# Patient Record
Sex: MALE | Race: WHITE | NOT HISPANIC OR LATINO | Employment: UNEMPLOYED | URBAN - METROPOLITAN AREA
[De-identification: names, ages, dates, MRNs, and addresses within clinical notes are randomized per-mention and may not be internally consistent; named-entity substitution may affect disease eponyms.]

---

## 2017-02-23 ENCOUNTER — ALLSCRIPTS OFFICE VISIT (OUTPATIENT)
Dept: OTHER | Facility: OTHER | Age: 27
End: 2017-02-23

## 2017-03-23 ENCOUNTER — ALLSCRIPTS OFFICE VISIT (OUTPATIENT)
Dept: OTHER | Facility: OTHER | Age: 27
End: 2017-03-23

## 2017-04-03 ENCOUNTER — ALLSCRIPTS OFFICE VISIT (OUTPATIENT)
Dept: OTHER | Facility: OTHER | Age: 27
End: 2017-04-03

## 2017-04-03 DIAGNOSIS — Q66.51 CONGENITAL PES PLANUS OF RIGHT FOOT: ICD-10-CM

## 2017-04-12 ENCOUNTER — APPOINTMENT (OUTPATIENT)
Dept: PHYSICAL THERAPY | Facility: REHABILITATION | Age: 27
End: 2017-04-12
Payer: COMMERCIAL

## 2017-04-12 PROCEDURE — 97162 PT EVAL MOD COMPLEX 30 MIN: CPT

## 2017-04-17 ENCOUNTER — GENERIC CONVERSION - ENCOUNTER (OUTPATIENT)
Dept: OTHER | Facility: OTHER | Age: 27
End: 2017-04-17

## 2017-04-20 ENCOUNTER — APPOINTMENT (OUTPATIENT)
Dept: PHYSICAL THERAPY | Facility: REHABILITATION | Age: 27
End: 2017-04-20
Payer: COMMERCIAL

## 2017-04-20 PROCEDURE — 97010 HOT OR COLD PACKS THERAPY: CPT

## 2017-04-20 PROCEDURE — 97140 MANUAL THERAPY 1/> REGIONS: CPT

## 2017-04-20 PROCEDURE — 97110 THERAPEUTIC EXERCISES: CPT

## 2017-04-28 ENCOUNTER — APPOINTMENT (OUTPATIENT)
Dept: PHYSICAL THERAPY | Facility: REHABILITATION | Age: 27
End: 2017-04-28
Payer: COMMERCIAL

## 2017-04-28 PROCEDURE — 97140 MANUAL THERAPY 1/> REGIONS: CPT

## 2017-04-28 PROCEDURE — 97110 THERAPEUTIC EXERCISES: CPT

## 2017-04-28 PROCEDURE — 97010 HOT OR COLD PACKS THERAPY: CPT

## 2017-05-03 ENCOUNTER — APPOINTMENT (OUTPATIENT)
Dept: PHYSICAL THERAPY | Facility: REHABILITATION | Age: 27
End: 2017-05-03
Payer: COMMERCIAL

## 2017-05-03 PROCEDURE — 97140 MANUAL THERAPY 1/> REGIONS: CPT

## 2017-05-03 PROCEDURE — 97010 HOT OR COLD PACKS THERAPY: CPT

## 2017-05-03 PROCEDURE — 97110 THERAPEUTIC EXERCISES: CPT

## 2017-05-10 ENCOUNTER — APPOINTMENT (OUTPATIENT)
Dept: PHYSICAL THERAPY | Facility: REHABILITATION | Age: 27
End: 2017-05-10
Payer: COMMERCIAL

## 2017-05-10 PROCEDURE — 97010 HOT OR COLD PACKS THERAPY: CPT

## 2017-05-10 PROCEDURE — 97110 THERAPEUTIC EXERCISES: CPT

## 2017-05-10 PROCEDURE — 97140 MANUAL THERAPY 1/> REGIONS: CPT

## 2017-05-12 ENCOUNTER — APPOINTMENT (OUTPATIENT)
Dept: PHYSICAL THERAPY | Facility: REHABILITATION | Age: 27
End: 2017-05-12
Payer: COMMERCIAL

## 2017-05-17 ENCOUNTER — APPOINTMENT (OUTPATIENT)
Dept: PHYSICAL THERAPY | Facility: REHABILITATION | Age: 27
End: 2017-05-17
Payer: COMMERCIAL

## 2017-05-17 PROCEDURE — 97140 MANUAL THERAPY 1/> REGIONS: CPT

## 2017-05-17 PROCEDURE — 97110 THERAPEUTIC EXERCISES: CPT

## 2017-05-17 PROCEDURE — 97010 HOT OR COLD PACKS THERAPY: CPT

## 2017-05-19 ENCOUNTER — ALLSCRIPTS OFFICE VISIT (OUTPATIENT)
Dept: OTHER | Facility: OTHER | Age: 27
End: 2017-05-19

## 2017-05-24 ENCOUNTER — APPOINTMENT (OUTPATIENT)
Dept: PHYSICAL THERAPY | Facility: REHABILITATION | Age: 27
End: 2017-05-24
Payer: COMMERCIAL

## 2017-05-24 PROCEDURE — 97110 THERAPEUTIC EXERCISES: CPT

## 2017-05-24 PROCEDURE — 97140 MANUAL THERAPY 1/> REGIONS: CPT

## 2017-05-25 ENCOUNTER — TRANSCRIBE ORDERS (OUTPATIENT)
Dept: ADMINISTRATIVE | Facility: HOSPITAL | Age: 27
End: 2017-05-25

## 2017-05-25 ENCOUNTER — ALLSCRIPTS OFFICE VISIT (OUTPATIENT)
Dept: OTHER | Facility: OTHER | Age: 27
End: 2017-05-25

## 2017-05-25 DIAGNOSIS — M54.16 LUMBAR RADICULOPATHY: Primary | ICD-10-CM

## 2017-05-30 ENCOUNTER — APPOINTMENT (OUTPATIENT)
Dept: PHYSICAL THERAPY | Facility: REHABILITATION | Age: 27
End: 2017-05-30
Payer: COMMERCIAL

## 2017-06-01 ENCOUNTER — APPOINTMENT (OUTPATIENT)
Dept: PHYSICAL THERAPY | Facility: REHABILITATION | Age: 27
End: 2017-06-01
Payer: COMMERCIAL

## 2017-06-07 ENCOUNTER — APPOINTMENT (OUTPATIENT)
Dept: PHYSICAL THERAPY | Facility: REHABILITATION | Age: 27
End: 2017-06-07
Payer: COMMERCIAL

## 2017-06-08 ENCOUNTER — GENERIC CONVERSION - ENCOUNTER (OUTPATIENT)
Dept: OTHER | Facility: OTHER | Age: 27
End: 2017-06-08

## 2017-06-09 ENCOUNTER — APPOINTMENT (OUTPATIENT)
Dept: PHYSICAL THERAPY | Facility: REHABILITATION | Age: 27
End: 2017-06-09
Payer: COMMERCIAL

## 2017-06-09 ENCOUNTER — HOSPITAL ENCOUNTER (OUTPATIENT)
Dept: NEUROLOGY | Facility: HOSPITAL | Age: 27
Discharge: HOME | End: 2017-06-09
Attending: PODIATRIST
Payer: COMMERCIAL

## 2017-06-09 DIAGNOSIS — R20.2 PARESTHESIA OF LEFT FOOT: ICD-10-CM

## 2017-06-09 DIAGNOSIS — M54.16 LUMBAR RADICULOPATHY: ICD-10-CM

## 2017-06-09 PROCEDURE — 95910 NRV CNDJ TEST 7-8 STUDIES: CPT

## 2017-06-09 PROCEDURE — 95885 MUSC TST DONE W/NERV TST LIM: CPT

## 2017-06-12 ENCOUNTER — ALLSCRIPTS OFFICE VISIT (OUTPATIENT)
Dept: OTHER | Facility: OTHER | Age: 27
End: 2017-06-12

## 2017-06-15 ENCOUNTER — ALLSCRIPTS OFFICE VISIT (OUTPATIENT)
Dept: OTHER | Facility: OTHER | Age: 27
End: 2017-06-15

## 2017-06-15 DIAGNOSIS — Z13.6 ENCOUNTER FOR SCREENING FOR CARDIOVASCULAR DISORDERS: ICD-10-CM

## 2017-06-21 ENCOUNTER — APPOINTMENT (OUTPATIENT)
Dept: PHYSICAL THERAPY | Facility: REHABILITATION | Age: 27
End: 2017-06-21
Payer: COMMERCIAL

## 2017-06-21 ENCOUNTER — GENERIC CONVERSION - ENCOUNTER (OUTPATIENT)
Dept: OTHER | Facility: OTHER | Age: 27
End: 2017-06-21

## 2017-06-21 PROCEDURE — 97010 HOT OR COLD PACKS THERAPY: CPT

## 2017-06-21 PROCEDURE — 97110 THERAPEUTIC EXERCISES: CPT

## 2017-06-21 PROCEDURE — 97140 MANUAL THERAPY 1/> REGIONS: CPT

## 2017-06-22 ENCOUNTER — GENERIC CONVERSION - ENCOUNTER (OUTPATIENT)
Dept: OTHER | Facility: OTHER | Age: 27
End: 2017-06-22

## 2017-06-22 LAB
A/G RATIO (HISTORICAL): 1.8 (ref 1.2–2.2)
ALBUMIN SERPL BCP-MCNC: 4.4 G/DL (ref 3.5–5.5)
ALP SERPL-CCNC: 71 IU/L (ref 39–117)
ALT SERPL W P-5'-P-CCNC: 37 IU/L (ref 0–44)
AST SERPL W P-5'-P-CCNC: 24 IU/L (ref 0–40)
BILIRUB SERPL-MCNC: 0.4 MG/DL (ref 0–1.2)
BUN SERPL-MCNC: 15 MG/DL (ref 6–20)
BUN/CREA RATIO (HISTORICAL): 17 (ref 9–20)
CALCIUM SERPL-MCNC: 9.8 MG/DL (ref 8.7–10.2)
CHLORIDE SERPL-SCNC: 99 MMOL/L (ref 96–106)
CHOLEST SERPL-MCNC: 198 MG/DL (ref 100–199)
CO2 SERPL-SCNC: 23 MMOL/L (ref 18–29)
CREAT SERPL-MCNC: 0.86 MG/DL (ref 0.76–1.27)
EGFR AFRICAN AMERICAN (HISTORICAL): 139 ML/MIN/1.73
EGFR-AMERICAN CALC (HISTORICAL): 121 ML/MIN/1.73
GLUCOSE SERPL-MCNC: 91 MG/DL (ref 65–99)
HDLC SERPL-MCNC: 38 MG/DL
INTERPRETATION (HISTORICAL): NORMAL
LDLC SERPL CALC-MCNC: 133 MG/DL (ref 0–99)
POTASSIUM SERPL-SCNC: 4.4 MMOL/L (ref 3.5–5.2)
SODIUM SERPL-SCNC: 139 MMOL/L (ref 134–144)
TOT. GLOBULIN, SERUM (HISTORICAL): 2.4 G/DL (ref 1.5–4.5)
TOTAL PROTEIN (HISTORICAL): 6.8 G/DL (ref 6–8.5)
TRIGL SERPL-MCNC: 134 MG/DL (ref 0–149)

## 2017-06-26 ENCOUNTER — APPOINTMENT (OUTPATIENT)
Dept: PHYSICAL THERAPY | Facility: REHABILITATION | Age: 27
End: 2017-06-26
Payer: COMMERCIAL

## 2017-06-26 PROCEDURE — 97110 THERAPEUTIC EXERCISES: CPT

## 2017-06-26 PROCEDURE — 97140 MANUAL THERAPY 1/> REGIONS: CPT

## 2017-06-30 ENCOUNTER — APPOINTMENT (OUTPATIENT)
Dept: PHYSICAL THERAPY | Facility: REHABILITATION | Age: 27
End: 2017-06-30
Payer: COMMERCIAL

## 2017-06-30 PROCEDURE — 97010 HOT OR COLD PACKS THERAPY: CPT

## 2017-06-30 PROCEDURE — 97140 MANUAL THERAPY 1/> REGIONS: CPT

## 2017-06-30 PROCEDURE — 97110 THERAPEUTIC EXERCISES: CPT

## 2017-07-03 ENCOUNTER — APPOINTMENT (OUTPATIENT)
Dept: PHYSICAL THERAPY | Facility: REHABILITATION | Age: 27
End: 2017-07-03
Payer: COMMERCIAL

## 2017-07-03 PROCEDURE — 97010 HOT OR COLD PACKS THERAPY: CPT

## 2017-07-03 PROCEDURE — 97140 MANUAL THERAPY 1/> REGIONS: CPT

## 2017-07-03 PROCEDURE — 97110 THERAPEUTIC EXERCISES: CPT

## 2017-07-06 ENCOUNTER — APPOINTMENT (OUTPATIENT)
Dept: PHYSICAL THERAPY | Facility: REHABILITATION | Age: 27
End: 2017-07-06
Payer: COMMERCIAL

## 2017-07-06 PROCEDURE — 97110 THERAPEUTIC EXERCISES: CPT

## 2017-07-06 PROCEDURE — 97010 HOT OR COLD PACKS THERAPY: CPT

## 2017-07-06 PROCEDURE — 97140 MANUAL THERAPY 1/> REGIONS: CPT

## 2017-07-11 ENCOUNTER — APPOINTMENT (OUTPATIENT)
Dept: PHYSICAL THERAPY | Facility: REHABILITATION | Age: 27
End: 2017-07-11
Payer: COMMERCIAL

## 2017-07-13 ENCOUNTER — APPOINTMENT (OUTPATIENT)
Dept: PHYSICAL THERAPY | Facility: REHABILITATION | Age: 27
End: 2017-07-13
Payer: COMMERCIAL

## 2017-07-13 PROCEDURE — 97110 THERAPEUTIC EXERCISES: CPT

## 2017-07-13 PROCEDURE — 97140 MANUAL THERAPY 1/> REGIONS: CPT

## 2017-07-13 PROCEDURE — 97010 HOT OR COLD PACKS THERAPY: CPT

## 2017-07-18 ENCOUNTER — APPOINTMENT (OUTPATIENT)
Dept: PHYSICAL THERAPY | Facility: REHABILITATION | Age: 27
End: 2017-07-18
Payer: COMMERCIAL

## 2017-07-18 PROCEDURE — 97010 HOT OR COLD PACKS THERAPY: CPT

## 2017-07-18 PROCEDURE — 97140 MANUAL THERAPY 1/> REGIONS: CPT

## 2017-07-18 PROCEDURE — 97110 THERAPEUTIC EXERCISES: CPT

## 2017-07-20 ENCOUNTER — APPOINTMENT (OUTPATIENT)
Dept: PHYSICAL THERAPY | Facility: REHABILITATION | Age: 27
End: 2017-07-20
Payer: COMMERCIAL

## 2017-07-20 PROCEDURE — 97140 MANUAL THERAPY 1/> REGIONS: CPT

## 2017-07-20 PROCEDURE — 97010 HOT OR COLD PACKS THERAPY: CPT

## 2017-07-20 PROCEDURE — 97110 THERAPEUTIC EXERCISES: CPT

## 2017-07-25 ENCOUNTER — APPOINTMENT (OUTPATIENT)
Dept: PHYSICAL THERAPY | Facility: REHABILITATION | Age: 27
End: 2017-07-25
Payer: COMMERCIAL

## 2017-07-25 PROCEDURE — 97140 MANUAL THERAPY 1/> REGIONS: CPT

## 2017-07-25 PROCEDURE — 97010 HOT OR COLD PACKS THERAPY: CPT

## 2017-07-25 PROCEDURE — 97110 THERAPEUTIC EXERCISES: CPT

## 2017-07-27 ENCOUNTER — ALLSCRIPTS OFFICE VISIT (OUTPATIENT)
Dept: OTHER | Facility: OTHER | Age: 27
End: 2017-07-27

## 2017-07-27 ENCOUNTER — APPOINTMENT (OUTPATIENT)
Dept: PHYSICAL THERAPY | Facility: REHABILITATION | Age: 27
End: 2017-07-27
Payer: COMMERCIAL

## 2017-07-27 PROCEDURE — 97110 THERAPEUTIC EXERCISES: CPT

## 2017-07-27 PROCEDURE — 97010 HOT OR COLD PACKS THERAPY: CPT

## 2017-07-27 PROCEDURE — 97140 MANUAL THERAPY 1/> REGIONS: CPT

## 2017-07-31 ENCOUNTER — APPOINTMENT (OUTPATIENT)
Dept: PHYSICAL THERAPY | Facility: REHABILITATION | Age: 27
End: 2017-07-31
Payer: COMMERCIAL

## 2017-08-02 ENCOUNTER — GENERIC CONVERSION - ENCOUNTER (OUTPATIENT)
Dept: OTHER | Facility: OTHER | Age: 27
End: 2017-08-02

## 2017-08-16 ENCOUNTER — ALLSCRIPTS OFFICE VISIT (OUTPATIENT)
Dept: OTHER | Facility: OTHER | Age: 27
End: 2017-08-16

## 2017-08-21 ENCOUNTER — ALLSCRIPTS OFFICE VISIT (OUTPATIENT)
Dept: OTHER | Facility: OTHER | Age: 27
End: 2017-08-21

## 2017-09-08 ENCOUNTER — GENERIC CONVERSION - ENCOUNTER (OUTPATIENT)
Dept: OTHER | Facility: OTHER | Age: 27
End: 2017-09-08

## 2017-09-12 ENCOUNTER — GENERIC CONVERSION - ENCOUNTER (OUTPATIENT)
Dept: OTHER | Facility: OTHER | Age: 27
End: 2017-09-12

## 2017-09-14 ENCOUNTER — GENERIC CONVERSION - ENCOUNTER (OUTPATIENT)
Dept: OTHER | Facility: OTHER | Age: 27
End: 2017-09-14

## 2017-10-13 ENCOUNTER — GENERIC CONVERSION - ENCOUNTER (OUTPATIENT)
Dept: OTHER | Facility: OTHER | Age: 27
End: 2017-10-13

## 2017-10-13 ENCOUNTER — TRANSCRIBE ORDERS (OUTPATIENT)
Dept: ADMINISTRATIVE | Facility: HOSPITAL | Age: 27
End: 2017-10-13

## 2017-10-13 DIAGNOSIS — M72.2 PLANTAR FASCIAL FIBROMATOSIS: Primary | ICD-10-CM

## 2017-10-24 ENCOUNTER — HOSPITAL ENCOUNTER (OUTPATIENT)
Dept: RADIOLOGY | Facility: HOSPITAL | Age: 27
Discharge: HOME | End: 2017-10-24
Attending: PODIATRIST
Payer: COMMERCIAL

## 2017-10-24 ENCOUNTER — GENERIC CONVERSION - ENCOUNTER (OUTPATIENT)
Dept: OTHER | Facility: OTHER | Age: 27
End: 2017-10-24

## 2017-10-24 DIAGNOSIS — M72.2 PLANTAR FASCIAL FIBROMATOSIS: ICD-10-CM

## 2017-10-24 PROCEDURE — 73721 MRI JNT OF LWR EXTRE W/O DYE: CPT

## 2017-10-27 ENCOUNTER — ALLSCRIPTS OFFICE VISIT (OUTPATIENT)
Dept: OTHER | Facility: OTHER | Age: 27
End: 2017-10-27

## 2017-10-27 ENCOUNTER — GENERIC CONVERSION - ENCOUNTER (OUTPATIENT)
Dept: OTHER | Facility: OTHER | Age: 27
End: 2017-10-27

## 2017-10-28 NOTE — PROGRESS NOTES
Assessment  1  Achilles tendinitis of left lower extremity (726 71) (M76 62)  2  GE reflux (530 81) (K21 9)  3  Obsessive compulsive disorder (300 3) (F42 9)  4  BMI 38 0-38 9,adult (V85 38) (Z68 38)  5  Bony exostosis (726 91) (M89 8X9)1   6  Obesity (BMI 30-39 9) (278 00) (E66 9)     1 Amended By: Sammi Soria ; Oct 27 2017 10:07 PM EST    Discussion/Summary  Surgical Clearance: He is at a LOW risk from a cardiovascular standpoint at this time without any additional cardiac testing  Reevaluation needed, if he should present with symptoms prior to surgery/procedure  Comments:  no labs reviewed  Surgical clearance faxed to Dr Silvia Jernigan   Additional medical information:  holding vit D for now  The patient was counseled regarding risk factor reductions,-- impressions  take usual am rx meds with water on day of surgery, nsaids should be held unless ok with surgeon   Possible side effects of new medications were reviewed with the patient/guardian today  The treatment plan was reviewed with the patient/guardian  The patient/guardian understands and agrees with the treatment plan      Chief Complaint  pt present for Pre-op clearance for surgery on Left ankle  ac/cma      History of Present Illness  Pre-Op Visit (Brief): The patient is being seen for a preoperative visit-- and-- left ankle surgery, Dr Silvia Jernigan, 1w from now  Surgical Risk Assessment:   Prior Anesthesia: He had no prior anesthesia  Exercise Capacity: able to walk four blocks without symptoms-- and-- able to walk two flights of stairs without symptoms  Lifestyle Factors: denies alcohol use and denies tobacco use  Symptoms: no easy bleeding,-- no easy bruising,-- no chest pain-- and-- no dyspnea  HPI: chronic left ankle painnormal function/walking causing discomfortpain from the condition      Review of Systems    Constitutional: no fever  Cardiovascular: no chest pain  Respiratory: no shortness of breath  Active Problems  1   Achilles tendinitis of left lower extremity (726 71) (M76 62)  2  Acne (706 1) (L70 9)  3  ADD (attention deficit disorder) without hyperactivity (314 00) (F98 8)  4  Allergic rhinitis (477 9) (J30 9)  5  Aphthous ulcer (528 2) (K12 0)  6  Arthralgia of left foot (719 47) (M25 572)  7  Bony exostosis (726 91) (M89 8X9)  8  Congenital pes planus of left foot (754 61) (Q66 52)  9  Congenital pes planus of right foot (754 61) (Q66 51)  10  Depression, unspecified depression type (311) (F32 9)  11  Encounter for hearing evaluation (V72 19) (Z01 10)  12  Epigastric discomfort (789 06) (R10 13)  13  External hemorrhoids (455 3) (K64 4)  14  GE reflux (530 81) (K21 9)  15  GERD (gastroesophageal reflux disease) (530 81) (K21 9)  16  Immunization due (V05 9) (Z23)  17  Lumbar radiculopathy (724 4) (M54 16)  18  Obsessive compulsive disorder (300 3) (F42 9)  19  Plantar fibromatosis (728 71) (M72 2)  20  Screening for cardiovascular, respiratory, and genitourinary diseases    (V81 2,V81 4,V81 6) (Z13 6,Z13 83,Z13 89)  21  Screening for diabetes mellitus (DM) (V77 1) (Z13 1)  22  Sleep apnea (780 57) (G47 30)  23  Tarsal tunnel syndrome of left side (355 5) (G57 52)  24  Thyroid disorder screening (V77 0) (Z13 29)  25   Well adult on routine health check (V70 0) (Z00 00)    Past Medical History   · History of Acquired ankle/foot deformity (736 70) (M21 969)   · Acute asthmatic bronchitis (493 90) (J45 909)   · History of Acute maxillary sinusitis (461 0) (J01 00)   · History of Acute upper respiratory infection (465 9) (J06 9)   · History of Change in hearing (389 9) (H91 90)   · History of Closed nondisplaced avulsion fracture of tuberosity of left calcaneus, initial  encounter (825 0) (M13 954O)   · History of Foreign Body In The Foot (917 6)   · History of acne (V13 3) (Z87 2)   · History of acute bronchitis (V12 69) (Z87 09)   · History of acute gastritis (V12 70) (Z87 19)   · History of acute pharyngitis (V12 69) (Z87 09)   · History of acute sinusitis (V12 69) (Z87 09)   · History of acute sinusitis (V12 69) (Z87 09)   · History of allergic rhinitis (V12 69) (Z87 09)   · History of allergic rhinitis (V12 69) (Z87 09)   · History of epistaxis (V12 69) (D20 168)   · History of impacted cerumen (V12 49) (Z86 69)   · History of nausea and vomiting (V12 79) (M24 209)   · History of oral aphthous ulcers (V12 79) (Z87 19)   · History of tinea corporis (V12 09) (Z86 19)   · History of Impacted cerumen, unspecified laterality (380 4) (H61 20)   · History of Infective otitis externa, unspecified laterality (380 10) (H60 399)   · History of Joint pain, knee (719 46) (M25 569)   · History of Left foot pain (729 5) (M79 672)   · History of Noninfectious Dermatological Conditions (709 9)   · History of Otitis media, unspecified laterality   · History of Otitis media, unspecified laterality   · History of Plantar fibromatosis (728 71) (M72 2)   · 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)   · History of Tarsal tunnel syndrome of left side (355 5) (G57 52)    Surgical History   · Denied: History of Reported Prior Surgical / Procedural History    Family History  Mother    · Family history of Allergic Rhinitis   · Denied: Family history of colon cancer   · Denied: Family history of colonic polyps   · Denied: Family history of liver disease  Father    · Denied: Family history of colon cancer   · Denied: Family history of colonic polyps   · Denied: Family history of liver disease   · Family history of Gout (V18 19)   · Family history of Hypertension (V17 49)  Maternal Grandmother    · Family history of colitis (V18 59) (Z83 79)   · Family history of colonic polyps (V18 51) (Z83 71)  Maternal Grandfather    · Family history of colitis (V18 59) (Z83 79)  Family History    · Family history of Cancer   · Family history of Diabetes Mellitus (V18 0)    The family history was reviewed and updated today         Social History   · Feels safe at home   · Never A Smoker   · No alcohol use  The social history was reviewed and updated today  Current Meds  1  B Complex Oral Capsule; once daily; Therapy: 23VLQ4251 to Recorded  2  Beano Oral Tablet; two before each meals; Therapy: 28QDZ2665 to Recorded  3  Budesonide SUSP; INHALE ML Daily; Therapy: (Recorded:26Oct2017) to Recorded  4  Etodolac 400 MG Oral Tablet; TAKE 1 TABLET TWICE DAILY WITH MEALS; Therapy: 96FMS1083 to (Evaluate:47Rxq5108)  Requested for: 24Oct2017; Last   Rx:18Gls7437 Ordered  5  FluvoxaMINE Maleate  MG Oral Capsule Extended Release 24 Hour; 1 Every Day   At Bedtime; Therapy: 56Bst9639 to (Last Rx:12Oct2013)  Requested for: 24Oct2017 Ordered  6  HydrOXYzine HCl - 25 MG Oral Tablet; TAKE 1 TABLET TWICE DAILY; Therapy: 94EFB8581 to (Evaluate:30Oct2017)  Requested for: 27Oct2017; Last   Rx:27Oct2017 Ordered  7  LevoFLOXacin 500 MG Oral Tablet; TAKE 1 TABLET DAILY; Therapy: 18WVB5771 to (Markus Chaney)  Requested for: 04MXO8489; Last   Rx:27Oct2017 Ordered  8  Montelukast Sodium 10 MG Oral Tablet; TAKE ONE TABLET BY MOUTH ONCE DAILY; Therapy: 48VRQ5801 to (Evaluate:22Jan2018)  Requested for: 24Oct2017; Last   Rx:40Xlo0716 Ordered  9  Multi-Day Oral Tablet; TAKE 1 TABLET DAILY; Therapy: 76WYN9423 to Recorded  10  Omeprazole 40 MG Oral Capsule Delayed Release; TAKE ONE CAPSULE BY MOUTH    ONCE DAILY BEFORE  A  MEAL; Therapy: 56QSP8930 to (Evaluate:73Lcx1261)  Requested for: 24Oct2017; Last    Rx:24Oct2017 Ordered  11  Osteo Bi-Flex Regular Strength TABS; Take 1 tablet daily; Therapy: (Recorded:26Oct2017) to Recorded  12  Oxycodone-Acetaminophen  MG Oral Tablet; TAKE 1 TABLET 4 TIMES DAILY AS    NEEDED FOR PAIN;    Therapy: 33QTM4556 to (Evaluate:06Nov2017); Last Rx:27Oct2017 Ordered  13  Oxymetazoline HCl 0 05 % SOLN; USE AS DIRECTED; Therapy: (Recorded:26Oct2017) to Recorded  14  Sleep Aid CAPS; take 1 capsule daily;     Therapy: (Recorded:26Oct2017) to Recorded  15  Vitamin C TABS; TAKE 1 TABLET DAILY; Therapy: (Recorded:61Yll6645) to Recorded  16  Vitamin D3 1000 UNIT Oral Capsule; take 1 capsule daily; Therapy: (Recorded:14Otk7405) to Recorded    Allergies  1  Penicillins  2  Sulfa Drugs  3  Seasonal    Vitals   Recorded: 98UNE9931 12:17PM   Temperature 97 8 F   Heart Rate 72   Respiration 16   Systolic 975   Diastolic 86   Height 5 ft 7 in   Weight 248 lb    BMI Calculated 38 84   BSA Calculated 2 22     Physical Exam    Constitutional   General appearance: No acute distress, well appearing and well nourished  Eyes   Conjunctiva and lids: No erythema, swelling or discharge  Pupils and irises: Equal, round, reactive to light  Ears, Nose, Mouth, and Throat   External inspection of ears and nose: Normal     Otoscopic examination: Tympanic membranes translucent with normal light reflex  Canals patent without erythema  Nasal mucosa, septum, and turbinates: Normal without edema or erythema  Lips, teeth, and gums: Normal, good dentition  Oropharynx: Normal with no erythema, edema, exudate or lesions  Neck   Neck: Supple, symmetric, trachea midline, no masses  Pulmonary   Respiratory effort: No increased work of breathing or signs of respiratory distress  Auscultation of lungs: Clear to auscultation  Cardiovascular   Auscultation of heart: Normal rate and rhythm, normal S1 and S2, no murmurs  Carotid pulses: 2+ bilaterally  Pedal pulses: 2+ bilaterally  Examination of extremities for edema and/or varicosities: Normal     Abdomen   Abdomen: Abnormal   The abdomen was obese  Lymphatic   Palpation of lymph nodes in neck: No lymphadenopathy  Musculoskeletal   Gait and station: Normal     Inspection/palpation of digits and nails: Normal without clubbing or cyanosis  Inspection/palpation of joints, bones, and muscles: Abnormal  -- right heel deformity     Range of motion: Normal     Muscle strength/tone: Normal     Skin Skin and subcutaneous tissue: Normal without rashes or lesions  Palpation of skin and subcutaneous tissue: Normal turgor  Neurologic   Reflexes: 2+ and symmetric  Sensation: No sensory loss  Psychiatric   Judgment and insight: Normal     Mood and affect: Normal        End of Encounter Meds  1  Budesonide SUSP; INHALE ML Daily; Therapy: (Recorded:26Oct2017) to Recorded  2  Montelukast Sodium 10 MG Oral Tablet; TAKE ONE TABLET BY MOUTH ONCE DAILY; Therapy: 41AJW6024 to (Evaluate:22Jan2018)  Requested for: 09ZBL5939; Last   Rx:66Qcw0297 Ordered  3  Oxymetazoline HCl 0 05 % SOLN; USE AS DIRECTED; Therapy: (Recorded:26Oct2017) to Recorded  4  Etodolac 400 MG Oral Tablet; TAKE 1 TABLET TWICE DAILY WITH MEALS; Therapy: 24VXO0275 to (Evaluate:28Sep2017)  Requested for: 24Oct2017; Last   Rx:08Sep2017 Ordered  5  HydrOXYzine HCl - 25 MG Oral Tablet; TAKE 1 TABLET TWICE DAILY; Therapy: 86QIV4602 to (Evaluate:30Oct2017)  Requested for: 27Oct2017; Last   Rx:27Oct2017 Ordered  6  LevoFLOXacin 500 MG Oral Tablet; TAKE 1 TABLET DAILY; Therapy: 21HBP4752 to (Echo Stiles)  Requested for: 84JGM2215; Last   Rx:27Oct2017 Ordered  7  Oxycodone-Acetaminophen  MG Oral Tablet; TAKE 1 TABLET 4 TIMES DAILY AS   NEEDED FOR PAIN;   Therapy: 39PXI9631 to (Evaluate:06Nov2017); Last Rx:27Oct2017 Ordered  8  Omeprazole 40 MG Oral Capsule Delayed Release; TAKE ONE CAPSULE BY MOUTH   ONCE DAILY BEFORE  A  MEAL; Therapy: 23IVA7744 to (Evaluate:82Vhc8844)  Requested for: 24Oct2017; Last   Rx:24Oct2017 Ordered  9  B Complex Oral Capsule; once daily; Therapy: 23RHA6432 to Recorded  10  Beano Oral Tablet; two before each meals; Therapy: 11WAA3157 to Recorded  11  Multi-Day Oral Tablet; TAKE 1 TABLET DAILY; Therapy: 45ILZ2364 to Recorded  12  Osteo Bi-Flex Regular Strength TABS; Take 1 tablet daily; Therapy: (Recorded:26Oct2017) to Recorded  13  Sleep Aid CAPS; take 1 capsule daily;     Therapy: ([phone] 15) to Recorded  14  Vitamin C TABS; TAKE 1 TABLET DAILY; Therapy: (Recorded:26Oct2017) to Recorded  15  Vitamin D3 1000 UNIT Oral Capsule; take 1 capsule daily; Therapy: (Recorded:26Oct2017) to Recorded  16  FluvoxaMINE Maleate  MG Oral Capsule Extended Release 24 Hour (Luvox CR);    1 Every Day At Bedtime; Therapy: 18Tbc0449 to (Last Rx:88Owr2447)  Requested for: 24Oct2017 Ordered    Future Appointments    Date/Time Provider Specialty Site   11/03/2017 01:00 PM Bebeto Loera DPM Podiatry [facility]   11/07/2017 11:00 AM Bebeto Loera DPM [facility]   10/31/2017 09:00 AM TERRENCE Davis   Gastroenterology [facility]     Signatures   Electronically signed by : Yvette Díaz DO; Oct 27 2017 10:08PM EST                       (Author)

## 2017-10-30 ENCOUNTER — APPOINTMENT (OUTPATIENT)
Dept: LAB | Facility: HOSPITAL | Age: 27
End: 2017-10-30
Attending: PODIATRIST
Payer: COMMERCIAL

## 2017-10-30 ENCOUNTER — APPOINTMENT (OUTPATIENT)
Dept: PREADMISSION TESTING | Facility: HOSPITAL | Age: 27
End: 2017-10-30
Payer: COMMERCIAL

## 2017-10-30 ENCOUNTER — ANESTHESIA EVENT (OUTPATIENT)
Dept: GASTROENTEROLOGY | Facility: AMBULARY SURGERY CENTER | Age: 27
End: 2017-10-30
Payer: COMMERCIAL

## 2017-10-30 ENCOUNTER — TRANSCRIBE ORDERS (OUTPATIENT)
Dept: ADMINISTRATIVE | Facility: HOSPITAL | Age: 27
End: 2017-10-30

## 2017-10-30 VITALS — WEIGHT: 245 LBS | BODY MASS INDEX: 38.45 KG/M2 | HEIGHT: 67 IN

## 2017-10-30 DIAGNOSIS — Z01.818 ENCOUNTER FOR PREADMISSION TESTING: Primary | ICD-10-CM

## 2017-10-30 LAB
BASOPHILS # BLD AUTO: 0 THOUSANDS/ΜL (ref 0–0.1)
BASOPHILS NFR BLD AUTO: 0 % (ref 0–1)
EOSINOPHIL # BLD AUTO: 0.1 THOUSAND/ΜL (ref 0–0.61)
EOSINOPHIL NFR BLD AUTO: 1 % (ref 0–6)
ERYTHROCYTE [DISTWIDTH] IN BLOOD BY AUTOMATED COUNT: 13.1 % (ref 11.6–15.1)
HCT VFR BLD AUTO: 47.1 % (ref 42–52)
HGB BLD-MCNC: 15.4 G/DL (ref 14–18)
LYMPHOCYTES # BLD AUTO: 2.5 THOUSANDS/ΜL (ref 0.6–4.47)
LYMPHOCYTES NFR BLD AUTO: 30 % (ref 14–44)
MCH RBC QN AUTO: 30.5 PG (ref 27–31)
MCHC RBC AUTO-ENTMCNC: 32.8 G/DL (ref 31.4–37.4)
MCV RBC AUTO: 93 FL (ref 82–98)
MONOCYTES # BLD AUTO: 0.8 THOUSAND/ΜL (ref 0.17–1.22)
MONOCYTES NFR BLD AUTO: 9 % (ref 4–12)
NEUTROPHILS # BLD AUTO: 5.1 THOUSANDS/ΜL (ref 1.85–7.62)
NEUTS SEG NFR BLD AUTO: 60 % (ref 43–75)
NRBC BLD AUTO-RTO: 0 /100 WBCS
PLATELET # BLD AUTO: 268 THOUSANDS/UL (ref 130–400)
PMV BLD AUTO: 8 FL (ref 8.9–12.7)
RBC # BLD AUTO: 5.05 MILLION/UL (ref 4.7–6.1)
WBC # BLD AUTO: 8.5 THOUSAND/UL (ref 4.8–10.8)

## 2017-10-30 PROCEDURE — 36415 COLL VENOUS BLD VENIPUNCTURE: CPT | Performed by: PODIATRIST

## 2017-10-30 PROCEDURE — 85025 COMPLETE CBC W/AUTO DIFF WBC: CPT | Performed by: PODIATRIST

## 2017-10-30 RX ORDER — MULTIVIT WITH MINERALS/LUTEIN
1000 TABLET ORAL DAILY
COMMUNITY
End: 2018-06-19

## 2017-10-30 RX ORDER — FLUVOXAMINE MALEATE 150 MG/1
CAPSULE, EXTENDED RELEASE ORAL
COMMUNITY
End: 2018-03-06

## 2017-10-30 RX ORDER — ALPHA-D-GALACTOSIDASE
TABLET ORAL
COMMUNITY
End: 2018-06-19

## 2017-10-30 RX ORDER — OXYMETAZOLINE HYDROCHLORIDE 0.05 G/100ML
2 SPRAY NASAL 2 TIMES DAILY
COMMUNITY

## 2017-10-30 RX ORDER — MONTELUKAST SODIUM 10 MG/1
10 TABLET ORAL
COMMUNITY
End: 2018-02-05 | Stop reason: SDUPTHER

## 2017-10-30 RX ORDER — OMEPRAZOLE 40 MG/1
40 CAPSULE, DELAYED RELEASE ORAL EVERY MORNING
COMMUNITY
End: 2018-07-17

## 2017-10-30 RX ORDER — DIPHENOXYLATE HYDROCHLORIDE AND ATROPINE SULFATE 2.5; .025 MG/1; MG/1
1 TABLET ORAL DAILY
COMMUNITY
End: 2018-06-19

## 2017-10-30 RX ORDER — VITAMIN B COMPLEX
1 CAPSULE ORAL DAILY
COMMUNITY
End: 2018-06-19

## 2017-10-30 NOTE — PRE-PROCEDURE INSTRUCTIONS
Pre-Surgery Instructions:   Medication Instructions    Alpha-D-Galactosidase (BEANO) TABS Instructed patient per Anesthesia Guidelines   Ascorbic Acid (VITAMIN C) 1000 MG tablet Instructed patient per Anesthesia Guidelines   b complex vitamins capsule Instructed patient per Anesthesia Guidelines   budesonide (RINOCORT AQUA) 32 MCG/ACT nasal spray Instructed patient per Anesthesia Guidelines   Cholecalciferol (VITAMIN D3) 2000 units CHEW Instructed patient per Anesthesia Guidelines   fexofenadine (ALLEGRA) 30 MG tablet Instructed patient per Anesthesia Guidelines   Fluvoxamine Maleate (LUVOX CR) 150 MG CP24 Instructed patient per Anesthesia Guidelines   Misc Natural Products (OSTEO BI-FLEX JOINT SHIELD PO) Instructed patient per Anesthesia Guidelines   montelukast (SINGULAIR) 10 mg tablet Instructed patient per Anesthesia Guidelines   multivitamin (THERAGRAN) TABS Instructed patient per Anesthesia Guidelines   NON FORMULARY Instructed patient per Anesthesia Guidelines   omeprazole (PriLOSEC) 40 MG capsule Instructed patient per Anesthesia Guidelines   oxymetazoline (AFRIN) 0 05 % nasal spray Instructed patient per Anesthesia Guidelines  Pre op instructions reviewed with pt  Instructed to take omeprazole a m of surgery  Crutch training implemented by PT Socorro Yip, pt has crutches at home   geremias Martinez [phone]  My Surgical Experience    The following information was developed to assist you to prepare for your operation  What do I need to do before coming to the hospital?   Arrange for a responsible person to drive you to and from the hospital    Arrange care for your children at home  Children are not allowed in the recovery areas of the hospital   Plan to wear clothing that is easy to put on and take off   If you are having shoulder surgery, wear a shirt that buttons or zippers in the front  Bathing  o Shower the evening before and the morning of your surgery with an antibacterial soap  Please refer to the Pre Op Showering Instructions for Surgery Patients Sheet   o Remove nail polish and all body piercing jewelry  o Do not shave any body part for at least 24 hours before surgery-this includes face, arms, legs and upper body  Food  o Nothing to eat or drink after midnight the night before your surgery  This includes candy and chewing gum  o Exception: If your surgery is after 12:00pm (noon), you may have clear liquids such as 7-Up®, ginger ale, apple or cranberry juice, Jell-O®, water, or clear broth until 8:00 am  o Do not drink milk or juice with pulp on the morning before surgery  o Do not drink alcohol 24 hours before surgery  Medicine  o Follow instructions you received from your surgeon about which medicines you may take on the day of surgery  o If instructed to take medicine on the morning of surgery, take pills with just a small sip of water  Call your prescribing doctor for specific information on what to do if you take insulin    What should I bring to the hospital?    Bring:  Swetha Shelling or a walker, if you have them, for foot or knee surgery   A list of the daily medicines, vitamins, minerals, herbals and nutritional supplements you take  Include the dosages of medicines and the time you take them each day   Glasses, dentures or hearing aids   Minimal clothing; you will be wearing hospital sleepwear   Photo ID; required to verify your identity   If you have a Living Will or Power of , bring a copy of the documents   If you have an ostomy, bring an extra pouch and any supplies you use    Do not bring   Medicines or inhalers   Money, valuables or jewelry    What other information should I know about the day of surgery?  Notify your surgeons if you develop a cold, sore throat, cough, fever, rash or any other illness     Report to the Ambulatory Surgical/Same Day Surgery Unit   You will be instructed to stop at Registration only if you have not been pre-registered   Inform your  fi they do not stay that they will be asked by the staff to leave a phone number where they can be reached   Be available to be reached before surgery  In the event the operating room schedule changes, you may be asked to come in earlier or later than expected    *It is important to tell your doctor and others involved in your health care if you are taking or have been taking any non-prescription drugs, vitamins, minerals, herbals or other nutritional supplements   Any of these may interact with some food or medicines and cause a reaction

## 2017-10-30 NOTE — ANESTHESIA PREPROCEDURE EVALUATION
Insomnia    Seasonal allergies    GERD (gastroesophageal reflux disease)    OCD (obsessive compulsive disorder)    Irritable bowel syndrome        Review of Systems/Medical History  Patient summary reviewed  Chart reviewed      Cardiovascular   Pulmonary       GI/Hepatic    GERD ,             Endo/Other     GYN       Hematology   Musculoskeletal  Obesity  morbid obesity,        Neurology   Psychology     Comment: OCD (obsessive compulsive disorder)         Physical Exam    Airway    Mallampati score: II  TM Distance: >3 FB  Neck ROM: full     Dental       Cardiovascular  Rhythm: regular, Rate: normal,     Pulmonary  Breath sounds clear to auscultation,     Other Findings        Anesthesia Plan  ASA Score- 2       Anesthesia Type- IV sedation with anesthesia with ASA Monitors  Additional Monitors:   Airway Plan:           Induction- intravenous  Informed Consent- Anesthetic plan and risks discussed with patient

## 2017-10-30 NOTE — PRE-PROCEDURE INSTRUCTIONS
Pre-Surgery Instructions:   Medication Instructions    Alpha-D-Galactosidase [facility]) TABS Patient was instructed by Physician and understands   Ascorbic Acid (VITAMIN C) 1000 MG tablet Patient was instructed by Physician and understands   b complex vitamins capsule Patient was instructed by Physician and understands   budesonide (RINOCORT AQUA) 32 MCG/ACT nasal spray Patient was instructed by Physician and understands   Cholecalciferol (VITAMIN D3) 2000 units CHEW Patient was instructed by Physician and understands   fexofenadine (ALLEGRA) 30 MG tablet Patient was instructed by Physician and understands   Fluvoxamine Maleate (LUVOX CR) 150 MG CP24 Patient was instructed by Physician and understands   Misc Natural Products (OSTEO BI-FLEX JOINT SHIELD PO) Patient was instructed by Physician and understands   montelukast (SINGULAIR) 10 mg tablet Patient was instructed by Physician and understands   multivitamin [facility]) TABS Patient was instructed by Physician and understands   NON FORMULARY Patient was instructed by Physician and understands   omeprazole (PriLOSEC) 40 MG capsule Patient was instructed by Physician and understands   oxymetazoline (AFRIN) 0 05 % nasal spray Patient was instructed by Physician and understands

## 2017-10-31 ENCOUNTER — HOSPITAL ENCOUNTER (OUTPATIENT)
Facility: AMBULARY SURGERY CENTER | Age: 27
Setting detail: OUTPATIENT SURGERY
Discharge: HOME | End: 2017-10-31
Attending: INTERNAL MEDICINE | Admitting: INTERNAL MEDICINE
Payer: COMMERCIAL

## 2017-10-31 ENCOUNTER — GENERIC CONVERSION - ENCOUNTER (OUTPATIENT)
Dept: OTHER | Facility: OTHER | Age: 27
End: 2017-10-31

## 2017-10-31 ENCOUNTER — ANESTHESIA (OUTPATIENT)
Dept: GASTROENTEROLOGY | Facility: AMBULARY SURGERY CENTER | Age: 27
End: 2017-10-31
Payer: COMMERCIAL

## 2017-10-31 VITALS
HEART RATE: 84 BPM | DIASTOLIC BLOOD PRESSURE: 54 MMHG | SYSTOLIC BLOOD PRESSURE: 119 MMHG | TEMPERATURE: 98.6 F | RESPIRATION RATE: 18 BRPM | OXYGEN SATURATION: 98 %

## 2017-10-31 DIAGNOSIS — K21.9 GASTRO-ESOPHAGEAL REFLUX DISEASE WITHOUT ESOPHAGITIS: ICD-10-CM

## 2017-10-31 PROCEDURE — 88342 IMHCHEM/IMCYTCHM 1ST ANTB: CPT | Performed by: INTERNAL MEDICINE

## 2017-10-31 PROCEDURE — 88305 TISSUE EXAM BY PATHOLOGIST: CPT | Performed by: INTERNAL MEDICINE

## 2017-10-31 RX ORDER — SODIUM CHLORIDE, SODIUM LACTATE, POTASSIUM CHLORIDE, CALCIUM CHLORIDE 600; 310; 30; 20 MG/100ML; MG/100ML; MG/100ML; MG/100ML
125 INJECTION, SOLUTION INTRAVENOUS CONTINUOUS
Status: DISCONTINUED | OUTPATIENT
Start: 2017-10-31 | End: 2017-10-31 | Stop reason: HOSPADM

## 2017-10-31 RX ORDER — PROPOFOL 10 MG/ML
INJECTION, EMULSION INTRAVENOUS AS NEEDED
Status: DISCONTINUED | OUTPATIENT
Start: 2017-10-31 | End: 2017-10-31 | Stop reason: SURG

## 2017-10-31 RX ADMIN — PROPOFOL 50 MG: 10 INJECTION, EMULSION INTRAVENOUS at 09:13

## 2017-10-31 RX ADMIN — PROPOFOL 100 MG: 10 INJECTION, EMULSION INTRAVENOUS at 09:08

## 2017-10-31 RX ADMIN — SODIUM CHLORIDE, SODIUM LACTATE, POTASSIUM CHLORIDE, AND CALCIUM CHLORIDE: .6; .31; .03; .02 INJECTION, SOLUTION INTRAVENOUS at 09:05

## 2017-10-31 RX ADMIN — SODIUM CHLORIDE, SODIUM LACTATE, POTASSIUM CHLORIDE, AND CALCIUM CHLORIDE 125 ML/HR: .6; .31; .03; .02 INJECTION, SOLUTION INTRAVENOUS at 08:28

## 2017-10-31 NOTE — H&P
History and Physical -  [facility]  Barbara High 33 y o  male MRN: [id number]    HPI: Barbara High is a 33y o  year old male who presents with chronic GERD  Review of Systems    Historical Information   Past Medical History:   Diagnosis Date    GERD (gastroesophageal reflux disease)     Insomnia     Irritable bowel syndrome     OCD (obsessive compulsive disorder)     Seasonal allergies      Past Surgical History:   Procedure Laterality Date    NO PAST SURGERIES       Social History   History   Alcohol Use No     History   Drug Use No     History   Smoking Status    Never Smoker   Smokeless Tobacco    Never Used     Family History   Problem Relation Age of Onset    Kidney disease Mother     Thyroid disease Mother     Hypertension Father     Kidney disease Father     Gout Father     No Known Problems Sister        Meds/Allergies     Prescriptions Prior to Admission   Medication    Alpha-D-Galactosidase (BEANO) TABS    Ascorbic Acid (VITAMIN C) 1000 MG tablet    b complex vitamins capsule    budesonide (RINOCORT AQUA) 32 MCG/ACT nasal spray    Cholecalciferol (VITAMIN D3) 2000 units CHEW    fexofenadine (ALLEGRA) 30 MG tablet    Fluvoxamine Maleate (LUVOX CR) 150 MG CP24    Misc Natural Products (OSTEO BI-FLEX JOINT SHIELD PO)    montelukast (SINGULAIR) 10 mg tablet    multivitamin (THERAGRAN) TABS    NON FORMULARY    omeprazole (PriLOSEC) 40 MG capsule    oxymetazoline (AFRIN) 0 05 % nasal spray       Allergies   Allergen Reactions    Acetazolamide     Penicillins Hives    Sulfa Antibiotics GI Intolerance       Objective     Blood pressure 132/76, pulse 77, temperature 98 6 °F (37 °C), temperature source Tympanic, resp  rate 18, SpO2 97 %  PHYSICAL EXAM    Gen: NAD  CV: RRR  CHEST: Clear  ABD: soft, NT/ND  EXT: no edema  Neuro: AAO      ASSESSMENT/PLAN:  This is a 33y o  year old male here for chronic GERD       PLAN:   Procedure: Mich Carrasco

## 2017-10-31 NOTE — DISCHARGE INSTRUCTIONS
Discharge home  Resume regular diet  Resume home medications  Follow up biopsy results  Continue reflux lifestyle modification  Follow up in the office in 6 months  Call with any abdominal pain, bleeding, fevers

## 2017-10-31 NOTE — OP NOTE
ESOPHAGOGASTRODUODENOSCOPY    PROCEDURE: EGD/ Biopsy    INDICATIONS: GERD    POST-OP DIAGNOSIS: See the impression below    SEDATION: Monitored anesthesia care, check anesthesia records    PHYSICAL EXAM:    Vitals:    10/31/17 0818   BP: 132/76   Pulse: 77   Resp: 18   Temp: 98 6 °F (37 °C)   SpO2: 97%    There is no height or weight on file to calculate BMI  General: NAD  Heart: S1 & S2 normal, RRR  Lungs: CTA, No rales or rhonchi  Abdomen: Soft, nontender, nondistended, good bowel sounds    CONSENT:  Informed consent was obtained for the procedure, including sedation after explaining the risks and benefits of the procedure  Risks including but not limited to bleeding, perforation, infection, aspiration were discussed in detail  Also explained about less than 100% sensitivity with the exam and other alternatives  PREPARATION:   EKG tracing, pulse oximetry, blood pressure were monitored throughout the procedure  Patient was identified by myself both verbally and by visual inspection of ID band  DESCRIPTION:   Patient was placed in the left lateral decubitus position and was sedated with the above medication  The gastroscope was introduced in to the oropharynx and the esophagus was intubated under direct visualization  Scope was passed down the esophagus up to 2nd part of the duodenum  A careful inspection was made as the gastroscope was withdrawn, including a retroflexed view of the stomach; findings and interventions are described below  FINDINGS:    #1  Esophagus and GEJ- mild reflux esophagitis, no evidence of Tabor's esophagus    #2  Stomach- in mild antral gastritis, biopsies taken for H pylori  Normal retroflexion    #3   Duodenum- nodular duodenum, multiple biopsies taken  Normal 2nd portion of the duodenum         IMPRESSIONS:      Nodularity of the duodenal bulb, biopsied  Antral gastritis, biopsied  Normal retroflexion  Mild esophagitis    RECOMMENDATIONS:     Discharge home  Resume regular diet  Resume home medications  Follow up biopsy results  Continue reflux lifestyle modification  Follow up in the office in 6 months  Call with any abdominal pain, bleeding, fevers    COMPLICATIONS:  None; patient tolerated the procedure well            DISPOSITION: PACU           CONDITION: Stable

## 2017-11-03 ENCOUNTER — ANESTHESIA (OUTPATIENT)
Dept: PERIOP | Facility: HOSPITAL | Age: 27
End: 2017-11-03
Payer: COMMERCIAL

## 2017-11-03 ENCOUNTER — HOSPITAL ENCOUNTER (OUTPATIENT)
Facility: AMBULARY SURGERY CENTER | Age: 27
Setting detail: OUTPATIENT SURGERY
Discharge: HOME | End: 2017-11-03
Attending: PODIATRIST | Admitting: PODIATRIST
Payer: COMMERCIAL

## 2017-11-03 ENCOUNTER — ANESTHESIA EVENT (OUTPATIENT)
Dept: PERIOP | Facility: HOSPITAL | Age: 27
End: 2017-11-03
Payer: COMMERCIAL

## 2017-11-03 VITALS
SYSTOLIC BLOOD PRESSURE: 156 MMHG | TEMPERATURE: 97.8 F | HEART RATE: 95 BPM | DIASTOLIC BLOOD PRESSURE: 72 MMHG | RESPIRATION RATE: 18 BRPM | OXYGEN SATURATION: 96 %

## 2017-11-03 PROCEDURE — C1713 ANCHOR/SCREW BN/BN,TIS/BN: HCPCS | Performed by: PODIATRIST

## 2017-11-03 DEVICE — IMPLANTABLE DEVICE: Type: IMPLANTABLE DEVICE | Site: ANKLE | Status: FUNCTIONAL

## 2017-11-03 RX ORDER — BUPIVACAINE HYDROCHLORIDE 5 MG/ML
INJECTION, SOLUTION EPIDURAL; INTRACAUDAL AS NEEDED
Status: DISCONTINUED | OUTPATIENT
Start: 2017-11-03 | End: 2017-11-03 | Stop reason: HOSPADM

## 2017-11-03 RX ORDER — ONDANSETRON 2 MG/ML
INJECTION INTRAMUSCULAR; INTRAVENOUS AS NEEDED
Status: DISCONTINUED | OUTPATIENT
Start: 2017-11-03 | End: 2017-11-03 | Stop reason: SURG

## 2017-11-03 RX ORDER — PROPOFOL 10 MG/ML
INJECTION, EMULSION INTRAVENOUS AS NEEDED
Status: DISCONTINUED | OUTPATIENT
Start: 2017-11-03 | End: 2017-11-03 | Stop reason: SURG

## 2017-11-03 RX ORDER — MAGNESIUM HYDROXIDE 1200 MG/15ML
LIQUID ORAL AS NEEDED
Status: DISCONTINUED | OUTPATIENT
Start: 2017-11-03 | End: 2017-11-03 | Stop reason: HOSPADM

## 2017-11-03 RX ORDER — DEXAMETHASONE SODIUM PHOSPHATE 4 MG/ML
INJECTION, SOLUTION INTRA-ARTICULAR; INTRALESIONAL; INTRAMUSCULAR; INTRAVENOUS; SOFT TISSUE AS NEEDED
Status: DISCONTINUED | OUTPATIENT
Start: 2017-11-03 | End: 2017-11-03 | Stop reason: SURG

## 2017-11-03 RX ORDER — CLINDAMYCIN PHOSPHATE 600 MG/50ML
600 INJECTION INTRAVENOUS ONCE
Status: DISCONTINUED | OUTPATIENT
Start: 2017-11-03 | End: 2017-11-03 | Stop reason: HOSPADM

## 2017-11-03 RX ORDER — ALBUTEROL SULFATE 90 UG/1
AEROSOL, METERED RESPIRATORY (INHALATION) AS NEEDED
Status: DISCONTINUED | OUTPATIENT
Start: 2017-11-03 | End: 2017-11-03 | Stop reason: SURG

## 2017-11-03 RX ORDER — OXYCODONE HYDROCHLORIDE AND ACETAMINOPHEN 5; 325 MG/1; MG/1
1 TABLET ORAL EVERY 4 HOURS PRN
Status: COMPLETED | OUTPATIENT
Start: 2017-11-03 | End: 2017-11-03

## 2017-11-03 RX ORDER — ROCURONIUM BROMIDE 10 MG/ML
INJECTION, SOLUTION INTRAVENOUS AS NEEDED
Status: DISCONTINUED | OUTPATIENT
Start: 2017-11-03 | End: 2017-11-03 | Stop reason: SURG

## 2017-11-03 RX ORDER — FENTANYL CITRATE 50 UG/ML
INJECTION, SOLUTION INTRAMUSCULAR; INTRAVENOUS AS NEEDED
Status: DISCONTINUED | OUTPATIENT
Start: 2017-11-03 | End: 2017-11-03 | Stop reason: SURG

## 2017-11-03 RX ORDER — SODIUM CHLORIDE, SODIUM LACTATE, POTASSIUM CHLORIDE, CALCIUM CHLORIDE 600; 310; 30; 20 MG/100ML; MG/100ML; MG/100ML; MG/100ML
INJECTION, SOLUTION INTRAVENOUS CONTINUOUS PRN
Status: DISCONTINUED | OUTPATIENT
Start: 2017-11-03 | End: 2017-11-03 | Stop reason: SURG

## 2017-11-03 RX ORDER — MIDAZOLAM HYDROCHLORIDE 1 MG/ML
INJECTION INTRAMUSCULAR; INTRAVENOUS AS NEEDED
Status: DISCONTINUED | OUTPATIENT
Start: 2017-11-03 | End: 2017-11-03 | Stop reason: SURG

## 2017-11-03 RX ORDER — KETOROLAC TROMETHAMINE 30 MG/ML
INJECTION, SOLUTION INTRAMUSCULAR; INTRAVENOUS AS NEEDED
Status: DISCONTINUED | OUTPATIENT
Start: 2017-11-03 | End: 2017-11-03 | Stop reason: SURG

## 2017-11-03 RX ADMIN — PROPOFOL 200 MG: 10 INJECTION, EMULSION INTRAVENOUS at 14:25

## 2017-11-03 RX ADMIN — FENTANYL CITRATE 100 MCG: 50 INJECTION, SOLUTION INTRAMUSCULAR; INTRAVENOUS at 14:22

## 2017-11-03 RX ADMIN — SODIUM CHLORIDE, SODIUM LACTATE, POTASSIUM CHLORIDE, AND CALCIUM CHLORIDE: .6; .31; .03; .02 INJECTION, SOLUTION INTRAVENOUS at 13:50

## 2017-11-03 RX ADMIN — ALBUTEROL SULFATE 8 PUFF: 90 AEROSOL, METERED RESPIRATORY (INHALATION) at 15:52

## 2017-11-03 RX ADMIN — ONDANSETRON 4 MG: 2 INJECTION INTRAMUSCULAR; INTRAVENOUS at 14:38

## 2017-11-03 RX ADMIN — MIDAZOLAM HYDROCHLORIDE 2 MG: 1 INJECTION, SOLUTION INTRAMUSCULAR; INTRAVENOUS at 14:22

## 2017-11-03 RX ADMIN — DEXAMETHASONE SODIUM PHOSPHATE 4 MG: 4 INJECTION, SOLUTION INTRA-ARTICULAR; INTRALESIONAL; INTRAMUSCULAR; INTRAVENOUS; SOFT TISSUE at 14:38

## 2017-11-03 RX ADMIN — KETOROLAC TROMETHAMINE 30 MG: 30 INJECTION, SOLUTION INTRAMUSCULAR at 15:33

## 2017-11-03 RX ADMIN — ROCURONIUM BROMIDE 50 MG: 10 INJECTION, SOLUTION INTRAVENOUS at 14:25

## 2017-11-03 RX ADMIN — PROPOFOL 50 MG: 10 INJECTION, EMULSION INTRAVENOUS at 15:52

## 2017-11-03 RX ADMIN — OXYCODONE HYDROCHLORIDE AND ACETAMINOPHEN 1 TABLET: 5; 325 TABLET ORAL at 16:27

## 2017-11-03 NOTE — INTERIM OP NOTE
CALCANEAL OSTECTOMY, ACHILLEST TENDON LENGTHING  Postoperative Note  PATIENT NAME: Izetta Claude  : 1990  MRN: [id number]  [facility] ROOM 02    Surgery Date: 11/3/2017    Preop Diagnosis:  Other specified disorders of bone, unspecified site (CODE) [M89 8X9]    Post-Op Diagnosis Codes:     * Other specified disorders of bone, unspecified site (CODE) [M89 8X9]    Procedure(s) (LRB):  CALCANEAL OSTECTOMY, ACHILLEST TENDON LENGTHING (Left)    Surgeon(s) and Role:     Jak Napier DPM - Primary    Specimens:  * No specimens in log *    Estimated Blood Loss:   Minimal    Anesthesia Type:   General     Findings:    Achilles tendinitis with tendon thickening    Calcaneal exostosis  Complications:   None    SIGNATURE: Hanna Sánchez DPM   DATE: November 3, 2017   TIME: 4:20 PM

## 2017-11-03 NOTE — PERIOPERATIVE NURSING NOTE
Received from [address] [place] [address]  discomfort outer aspect of right foot-dressing dry and intact-exposed toes warm and able to move-was drinking water on arrival

## 2017-11-03 NOTE — ANESTHESIA PREPROCEDURE EVALUATION
Insomnia    Seasonal allergies    GERD (gastroesophageal reflux disease)    OCD (obsessive compulsive disorder)    Irritable bowel syndrome        Review of Systems/Medical History  Patient summary reviewed  Chart reviewed      Cardiovascular  Negative cardio ROS    Pulmonary  Negative pulmonary ROS ,        GI/Hepatic    GERD ,        Negative  ROS        Endo/Other  Negative endo/other ROS      GYN       Hematology  Negative hematology ROS      Musculoskeletal  Obesity  morbid obesity,        Neurology  Negative neurology ROS      Psychology   Negative psychology ROS            Physical Exam    Airway    Mallampati score: II  TM Distance: >3 FB  Neck ROM: full     Dental       Cardiovascular  Comment: Negative ROS, Rhythm: regular, Rate: normal,     Pulmonary  Breath sounds clear to auscultation,     Other Findings        Anesthesia Plan  ASA Score- 2       Anesthesia Type- general with ASA Monitors  Additional Monitors:   Airway Plan:           Induction- intravenous  Informed Consent- Anesthetic plan and risks discussed with patient

## 2017-11-03 NOTE — ANESTHESIA PREPROCEDURE EVALUATION
Review of Systems/Medical History  Patient summary reviewed  Chart reviewed  No history of anesthetic complications     Cardiovascular   Pulmonary       GI/Hepatic    GERD ,             Endo/Other     GYN       Hematology   Musculoskeletal  Obesity  morbid obesity,        Neurology   Psychology   Psychiatric history,   Comment: OCD         Physical Exam    Airway    Mallampati score: II  TM Distance: >3 FB  Neck ROM: full     Dental   No notable dental hx     Cardiovascular  Cardiovascular exam normal    Pulmonary  Pulmonary exam normal     Other Findings        Anesthesia Plan  ASA Score- 3       Anesthesia Type- general with ASA Monitors  Additional Monitors:   Airway Plan: ETT  Induction- intravenous  Informed Consent- Anesthetic plan and risks discussed with patient

## 2017-11-03 NOTE — ANESTHESIA POSTPROCEDURE EVALUATION
Post-Op Assessment Note      CV Status:  Stable    Mental Status:  Alert and awake    Hydration Status:  Euvolemic    PONV Controlled:  Controlled    Airway Patency:  Patent    Post Op Vitals Reviewed: Yes          Staff: Anesthesiologist           BP   140/76   Temp      Pulse 88   Resp   14   SpO2   97

## 2017-11-03 NOTE — OP NOTE
OPERATIVE REPORT  PATIENT NAME: Darryle Shores    :  1990  MRN: [id number]  Pt Location: [facility] 02    SURGERY DATE: 11/3/2017    Surgeon(s) and Role:     Ralph Olivier DPM - Primary    Preop Diagnosis:  Other specified disorders of bone, unspecified site (CODE) [M89 8X9]    Post-Op Diagnosis Codes:     * Other specified disorders of bone, unspecified site (CODE) [M89 8X9]    Procedure(s) (LRB):  CALCANEAL OSTECTOMY, ACHILLEST TENDON LENGTHING (Left)    Specimen(s):  * No specimens in log *    Estimated Blood Loss:   Minimal    Drains:       Anesthesia Type:   General    Operative Indications: Other specified disorders of bone, unspecified site (CODE) [id number]  Patient has chronic pain of the left foot  He suffers from chronic Achilles tendinitis  Patient has failed all conservative measures  His pain was continuous  He wanted to attempt surgical intervention in hopes of alleviating his complaint of pain  He therefore consented to surgery understanding all the possible risks benefits alternatives and complications understand that no guarantees have been given  He also understands any of following could occur but not limited to pain scar swelling hypo or hyper seizure  RSD phlebitis vasculitis  Chronic Achilles tendonitis good Achilles rupture he for future surgery wound need for wound care infection for infection care  To name a few  Operative Findings:  Achilles tendon thickening noted left foot  Retrocalcaneal exostosis and Abel's deformity noted as well    Complications:   None    Procedure and Technique:  Patient was brought to the OR and placed on the operating table in the prone position  Patient was placed under general anesthesia  Calf tourniquet utilized  At this point attention was directed to the posterior aspect of the left foot  A 15 blade scalpel used to perform a 7 cm linear incision overlying the posterior aspect of the heel    This was in the area of the Achilles tendon insertion  The surgeon was deepened down to subcutaneous layers with all bleeders being bovied and coagulated as necessary  Dissection was carried deep given exposure to the Achilles tendon  At this point a V-Y tendon lengthening was performed  This allowed for reflection of the Achilles tendon off the bony areas  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  In addition there was retrocalcaneal spurring  This was removed with both osteotome mallet as well as oscillating saw  Normal contour of bone was created  At this point was felt that adequate reduction of bone deformity occurred  The Achilles tendon was debrided  Any fibrotic tissue was removed at this time  Noted a hold the tendon back in place my take anchors were used  This was sutured it in place  At this point after copiously flushing large amounts of sterile saline closure was attempted  Deep structures reapproximated with 3 0 chromic and the skin with 3 0 nylon  Tourniquet was released during closure  Vascular status returned within normal preoperative limits  After closure dry sterile dressing was applied  Patient returned recovery room with vital signs stable and neurovascular status intact  Patient is to be followed up in the office in a week  He will take prescribed medications the following instructions  He has been advised to remain strict nonweightbearing on the left side     I was present for the entire procedure    Patient Disposition:  PACU     SIGNATURE: John Grant DPM  DATE: November 3, 2017  TIME: 4:16 PM

## 2017-11-03 NOTE — DISCHARGE INSTRUCTIONS
PODIATRY DISCHARGE INSTRUCTIONS  DR Kell Crane Instructions    · No driving or operating machinery for 24 hours or while taking pain medication  · No alcoholic beverages for 24 hours or while taking pain medication  · DO NOT make any important personal or business decisions for 24 hours  Diet:  · Begin with liquids and light food and progress to your normal diet unless you are nauseated or otherwise instructed by your physician  Medication:  · Begin taking pain medication as directed and remember that narcotic medications may be constipating  Consider starting over the counter stool softeners  If constipation persists begin taking over the counter laxative  Dressing:  · Keep dressing dry  Do not remove dressing until seen by Dr Denzel Rivera:  · Apply ice to affected area 20 minutes on and 20 minutes off unless otherwise         Instructed     Elevation:  · Keep affected foot elevated on pillows    Weight Bearing status:  ___________________________  Wear blue surgical shoe when walking  Use crutches, cane or walker as directed    Keep regular scheduled appointment with Dr Eleanor Pacheco    Call Dr Eleanor Pacheco with any problems or questions at [address] [place] [address]  DR Kell Crane Instructions    · No driving or operating machinery for 24 hours or while taking pain medication  · No alcoholic beverages for 24 hours or while taking pain medication  · DO NOT make any important personal or business decisions for 24 hours  Diet:  · Begin with liquids and light food and progress to your normal diet unless you are nauseated or otherwise instructed by your physician  Medication:  · Begin taking pain medication as directed and remember that narcotic medications may be constipating  Consider starting over the counter stool softeners  If constipation persists begin taking over the counter laxative  Dressing:  · Keep dressing dry   Do not remove dressing until seen by Dr Fran Boyer:  · Apply ice to affected area 20 minutes on and 20 minutes off unless otherwise         Instructed     Elevation:  · Keep affected foot elevated on pillows    Weight Bearing status:  ___________________________  Wear blue surgical shoe when walking  Use crutches, cane or walker as directed    Keep regular scheduled appointment with Dr Jorge Che    Call Dr Jorge Che with any problems or questions at [address] [place] [address]  DR Kell Crane Instructions    · No driving or operating machinery for 24 hours or while taking pain medication  · No alcoholic beverages for 24 hours or while taking pain medication  · DO NOT make any important personal or business decisions for 24 hours  Diet:  · Begin with liquids and light food and progress to your normal diet unless you are nauseated or otherwise instructed by your physician  Medication:  · Begin taking pain medication as directed and remember that narcotic medications may be constipating  Consider starting over the counter stool softeners  If constipation persists begin taking over the counter laxative  Dressing:  · Keep dressing dry   Do not remove dressing until seen by Dr Fran Boyer:  · Apply ice to affected area 20 minutes on and 20 minutes off unless otherwise         Instructed     Elevation:  · Keep affected foot elevated on pillows    Weight Bearing status:  ___________________________  Wear blue surgical shoe when walking  Use crutches, cane or walker as directed    Keep regular scheduled appointment with Dr Jorge Che    Call Dr Jorge Che with any problems or questions at [phone]

## 2017-11-07 ENCOUNTER — GENERIC CONVERSION - ENCOUNTER (OUTPATIENT)
Dept: OTHER | Facility: OTHER | Age: 27
End: 2017-11-07

## 2017-11-14 ENCOUNTER — GENERIC CONVERSION - ENCOUNTER (OUTPATIENT)
Dept: OTHER | Facility: OTHER | Age: 27
End: 2017-11-14

## 2017-11-21 ENCOUNTER — GENERIC CONVERSION - ENCOUNTER (OUTPATIENT)
Dept: OTHER | Facility: OTHER | Age: 27
End: 2017-11-21

## 2017-11-30 ENCOUNTER — GENERIC CONVERSION - ENCOUNTER (OUTPATIENT)
Dept: OTHER | Facility: OTHER | Age: 27
End: 2017-11-30

## 2017-12-14 ENCOUNTER — ALLSCRIPTS OFFICE VISIT (OUTPATIENT)
Dept: OTHER | Facility: OTHER | Age: 27
End: 2017-12-14

## 2017-12-14 DIAGNOSIS — M76.62 ACHILLES TENDINITIS OF LEFT LOWER EXTREMITY: ICD-10-CM

## 2017-12-18 ENCOUNTER — APPOINTMENT (OUTPATIENT)
Dept: PHYSICAL THERAPY | Facility: REHABILITATION | Age: 27
End: 2017-12-18
Payer: COMMERCIAL

## 2017-12-18 DIAGNOSIS — M76.62 ACHILLES TENDINITIS OF LEFT LOWER EXTREMITY: ICD-10-CM

## 2017-12-18 PROCEDURE — G8979 MOBILITY GOAL STATUS: HCPCS

## 2017-12-18 PROCEDURE — G8978 MOBILITY CURRENT STATUS: HCPCS

## 2017-12-18 PROCEDURE — 97161 PT EVAL LOW COMPLEX 20 MIN: CPT

## 2017-12-20 ENCOUNTER — APPOINTMENT (OUTPATIENT)
Dept: PHYSICAL THERAPY | Facility: REHABILITATION | Age: 27
End: 2017-12-20
Payer: COMMERCIAL

## 2017-12-20 PROCEDURE — 97140 MANUAL THERAPY 1/> REGIONS: CPT

## 2017-12-20 PROCEDURE — 97110 THERAPEUTIC EXERCISES: CPT

## 2017-12-26 ENCOUNTER — APPOINTMENT (OUTPATIENT)
Dept: PHYSICAL THERAPY | Facility: REHABILITATION | Age: 27
End: 2017-12-26
Payer: COMMERCIAL

## 2017-12-26 PROCEDURE — 97140 MANUAL THERAPY 1/> REGIONS: CPT

## 2017-12-26 PROCEDURE — 97110 THERAPEUTIC EXERCISES: CPT

## 2017-12-28 ENCOUNTER — APPOINTMENT (OUTPATIENT)
Dept: PHYSICAL THERAPY | Facility: REHABILITATION | Age: 27
End: 2017-12-28
Payer: COMMERCIAL

## 2017-12-28 PROCEDURE — 97110 THERAPEUTIC EXERCISES: CPT

## 2017-12-28 PROCEDURE — 97140 MANUAL THERAPY 1/> REGIONS: CPT

## 2018-01-03 ENCOUNTER — APPOINTMENT (OUTPATIENT)
Dept: PHYSICAL THERAPY | Facility: REHABILITATION | Age: 28
End: 2018-01-03
Payer: COMMERCIAL

## 2018-01-03 PROCEDURE — 97110 THERAPEUTIC EXERCISES: CPT

## 2018-01-04 ENCOUNTER — GENERIC CONVERSION - ENCOUNTER (OUTPATIENT)
Dept: OTHER | Facility: OTHER | Age: 28
End: 2018-01-04

## 2018-01-05 ENCOUNTER — APPOINTMENT (OUTPATIENT)
Dept: PHYSICAL THERAPY | Facility: REHABILITATION | Age: 28
End: 2018-01-05
Payer: COMMERCIAL

## 2018-01-08 ENCOUNTER — GENERIC CONVERSION - ENCOUNTER (OUTPATIENT)
Dept: PODIATRY | Facility: CLINIC | Age: 28
End: 2018-01-08

## 2018-01-11 NOTE — RESULT NOTES
Verified Results  (1) COMPREHENSIVE METABOLIC PANEL [id number] 04:61WF Garrett Basurto     Test Name Result Flag Reference   Glucose, Serum 91 mg/dL  65-99   BUN 15 mg/dL  6-20   Creatinine, Serum 0 86 mg/dL  0 76-1 27   BUN/Creatinine Ratio 17  9-20   Sodium, Serum 139 mmol/L  134-144   Potassium, Serum 4 4 mmol/L  3 5-5 2   Chloride, Serum 99 mmol/L     Carbon Dioxide, Total 23 mmol/L  18-29   Calcium, Serum 9 8 mg/dL  8 7-10 2   Protein, Total, Serum 6 8 g/dL  6 0-8 5   Albumin, Serum 4 4 g/dL  3 5-5 5   Globulin, Total 2 4 g/dL  1 5-4 5   A/G Ratio 1 8  1 2-2 2   Bilirubin, Total 0 4 mg/dL  0 0-1 2   Alkaline Phosphatase, S 71 IU/L     AST (SGOT) 24 IU/L  0-40   ALT (SGPT) 37 IU/L  0-44   eGFR If NonAfricn Am 121 mL/min/1 73  >59   eGFR If Africn Am 139 mL/min/1 73  >59     (1) LIPID PANEL FASTING W DIRECT LDL REFLEX 21Jun2017 10:17AM Garrett Basurto     Test Name Result Flag Reference   Cholesterol, Total 198 mg/dL  100-199   Triglycerides 134 mg/dL  0-149   HDL Cholesterol 38 mg/dL L >39   LDL Cholesterol Calc 133 mg/dL H 0-99     [facility]) Cardiovascular Risk Assessment 21Jun2017 10:17AM Garrett Basurto     Test Name Result Flag Reference   Interpretation Note     Supplement report is available  PDF Image

## 2018-01-12 VITALS
HEART RATE: 75 BPM | HEIGHT: 66 IN | WEIGHT: 240 LBS | DIASTOLIC BLOOD PRESSURE: 89 MMHG | RESPIRATION RATE: 16 BRPM | SYSTOLIC BLOOD PRESSURE: 136 MMHG | BODY MASS INDEX: 38.57 KG/M2

## 2018-01-12 NOTE — RESULT NOTES
Discussion/Summary   Please inform the patient that there is inflammation in his duodenum, possibly suggestive of celiac sprue  I will order laboratory studies to further evaluate this  Please mail lab slip to the patient  Biopsies from stomach were negative for H pylori  Please have the patient continue current medications and follow up in the office in 3 months, sooner if needed  We will call him back once we get the results of the celiac panel  Verified Results  (1) [id number]     Test Name Result Flag Reference   LAB AP CASE REPORT (Report)     Surgical Pathology Report             Case: [id number]                   Authorizing Provider: Jorge Correa MD      Collected:      10/31/2017 0914        Ordering Location:   [facility] Surgery  Received:      10/31/2017 [facility] [address] [place] [address],[address]                                     Pathologist:      Melonie Rizo MD                                 Specimens:  A) - Duodenum, Duodenal Bx, Nodular Duodenum                              B) - Stomach, Bx Stomach, r/o h  pylori   LAB AP FINAL DIAGNOSIS (Report)     A  Nodular duodenum (biopsy):    - Chronic duodenitis with partial villous blunting and gastric   heterotopia  - No marked increase in intraepithelial lymphocytes  - No granulomas, dysplasia or neoplasia identified  Comment:  Correlation with celiac serologic studies may be considered as clinically   indicated  B  Stomach (biopsy):    - Minimal chronic inactive gastritis with lymphoid aggregate formation   involving antral and oxyntic mucosa  - Immunostain for H  pylori (with appropriate positive control)   demonstrates no definitive Helicobacter  - No intestinal metaplasia, dysplasia or neoplasia identified  Electronically signed by Melonie Rizo MD on 11/2/2017 at 9:24 AM   LAB AP NOTE      Interpretation performed at [facility], [address] [facility], [address] [place] [id number]     LAB AP SURGICAL ADDITIONAL INFORMATION (Report)     All controls performed with the immunohistochemical stains reported above   reacted appropriately  These tests were developed and their performance   characteristics determined by [facility] or   [facility]  They may not be cleared or approved by the [place]  Food and Drug Administration  The FDA has determined that such clearance   or approval is not necessary  These tests are used for clinical purposes  They should not be regarded as investigational or for research  This   laboratory has been approved by [place] [id number], designated as a high-complexity   laboratory and is qualified to perform these tests  LAB AP GROSS DESCRIPTION (Report)     A  The specimen is received in formalin, labeled with the patient's name   and hospital number, and is designated Duodenal biopsy, nodular   duodenum  The specimen consists of 4 tan red soft tissue fragments each   measuring 0 2-0 4 cm  Entirely submitted  One cassette  B  The specimen is received in formalin, labeled with the patient's name   and hospital number, and is designated Biopsy stomach rule out H    Pylori  The specimen consists of multiple tan-red soft tissue fragments   measuring in aggregate 0 6 x 0 6 x 0 1 cm  Entirely submitted  One   cassette  Note: The estimated total formalin fixation time based upon information   provided by the submitting clinician and the standard processing schedule   is less than 72 hours      MAC   LAB AP CLINICAL INFORMATION      Gastro-esophageal reflux disease without esophagitis

## 2018-01-13 VITALS
RESPIRATION RATE: 18 BRPM | SYSTOLIC BLOOD PRESSURE: 140 MMHG | HEART RATE: 86 BPM | HEIGHT: 66 IN | WEIGHT: 240 LBS | TEMPERATURE: 97.8 F | DIASTOLIC BLOOD PRESSURE: 80 MMHG | BODY MASS INDEX: 38.57 KG/M2

## 2018-01-13 VITALS
DIASTOLIC BLOOD PRESSURE: 86 MMHG | WEIGHT: 248 LBS | RESPIRATION RATE: 16 BRPM | SYSTOLIC BLOOD PRESSURE: 120 MMHG | TEMPERATURE: 97.8 F | HEIGHT: 67 IN | HEART RATE: 72 BPM | BODY MASS INDEX: 38.92 KG/M2

## 2018-01-13 VITALS
TEMPERATURE: 98.5 F | SYSTOLIC BLOOD PRESSURE: 130 MMHG | BODY MASS INDEX: 40.18 KG/M2 | HEART RATE: 84 BPM | DIASTOLIC BLOOD PRESSURE: 82 MMHG | HEIGHT: 66 IN | WEIGHT: 250 LBS | RESPIRATION RATE: 16 BRPM

## 2018-01-13 VITALS
DIASTOLIC BLOOD PRESSURE: 89 MMHG | HEART RATE: 98 BPM | RESPIRATION RATE: 16 BRPM | SYSTOLIC BLOOD PRESSURE: 135 MMHG | HEIGHT: 66 IN | WEIGHT: 231 LBS | BODY MASS INDEX: 37.12 KG/M2

## 2018-01-13 VITALS
RESPIRATION RATE: 17 BRPM | SYSTOLIC BLOOD PRESSURE: 144 MMHG | DIASTOLIC BLOOD PRESSURE: 85 MMHG | HEART RATE: 67 BPM | HEIGHT: 66 IN | BODY MASS INDEX: 38.57 KG/M2 | WEIGHT: 240 LBS

## 2018-01-13 NOTE — MISCELLANEOUS
Message  Patient called and results discussed  Advised and educated to do dietary modifications (DASH diet) with exercise to control weight and reduce LDL  Patient agreed with the plan of care        Signatures   Electronically signed by : Montse Medina, Titus Castellano ; Jun 22 2017  8:36AM EST                       (Author)

## 2018-01-14 VITALS
HEART RATE: 77 BPM | BODY MASS INDEX: 38.57 KG/M2 | RESPIRATION RATE: 17 BRPM | HEIGHT: 66 IN | SYSTOLIC BLOOD PRESSURE: 136 MMHG | DIASTOLIC BLOOD PRESSURE: 88 MMHG | WEIGHT: 240 LBS

## 2018-01-14 VITALS
HEIGHT: 67 IN | BODY MASS INDEX: 39.24 KG/M2 | WEIGHT: 250 LBS | SYSTOLIC BLOOD PRESSURE: 132 MMHG | DIASTOLIC BLOOD PRESSURE: 82 MMHG

## 2018-01-14 VITALS
HEIGHT: 67 IN | DIASTOLIC BLOOD PRESSURE: 70 MMHG | WEIGHT: 239 LBS | BODY MASS INDEX: 37.51 KG/M2 | SYSTOLIC BLOOD PRESSURE: 112 MMHG

## 2018-01-14 VITALS — BODY MASS INDEX: 38.57 KG/M2 | RESPIRATION RATE: 16 BRPM | HEART RATE: 67 BPM | WEIGHT: 240 LBS | HEIGHT: 66 IN

## 2018-01-15 NOTE — CONSULTS
Chief Complaint  pt present for Pre-op clearance for surgery on Left ankle  ac/cma      History of Present Illness  Pre-Op Visit (Brief): The patient is being seen for a preoperative visit and left ankle surgery, Dr Zina Gonsalves, 1w from now  Surgical Risk Assessment:   Prior Anesthesia: He had no prior anesthesia  Exercise Capacity: able to walk four blocks without symptoms and able to walk two flights of stairs without symptoms  Lifestyle Factors: denies alcohol use and denies tobacco use  Symptoms: no easy bleeding, no easy bruising, no chest pain and no dyspnea  HPI: chronic left ankle pain  affects normal function/walking causing discomfort  chronic pain from the condition      Review of Systems    Constitutional: no fever  Cardiovascular: no chest pain  Respiratory: no shortness of breath  Active Problems    1  Achilles tendinitis of left lower extremity (726 71) (M76 62)   2  Acne (706 1) (L70 9)   3  ADD (attention deficit disorder) without hyperactivity (314 00) (F98 8)   4  Allergic rhinitis (477 9) (J30 9)   5  Aphthous ulcer (528 2) (K12 0)   6  Arthralgia of left foot (719 47) (M25 572)   7  Bony exostosis (726 91) (M89 8X9)   8  Congenital pes planus of left foot (754 61) (Q66 52)   9  Congenital pes planus of right foot (754 61) (Q66 51)   10  Depression, unspecified depression type (311) (F32 9)   11  Encounter for hearing evaluation (V72 19) (Z01 10)   12  Epigastric discomfort (789 06) (R10 13)   13  External hemorrhoids (455 3) (K64 4)   14  GE reflux (530 81) (K21 9)   15  GERD (gastroesophageal reflux disease) (530 81) (K21 9)   16  Immunization due (V05 9) (Z23)   17  Lumbar radiculopathy (724 4) (M54 16)   18  Obsessive compulsive disorder (300 3) (F42 9)   19  Plantar fibromatosis (728 71) (M72 2)   20  Screening for cardiovascular, respiratory, and genitourinary diseases    (V81 2,V81 4,V81 6) (Z13 6,Z13 83,Z13 89)   21  Screening for diabetes mellitus (DM) (V77 1) (Z13 1)   22  Sleep apnea (780 57) (G47 30)   23  Tarsal tunnel syndrome of left side (355 5) (G57 52)   24  Thyroid disorder screening (V77 0) (Z13 29)   25   Well adult on routine health check (V70 0) (Z00 00)    Past Medical History    · History of Acquired ankle/foot deformity (736 70) (M21 969)   · Acute asthmatic bronchitis (493 90) (J45 909)   · History of Acute maxillary sinusitis (461 0) (J01 00)   · History of Acute upper respiratory infection (465 9) (J06 9)   · History of Change in hearing (389 9) (H91 90)   · History of Closed nondisplaced avulsion fracture of tuberosity of left calcaneus, initial  encounter (825 0) (U69 589I)   · History of Foreign Body In The Foot (917 6)   · History of acne (V13 3) (Z87 2)   · History of acute bronchitis (V12 69) (Z87 09)   · History of acute gastritis (V12 70) (Z87 19)   · History of acute pharyngitis (V12 69) (Z87 09)   · History of acute sinusitis (V12 69) (Z87 09)   · History of acute sinusitis (V12 69) (Z87 09)   · History of allergic rhinitis (V12 69) (Z87 09)   · History of allergic rhinitis (V12 69) (Z87 09)   · History of epistaxis (V12 69) (Q09 334)   · History of impacted cerumen (V12 49) (Z86 69)   · History of nausea and vomiting (V12 79) (Z87 898)   · History of oral aphthous ulcers (V12 79) (Z87 19)   · History of tinea corporis (V12 09) (Z86 19)   · History of Impacted cerumen, unspecified laterality (380 4) (H61 20)   · History of Infective otitis externa, unspecified laterality (380 10) (H60 399)   · History of Joint pain, knee (719 46) (M25 569)   · History of Left foot pain (729 5) (M79 672)   · History of Noninfectious Dermatological Conditions (709 9)   · History of Otitis media, unspecified laterality   · History of Otitis media, unspecified laterality   · History of Plantar fibromatosis (518 71) (M72 2)   · 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)   · History of Tarsal tunnel syndrome of left side (355 5) (G57 52)    Surgical History    · Denied: History of Reported Prior Surgical / Procedural History    Family History    · Family history of Allergic Rhinitis   · Denied: Family history of colon cancer   · Denied: Family history of colonic polyps   · Denied: Family history of liver disease    · Denied: Family history of colon cancer   · Denied: Family history of colonic polyps   · Denied: Family history of liver disease   · Family history of Gout (V18 19)   · Family history of Hypertension (V17 49)    · Family history of colitis (V18 59) (Z83 79)   · Family history of colonic polyps (V18 51) (Z83 71)    · Family history of colitis (V18 59) (Z83 79)    · Family history of Cancer   · Family history of Diabetes Mellitus (V18 0)    The family history was reviewed and updated today  Social History    · Feels safe at home   · Never A Smoker   · No alcohol use  The social history was reviewed and updated today  Current Meds   1  B Complex Oral Capsule; once daily; Therapy: 32SUF4115 to Recorded   2  Beano Oral Tablet; two before each meals; Therapy: 71IEG7567 to Recorded   3  Budesonide SUSP; INHALE ML Daily; Therapy: (Recorded:26Oct2017) to Recorded   4  Etodolac 400 MG Oral Tablet; TAKE 1 TABLET TWICE DAILY WITH MEALS; Therapy: 93KYQ9927 to (Evaluate:16Ngo9559)  Requested for: 24Oct2017; Last   Rx:57Enb7251 Ordered   5  FluvoxaMINE Maleate  MG Oral Capsule Extended Release 24 Hour; 1 Every Day   At Bedtime; Therapy: 32Dym0744 to (Last Rx:12Oct2013)  Requested for: 24Oct2017 Ordered   6  HydrOXYzine HCl - 25 MG Oral Tablet; TAKE 1 TABLET TWICE DAILY; Therapy: 40EOO7353 to (Evaluate:30Oct2017)  Requested for: 27Oct2017; Last   Rx:27Oct2017 Ordered   7  LevoFLOXacin 500 MG Oral Tablet; TAKE 1 TABLET DAILY; Therapy: 32SLV8804 to (Sia Bearden)  Requested for: 24FIZ0661; Last   Rx:27Oct2017 Ordered   8  Montelukast Sodium 10 MG Oral Tablet; TAKE ONE TABLET BY MOUTH ONCE DAILY;    Therapy: 03QTL5998 to (Evaluate:22Jan2018)  Requested for: 51LFA7398; Last   Rx:03Ovj4604 Ordered   9  Multi-Day Oral Tablet; TAKE 1 TABLET DAILY; Therapy: 99XJN9368 to Recorded   10  Omeprazole 40 MG Oral Capsule Delayed Release; TAKE ONE CAPSULE BY MOUTH    ONCE DAILY BEFORE  A  MEAL; Therapy: 06JBW0635 to (Evaluate:37Ick7431)  Requested for: 24Oct2017; Last    Rx:24Oct2017 Ordered   11  Osteo Bi-Flex Regular Strength TABS; Take 1 tablet daily; Therapy: (Recorded:26Oct2017) to Recorded   12  Oxycodone-Acetaminophen  MG Oral Tablet; TAKE 1 TABLET 4 TIMES DAILY AS    NEEDED FOR PAIN;    Therapy: 62WVP9799 to (Evaluate:06Nov2017); Last Rx:27Oct2017 Ordered   13  Oxymetazoline HCl 0 05 % SOLN; USE AS DIRECTED; Therapy: (Recorded:26Oct2017) to Recorded   14  Sleep Aid CAPS; take 1 capsule daily; Therapy: (Recorded:26Oct2017) to Recorded   15  Vitamin C TABS; TAKE 1 TABLET DAILY; Therapy: (Recorded:26Oct2017) to Recorded   16  Vitamin D3 1000 UNIT Oral Capsule; take 1 capsule daily; Therapy: (Recorded:26Oct2017) to Recorded    Allergies    1  Penicillins   2  Sulfa Drugs    3  Seasonal    Vitals  Signs    Temperature: 97 8 F  Heart Rate: 72  Respiration: 16  Systolic: 663  Diastolic: 86  Height: 5 ft 7 in  Weight: 248 lb   BMI Calculated: 38 84  BSA Calculated: 2 22    Physical Exam    Constitutional   General appearance: No acute distress, well appearing and well nourished  Eyes   Conjunctiva and lids: No erythema, swelling or discharge  Pupils and irises: Equal, round, reactive to light  Ears, Nose, Mouth, and Throat   External inspection of ears and nose: Normal     Otoscopic examination: Tympanic membranes translucent with normal light reflex  Canals patent without erythema  Nasal mucosa, septum, and turbinates: Normal without edema or erythema  Lips, teeth, and gums: Normal, good dentition  Oropharynx: Normal with no erythema, edema, exudate or lesions  Neck   Neck: Supple, symmetric, trachea midline, no masses  Pulmonary   Respiratory effort: No increased work of breathing or signs of respiratory distress  Auscultation of lungs: Clear to auscultation  Cardiovascular   Auscultation of heart: Normal rate and rhythm, normal S1 and S2, no murmurs  Carotid pulses: 2+ bilaterally  Pedal pulses: 2+ bilaterally  Examination of extremities for edema and/or varicosities: Normal     Abdomen   Abdomen: Abnormal   The abdomen was obese  Lymphatic   Palpation of lymph nodes in neck: No lymphadenopathy  Musculoskeletal   Gait and station: Normal     Inspection/palpation of digits and nails: Normal without clubbing or cyanosis  Inspection/palpation of joints, bones, and muscles: Abnormal   right heel deformity  Range of motion: Normal     Muscle strength/tone: Normal     Skin   Skin and subcutaneous tissue: Normal without rashes or lesions  Palpation of skin and subcutaneous tissue: Normal turgor  Neurologic   Reflexes: 2+ and symmetric  Sensation: No sensory loss  Psychiatric   Judgment and insight: Normal     Mood and affect: Normal        Assessment    1  GE reflux (530 81) (K21 9)   2  Obsessive compulsive disorder (300 3) (F42 9)   3  BMI 38 0-38 9,adult (V85 38) (Z68 38)   4  Bony exostosis (726 91) (M89 8X9)   5  Obesity (BMI 30-39 9) (278 00) (E66 9)   6  Achilles tendinitis of left lower extremity (726 71) (M76 62)    Discussion/Summary  Surgical Clearance: He is at a LOW risk from a cardiovascular standpoint at this time without any additional cardiac testing  Reevaluation needed, if he should present with symptoms prior to surgery/procedure  Comments:  no labs reviewed  Surgical clearance faxed to Dr Radha Mireles   Additional medical information:  holding vit D for now  The patient was counseled regarding risk factor reductions, impressions   take usual am rx meds with water on day of surgery, nsaids should be held unless ok with surgeon   Possible side effects of new medications were reviewed with the patient/guardian today  The treatment plan was reviewed with the patient/guardian  The patient/guardian understands and agrees with the treatment plan      End of Encounter Meds    1  Budesonide SUSP; INHALE ML Daily; Therapy: (Recorded:26Oct2017) to Recorded   2  Montelukast Sodium 10 MG Oral Tablet; TAKE ONE TABLET BY MOUTH ONCE DAILY; Therapy: 91JLJ5613 to (Evaluate:22Jan2018)  Requested for: 00SSI3722; Last   Rx:30Xpi2871 Ordered   3  Oxymetazoline HCl 0 05 % SOLN; USE AS DIRECTED; Therapy: (Recorded:26Oct2017) to Recorded    4  Etodolac 400 MG Oral Tablet; TAKE 1 TABLET TWICE DAILY WITH MEALS; Therapy: 37FMG4580 to (Evaluate:28Sep2017)  Requested for: 24Oct2017; Last   Rx:64Aex2427 Ordered    5  HydrOXYzine HCl - 25 MG Oral Tablet; TAKE 1 TABLET TWICE DAILY; Therapy: 72UKE0667 to (Evaluate:30Oct2017)  Requested for: 27Oct2017; Last   Rx:27Oct2017 Ordered   6  LevoFLOXacin 500 MG Oral Tablet; TAKE 1 TABLET DAILY; Therapy: 91GQI3922 to (Vinicio Mahmood)  Requested for: 66OUD5003; Last   Rx:27Oct2017 Ordered   7  Oxycodone-Acetaminophen  MG Oral Tablet; TAKE 1 TABLET 4 TIMES DAILY AS   NEEDED FOR PAIN;   Therapy: 22YHZ3476 to (Evaluate:06Nov2017); Last Rx:27Oct2017 Ordered    8  Omeprazole 40 MG Oral Capsule Delayed Release; TAKE ONE CAPSULE BY MOUTH   ONCE DAILY BEFORE  A  MEAL; Therapy: 72SVZ0563 to (Evaluate:26Yfk4322)  Requested for: 24Oct2017; Last   Rx:24Oct2017 Ordered    9  B Complex Oral Capsule; once daily; Therapy: 11LVM1966 to Recorded   10  Beano Oral Tablet; two before each meals; Therapy: 89GXF2688 to Recorded   11  Multi-Day Oral Tablet; TAKE 1 TABLET DAILY; Therapy: 71CAA6661 to Recorded   12  Osteo Bi-Flex Regular Strength TABS; Take 1 tablet daily; Therapy: (Recorded:26Oct2017) to Recorded   13  Sleep Aid CAPS; take 1 capsule daily; Therapy: (Recorded:26Oct2017) to Recorded   14  Vitamin C TABS; TAKE 1 TABLET DAILY;     Therapy: (774 1120 4626) to Recorded   15  Vitamin D3 1000 UNIT Oral Capsule; take 1 capsule daily; Therapy: (Recorded:26Oct2017) to Recorded    16  FluvoxaMINE Maleate  MG Oral Capsule Extended Release 24 Hour (Luvox CR);    1 Every Day At Bedtime;     Therapy: 43Bmm6413 to (Last Rx:12Oct2013)  Requested for: 24Oct2017 Ordered    Signatures   Electronically signed by : Odalis Maciel DO; Oct 27 2017 10:08PM EST                       (Author)

## 2018-01-18 NOTE — PROGRESS NOTES
Assessment    1  BMI 37 0-37 9, adult (V85 37) (Z68 37)   2  Encounter for preventive health examination (V70 0) (Z00 00)   3  GE reflux (530 81) (K21 9)    Plan  Allergic rhinitis    · Claritin 10 MG Oral Capsule; 1 DAILY   · Fluticasone Propionate 50 MCG/ACT Nasal Suspension; 2 puffs/nostril/day   · Montelukast Sodium 10 MG Oral Tablet; TAKE ONE TABLET BY MOUTH  ONCE DAILY  GE reflux    · Omeprazole 40 MG Oral Capsule Delayed Release; TAKE 1 CAPSULE Before  meals   · 1 - Aydin Gibbons MD (Gastroenterology) Co-Management  *consider EGD  Status: Active   Requested for: 83Dtv1470  Care Summary provided  : Yes  Obsessive compulsive disorder    · FluvoxaMINE Maleate  MG Oral Capsule Extended Release 24 Hour  (Luvox CR); 1 Every Day At Bedtime    Discussion/Summary  The patient was counseled regarding risk factor reductions, impressions, risks and benefits of treatment options  Possible side effects of new medications were reviewed with the patient/guardian today  The treatment plan was reviewed with the patient/guardian  The patient/guardian understands and agrees with the treatment plan      Chief Complaint  pt present for a CPE  hg      History of Present Illness  HM, Adult Male: The patient is being seen for a health maintenance evaluation  General Health: The patient's health since the last visit is described as good  Immunizations status: up to date  Lifestyle:  He does not have a healthy diet  He has weight concerns  He does not exercise regularly  He does not use tobacco  He denies alcohol use  Screening:      Review of Systems    Cardiovascular: no chest pain  Respiratory: no shortness of breath  Gastrointestinal: no abdominal pain  Active Problems    1  Achilles tendinitis of left lower extremity (726 71) (M76 62)   2  Acne (706 1) (L70 9)   3  ADD (attention deficit disorder) without hyperactivity (314 00) (F98 8)   4  Allergic rhinitis (477 9) (J30 9)   5   Congenital pes planus of left foot (754 61) (Q66 52)   6  Congenital pes planus of right foot (754 61) (Q66 51)   7  Encounter for hearing evaluation (V72 19) (Z01 10)   8  Epigastric discomfort (789 06) (R10 13)   9  External hemorrhoids (455 3) (K64 4)   10  GE reflux (530 81) (K21 9)   11  Immunization due (V05 9) (Z23)   12  Lumbar radiculopathy (724 4) (M54 16)   13  Obesity (278 00) (E66 9)   14  Obsessive compulsive disorder (300 3) (F42 9)   15  Screening for cardiovascular, respiratory, and genitourinary diseases    (V81 2,V81 4,V81 6) (Z13 6,Z13 83,Z13 89)   16  Screening for diabetes mellitus (DM) (V77 1) (Z13 1)   17  Sleep apnea (780 57) (G47 30)   18  Tarsal tunnel syndrome of left side (355 5) (G57 52)   19  Thyroid disorder screening (V77 0) (Z13 29)   20   Well adult on routine health check (V70 0) (Z00 00)    Past Medical History    · History of Acquired ankle/foot deformity (736 70) (M21 969)   · Acute asthmatic bronchitis (493 90) (J45 909)   · History of Acute maxillary sinusitis (461 0) (J01 00)   · History of Acute upper respiratory infection (465 9) (J06 9)   · History of Change in hearing (389 9) (H91 90)   · History of Closed nondisplaced avulsion fracture of tuberosity of left calcaneus, initial  encounter (825 0) (I24 385C)   · History of Foreign Body In The Foot (917 6)   · History of acne (V13 3) (Z87 2)   · History of acute bronchitis (V12 69) (Z87 09)   · History of acute gastritis (V12 70) (Z87 19)   · History of acute pharyngitis (V12 69) (Z87 09)   · History of acute sinusitis (V12 69) (Z87 09)   · History of acute sinusitis (V12 69) (Z87 09)   · History of allergic rhinitis (V12 69) (Z87 09)   · History of allergic rhinitis (V12 69) (Z87 09)   · History of depression (V11 8) (Z86 59)   · History of epistaxis (V12 69) (U93 691)   · History of impacted cerumen (V12 49) (Z86 69)   · History of nausea and vomiting (V12 79) (M43 775)   · History of oral aphthous ulcers (V12 79) (Z87 19)   · History of tinea corporis (V12 09) (Z86 19)   · History of Impacted cerumen, unspecified laterality (380 4) (H61 20)   · History of Infective otitis externa, unspecified laterality (380 10) (H60 399)   · History of Joint pain, knee (719 46) (M25 569)   · History of Left foot pain (729 5) (M79 672)   · History of Noninfectious Dermatological Conditions (709 9)   · History of Otitis media, unspecified laterality   · History of Otitis media, unspecified laterality   · History of Plantar fibromatosis (728 71) (M72 2)   · 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)    Surgical History    · Denied: History of Reported Prior Surgical / Procedural History    Family History  Mother    · Family history of Allergic Rhinitis  Father    · Family history of Gout (V18 19)   · Family history of Hypertension (V17 49)  Family History    · Family history of Cancer   · Family history of Diabetes Mellitus (V18 0)    Social History    · Never A Smoker    Current Meds   1  B Complex Oral Capsule; once daily; Therapy: 20ATN3183 to Recorded   2  Beano Oral Tablet; two before each meals; Therapy: 43FVP6647 to Recorded   3  Claritin 10 MG Oral Capsule; 1 DAILY; Therapy: (Recorded:31Xss2686) to Recorded   4  Fluticasone Propionate 50 MCG/ACT Nasal Suspension; 2 puffs/nostril/day; Therapy: 80GCW6522 to (Last Rx:95Prm1059) Ordered   5  FluvoxaMINE Maleate  MG Oral Capsule Extended Release 24 Hour; 1 Every Day   At Bedtime; Therapy: 48Dpx0499 to (Last Rx:12Oct2013)  Requested for: 12Oct2013 Ordered   6  Montelukast Sodium 10 MG Oral Tablet; TAKE ONE TABLET BY MOUTH ONCE DAILY; Therapy: 91FXQ0415 to (Evaluate:22Jan2018)  Requested for: 24Gal3426; Last   Rx:05Vdw2851 Ordered   7  Multi-Day Oral Tablet; TAKE 1 TABLET DAILY; Therapy: 90RHI6188 to Recorded   8  Omeprazole 40 MG Oral Capsule Delayed Release; TAKE 1 CAPSULE Before meals;    Therapy: 17HLR7284 to (Evaluate:13Oct2017)  Requested for: 67TQC3979; Last   Rx:15Jun2017 Ordered    Allergies    1  Penicillins   2  Sulfa Drugs    Vitals   Recorded: 51Nls5489 01:44PM   Temperature 98 8 F   Heart Rate 80   Respiration 20   Systolic 060   Diastolic 70   Height 5 ft 7 in   Weight 239 lb 6 oz   BMI Calculated 37 49   BSA Calculated 2 18     Physical Exam    Constitutional   General appearance: No acute distress, well appearing and well nourished  Eyes   Conjunctiva and lids: No erythema, swelling or discharge  Pupils and irises: Equal, round, reactive to light  Ophthalmoscopic examination: Normal fundi and optic discs  Ears, Nose, Mouth, and Throat   External inspection of ears and nose: Normal     Otoscopic examination: Tympanic membranes translucent with normal light reflex  Canals patent without erythema  Hearing: Normal     Nasal mucosa, septum, and turbinates: Normal without edema or erythema  Lips, teeth, and gums: Normal, good dentition  Oropharynx: Normal with no erythema, edema, exudate or lesions  Neck   Neck: Supple, symmetric, trachea midline, no masses  Thyroid: Normal, no thyromegaly  Pulmonary   Respiratory effort: No increased work of breathing or signs of respiratory distress  Auscultation of lungs: Clear to auscultation  Cardiovascular   Auscultation of heart: Normal rate and rhythm, normal S1 and S2, no murmurs  Femoral pulses: 2+ bilaterally  Pedal pulses: 2+ bilaterally  Examination of extremities for edema and/or varicosities: Normal     Chest   Palpation of breasts and axillae: Normal, no masses palpated  Chest: Normal     Abdomen   Abdomen: Abnormal   The abdomen was rounded and obese  Liver and spleen: No hepatomegaly or splenomegaly  Examination for hernias: No hernias appreciated  Anus, perineum, and rectum: Normal sphincter tone, no masses, no prolapse  Stool sample for occult blood: Negative  Genitourinary   Scrotal contents: Normal testes, no masses  Penis: Normal, no lesions      Lymphatic Palpation of lymph nodes in neck: No lymphadenopathy  Palpation of lymph nodes in groin: No lymphadenopathy  Musculoskeletal   Gait and station: Normal     Inspection/palpation of digits and nails: Normal without clubbing or cyanosis  Inspection/palpation of joints, bones, and muscles: Normal     Range of motion: Normal     Stability: Normal     Muscle strength/tone: Normal     Skin   Skin and subcutaneous tissue: Normal without rashes or lesions  Palpation of skin and subcutaneous tissue: Normal turgor  Neurologic   Reflexes: 2+ and symmetric  Sensation: No sensory loss  Psychiatric   Judgment and insight: Normal     Mood and affect: Normal        Results/Data  PHQ-2 Adult Depression Screening 35Oba4973 10:58PM Nimesh Halo     Test Name Result Flag Reference   PHQ-2 Adult Depression Score 0     Over the last two weeks, how often have you been bothered by any of the following problems? Little interest or pleasure in doing things: Not at all - 0  Feeling down, depressed, or hopeless: Not at all - 0   PHQ-2 Adult Depression Screening Negative         Procedure    Procedure:   Results: 20/15 in both eyes with corrective device, 20/15 in the right eye with corrective device, 20/15 in the left eye with corrective device      Provider Comments  Provider Comments:   offer HPV9 if covered  suggest EGD for chronic gerd  Diet/exercise/weight loss is recommended  allergies stable  Recent data with increased risk of ischemic CVA and chronic kidney damage with PPI use advised            Signatures   Electronically signed by : Keren Rodriguez DO; Aug 16 2017 11:02PM EST                       (Author)

## 2018-01-22 ENCOUNTER — GENERIC CONVERSION - ENCOUNTER (OUTPATIENT)
Dept: OTHER | Facility: OTHER | Age: 28
End: 2018-01-22

## 2018-01-22 VITALS
SYSTOLIC BLOOD PRESSURE: 143 MMHG | DIASTOLIC BLOOD PRESSURE: 91 MMHG | RESPIRATION RATE: 18 BRPM | WEIGHT: 248 LBS | HEIGHT: 67 IN | BODY MASS INDEX: 38.92 KG/M2 | HEART RATE: 88 BPM

## 2018-01-22 VITALS
BODY MASS INDEX: 37.67 KG/M2 | TEMPERATURE: 97.9 F | WEIGHT: 240 LBS | DIASTOLIC BLOOD PRESSURE: 78 MMHG | HEIGHT: 67 IN | HEART RATE: 82 BPM | SYSTOLIC BLOOD PRESSURE: 114 MMHG | RESPIRATION RATE: 16 BRPM

## 2018-01-22 VITALS
SYSTOLIC BLOOD PRESSURE: 133 MMHG | BODY MASS INDEX: 38.92 KG/M2 | RESPIRATION RATE: 17 BRPM | HEART RATE: 80 BPM | DIASTOLIC BLOOD PRESSURE: 91 MMHG | WEIGHT: 248 LBS | HEIGHT: 67 IN

## 2018-01-22 VITALS
WEIGHT: 240 LBS | BODY MASS INDEX: 37.67 KG/M2 | SYSTOLIC BLOOD PRESSURE: 122 MMHG | HEIGHT: 67 IN | DIASTOLIC BLOOD PRESSURE: 82 MMHG

## 2018-01-22 VITALS
HEART RATE: 85 BPM | TEMPERATURE: 98.4 F | OXYGEN SATURATION: 98 % | WEIGHT: 244 LBS | BODY MASS INDEX: 38.3 KG/M2 | HEIGHT: 67 IN | RESPIRATION RATE: 16 BRPM | SYSTOLIC BLOOD PRESSURE: 122 MMHG | DIASTOLIC BLOOD PRESSURE: 78 MMHG

## 2018-01-22 VITALS
HEIGHT: 67 IN | BODY MASS INDEX: 38.3 KG/M2 | WEIGHT: 244 LBS | RESPIRATION RATE: 17 BRPM | DIASTOLIC BLOOD PRESSURE: 88 MMHG | HEART RATE: 65 BPM | SYSTOLIC BLOOD PRESSURE: 131 MMHG

## 2018-01-22 VITALS
SYSTOLIC BLOOD PRESSURE: 121 MMHG | RESPIRATION RATE: 16 BRPM | HEIGHT: 67 IN | HEART RATE: 76 BPM | BODY MASS INDEX: 37.67 KG/M2 | DIASTOLIC BLOOD PRESSURE: 82 MMHG | WEIGHT: 240 LBS

## 2018-01-22 VITALS
SYSTOLIC BLOOD PRESSURE: 134 MMHG | RESPIRATION RATE: 16 BRPM | DIASTOLIC BLOOD PRESSURE: 81 MMHG | WEIGHT: 239 LBS | BODY MASS INDEX: 37.51 KG/M2 | HEART RATE: 76 BPM | HEIGHT: 67 IN

## 2018-01-22 VITALS
DIASTOLIC BLOOD PRESSURE: 70 MMHG | BODY MASS INDEX: 37.57 KG/M2 | RESPIRATION RATE: 20 BRPM | SYSTOLIC BLOOD PRESSURE: 110 MMHG | WEIGHT: 239.38 LBS | HEART RATE: 80 BPM | TEMPERATURE: 98.8 F | HEIGHT: 67 IN

## 2018-01-22 VITALS
BODY MASS INDEX: 38.92 KG/M2 | WEIGHT: 248 LBS | SYSTOLIC BLOOD PRESSURE: 132 MMHG | HEIGHT: 67 IN | DIASTOLIC BLOOD PRESSURE: 87 MMHG

## 2018-01-22 VITALS
RESPIRATION RATE: 17 BRPM | HEART RATE: 76 BPM | SYSTOLIC BLOOD PRESSURE: 130 MMHG | WEIGHT: 248 LBS | HEIGHT: 67 IN | DIASTOLIC BLOOD PRESSURE: 88 MMHG | BODY MASS INDEX: 38.92 KG/M2

## 2018-01-23 NOTE — MISCELLANEOUS
Message  Return to work or school:   Ida Jang is under my professional care  He was seen in my office on Today    He is not able to return to work until January 2, 2018      Dr Devan Pereira        Signatures   Electronically signed by : Devan Pereira DPM; Dec 14 2017  4:45PM EST                       (Author)

## 2018-01-24 VITALS — RESPIRATION RATE: 16 BRPM | SYSTOLIC BLOOD PRESSURE: 128 MMHG | DIASTOLIC BLOOD PRESSURE: 91 MMHG | HEART RATE: 84 BPM

## 2018-01-24 VITALS — HEIGHT: 67 IN | WEIGHT: 248 LBS | BODY MASS INDEX: 38.92 KG/M2

## 2018-02-05 DIAGNOSIS — J30.9 CHRONIC ALLERGIC RHINITIS, UNSPECIFIED SEASONALITY, UNSPECIFIED TRIGGER: Primary | ICD-10-CM

## 2018-02-05 PROBLEM — M89.8X9 BONY EXOSTOSIS: Status: ACTIVE | Noted: 2017-10-27

## 2018-02-05 PROBLEM — M54.16 LUMBAR RADICULOPATHY: Status: ACTIVE | Noted: 2017-05-19

## 2018-02-05 PROBLEM — K64.4 EXTERNAL HEMORRHOIDS: Status: ACTIVE | Noted: 2017-08-14

## 2018-02-05 PROBLEM — E66.9 OBESITY (BMI 30-39.9): Status: ACTIVE | Noted: 2017-10-27

## 2018-02-05 PROBLEM — G57.52 TARSAL TUNNEL SYNDROME OF LEFT SIDE: Status: ACTIVE | Noted: 2017-08-21

## 2018-02-05 PROBLEM — K12.0 APHTHOUS ULCER: Status: ACTIVE | Noted: 2017-09-14

## 2018-02-05 PROBLEM — M25.572 ARTHRALGIA OF LEFT FOOT: Status: ACTIVE | Noted: 2017-09-08

## 2018-02-05 PROBLEM — M76.62 ACHILLES TENDINITIS OF LEFT LOWER EXTREMITY: Status: ACTIVE | Noted: 2017-02-23

## 2018-02-05 RX ORDER — BIOTIN 1 MG
1 TABLET ORAL DAILY
COMMUNITY
End: 2019-01-25 | Stop reason: ALTCHOICE

## 2018-02-05 RX ORDER — OXYMETAZOLINE HYDROCHLORIDE 0.05 G/100ML
SPRAY NASAL
COMMUNITY
End: 2018-06-19

## 2018-02-05 RX ORDER — BUDESONIDE 1 MG/2ML
INHALANT ORAL DAILY
COMMUNITY
End: 2018-07-17

## 2018-02-05 RX ORDER — HYDROXYZINE HYDROCHLORIDE 25 MG/1
1 TABLET, FILM COATED ORAL
COMMUNITY
Start: 2017-11-07 | End: 2019-01-25 | Stop reason: ALTCHOICE

## 2018-02-05 RX ORDER — MONTELUKAST SODIUM 10 MG/1
TABLET ORAL
Qty: 30 TABLET | Refills: 5 | Status: SHIPPED | OUTPATIENT
Start: 2018-02-05 | End: 2018-07-17 | Stop reason: SDUPTHER

## 2018-02-05 RX ORDER — FLUVOXAMINE MALEATE 150 MG/1
CAPSULE, EXTENDED RELEASE ORAL
COMMUNITY
Start: 2009-09-18 | End: 2018-03-06

## 2018-02-05 RX ORDER — OMEPRAZOLE 40 MG/1
CAPSULE, DELAYED RELEASE ORAL
COMMUNITY
Start: 2016-07-08 | End: 2018-06-19

## 2018-02-05 RX ORDER — ETODOLAC 400 MG/1
1 TABLET, FILM COATED ORAL 2 TIMES DAILY
COMMUNITY
Start: 2017-09-08 | End: 2018-03-06

## 2018-02-05 RX ORDER — MONTELUKAST SODIUM 10 MG/1
1 TABLET ORAL DAILY
COMMUNITY
Start: 2016-07-08 | End: 2018-06-19

## 2018-02-05 RX ORDER — OXYCODONE AND ACETAMINOPHEN 10; 325 MG/1; MG/1
1 TABLET ORAL 4 TIMES DAILY PRN
COMMUNITY
Start: 2017-10-27 | End: 2018-06-19

## 2018-02-05 RX ORDER — RIBOFLAVIN (VITAMIN B2) 100 MG
1 TABLET ORAL DAILY
COMMUNITY
End: 2019-01-25 | Stop reason: ALTCHOICE

## 2018-02-06 ENCOUNTER — OFFICE VISIT (OUTPATIENT)
Dept: PODIATRY | Facility: CLINIC | Age: 28
End: 2018-02-06
Payer: COMMERCIAL

## 2018-02-06 VITALS
WEIGHT: 245 LBS | BODY MASS INDEX: 38.45 KG/M2 | SYSTOLIC BLOOD PRESSURE: 138 MMHG | DIASTOLIC BLOOD PRESSURE: 89 MMHG | HEART RATE: 63 BPM | HEIGHT: 67 IN | RESPIRATION RATE: 17 BRPM

## 2018-02-06 DIAGNOSIS — M79.672 LEFT FOOT PAIN: ICD-10-CM

## 2018-02-06 DIAGNOSIS — R26.2 DIFFICULTY WALKING: ICD-10-CM

## 2018-02-06 DIAGNOSIS — M54.16 RADICULOPATHY OF LUMBAR REGION: ICD-10-CM

## 2018-02-06 DIAGNOSIS — M72.2 PLANTAR FASCIITIS: Primary | ICD-10-CM

## 2018-02-06 PROCEDURE — 20550 NJX 1 TENDON SHEATH/LIGAMENT: CPT | Performed by: PODIATRIST

## 2018-02-06 PROCEDURE — 99212 OFFICE O/P EST SF 10 MIN: CPT | Performed by: PODIATRIST

## 2018-02-06 RX ORDER — PREGABALIN 75 MG/1
75 CAPSULE ORAL 2 TIMES DAILY
Qty: 60 CAPSULE | Refills: 0 | Status: SHIPPED | OUTPATIENT
Start: 2018-02-06 | End: 2018-03-06

## 2018-02-06 NOTE — PROGRESS NOTES
Inj Trigger Point Single/Multiple     Date/Time 2/6/2018 2:47 PM     Performed by  Panchito Wood     Authorized by Panchito Wood       Risks and benefits: risks, benefits and alternatives were discussed  Consent given by: patient  Patient understanding: patient states understanding of the procedure being performed  Patient consent: the patient's understanding of the procedure matches consent given  Patient identity confirmed: verbally with patient      Site preparation: Betadine    Local anesthesia used: no     Anesthesia   Local anesthesia used: no     Sedation   Patient sedated: no      Specimen: no    Culture: no    Patient tolerance: Patient tolerated the procedure well with no immediate complications      Patient Transportation: confirmed    Comments: Patient has plantar fasciitis of the left foot  Today, 1 0 25 cc of Kenalog 10 was injected into left heel without pain or complication            Vanessa Rodriguez Foot Exam    General  General Appearance: appears stated age and healthy   Orientation: alert and oriented to person, place, and time   Affect: appropriate   Gait: unimpaired       Right Foot/Ankle     Inspection and Palpation  Ecchymosis: none  Tenderness: none   Swelling: none   Arch: pes planus  Skin Exam: skin intact; Neurovascular  Dorsalis pedis: 3+  Posterior tibial: 3+    Range of Motion    Active  Ankle dorsiflexion: 5      Left Foot/Ankle      Inspection and Palpation  Ecchymosis: none  Tenderness: plantar fascia   Swelling: none   Arch: pes planus  Skin Exam: skin intact; Neurovascular  Dorsalis pedis: 3+  Posterior tibial: 3+    Range of Motion    Active  Ankle dorsiflexion: 5    Comments  In addition, patient has positive sciatic test of the left lower extremity  He test 4/5 L5 testing bilateral  We will add empiric course of gabapentin

## 2018-03-03 ENCOUNTER — TELEPHONE (OUTPATIENT)
Dept: FAMILY MEDICINE CLINIC | Facility: CLINIC | Age: 28
End: 2018-03-03

## 2018-03-03 NOTE — TELEPHONE ENCOUNTER
Maria Victoria Franco called and left a message regarding pts Omeprazole  This medication will need a prior authorization   Estrada Mathur

## 2018-03-06 ENCOUNTER — OFFICE VISIT (OUTPATIENT)
Dept: PODIATRY | Facility: CLINIC | Age: 28
End: 2018-03-06
Payer: COMMERCIAL

## 2018-03-06 VITALS
SYSTOLIC BLOOD PRESSURE: 137 MMHG | DIASTOLIC BLOOD PRESSURE: 93 MMHG | BODY MASS INDEX: 38.45 KG/M2 | WEIGHT: 245 LBS | HEIGHT: 67 IN | HEART RATE: 78 BPM | RESPIRATION RATE: 16 BRPM

## 2018-03-06 DIAGNOSIS — R26.2 DIFFICULTY WALKING: ICD-10-CM

## 2018-03-06 DIAGNOSIS — M72.2 PLANTAR FASCIITIS: Primary | ICD-10-CM

## 2018-03-06 PROCEDURE — 99213 OFFICE O/P EST LOW 20 MIN: CPT | Performed by: PODIATRIST

## 2018-03-06 PROCEDURE — 73620 X-RAY EXAM OF FOOT: CPT | Performed by: PODIATRIST

## 2018-03-06 RX ORDER — GABAPENTIN 100 MG/1
CAPSULE ORAL
COMMUNITY
Start: 2018-02-06 | End: 2018-03-06

## 2018-03-06 RX ORDER — MELOXICAM 7.5 MG/1
7.5 TABLET ORAL DAILY
Qty: 20 TABLET | Refills: 0 | Status: SHIPPED | OUTPATIENT
Start: 2018-03-06 | End: 2018-06-19

## 2018-03-06 NOTE — PROGRESS NOTES
Assessment/Plan:  Patient has plantar fasciitis  He has pain with ambulation  Plan  X-rays taken  Left heel trigger point injection done  1 25 cc of Kenalog 10 was injected without pain or complication  Patient will use night splint  Will increase gabapentin dosing  We will add anti-inflammatory medication  No problem-specific Assessment & Plan notes found for this encounter  Foot Exam     General  General Appearance: appears stated age and healthy   Orientation: alert and oriented to person, place, and time   Affect: appropriate   Gait: unimpaired         Right Foot/Ankle      Inspection and Palpation  Ecchymosis: none  Tenderness: none   Swelling: none   Arch: pes planus  Skin Exam: skin intact;      Neurovascular  Dorsalis pedis: 3+  Posterior tibial: 3+     Range of Motion     Active  Ankle dorsiflexion: 5        Left Foot/Ankle       Inspection and Palpation  Ecchymosis: none  Tenderness: plantar fascia   Swelling: none   Arch: pes planus  Skin Exam: skin intact;      Neurovascular  Dorsalis pedis: 3+  Posterior tibial: 3+     Range of Motion     Active  Ankle dorsiflexion: 5   There are no diagnoses linked to this encounter  Subjective:      Patient ID: Wilber Ramires is a 33 y o  male  Patient is back to work  His biggest complaint today is pain in the bottom of his foot  He suffers from post static dyskinesia  No history of trauma  Patient is taking gabapentin without problem  He has minimal pain in the Achilles tendon area  Of note, patient would like out of work  He is wondering about permanent disability  The following portions of the patient's history were reviewed and updated as appropriate: allergies, current medications, past family history, past medical history, past social history, past surgical history and problem list     Review of Systems      Objective:   Foot Exam     General  General Appearance: appears stated age and healthy   Orientation: alert and oriented to person, place, and time   Affect: appropriate   Gait: unimpaired         Right Foot/Ankle      Inspection and Palpation  Ecchymosis: none  Tenderness: none   Swelling: none   Arch: pes planus  Skin Exam: skin intact;      Neurovascular  Dorsalis pedis: 3+  Posterior tibial: 3+     Range of Motion     Active  Ankle dorsiflexion: 5      Foot Exam     General  General Appearance: appears stated age and healthy   Orientation: alert and oriented to person, place, and time   Affect: appropriate   Gait: unimpaired         Right Foot/Ankle      Inspection and Palpation  Ecchymosis: none  Tenderness: none   Swelling: none   Arch: pes planus  Skin Exam: skin intact;      Neurovascular  Dorsalis pedis: 3+  Posterior tibial: 3+     Range of Motion     Active  Ankle dorsiflexion: 5        Left Foot/Ankle       Inspection and Palpation  Ecchymosis: none  Tenderness: plantar fascia   Swelling: none   Arch: pes planus  Skin Exam: skin intact;      Neurovascular  Dorsalis pedis: 3+  Posterior tibial: 3+     Range of Motion     Active  Ankle dorsiflexion: 5  Left Foot/Ankle       Inspection and Palpation  Ecchymosis: none  Tenderness: plantar fascia   Swelling: none   Arch: pes planus  Skin Exam: skin intact;      Neurovascular  Dorsalis pedis: 3+  Posterior tibial: 3+     Range of Motion     Active  Ankle dorsiflexion: 5   Foot Exam    General  General Appearance: appears stated age and healthy   Orientation: alert and oriented to person, place, and time   Affect: appropriate   Gait: unimpaired       Right Foot/Ankle     Inspection and Palpation  Ecchymosis: none  Tenderness: none   Swelling: none   Arch: pes planus      Left Foot/Ankle      Inspection and Palpation  Ecchymosis: none  Tenderness: plantar fascia   Swelling: plantar fascia   Arch: pes planus    Comments  Patient is maximally pronated in stance and gait  There is pain with palpation left plantar fascia  Negative pain with tuning fork test     X-ray    No evidence of osteomyelitis or fracture    Physical Exam

## 2018-03-20 ENCOUNTER — OFFICE VISIT (OUTPATIENT)
Dept: PODIATRY | Facility: CLINIC | Age: 28
End: 2018-03-20
Payer: COMMERCIAL

## 2018-03-20 VITALS
HEIGHT: 67 IN | DIASTOLIC BLOOD PRESSURE: 81 MMHG | BODY MASS INDEX: 38.45 KG/M2 | RESPIRATION RATE: 17 BRPM | HEART RATE: 89 BPM | SYSTOLIC BLOOD PRESSURE: 121 MMHG | WEIGHT: 245 LBS

## 2018-03-20 DIAGNOSIS — M54.16 RADICULOPATHY OF LUMBAR REGION: Primary | ICD-10-CM

## 2018-03-20 DIAGNOSIS — M21.969 ACQUIRED DEFORMITY OF FOOT, UNSPECIFIED LATERALITY: ICD-10-CM

## 2018-03-20 PROCEDURE — 99212 OFFICE O/P EST SF 10 MIN: CPT | Performed by: PODIATRIST

## 2018-03-20 RX ORDER — FLUVOXAMINE MALEATE 150 MG/1
CAPSULE, EXTENDED RELEASE ORAL
COMMUNITY
Start: 2018-03-07 | End: 2020-08-31 | Stop reason: SDUPTHER

## 2018-03-20 NOTE — PROGRESS NOTES
Assessment/Plan:  Normal course  Will watch for residual tendinitis  Patient does have history of plantar  Fasciitis as well  Plan  Patient will stretch daily  He will use orthotics  No problem-specific Assessment & Plan notes found for this encounter  There are no diagnoses linked to this encounter  Subjective:      Patient ID: Oni Leong is a 33 y o  male  Patient is significantly better  He has no pain in the operative foot  He is working  He is very happy with surgical result  The following portions of the patient's history were reviewed and updated as appropriate: allergies, current medications, past family history, past medical history, past social history, past surgical history and problem list     Review of Systems      Objective: Foot ExamPhysical Exam   Constitutional: He appears well-developed and well-nourished  Cardiovascular: Normal rate and regular rhythm  Musculoskeletal:   Incision of left foot is coapted  Patient demonstrates full range of motion at the ankle  This is both passive and active  No indication of infection

## 2018-04-08 DIAGNOSIS — K21.9 GASTROESOPHAGEAL REFLUX DISEASE, ESOPHAGITIS PRESENCE NOT SPECIFIED: Primary | ICD-10-CM

## 2018-04-10 RX ORDER — OMEPRAZOLE 40 MG/1
CAPSULE, DELAYED RELEASE ORAL
Qty: 90 CAPSULE | Refills: 1 | Status: SHIPPED | OUTPATIENT
Start: 2018-04-10 | End: 2018-07-17 | Stop reason: SDUPTHER

## 2018-06-13 ENCOUNTER — OFFICE VISIT (OUTPATIENT)
Dept: PODIATRY | Facility: CLINIC | Age: 28
End: 2018-06-13
Payer: COMMERCIAL

## 2018-06-13 VITALS
HEIGHT: 67 IN | DIASTOLIC BLOOD PRESSURE: 93 MMHG | BODY MASS INDEX: 38.45 KG/M2 | HEART RATE: 70 BPM | RESPIRATION RATE: 17 BRPM | SYSTOLIC BLOOD PRESSURE: 137 MMHG | WEIGHT: 245 LBS

## 2018-06-13 DIAGNOSIS — M72.2 PLANTAR FASCIITIS: Primary | ICD-10-CM

## 2018-06-13 DIAGNOSIS — S92.015D CLOSED NONDISPLACED FRACTURE OF BODY OF LEFT CALCANEUS WITH ROUTINE HEALING: ICD-10-CM

## 2018-06-13 PROCEDURE — 99212 OFFICE O/P EST SF 10 MIN: CPT | Performed by: PODIATRIST

## 2018-06-13 PROCEDURE — 20550 NJX 1 TENDON SHEATH/LIGAMENT: CPT | Performed by: PODIATRIST

## 2018-06-13 RX ORDER — MELOXICAM 7.5 MG/1
15 TABLET ORAL DAILY
Qty: 20 TABLET | Refills: 0 | Status: SHIPPED | OUTPATIENT
Start: 2018-06-13 | End: 2019-01-25 | Stop reason: ALTCHOICE

## 2018-06-13 RX ORDER — ETODOLAC 400 MG/1
400 TABLET, FILM COATED ORAL 2 TIMES DAILY
Qty: 60 TABLET | Refills: 0 | Status: SHIPPED | OUTPATIENT
Start: 2018-06-13 | End: 2018-06-29 | Stop reason: SDUPTHER

## 2018-06-13 NOTE — PROGRESS NOTES
Assessment/Plan:  Rule out calcaneal fracture  Plantar fasciitis left foot    Plan  Left heel trigger point injection done  1 25 cc of Kenalog 10 was injected without pain or complication  MRI will be ordered to rule out plantar fasciitis  Patient be placed on anti-inflammatory medicine   There are no diagnoses linked to this encounter  Subjective:  Patient has left heel pain  It is on the bottom  This has been since he last developed a blister  He suffers from post static dyskinesia  Patient ID: Darryle Shores is a 33 y o  male  Past Medical History:   Diagnosis Date    GERD (gastroesophageal reflux disease)     Insomnia     Irritable bowel syndrome     OCD (obsessive compulsive disorder)     Seasonal allergies        Past Surgical History:   Procedure Laterality Date    ESOPHAGOGASTRODUODENOSCOPY N/A 10/31/2017    Procedure: ESOPHAGOGASTRODUODENOSCOPY (EGD); Surgeon: Zane Breen MD;  Location: [facility];   Service: Gastroenterology    NO PAST SURGERIES      DE LENGTH/SHORT LEG/ANKL TENDON,SINGLE Left 11/3/2017    Procedure: CALCANEAL OSTECTOMY, ACHILLEST TENDON LENGTHING;  Surgeon: Nehemias Clark DPM;  Location: [facility];  Service: Podiatry       Allergies   Allergen Reactions    Acetazolamide     Penicillins Hives    Seasonal Ic  [Cholestatin]     Sulfa Antibiotics GI Intolerance         Current Outpatient Prescriptions:     Alpha-D-Galactosidase (BEANO PO), Take by mouth, Disp: , Rfl:     Alpha-D-Galactosidase (BEANO) TABS, Take by mouth, Disp: , Rfl:     Ascorbic Acid (VITAMIN C) 100 MG tablet, Take 1 tablet by mouth daily, Disp: , Rfl:     Ascorbic Acid (VITAMIN C) 1000 MG tablet, Take 1,000 mg by mouth daily, Disp: , Rfl:     B Complex Vitamins (B COMPLEX 1 PO), Take by mouth daily, Disp: , Rfl:     b complex vitamins capsule, Take 1 capsule by mouth daily, Disp: , Rfl:     budesonide (PULMICORT) 1 MG/2ML nebulizer solution, Inhale daily, Disp: , Rfl:     budesonide (RINOCORT AQUA) 32 MCG/ACT nasal spray, 1 spray into each nostril daily at bedtime, Disp: , Rfl:     Cholecalciferol (VITAMIN D3) 1000 units CAPS, Take 1 capsule by mouth daily, Disp: , Rfl:     Cholecalciferol (VITAMIN D3) 2000 units CHEW, Chew, Disp: , Rfl:     DiphenhydrAMINE HCl, Sleep, (SLEEP AID) 25 MG CAPS, Take 1 capsule by mouth daily, Disp: , Rfl:     fexofenadine (ALLEGRA) 30 MG tablet, Take 30 mg by mouth every evening, Disp: , Rfl:     Fluvoxamine Maleate 150 MG CP24, , Disp: , Rfl:     Glucosamine-Chondroitin (OSTEO BI-FLEX REGULAR STRENGTH) 250-200 MG TABS, Take 1 tablet by mouth daily, Disp: , Rfl:     hydrOXYzine HCL (ATARAX) 25 mg tablet, Take 1 tablet by mouth, Disp: , Rfl:     Infant Foods (FOLLOW-UP PO), Take by mouth, Disp: , Rfl:     meloxicam (MOBIC) 7 5 mg tablet, Take 1 tablet (7 5 mg total) by mouth daily for 20 days, Disp: 20 tablet, Rfl: 0    Misc Natural Products (OSTEO BI-FLEX JOINT SHIELD PO), Take by mouth, Disp: , Rfl:     montelukast (SINGULAIR) 10 mg tablet, TAKE ONE TABLET BY MOUTH ONCE DAILY, Disp: 30 tablet, Rfl: 5    montelukast (SINGULAIR) 10 mg tablet, Take 1 tablet by mouth daily, Disp: , Rfl:     Multiple Vitamin (MULTI-DAY PO), Take 1 tablet by mouth daily, Disp: , Rfl:     multivitamin (THERAGRAN) TABS, Take 1 tablet by mouth daily, Disp: , Rfl:     NON FORMULARY, daily at bedtime as needed Alteril OTC natural sleep aid, Disp: , Rfl:     omeprazole (PriLOSEC) 40 MG capsule, Take 40 mg by mouth every morning, Disp: , Rfl:     omeprazole (PriLOSEC) 40 MG capsule, Take by mouth, Disp: , Rfl:     omeprazole (PriLOSEC) 40 MG capsule, TAKE ONE CAPSULE BY MOUTH ONCE DAILY BEFORE MEAL(S), Disp: 90 capsule, Rfl: 1    oxyCODONE-acetaminophen (PERCOCET)  mg per tablet, Take 1 tablet by mouth 4 (four) times a day as needed, Disp: , Rfl:     oxymetazoline (AFRIN) 0 05 % nasal spray, 2 sprays by Each Nare route 2 (two) times a day, Disp: , Rfl:    oxymetazoline (AFRIN) 0 05 % nasal spray, into each nostril, Disp: , Rfl:     Patient Active Problem List   Diagnosis    Achilles tendinitis of left lower extremity    Acne    ADD (attention deficit disorder) without hyperactivity    Allergic rhinitis    Aphthous ulcer    Arthralgia of left foot    Bony exostosis    Depression    External hemorrhoids    GE reflux    Lumbar radiculopathy    Obesity (BMI 30-39  9)    Obsessive compulsive disorder    Sleep apnea    Tarsal tunnel syndrome of left side    Plantar fasciitis    Radiculopathy of lumbar region    Left foot pain    Difficulty walking       HPI    The following portions of the patient's history were reviewed and updated as appropriate: allergies, current medications, past family history, past medical history, past social history, past surgical history and problem list     Review of Systems      Historical Information   Past Medical History:   Diagnosis Date    GERD (gastroesophageal reflux disease)     Insomnia     Irritable bowel syndrome     OCD (obsessive compulsive disorder)     Seasonal allergies      Past Surgical History:   Procedure Laterality Date    ESOPHAGOGASTRODUODENOSCOPY N/A 10/31/2017    Procedure: ESOPHAGOGASTRODUODENOSCOPY (EGD); Surgeon: Fartun Stern MD;  Location: [facility];   Service: Gastroenterology    NO PAST SURGERIES      UT LENGTH/SHORT LEG/ANKL TENDON,SINGLE Left 11/3/2017    Procedure: CALCANEAL OSTECTOMY, ACHILLEST TENDON [address];  Surgeon: Lois Martinez DPM;  Location: [facility];  Service: Podiatry     Social History   History   Alcohol Use No     History   Drug Use No     Family History:   Family History   Problem Relation Age of Onset    Kidney disease Mother     Thyroid disease Mother     Hypertension Father     Kidney disease Father     Gout Father     No Known Problems Sister        Meds/Allergies   Allergies   Allergen Reactions    Acetazolamide     Penicillins Hives    Seasonal Ic [Cholestatin]     Sulfa Antibiotics GI Intolerance       Current Outpatient Prescriptions on File Prior to Visit   Medication Sig Dispense Refill    Alpha-D-Galactosidase (BEANO PO) Take by mouth      Alpha-D-Galactosidase (BEANO) TABS Take by mouth      Ascorbic Acid (VITAMIN C) 100 MG tablet Take 1 tablet by mouth daily      Ascorbic Acid (VITAMIN C) 1000 MG tablet Take 1,000 mg by mouth daily      B Complex Vitamins (B COMPLEX 1 PO) Take by mouth daily      b complex vitamins capsule Take 1 capsule by mouth daily      budesonide (PULMICORT) 1 MG/2ML nebulizer solution Inhale daily      budesonide (RINOCORT AQUA) 32 MCG/ACT nasal spray 1 spray into each nostril daily at bedtime      Cholecalciferol (VITAMIN D3) 1000 units CAPS Take 1 capsule by mouth daily      Cholecalciferol (VITAMIN D3) 2000 units CHEW Chew      DiphenhydrAMINE HCl, Sleep, (SLEEP AID) 25 MG CAPS Take 1 capsule by mouth daily      fexofenadine (ALLEGRA) 30 MG tablet Take 30 mg by mouth every evening      Fluvoxamine Maleate 150 MG CP24       Glucosamine-Chondroitin (OSTEO BI-FLEX REGULAR STRENGTH) 250-200 MG TABS Take 1 tablet by mouth daily      hydrOXYzine HCL (ATARAX) 25 mg tablet Take 1 tablet by mouth      Infant Foods (FOLLOW-UP PO) Take by mouth      meloxicam (MOBIC) 7 5 mg tablet Take 1 tablet (7 5 mg total) by mouth daily for 20 days 20 tablet 0    Misc Natural Products (OSTEO BI-FLEX JOINT SHIELD PO) Take by mouth      montelukast (SINGULAIR) 10 mg tablet TAKE ONE TABLET BY MOUTH ONCE DAILY 30 tablet 5    montelukast (SINGULAIR) 10 mg tablet Take 1 tablet by mouth daily      Multiple Vitamin (MULTI-DAY PO) Take 1 tablet by mouth daily      multivitamin (THERAGRAN) TABS Take 1 tablet by mouth daily      NON FORMULARY daily at bedtime as needed Alteril OTC natural sleep aid      omeprazole (PriLOSEC) 40 MG capsule Take 40 mg by mouth every morning      omeprazole (PriLOSEC) 40 MG capsule Take by mouth      omeprazole (PriLOSEC) 40 MG capsule TAKE ONE CAPSULE BY MOUTH ONCE DAILY BEFORE MEAL(S) 90 capsule 1    oxyCODONE-acetaminophen (PERCOCET)  mg per tablet Take 1 tablet by mouth 4 (four) times a day as needed      oxymetazoline (AFRIN) 0 05 % nasal spray 2 sprays by Each Nare route 2 (two) times a day      oxymetazoline (AFRIN) 0 05 % nasal spray into each nostril       No current facility-administered medications on file prior to visit  Objective:      Foot Exam    General  General Appearance: appears stated age and healthy   Orientation: alert and oriented to person, place, and time   Affect: appropriate   Gait: antalgic       Left Foot/Ankle      Inspection and Palpation  Tenderness: plantar fascia   Swelling: plantar fascia         Physical Exam

## 2018-06-19 ENCOUNTER — OFFICE VISIT (OUTPATIENT)
Dept: FAMILY MEDICINE CLINIC | Facility: CLINIC | Age: 28
End: 2018-06-19
Payer: COMMERCIAL

## 2018-06-19 VITALS
TEMPERATURE: 97.9 F | HEIGHT: 67 IN | DIASTOLIC BLOOD PRESSURE: 100 MMHG | WEIGHT: 237.6 LBS | BODY MASS INDEX: 37.29 KG/M2 | SYSTOLIC BLOOD PRESSURE: 158 MMHG | HEART RATE: 76 BPM

## 2018-06-19 DIAGNOSIS — R53.83 FATIGUE, UNSPECIFIED TYPE: ICD-10-CM

## 2018-06-19 DIAGNOSIS — Z13.89 SCREENING FOR CARDIOVASCULAR, RESPIRATORY, AND GENITOURINARY DISEASES: ICD-10-CM

## 2018-06-19 DIAGNOSIS — Z13.1 SCREENING FOR DIABETES MELLITUS (DM): ICD-10-CM

## 2018-06-19 DIAGNOSIS — Z13.83 SCREENING FOR CARDIOVASCULAR, RESPIRATORY, AND GENITOURINARY DISEASES: ICD-10-CM

## 2018-06-19 DIAGNOSIS — Z13.6 SCREENING FOR CARDIOVASCULAR, RESPIRATORY, AND GENITOURINARY DISEASES: ICD-10-CM

## 2018-06-19 DIAGNOSIS — I10 ESSENTIAL HYPERTENSION: Primary | ICD-10-CM

## 2018-06-19 PROCEDURE — 99214 OFFICE O/P EST MOD 30 MIN: CPT | Performed by: FAMILY MEDICINE

## 2018-06-19 RX ORDER — LISINOPRIL 10 MG/1
10 TABLET ORAL DAILY
Qty: 90 TABLET | Refills: 0 | Status: SHIPPED | OUTPATIENT
Start: 2018-06-19 | End: 2018-07-17 | Stop reason: SDUPTHER

## 2018-06-19 NOTE — PROGRESS NOTES
Assessment/Plan:    No problem-specific Assessment & Plan notes found for this encounter     htn new, recheck 4w  Labs for screening  Fatigue and wt gain r/o DM/thyroid/fatty liver     Diagnoses and all orders for this visit:    Essential hypertension  -     lisinopril (ZESTRIL) 10 mg tablet; Take 1 tablet (10 mg total) by mouth daily    Screening for cardiovascular, respiratory, and genitourinary diseases  -     Lipid Panel with Direct LDL reflex; Future    Screening for diabetes mellitus (DM)  -     Comprehensive metabolic panel; Future  -     Hemoglobin A1C; Future    Fatigue, unspecified type  -     TSH, 3rd generation; Future              No Follow-up on file  Subjective:      Patient ID: Catrachita Hernandez is a 33 y o  male  Chief Complaint   Patient presents with    Fatigue     After eating prcma       HPI  Fatigue  Chronic but last few days very hungry and shakey within few hours of eating  Like he needed a nap  Sweaty  Tingling in arms and hands, has some still  Not same time of day  2 episodes  Lunch is biggest meal  Both happened before lunch  Small breakfast typically  Eats badly  No change in activity  Not exercising, but plans to  Interested  Drinks a lot of water  No polydipsia  No nocturia , 1 to none  Mom underactive thyroid    The following portions of the patient's history were reviewed and updated as appropriate: allergies, current medications, past family history, past medical history, past social history, past surgical history and problem list     Review of Systems   Constitutional: Positive for fatigue  Negative for fever  Respiratory: Negative for shortness of breath  Neurological: Positive for weakness  Negative for syncope           Current Outpatient Prescriptions   Medication Sig Dispense Refill    Alpha-D-Galactosidase (BEANO PO) Take by mouth      Ascorbic Acid (VITAMIN C) 100 MG tablet Take 1 tablet by mouth daily      Cholecalciferol (VITAMIN D3) 1000 units CAPS Take 1 capsule by mouth daily      DiphenhydrAMINE HCl, Sleep, (SLEEP AID) 25 MG CAPS Take 1 capsule by mouth daily      etodolac (LODINE) 400 MG tablet Take 1 tablet (400 mg total) by mouth 2 (two) times a day for 30 days 60 tablet 0    Fluvoxamine Maleate 150 MG CP24       Glucosamine-Chondroitin (OSTEO BI-FLEX REGULAR STRENGTH) 250-200 MG TABS Take 1 tablet by mouth daily      hydrOXYzine HCL (ATARAX) 25 mg tablet Take 1 tablet by mouth      Misc Natural Products (OSTEO BI-FLEX JOINT SHIELD PO) Take by mouth      montelukast (SINGULAIR) 10 mg tablet TAKE ONE TABLET BY MOUTH ONCE DAILY 30 tablet 5    omeprazole (PriLOSEC) 40 MG capsule TAKE ONE CAPSULE BY MOUTH ONCE DAILY BEFORE MEAL(S) 90 capsule 1    oxymetazoline (AFRIN) 0 05 % nasal spray 2 sprays by Each Nare route 2 (two) times a day      B Complex Vitamins (B COMPLEX 1 PO) Take by mouth daily      budesonide (PULMICORT) 1 MG/2ML nebulizer solution Inhale daily      budesonide (RINOCORT AQUA) 32 MCG/ACT nasal spray 1 spray into each nostril daily at bedtime      fexofenadine (ALLEGRA) 30 MG tablet Take 30 mg by mouth every evening      lisinopril (ZESTRIL) 10 mg tablet Take 1 tablet (10 mg total) by mouth daily 90 tablet 0    meloxicam (MOBIC) 7 5 mg tablet Take 2 tablets (15 mg total) by mouth daily for 20 days 20 tablet 0    Multiple Vitamin (MULTI-DAY PO) Take 1 tablet by mouth daily      omeprazole (PriLOSEC) 40 MG capsule Take 40 mg by mouth every morning       No current facility-administered medications for this visit  Objective:    /100   Pulse 76   Temp 97 9 °F (36 6 °C)   Ht 5' 7" (1 702 m)   Wt 108 kg (237 lb 9 6 oz)   BMI 37 21 kg/m²        Physical Exam   Constitutional: He appears well-developed  HENT:   Head: Normocephalic  Eyes: Conjunctivae are normal    Neck: Neck supple  Cardiovascular: Normal rate and intact distal pulses  Pulmonary/Chest: Effort normal  No respiratory distress  Abdominal: Soft  There is no tenderness  There is no rebound and no guarding  Musculoskeletal: He exhibits no edema or deformity  Neurological: He is alert  Skin: Skin is warm and dry  Psychiatric: His behavior is normal  Thought content normal    Nursing note and vitals reviewed               Tamica Salvador DO

## 2018-06-19 NOTE — PATIENT INSTRUCTIONS
Please get the fasting labwork done    After you give the blood, start the new blood pressure LISINOPRIL 10mg/d

## 2018-06-23 LAB
ALBUMIN SERPL-MCNC: 4.5 G/DL (ref 3.6–5.1)
ALBUMIN/GLOB SERPL: 1.5 (CALC) (ref 1–2.5)
ALP SERPL-CCNC: 97 U/L (ref 40–115)
ALT SERPL-CCNC: 29 U/L (ref 9–46)
AST SERPL-CCNC: 21 U/L (ref 10–40)
BILIRUB SERPL-MCNC: 0.7 MG/DL (ref 0.2–1.2)
BUN SERPL-MCNC: 16 MG/DL (ref 7–25)
BUN/CREAT SERPL: NORMAL (CALC) (ref 6–22)
CALCIUM SERPL-MCNC: 10.1 MG/DL (ref 8.6–10.3)
CHLORIDE SERPL-SCNC: 104 MMOL/L (ref 98–110)
CHOLEST SERPL-MCNC: 212 MG/DL
CHOLEST/HDLC SERPL: 5.3 (CALC)
CO2 SERPL-SCNC: 25 MMOL/L (ref 20–31)
CREAT SERPL-MCNC: 0.95 MG/DL (ref 0.6–1.35)
GLOBULIN SER CALC-MCNC: 3.1 G/DL (CALC) (ref 1.9–3.7)
GLUCOSE SERPL-MCNC: 77 MG/DL (ref 65–99)
HBA1C MFR BLD: 5.1 % OF TOTAL HGB
HDLC SERPL-MCNC: 40 MG/DL
LDLC SERPL CALC-MCNC: 149 MG/DL (CALC)
NONHDLC SERPL-MCNC: 172 MG/DL (CALC)
POTASSIUM SERPL-SCNC: 4.7 MMOL/L (ref 3.5–5.3)
PROT SERPL-MCNC: 7.6 G/DL (ref 6.1–8.1)
SL AMB EGFR AFRICAN AMERICAN: 128 ML/MIN/1.73M2
SL AMB EGFR NON AFRICAN AMERICAN: 110 ML/MIN/1.73M2
SODIUM SERPL-SCNC: 141 MMOL/L (ref 135–146)
TRIGL SERPL-MCNC: 111 MG/DL
TSH SERPL-ACNC: 2.16 MIU/L (ref 0.4–4.5)

## 2018-06-29 ENCOUNTER — OFFICE VISIT (OUTPATIENT)
Dept: PODIATRY | Facility: CLINIC | Age: 28
End: 2018-06-29
Payer: COMMERCIAL

## 2018-06-29 VITALS
BODY MASS INDEX: 37.29 KG/M2 | RESPIRATION RATE: 16 BRPM | DIASTOLIC BLOOD PRESSURE: 81 MMHG | HEIGHT: 67 IN | HEART RATE: 64 BPM | WEIGHT: 237.6 LBS | SYSTOLIC BLOOD PRESSURE: 144 MMHG

## 2018-06-29 DIAGNOSIS — M54.16 RADICULOPATHY OF LUMBAR REGION: ICD-10-CM

## 2018-06-29 DIAGNOSIS — M72.2 PLANTAR FASCIITIS: ICD-10-CM

## 2018-06-29 DIAGNOSIS — S92.015D CLOSED NONDISPLACED FRACTURE OF BODY OF LEFT CALCANEUS WITH ROUTINE HEALING: ICD-10-CM

## 2018-06-29 DIAGNOSIS — G57.52 TARSAL TUNNEL SYNDROME OF LEFT SIDE: Primary | ICD-10-CM

## 2018-06-29 PROCEDURE — 99213 OFFICE O/P EST LOW 20 MIN: CPT | Performed by: PODIATRIST

## 2018-06-29 RX ORDER — GABAPENTIN 300 MG/1
300 CAPSULE ORAL 2 TIMES DAILY
Qty: 60 CAPSULE | Refills: 0 | Status: SHIPPED | OUTPATIENT
Start: 2018-06-29 | End: 2018-09-13 | Stop reason: SDUPTHER

## 2018-06-29 RX ORDER — ETODOLAC 400 MG/1
400 TABLET, FILM COATED ORAL 2 TIMES DAILY
Qty: 60 TABLET | Refills: 0 | Status: SHIPPED | OUTPATIENT
Start: 2018-06-29 | End: 2018-09-13 | Stop reason: SDUPTHER

## 2018-06-29 NOTE — PROGRESS NOTES
Assessment/Plan:  Rule out tarsal tunnel syndrome  Rule out calcaneal stress fracture  Plan  We will treat for tarsal tunnel syndrome  Gabapentin has been ordered  Patient will continue therapy  He will undergo MRI  This will rule out tarsal tunnel syndrome and or calcaneal stress fracture  He is being sent Neurology to rule out neurologic etiology possibility     There are no diagnoses linked to this encounter  Subjective:  Patient has continued pain in left foot  He is working  He is requesting time out of work  Patient ID: Omayra Barentt is a 33 y o  male  Past Medical History:   Diagnosis Date    Acne 09/01/2010    Last Assessed 01 Sep 2010   Acquired ankle/foot deformity     Last assessed 23 Feb 2017  Resolved 14 Aug 2017   Change in hearing     Resolved 16 Aug 2017    Closed displaced avulsion fracture of tuberosity of left calcaneus     Last Assessed 25 May 2017  Resolved 14 Aug 2017   Epistaxis     Last Assessed 18 Feb 2014  Resolved 08 Jun 2016   Foreign body in foot     Last Assessed 30 Jul 2013  Resolved 12 Oct 2013   Gastritis     Last Assessed 22 Oct 2012    GERD (gastroesophageal reflux disease)     History of oral aphthous ulcers     Last Assessed 08 June 2016  Resolved 14 Aug 2017   Hypersomnia 02/18/2009    Last Assessed 18 Feb 2009  Resolved 16 Aug 2017   Impacted cerumen     Last Assessed 08 Jun 2016  Resolved 08 Jul 2016   Insomnia     Irritable bowel syndrome     Knee joint pain 03/10/2008    Left foot pain     Last Assessed 19 May 2017  Resolved 14 Aug 2017   OCD (obsessive compulsive disorder)     Plantar fibromatosis     Last Assessed 19 Oct 2016  Resolved 14 Aug 2017   Seasonal allergies     Tarsal tunnel syndrome of left side     Last Assessed 21 Aug 2017  Resolved 21 Aug 2017      Tinea corporis     Last Assessed 05 Dec 2011       Past Surgical History:   Procedure Laterality Date    ESOPHAGOGASTRODUODENOSCOPY N/A 10/31/2017 Procedure: ESOPHAGOGASTRODUODENOSCOPY (EGD); Surgeon: Xavier Montana MD;  Location: [facility];   Service: Gastroenterology    NO PAST SURGERIES      OH LENGTH/SHORT LEG/ANKL TENDON,SINGLE Left 11/3/2017    Procedure: CALCANEAL OSTECTOMY, ACHILLEST TENDON LENGTHING;  Surgeon: Heidi David DPM;  Location: [facility];  Service: Podiatry       Allergies   Allergen Reactions    Acetazolamide     Penicillins Hives    Seasonal Ic  [Cholestatin]     Sulfa Antibiotics GI Intolerance         Current Outpatient Prescriptions:     Alpha-D-Galactosidase (BEANO PO), Take by mouth, Disp: , Rfl:     Ascorbic Acid (VITAMIN C) 100 MG tablet, Take 1 tablet by mouth daily, Disp: , Rfl:     B Complex Vitamins (B COMPLEX 1 PO), Take by mouth daily, Disp: , Rfl:     budesonide (PULMICORT) 1 MG/2ML nebulizer solution, Inhale daily, Disp: , Rfl:     budesonide (RINOCORT AQUA) 32 MCG/ACT nasal spray, 1 spray into each nostril daily at bedtime, Disp: , Rfl:     Cholecalciferol (VITAMIN D3) 1000 units CAPS, Take 1 capsule by mouth daily, Disp: , Rfl:     DiphenhydrAMINE HCl, Sleep, (SLEEP AID) 25 MG CAPS, Take 1 capsule by mouth daily, Disp: , Rfl:     etodolac (LODINE) 400 MG tablet, Take 1 tablet (400 mg total) by mouth 2 (two) times a day for 30 days, Disp: 60 tablet, Rfl: 0    fexofenadine (ALLEGRA) 30 MG tablet, Take 30 mg by mouth every evening, Disp: , Rfl:     Fluvoxamine Maleate 150 MG CP24, , Disp: , Rfl:     Glucosamine-Chondroitin (OSTEO BI-FLEX REGULAR STRENGTH) 250-200 MG TABS, Take 1 tablet by mouth daily, Disp: , Rfl:     hydrOXYzine HCL (ATARAX) 25 mg tablet, Take 1 tablet by mouth, Disp: , Rfl:     lisinopril (ZESTRIL) 10 mg tablet, Take 1 tablet (10 mg total) by mouth daily, Disp: 90 tablet, Rfl: 0    meloxicam (MOBIC) 7 5 mg tablet, Take 2 tablets (15 mg total) by mouth daily for 20 days, Disp: 20 tablet, Rfl: 0    Misc Natural Products (OSTEO BI-FLEX JOINT SHIELD PO), Take by mouth, Disp: , Rfl:    montelukast (SINGULAIR) 10 mg tablet, TAKE ONE TABLET BY MOUTH ONCE DAILY, Disp: 30 tablet, Rfl: 5    Multiple Vitamin (MULTI-DAY PO), Take 1 tablet by mouth daily, Disp: , Rfl:     omeprazole (PriLOSEC) 40 MG capsule, Take 40 mg by mouth every morning, Disp: , Rfl:     omeprazole (PriLOSEC) 40 MG capsule, TAKE ONE CAPSULE BY MOUTH ONCE DAILY BEFORE MEAL(S), Disp: 90 capsule, Rfl: 1    oxymetazoline (AFRIN) 0 05 % nasal spray, 2 sprays by Each Nare route 2 (two) times a day, Disp: , Rfl:     Patient Active Problem List   Diagnosis    Achilles tendinitis of left lower extremity    Acne    ADD (attention deficit disorder) without hyperactivity    Allergic rhinitis    Aphthous ulcer    Arthralgia of left foot    Bony exostosis    Depression    External hemorrhoids    GE reflux    Lumbar radiculopathy    Obesity (BMI 30-39  9)    Obsessive compulsive disorder    Sleep apnea    Tarsal tunnel syndrome of left side    Plantar fasciitis    Radiculopathy of lumbar region    Left foot pain    Difficulty walking    Closed nondisplaced fracture of body of left calcaneus with routine healing    Screening for cardiovascular, respiratory, and genitourinary diseases    Screening for diabetes mellitus (DM)    Essential hypertension    Fatigue       HPI    The following portions of the patient's history were reviewed and updated as appropriate: allergies, current medications, past family history, past medical history, past social history, past surgical history and problem list     Review of Systems      Historical Information   Past Medical History:   Diagnosis Date    Acne 09/01/2010    Last Assessed 01 Sep 2010   Acquired ankle/foot deformity     Last assessed 23 Feb 2017  Resolved 14 Aug 2017   Change in hearing     Resolved 16 Aug 2017    Closed displaced avulsion fracture of tuberosity of left calcaneus     Last Assessed 25 May 2017  Resolved 14 Aug 2017      Epistaxis     Last Assessed 18 Feb 2014  Resolved 08 Jun 2016   Foreign body in foot     Last Assessed 30 Jul 2013  Resolved 12 Oct 2013   Gastritis     Last Assessed 22 Oct 2012    GERD (gastroesophageal reflux disease)     History of oral aphthous ulcers     Last Assessed 08 June 2016  Resolved 14 Aug 2017   Hypersomnia 02/18/2009    Last Assessed 18 Feb 2009  Resolved 16 Aug 2017   Impacted cerumen     Last Assessed 08 Jun 2016  Resolved 08 Jul 2016   Insomnia     Irritable bowel syndrome     Knee joint pain 03/10/2008    Left foot pain     Last Assessed 19 May 2017  Resolved 14 Aug 2017   OCD (obsessive compulsive disorder)     Plantar fibromatosis     Last Assessed 19 Oct 2016  Resolved 14 Aug 2017   Seasonal allergies     Tarsal tunnel syndrome of left side     Last Assessed 21 Aug 2017  Resolved 21 Aug 2017   Tinea corporis     Last Assessed 05 Dec 2011     Past Surgical History:   Procedure Laterality Date    ESOPHAGOGASTRODUODENOSCOPY N/A 10/31/2017    Procedure: ESOPHAGOGASTRODUODENOSCOPY (EGD); Surgeon: Zane Breen MD;  Location: [facility];   Service: Gastroenterology    NO PAST SURGERIES      NC LENGTH/SHORT LEG/ANKL TENDON,SINGLE Left 11/3/2017    Procedure: CALCANEAL OSTECTOMY, ACHILLEST TENDON [address];  Surgeon: Nehemias Clark DPM;  Location: [facility] OR;  Service: Podiatry     Social History   History   Alcohol Use No     History   Drug Use No     Family History:   Family History   Problem Relation Age of Onset    Kidney disease Mother     Thyroid disease Mother     Allergic rhinitis Mother     Hypertension Father     Kidney disease Father     Gout Father     No Known Problems Sister     Colonic polyp Maternal Grandmother     Cancer Family     Diabetes Family        Meds/Allergies   Allergies   Allergen Reactions    Acetazolamide     Penicillins Hives    Seasonal Ic  [Cholestatin]     Sulfa Antibiotics GI Intolerance       Current Outpatient Prescriptions on File Prior to Visit Medication Sig Dispense Refill    Alpha-D-Galactosidase (BEANO PO) Take by mouth      Ascorbic Acid (VITAMIN C) 100 MG tablet Take 1 tablet by mouth daily      B Complex Vitamins (B COMPLEX 1 PO) Take by mouth daily      budesonide (PULMICORT) 1 MG/2ML nebulizer solution Inhale daily      budesonide (RINOCORT AQUA) 32 MCG/ACT nasal spray 1 spray into each nostril daily at bedtime      Cholecalciferol (VITAMIN D3) 1000 units CAPS Take 1 capsule by mouth daily      DiphenhydrAMINE HCl, Sleep, (SLEEP AID) 25 MG CAPS Take 1 capsule by mouth daily      etodolac (LODINE) 400 MG tablet Take 1 tablet (400 mg total) by mouth 2 (two) times a day for 30 days 60 tablet 0    fexofenadine (ALLEGRA) 30 MG tablet Take 30 mg by mouth every evening      Fluvoxamine Maleate 150 MG CP24       Glucosamine-Chondroitin (OSTEO BI-FLEX REGULAR STRENGTH) 250-200 MG TABS Take 1 tablet by mouth daily      hydrOXYzine HCL (ATARAX) 25 mg tablet Take 1 tablet by mouth      lisinopril (ZESTRIL) 10 mg tablet Take 1 tablet (10 mg total) by mouth daily 90 tablet 0    meloxicam (MOBIC) 7 5 mg tablet Take 2 tablets (15 mg total) by mouth daily for 20 days 20 tablet 0    Misc Natural Products (OSTEO BI-FLEX JOINT SHIELD PO) Take by mouth      montelukast (SINGULAIR) 10 mg tablet TAKE ONE TABLET BY MOUTH ONCE DAILY 30 tablet 5    Multiple Vitamin (MULTI-DAY PO) Take 1 tablet by mouth daily      omeprazole (PriLOSEC) 40 MG capsule Take 40 mg by mouth every morning      omeprazole (PriLOSEC) 40 MG capsule TAKE ONE CAPSULE BY MOUTH ONCE DAILY BEFORE MEAL(S) 90 capsule 1    oxymetazoline (AFRIN) 0 05 % nasal spray 2 sprays by Each Nare route 2 (two) times a day       No current facility-administered medications on file prior to visit  Objective:    Neurovascular status  Positive toenail left tibial nerve  Pain with palpation plantar aspect left heel  Minimal pain with compression calcaneus    No pain with tuning fork test      Foot ExamPhysical Exam

## 2018-07-17 ENCOUNTER — OFFICE VISIT (OUTPATIENT)
Dept: FAMILY MEDICINE CLINIC | Facility: CLINIC | Age: 28
End: 2018-07-17
Payer: COMMERCIAL

## 2018-07-17 VITALS
HEIGHT: 67 IN | HEART RATE: 84 BPM | TEMPERATURE: 98 F | BODY MASS INDEX: 36.57 KG/M2 | RESPIRATION RATE: 16 BRPM | WEIGHT: 233 LBS | DIASTOLIC BLOOD PRESSURE: 78 MMHG | SYSTOLIC BLOOD PRESSURE: 118 MMHG

## 2018-07-17 DIAGNOSIS — E66.9 OBESITY (BMI 30-39.9): ICD-10-CM

## 2018-07-17 DIAGNOSIS — F42.9 OBSESSIVE-COMPULSIVE DISORDER, UNSPECIFIED TYPE: ICD-10-CM

## 2018-07-17 DIAGNOSIS — K21.9 GASTROESOPHAGEAL REFLUX DISEASE, ESOPHAGITIS PRESENCE NOT SPECIFIED: ICD-10-CM

## 2018-07-17 DIAGNOSIS — Z13.6 SCREENING FOR CARDIOVASCULAR, RESPIRATORY, AND GENITOURINARY DISEASES: ICD-10-CM

## 2018-07-17 DIAGNOSIS — J30.9 ALLERGIC RHINITIS, UNSPECIFIED SEASONALITY, UNSPECIFIED TRIGGER: ICD-10-CM

## 2018-07-17 DIAGNOSIS — I10 ESSENTIAL HYPERTENSION: ICD-10-CM

## 2018-07-17 DIAGNOSIS — Z00.00 HEALTHCARE MAINTENANCE: Primary | ICD-10-CM

## 2018-07-17 DIAGNOSIS — F32.A DEPRESSION, UNSPECIFIED DEPRESSION TYPE: ICD-10-CM

## 2018-07-17 DIAGNOSIS — Z13.83 SCREENING FOR CARDIOVASCULAR, RESPIRATORY, AND GENITOURINARY DISEASES: ICD-10-CM

## 2018-07-17 DIAGNOSIS — Z13.89 SCREENING FOR CARDIOVASCULAR, RESPIRATORY, AND GENITOURINARY DISEASES: ICD-10-CM

## 2018-07-17 PROCEDURE — 99395 PREV VISIT EST AGE 18-39: CPT | Performed by: FAMILY MEDICINE

## 2018-07-17 RX ORDER — OMEPRAZOLE 40 MG/1
40 CAPSULE, DELAYED RELEASE ORAL DAILY
Qty: 90 CAPSULE | Refills: 1 | Status: SHIPPED | OUTPATIENT
Start: 2018-07-17 | End: 2019-01-25 | Stop reason: SDUPTHER

## 2018-07-17 RX ORDER — LISINOPRIL 10 MG/1
10 TABLET ORAL DAILY
Qty: 90 TABLET | Refills: 1 | Status: SHIPPED | OUTPATIENT
Start: 2018-07-17 | End: 2019-01-25 | Stop reason: SDUPTHER

## 2018-07-17 RX ORDER — MONTELUKAST SODIUM 10 MG/1
10 TABLET ORAL DAILY
Qty: 90 TABLET | Refills: 1 | Status: SHIPPED | OUTPATIENT
Start: 2018-07-17 | End: 2019-01-25 | Stop reason: SDUPTHER

## 2018-07-17 NOTE — PROGRESS NOTES
Assessment/Plan:    No problem-specific Assessment & Plan notes found for this encounter  Dr Kailey Ferrera gives neurotonin  Psych Rossi Crank- OCD  Right eye tearing lately, watery--warm compresses     Diagnoses and all orders for this visit:    Healthcare maintenance    Depression, unspecified depression type    Obesity (BMI 30-39  9)    Essential hypertension          Recent data suggesting increased risk of ischemic CVA and chronic kidney damage with PPI use was advised  No Follow-up on file  Subjective:      Patient ID: Carol Sidhu is a 33 y o  male  Chief Complaint   Patient presents with    Physical Exam     Mynor Duster       HPI  gerd controlled on meds and diet, PPI risks aware, egd neg  Not exercising  VGE recently with diarrhea  No tob  htn - does not check it at home  No nocturia    The following portions of the patient's history were reviewed and updated as appropriate: allergies, current medications, past family history, past medical history, past social history, past surgical history and problem list     Review of Systems   Respiratory: Negative for shortness of breath  Cardiovascular: Negative for chest pain           Current Outpatient Prescriptions   Medication Sig Dispense Refill    Alpha-D-Galactosidase (BEANO PO) Take by mouth      budesonide (RINOCORT AQUA) 32 MCG/ACT nasal spray 1 spray into each nostril daily at bedtime      DiphenhydrAMINE HCl, Sleep, (SLEEP AID) 25 MG CAPS Take 1 capsule by mouth daily      Fluvoxamine Maleate 150 MG CP24       gabapentin (NEURONTIN) 300 mg capsule Take 1 capsule (300 mg total) by mouth 2 (two) times a day for 30 days 60 capsule 0    lisinopril (ZESTRIL) 10 mg tablet Take 1 tablet (10 mg total) by mouth daily 90 tablet 0    montelukast (SINGULAIR) 10 mg tablet TAKE ONE TABLET BY MOUTH ONCE DAILY 30 tablet 5    Multiple Vitamin (MULTI-DAY PO) Take 1 tablet by mouth daily      oxymetazoline (AFRIN) 0 05 % nasal spray 2 sprays by Each Nare route 2 (two) times a day      Ascorbic Acid (VITAMIN C) 100 MG tablet Take 1 tablet by mouth daily      B Complex Vitamins (B COMPLEX 1 PO) Take by mouth daily      budesonide (PULMICORT) 1 MG/2ML nebulizer solution Inhale daily      Cholecalciferol (VITAMIN D3) 1000 units CAPS Take 1 capsule by mouth daily      etodolac (LODINE) 400 MG tablet Take 1 tablet (400 mg total) by mouth 2 (two) times a day for 30 days 60 tablet 0    fexofenadine (ALLEGRA) 30 MG tablet Take 30 mg by mouth every evening      Glucosamine-Chondroitin (OSTEO BI-FLEX REGULAR STRENGTH) 250-200 MG TABS Take 1 tablet by mouth daily      hydrOXYzine HCL (ATARAX) 25 mg tablet Take 1 tablet by mouth      meloxicam (MOBIC) 7 5 mg tablet Take 2 tablets (15 mg total) by mouth daily for 20 days 20 tablet 0    Misc Natural Products (OSTEO BI-FLEX JOINT SHIELD PO) Take by mouth      omeprazole (PriLOSEC) 40 MG capsule TAKE ONE CAPSULE BY MOUTH ONCE DAILY BEFORE MEAL(S) 90 capsule 1     No current facility-administered medications for this visit  Objective:    /78   Pulse 84   Temp 98 °F (36 7 °C)   Resp 16   Ht 5' 6 5" (1 689 m)   Wt 106 kg (233 lb)   BMI 37 04 kg/m²        Physical Exam   Constitutional: He appears well-developed  HENT:   Head: Normocephalic  Eyes: Conjunctivae are normal    Neck: Neck supple  Cardiovascular: Normal rate and intact distal pulses  Pulmonary/Chest: Effort normal  No respiratory distress  Abdominal: Soft  Hernia confirmed negative in the right inguinal area and confirmed negative in the left inguinal area  Musculoskeletal: He exhibits no edema or deformity  Neurological: He is alert  Skin: Skin is warm and dry  Psychiatric: His behavior is normal  Thought content normal    Nursing note and vitals reviewed               Delialh Jameson DO

## 2018-07-27 ENCOUNTER — OFFICE VISIT (OUTPATIENT)
Dept: PODIATRY | Facility: CLINIC | Age: 28
End: 2018-07-27
Payer: COMMERCIAL

## 2018-07-27 VITALS
SYSTOLIC BLOOD PRESSURE: 131 MMHG | DIASTOLIC BLOOD PRESSURE: 84 MMHG | RESPIRATION RATE: 17 BRPM | WEIGHT: 233 LBS | HEART RATE: 61 BPM | BODY MASS INDEX: 36.57 KG/M2 | HEIGHT: 67 IN

## 2018-07-27 DIAGNOSIS — M25.572 ARTHRALGIA OF LEFT FOOT: ICD-10-CM

## 2018-07-27 DIAGNOSIS — S92.015D CLOSED NONDISPLACED FRACTURE OF BODY OF LEFT CALCANEUS WITH ROUTINE HEALING: ICD-10-CM

## 2018-07-27 DIAGNOSIS — G57.52 TARSAL TUNNEL SYNDROME OF LEFT SIDE: Primary | ICD-10-CM

## 2018-07-27 PROCEDURE — 73620 X-RAY EXAM OF FOOT: CPT | Performed by: PODIATRIST

## 2018-07-27 PROCEDURE — 64450 NJX AA&/STRD OTHER PN/BRANCH: CPT | Performed by: PODIATRIST

## 2018-07-27 RX ORDER — TRAMADOL HYDROCHLORIDE 50 MG/1
50 TABLET ORAL EVERY 8 HOURS PRN
Qty: 60 TABLET | Refills: 0 | Status: SHIPPED | OUTPATIENT
Start: 2018-07-27 | End: 2018-08-16

## 2018-07-27 RX ORDER — ETODOLAC 400 MG/1
400 TABLET, FILM COATED ORAL 2 TIMES DAILY
Qty: 60 TABLET | Refills: 0 | Status: SHIPPED | OUTPATIENT
Start: 2018-07-27 | End: 2018-10-11 | Stop reason: SDUPTHER

## 2018-07-27 NOTE — PROGRESS NOTES
Procedures   Foot Exam       Signed  Encounter Date: 6/29/2018   Assessment/Plan:  Rule out tarsal tunnel syndrome  Rule out calcaneal stress fracture      Plan  We will treat for tarsal tunnel syndrome  Gabapentin has been ordered  Patient will continue therapy  He will undergo MRI  This will rule out tarsal tunnel syndrome and or calcaneal stress fracture  He is being sent Neurology to rule out neurologic etiology possibility   X-rays taken on this day  Left foot injection done  In the area of the tarsal tunnel, 1 cc of Celestone with 2 cc of 2% lidocaine plain was performed as nerve block  MRI is needed to rule out stress fracture or tarsal tunnel syndrome  Patient had not have sufficient physical therapy  We may need to rule out malignant ring  We will add Lodine and tramadol for pain control  Patient be placed in [employer]irc[employer]    There are no diagnoses linked to this encounter        Subjective:  Patient has continued pain in left foot  He is working  He is requesting time out of work  Patient ID: Alvie Mohs is a 33 y o  male           Past Medical History:   Diagnosis Date    Acne 09/01/2010     Last Assessed 01 Sep 2010   Acquired ankle/foot deformity       Last assessed 23 Feb 2017  Resolved 14 Aug 2017   Change in hearing       Resolved 16 Aug 2017    Closed displaced avulsion fracture of tuberosity of left calcaneus       Last Assessed 25 May 2017  Resolved 14 Aug 2017   Epistaxis       Last Assessed 18 Feb 2014  Resolved 08 Jun 2016   Foreign body in foot       Last Assessed 30 Jul 2013  Resolved 12 Oct 2013   Gastritis       Last Assessed 22 Oct 2012    GERD (gastroesophageal reflux disease)      History of oral aphthous ulcers       Last Assessed 08 June 2016  Resolved 14 Aug 2017   Hypersomnia 02/18/2009     Last Assessed 18 Feb 2009  Resolved 16 Aug 2017   Impacted cerumen       Last Assessed 08 Jun 2016  Resolved 08 Jul 2016      Insomnia      Irritable bowel syndrome      Knee joint pain 03/10/2008    Left foot pain       Last Assessed 19 May 2017  Resolved 14 Aug 2017   OCD (obsessive compulsive disorder)      Plantar fibromatosis       Last Assessed 19 Oct 2016  Resolved 14 Aug 2017   Seasonal allergies      Tarsal tunnel syndrome of left side       Last Assessed 21 Aug 2017  Resolved 21 Aug 2017   Tinea corporis       Last Assessed 05 Dec 2011               Past Surgical History:   Procedure Laterality Date    ESOPHAGOGASTRODUODENOSCOPY N/A 10/31/2017     Procedure: ESOPHAGOGASTRODUODENOSCOPY (EGD); Surgeon: Doreen Richard MD;  Location: [facility] GI [facility];   Service: Gastroenterology    NO PAST SURGERIES        NM LENGTH/SHORT LEG/ANKL TENDON,SINGLE Left 11/3/2017     Procedure: CALCANEAL OSTECTOMY, ACHILLEST TENDON LENGTHING;  Surgeon: Ho Ceron DPM;  Location: [facility] OR;  Service: Podiatry              Allergies   Allergen Reactions    Acetazolamide      Penicillins Hives    Seasonal Ic  [Cholestatin]      Sulfa Antibiotics GI Intolerance            Current Outpatient Prescriptions:     Alpha-D-Galactosidase (BEANO PO), Take by mouth, Disp: , Rfl:     Ascorbic Acid (VITAMIN C) 100 MG tablet, Take 1 tablet by mouth daily, Disp: , Rfl:     B Complex Vitamins (B COMPLEX 1 PO), Take by mouth daily, Disp: , Rfl:     budesonide (PULMICORT) 1 MG/2ML nebulizer solution, Inhale daily, Disp: , Rfl:     budesonide (RINOCORT AQUA) 32 MCG/ACT nasal spray, 1 spray into each nostril daily at bedtime, Disp: , Rfl:     Cholecalciferol (VITAMIN D3) 1000 units CAPS, Take 1 capsule by mouth daily, Disp: , Rfl:     DiphenhydrAMINE HCl, Sleep, (SLEEP AID) 25 MG CAPS, Take 1 capsule by mouth daily, Disp: , Rfl:     etodolac (LODINE) 400 MG tablet, Take 1 tablet (400 mg total) by mouth 2 (two) times a day for 30 days, Disp: 60 tablet, Rfl: 0    fexofenadine (ALLEGRA) 30 MG tablet, Take 30 mg by mouth every evening, Disp: , Rfl:     Fluvoxamine Maleate 150 MG CP24, , Disp: , Rfl:     Glucosamine-Chondroitin (OSTEO BI-FLEX REGULAR STRENGTH) 250-200 MG TABS, Take 1 tablet by mouth daily, Disp: , Rfl:     hydrOXYzine HCL (ATARAX) 25 mg tablet, Take 1 tablet by mouth, Disp: , Rfl:     lisinopril (ZESTRIL) 10 mg tablet, Take 1 tablet (10 mg total) by mouth daily, Disp: 90 tablet, Rfl: 0    meloxicam (MOBIC) 7 5 mg tablet, Take 2 tablets (15 mg total) by mouth daily for 20 days, Disp: 20 tablet, Rfl: 0    Misc Natural Products (OSTEO BI-FLEX JOINT SHIELD PO), Take by mouth, Disp: , Rfl:     montelukast (SINGULAIR) 10 mg tablet, TAKE ONE TABLET BY MOUTH ONCE DAILY, Disp: 30 tablet, Rfl: 5    Multiple Vitamin (MULTI-DAY PO), Take 1 tablet by mouth daily, Disp: , Rfl:     omeprazole (PriLOSEC) 40 MG capsule, Take 40 mg by mouth every morning, Disp: , Rfl:     omeprazole (PriLOSEC) 40 MG capsule, TAKE ONE CAPSULE BY MOUTH ONCE DAILY BEFORE MEAL(S), Disp: 90 capsule, Rfl: 1    oxymetazoline (AFRIN) 0 05 % nasal spray, 2 sprays by Each Nare route 2 (two) times a day, Disp: , Rfl:          Patient Active Problem List   Diagnosis    Achilles tendinitis of left lower extremity    Acne    ADD (attention deficit disorder) without hyperactivity    Allergic rhinitis    Aphthous ulcer    Arthralgia of left foot    Bony exostosis    Depression    External hemorrhoids    GE reflux    Lumbar radiculopathy    Obesity (BMI 30-39  9)    Obsessive compulsive disorder    Sleep apnea    Tarsal tunnel syndrome of left side    Plantar fasciitis    Radiculopathy of lumbar region    Left foot pain    Difficulty walking    Closed nondisplaced fracture of body of left calcaneus with routine healing    Screening for cardiovascular, respiratory, and genitourinary diseases    Screening for diabetes mellitus (DM)    Essential hypertension    Fatigue         HPI     The following portions of the patient's history were reviewed and updated as appropriate: allergies, current medications, past family history, past medical history, past social history, past surgical history and problem list      Review of Systems       Historical Information        Past Medical History:   Diagnosis Date    Acne 09/01/2010     Last Assessed 01 Sep 2010   Acquired ankle/foot deformity       Last assessed 23 Feb 2017  Resolved 14 Aug 2017   Change in hearing       Resolved 16 Aug 2017    Closed displaced avulsion fracture of tuberosity of left calcaneus       Last Assessed 25 May 2017  Resolved 14 Aug 2017   Epistaxis       Last Assessed 18 Feb 2014  Resolved 08 Jun 2016   Foreign body in foot       Last Assessed 30 Jul 2013  Resolved 12 Oct 2013   Gastritis       Last Assessed 22 Oct 2012    GERD (gastroesophageal reflux disease)      History of oral aphthous ulcers       Last Assessed 08 June 2016  Resolved 14 Aug 2017   Hypersomnia 02/18/2009     Last Assessed 18 Feb 2009  Resolved 16 Aug 2017   Impacted cerumen       Last Assessed 08 Jun 2016  Resolved 08 Jul 2016   Insomnia      Irritable bowel syndrome      Knee joint pain 03/10/2008    Left foot pain       Last Assessed 19 May 2017  Resolved 14 Aug 2017   OCD (obsessive compulsive disorder)      Plantar fibromatosis       Last Assessed 19 Oct 2016  Resolved 14 Aug 2017   Seasonal allergies      Tarsal tunnel syndrome of left side       Last Assessed 21 Aug 2017  Resolved 21 Aug 2017   Tinea corporis       Last Assessed 05 Dec 2011            Past Surgical History:   Procedure Laterality Date    ESOPHAGOGASTRODUODENOSCOPY N/A 10/31/2017     Procedure: ESOPHAGOGASTRODUODENOSCOPY (EGD); Surgeon: Sunshine Doll MD;  Location[facility];   Service: Gastroenterology    NO PAST SURGERIES        [place] LENGTH/SHORT LEG/ANKL TENDON,SINGLE Left 11/3/2017     Procedure: CALCANEAL OSTECTOMY, ACHILLEST TENDON [address];  Surgeon: James Morales DPM;  Location: [facility];  Service: Podiatry      Social History       History Alcohol Use No          History   Drug Use No      Family History:         Family History   Problem Relation Age of Onset    Kidney disease Mother      Thyroid disease Mother      Allergic rhinitis Mother      Hypertension Father      Kidney disease Father      Gout Father      No Known Problems Sister      Colonic polyp Maternal Grandmother      Cancer Family      Diabetes Family           Meds/Allergies        Allergies   Allergen Reactions    Acetazolamide      Penicillins Hives    Seasonal Ic  [Cholestatin]      Sulfa Antibiotics GI Intolerance                Current Outpatient Prescriptions on File Prior to Visit   Medication Sig Dispense Refill    Alpha-D-Galactosidase (BEANO PO) Take by mouth        Ascorbic Acid (VITAMIN C) 100 MG tablet Take 1 tablet by mouth daily        B Complex Vitamins (B COMPLEX 1 PO) Take by mouth daily        budesonide (PULMICORT) 1 MG/2ML nebulizer solution Inhale daily        budesonide (RINOCORT AQUA) 32 MCG/ACT nasal spray 1 spray into each nostril daily at bedtime        Cholecalciferol (VITAMIN D3) 1000 units CAPS Take 1 capsule by mouth daily        DiphenhydrAMINE HCl, Sleep, (SLEEP AID) 25 MG CAPS Take 1 capsule by mouth daily        etodolac (LODINE) 400 MG tablet Take 1 tablet (400 mg total) by mouth 2 (two) times a day for 30 days 60 tablet 0    fexofenadine (ALLEGRA) 30 MG tablet Take 30 mg by mouth every evening        Fluvoxamine Maleate 150 MG CP24          Glucosamine-Chondroitin (OSTEO BI-FLEX REGULAR STRENGTH) 250-200 MG TABS Take 1 tablet by mouth daily        hydrOXYzine HCL (ATARAX) 25 mg tablet Take 1 tablet by mouth        lisinopril (ZESTRIL) 10 mg tablet Take 1 tablet (10 mg total) by mouth daily 90 tablet 0    meloxicam (MOBIC) 7 5 mg tablet Take 2 tablets (15 mg total) by mouth daily for 20 days 20 tablet 0    Misc Natural Products (OSTEO BI-FLEX JOINT SHIELD PO) Take by mouth        montelukast (SINGULAIR) 10 mg tablet TAKE ONE TABLET BY MOUTH ONCE DAILY 30 tablet 5    Multiple Vitamin (MULTI-DAY PO) Take 1 tablet by mouth daily        omeprazole (PriLOSEC) 40 MG capsule Take 40 mg by mouth every morning        omeprazole (PriLOSEC) 40 MG capsule TAKE ONE CAPSULE BY MOUTH ONCE DAILY BEFORE MEAL(S) 90 capsule 1    oxymetazoline (AFRIN) 0 05 % nasal spray 2 sprays by Each Nare route 2 (two) times a day          No current facility-administered medications on file prior to visit              Objective:     Neurovascular status  Positive toenail left tibial nerve      Pain with palpation plantar aspect left heel  Minimal pain with compression calcaneus  No pain with tuning fork test   Negative pain with compression  Patient has tight hamstrings and calf muscles  X-ray  Negative evidence of fracture or bony mass  Negative a evidence of osteomyelitis    Fixation appears to be intact and within the bone

## 2018-08-22 ENCOUNTER — OFFICE VISIT (OUTPATIENT)
Dept: PODIATRY | Facility: CLINIC | Age: 28
End: 2018-08-22
Payer: COMMERCIAL

## 2018-08-22 VITALS
BODY MASS INDEX: 36.57 KG/M2 | HEIGHT: 67 IN | DIASTOLIC BLOOD PRESSURE: 85 MMHG | RESPIRATION RATE: 17 BRPM | WEIGHT: 233 LBS | SYSTOLIC BLOOD PRESSURE: 128 MMHG | HEART RATE: 79 BPM

## 2018-08-22 DIAGNOSIS — M76.62 ACHILLES TENDINITIS OF LEFT LOWER EXTREMITY: ICD-10-CM

## 2018-08-22 DIAGNOSIS — M79.672 LEFT FOOT PAIN: Primary | ICD-10-CM

## 2018-08-22 PROCEDURE — 20550 NJX 1 TENDON SHEATH/LIGAMENT: CPT | Performed by: PODIATRIST

## 2018-08-22 RX ORDER — METHYLPREDNISOLONE 4 MG/1
TABLET ORAL
Qty: 21 TABLET | Refills: 0 | Status: SHIPPED | OUTPATIENT
Start: 2018-08-22 | End: 2019-01-25 | Stop reason: ALTCHOICE

## 2018-08-22 NOTE — PROGRESS NOTES
Assessment/Plan:      Achilles tendinitis and scarring  Pain  Rule out malingering  Pain left foot  Plan  We recommend physical therapy  Patient declines due to finances  Patient will remain in [employer]irc[employer]   He will stretch daily  We will add Medrol Dosepak  Will use Voltaren Gel  Patient may need 2nd procedure to debride fibrosis  Today, 1 cc of dexamethasone phosphate injected into area without pain or complication  There are no diagnoses linked to this encounter  Subjective:   Patient ID: Wendy Cameron is a 33 y o  male  Past Medical History:   Diagnosis Date    Acne 09/01/2010    Last Assessed 01 Sep 2010   Acquired ankle/foot deformity     Last assessed 23 Feb 2017  Resolved 14 Aug 2017   Change in hearing     Resolved 16 Aug 2017    Closed displaced avulsion fracture of tuberosity of left calcaneus     Last Assessed 25 May 2017  Resolved 14 Aug 2017   Epistaxis     Last Assessed 18 Feb 2014  Resolved 08 Jun 2016   Foreign body in foot     Last Assessed 30 Jul 2013  Resolved 12 Oct 2013   Gastritis     Last Assessed 22 Oct 2012    GERD (gastroesophageal reflux disease)     History of oral aphthous ulcers     Last Assessed 08 June 2016  Resolved 14 Aug 2017   Hypersomnia 02/18/2009    Last Assessed 18 Feb 2009  Resolved 16 Aug 2017   Impacted cerumen     Last Assessed 08 Jun 2016  Resolved 08 Jul 2016   Insomnia     Irritable bowel syndrome     Knee joint pain 03/10/2008    Left foot pain     Last Assessed 19 May 2017  Resolved 14 Aug 2017   OCD (obsessive compulsive disorder)     Plantar fibromatosis     Last Assessed 19 Oct 2016  Resolved 14 Aug 2017   Seasonal allergies     Tarsal tunnel syndrome of left side     Last Assessed 21 Aug 2017  Resolved 21 Aug 2017      Tinea corporis     Last Assessed 05 Dec 2011       Past Surgical History:   Procedure Laterality Date    ESOPHAGOGASTRODUODENOSCOPY N/A 10/31/2017    Procedure: ESOPHAGOGASTRODUODENOSCOPY (EGD); Surgeon: Chu Camp MD;  Location: [facility] GI LAB;   Service: Gastroenterology    NO PAST SURGERIES      [place] LENGTH/SHORT LEG/ANKL TENDON,SINGLE Left 11/3/2017    Procedure: CALCANEAL OSTECTOMY, ACHILLEST TENDON LENGTHING;  Surgeon: John Grant DPM;  Location: [facility] OR;  Service: Podiatry       Allergies   Allergen Reactions    Acetazolamide     Penicillins Hives    Seasonal Ic  [Cholestatin]     Sulfa Antibiotics GI Intolerance         Current Outpatient Prescriptions:     Alpha-D-Galactosidase (BEANO PO), Take by mouth, Disp: , Rfl:     Ascorbic Acid (VITAMIN C) 100 MG tablet, Take 1 tablet by mouth daily, Disp: , Rfl:     B Complex Vitamins (B COMPLEX 1 PO), Take by mouth daily, Disp: , Rfl:     budesonide (RINOCORT AQUA) 32 MCG/ACT nasal spray, 1 spray into each nostril daily at bedtime, Disp: , Rfl:     Cholecalciferol (VITAMIN D3) 1000 units CAPS, Take 1 capsule by mouth daily, Disp: , Rfl:     DiphenhydrAMINE HCl, Sleep, (SLEEP AID) 25 MG CAPS, Take 1 capsule by mouth daily, Disp: , Rfl:     etodolac (LODINE) 400 MG tablet, Take 1 tablet (400 mg total) by mouth 2 (two) times a day for 30 days, Disp: 60 tablet, Rfl: 0    etodolac (LODINE) 400 MG tablet, Take 1 tablet (400 mg total) by mouth 2 (two) times a day for 30 days, Disp: 60 tablet, Rfl: 0    Fluvoxamine Maleate 150 MG CP24, , Disp: , Rfl:     gabapentin (NEURONTIN) 300 mg capsule, Take 1 capsule (300 mg total) by mouth 2 (two) times a day for 30 days, Disp: 60 capsule, Rfl: 0    Glucosamine-Chondroitin (OSTEO BI-FLEX REGULAR STRENGTH) 250-200 MG TABS, Take 1 tablet by mouth daily, Disp: , Rfl:     hydrOXYzine HCL (ATARAX) 25 mg tablet, Take 1 tablet by mouth, Disp: , Rfl:     lisinopril (ZESTRIL) 10 mg tablet, Take 1 tablet (10 mg total) by mouth daily, Disp: 90 tablet, Rfl: 1    meloxicam (MOBIC) 7 5 mg tablet, Take 2 tablets (15 mg total) by mouth daily for 20 days, Disp: 20 tablet, Rfl: 0    Misc Natural Products (OSTEO BI-FLEX JOINT SHIELD PO), Take by mouth, Disp: , Rfl:     montelukast (SINGULAIR) 10 mg tablet, Take 1 tablet (10 mg total) by mouth daily, Disp: 90 tablet, Rfl: 1    Multiple Vitamin (MULTI-DAY PO), Take 1 tablet by mouth daily, Disp: , Rfl:     omeprazole (PriLOSEC) 40 MG capsule, Take 1 capsule (40 mg total) by mouth daily, Disp: 90 capsule, Rfl: 1    oxymetazoline (AFRIN) 0 05 % nasal spray, 2 sprays by Each Nare route 2 (two) times a day, Disp: , Rfl:     Patient Active Problem List   Diagnosis    Achilles tendinitis of left lower extremity    Acne    ADD (attention deficit disorder) without hyperactivity    Allergic rhinitis    Aphthous ulcer    Arthralgia of left foot    Bony exostosis    Depression    External hemorrhoids    GE reflux    Lumbar radiculopathy    Obesity (BMI 30-39  9)    Obsessive compulsive disorder    Sleep apnea    Tarsal tunnel syndrome of left side    Plantar fasciitis    Radiculopathy of lumbar region    Left foot pain    Difficulty walking    Closed nondisplaced fracture of body of left calcaneus with routine healing    Screening for cardiovascular, respiratory, and genitourinary diseases    Screening for diabetes mellitus (DM)    Essential hypertension    Fatigue    Healthcare maintenance       HPI    The following portions of the patient's history were reviewed and updated as appropriate: allergies, current medications, past family history, past medical history, past social history, past surgical history and problem list     Review of Systems      Historical Information   Past Medical History:   Diagnosis Date    Acne 09/01/2010    Last Assessed 01 Sep 2010   Acquired ankle/foot deformity     Last assessed 23 Feb 2017  Resolved 14 Aug 2017   Change in hearing     Resolved 16 Aug 2017    Closed displaced avulsion fracture of tuberosity of left calcaneus     Last Assessed 25 May 2017  Resolved 14 Aug 2017      Epistaxis     Last Assessed 18 Feb 2014  Resolved 08 Jun 2016   Foreign body in foot     Last Assessed 30 Jul 2013  Resolved 12 Oct 2013   Gastritis     Last Assessed 22 Oct 2012    GERD (gastroesophageal reflux disease)     History of oral aphthous ulcers     Last Assessed 08 June 2016  Resolved 14 Aug 2017   Hypersomnia 02/18/2009    Last Assessed 18 Feb 2009  Resolved 16 Aug 2017   Impacted cerumen     Last Assessed 08 Jun 2016  Resolved 08 Jul 2016   Insomnia     Irritable bowel syndrome     Knee joint pain 03/10/2008    Left foot pain     Last Assessed 19 May 2017  Resolved 14 Aug 2017   OCD (obsessive compulsive disorder)     Plantar fibromatosis     Last Assessed 19 Oct 2016  Resolved 14 Aug 2017   Seasonal allergies     Tarsal tunnel syndrome of left side     Last Assessed 21 Aug 2017  Resolved 21 Aug 2017   Tinea corporis     Last Assessed 05 Dec 2011     Past Surgical History:   Procedure Laterality Date    ESOPHAGOGASTRODUODENOSCOPY N/A 10/31/2017    Procedure: ESOPHAGOGASTRODUODENOSCOPY (EGD); Surgeon: Fartun Stern MD;  Location: [facility] GI LAB;   Service: Gastroenterology    NO PAST SURGERIES      [place] LENGTH/SHORT LEG/ANKL TENDON,SINGLE Left 11/3/2017    Procedure: CALCANEAL OSTECTOMY, ACHILLEST TENDON [address];  Surgeon: Lois Martinez DPM;  Location: [facility];  Service: Podiatry     Social History   History   Alcohol Use No     History   Drug Use No     Family History:   Family History   Problem Relation Age of Onset    Kidney disease Mother     Thyroid disease Mother     Allergic rhinitis Mother     Hypertension Father     Kidney disease Father     Gout Father     No Known Problems Sister     Colonic polyp Maternal Grandmother     Cancer Family     Diabetes Family        Meds/Allergies   Allergies   Allergen Reactions    Acetazolamide     Penicillins Hives    Seasonal Ic  [Cholestatin]     Sulfa Antibiotics GI Intolerance       Current Outpatient Prescriptions on File Prior to Visit Medication Sig Dispense Refill    Alpha-D-Galactosidase (BEANO PO) Take by mouth      Ascorbic Acid (VITAMIN C) 100 MG tablet Take 1 tablet by mouth daily      B Complex Vitamins (B COMPLEX 1 PO) Take by mouth daily      budesonide (RINOCORT AQUA) 32 MCG/ACT nasal spray 1 spray into each nostril daily at bedtime      Cholecalciferol (VITAMIN D3) 1000 units CAPS Take 1 capsule by mouth daily      DiphenhydrAMINE HCl, Sleep, (SLEEP AID) 25 MG CAPS Take 1 capsule by mouth daily      etodolac (LODINE) 400 MG tablet Take 1 tablet (400 mg total) by mouth 2 (two) times a day for 30 days 60 tablet 0    etodolac (LODINE) 400 MG tablet Take 1 tablet (400 mg total) by mouth 2 (two) times a day for 30 days 60 tablet 0    Fluvoxamine Maleate 150 MG CP24       gabapentin (NEURONTIN) 300 mg capsule Take 1 capsule (300 mg total) by mouth 2 (two) times a day for 30 days 60 capsule 0    Glucosamine-Chondroitin (OSTEO BI-FLEX REGULAR STRENGTH) 250-200 MG TABS Take 1 tablet by mouth daily      hydrOXYzine HCL (ATARAX) 25 mg tablet Take 1 tablet by mouth      lisinopril (ZESTRIL) 10 mg tablet Take 1 tablet (10 mg total) by mouth daily 90 tablet 1    meloxicam (MOBIC) 7 5 mg tablet Take 2 tablets (15 mg total) by mouth daily for 20 days 20 tablet 0    Misc Natural Products (OSTEO BI-FLEX JOINT SHIELD PO) Take by mouth      montelukast (SINGULAIR) 10 mg tablet Take 1 tablet (10 mg total) by mouth daily 90 tablet 1    Multiple Vitamin (MULTI-DAY PO) Take 1 tablet by mouth daily      omeprazole (PriLOSEC) 40 MG capsule Take 1 capsule (40 mg total) by mouth daily 90 capsule 1    oxymetazoline (AFRIN) 0 05 % nasal spray 2 sprays by Each Nare route 2 (two) times a day       No current facility-administered medications on file prior to visit  Objective:  Patient's shoes and socks removed  Foot ExamPhysical Exam   Constitutional: He appears well-developed and well-nourished     Cardiovascular: Normal rate and regular rhythm  Musculoskeletal:   Left foot is grossly within normal limits  There is minimal pain with palpation of Achilles tendon body  No evidence of mass  No evidence of rupture

## 2018-09-05 ENCOUNTER — TELEPHONE (OUTPATIENT)
Dept: FAMILY MEDICINE CLINIC | Facility: CLINIC | Age: 28
End: 2018-09-05

## 2018-09-05 NOTE — TELEPHONE ENCOUNTER
DR Clayton Marshall    Patient called and said that he is getting a bill for a test that is not being covered on his lab work  He stated that he was told by Dr Darnell Lipscomb that if a test wasn't covered that he would change it to routine and we would resubmit it  Please advise

## 2018-09-05 NOTE — TELEPHONE ENCOUNTER
S/w pt , aware I tried using screening codes for nearly all tests but used fatigue for tsh    He will let us copy the denial letter and I can try if possible to write appeal

## 2018-09-06 NOTE — TELEPHONE ENCOUNTER
Patient brought in a bill not a denial letter for $[phone] 06 that states that Ingris Arguello indicated the balance is part of deductible/co-insurance  I am not sure what there is to appeal, but I left this bill in your folder

## 2018-09-13 ENCOUNTER — OFFICE VISIT (OUTPATIENT)
Dept: PODIATRY | Facility: CLINIC | Age: 28
End: 2018-09-13
Payer: COMMERCIAL

## 2018-09-13 VITALS
HEART RATE: 95 BPM | RESPIRATION RATE: 16 BRPM | BODY MASS INDEX: 36.57 KG/M2 | SYSTOLIC BLOOD PRESSURE: 140 MMHG | DIASTOLIC BLOOD PRESSURE: 91 MMHG | WEIGHT: 233 LBS | HEIGHT: 67 IN

## 2018-09-13 DIAGNOSIS — M77.52 BURSITIS OF LEFT ANKLE: Primary | ICD-10-CM

## 2018-09-13 DIAGNOSIS — M79.672 LEFT FOOT PAIN: ICD-10-CM

## 2018-09-13 PROCEDURE — 20551 NJX 1 TENDON ORIGIN/INSJ: CPT | Performed by: PODIATRIST

## 2018-09-13 RX ORDER — GABAPENTIN 300 MG/1
300 CAPSULE ORAL 2 TIMES DAILY
Qty: 60 CAPSULE | Refills: 0 | Status: SHIPPED | OUTPATIENT
Start: 2018-09-13 | End: 2018-10-11 | Stop reason: SDUPTHER

## 2018-09-13 RX ORDER — ETODOLAC 400 MG/1
400 TABLET, FILM COATED ORAL 2 TIMES DAILY
Qty: 60 TABLET | Refills: 0 | Status: SHIPPED | OUTPATIENT
Start: 2018-09-13 | End: 2018-11-21 | Stop reason: SDUPTHER

## 2018-09-13 NOTE — PROGRESS NOTES
Procedures     Patient states he is doing better  He still feels he cannot work a full time job  He is using cast   He is taking medication as prescribed  Foot Exam    General  General Appearance: appears stated age and healthy   Orientation: alert and oriented to person, place, and time   Affect: appropriate   Gait: antalgic   Assistance: ([employer] left leg)      Right Foot/Ankle     Range of Motion    Passive  Ankle dorsiflexion: 5    Active  Ankle dorsiflexion: 5      Left Foot/Ankle      Range of Motion    Passive  Ankle dorsiflexion: 5    Active  Ankle dorsiflexion: 5             Assessment/Plan:       Achilles tendinitis and scarring  Pain  Rule out malingering  Pain left foot      Plan  We recommend physical therapy  Patient declines due to finances  Patient will remain in [employer]   He will stretch daily  We will add Medrol Dosepak  Will use Voltaren Gel  Patient may need 2nd procedure to debride fibrosis  Today, 1 cc of dexamethasone phosphate injected into area without pain or complication       There are no diagnoses linked to this encounter        Subjective:   Patient ID: Genie Cabrera is a 33 y o  male           Past Medical History:   Diagnosis Date    Acne 09/01/2010     Last Assessed 01 Sep 2010   Acquired ankle/foot deformity       Last assessed 23 Feb 2017  Resolved 14 Aug 2017   Change in hearing       Resolved 16 Aug 2017    Closed displaced avulsion fracture of tuberosity of left calcaneus       Last Assessed 25 May 2017  Resolved 14 Aug 2017   Epistaxis       Last Assessed 18 Feb 2014  Resolved 08 Jun 2016   Foreign body in foot       Last Assessed 30 Jul 2013  Resolved 12 Oct 2013   Gastritis       Last Assessed 22 Oct 2012    GERD (gastroesophageal reflux disease)      History of oral aphthous ulcers       Last Assessed 08 June 2016  Resolved 14 Aug 2017   Hypersomnia 02/18/2009     Last Assessed 18 Feb 2009  Resolved 16 Aug 2017      Impacted cerumen     Last Assessed 08 Jun 2016  Resolved 08 Jul 2016   Insomnia      Irritable bowel syndrome      Knee joint pain 03/10/2008    Left foot pain       Last Assessed 19 May 2017  Resolved 14 Aug 2017   OCD (obsessive compulsive disorder)      Plantar fibromatosis       Last Assessed 19 Oct 2016  Resolved 14 Aug 2017   Seasonal allergies      Tarsal tunnel syndrome of left side       Last Assessed 21 Aug 2017  Resolved 21 Aug 2017   Tinea corporis       Last Assessed 05 Dec 2011               Past Surgical History:   Procedure Laterality Date    ESOPHAGOGASTRODUODENOSCOPY N/A 10/31/2017     Procedure: ESOPHAGOGASTRODUODENOSCOPY (EGD); Surgeon: Leia Rivera MD;  Location: [facility] GI LAB;   Service: Gastroenterology    NO PAST SURGERIES        [place] LENGTH/SHORT LEG/ANKL TENDON,SINGLE Left 11/3/2017     Procedure: CALCANEAL OSTECTOMY, ACHILLEST TENDON LENGTHING;  Surgeon: Vannesa Neal DPM;  Location: [facility] OR;  Service: Podiatry              Allergies   Allergen Reactions    Acetazolamide      Penicillins Hives    Seasonal Ic  [Cholestatin]      Sulfa Antibiotics GI Intolerance            Current Outpatient Prescriptions:     Alpha-D-Galactosidase (BEANO PO), Take by mouth, Disp: , Rfl:     Ascorbic Acid (VITAMIN C) 100 MG tablet, Take 1 tablet by mouth daily, Disp: , Rfl:     B Complex Vitamins (B COMPLEX 1 PO), Take by mouth daily, Disp: , Rfl:     budesonide (RINOCORT AQUA) 32 MCG/ACT nasal spray, 1 spray into each nostril daily at bedtime, Disp: , Rfl:     Cholecalciferol (VITAMIN D3) 1000 units CAPS, Take 1 capsule by mouth daily, Disp: , Rfl:     DiphenhydrAMINE HCl, Sleep, (SLEEP AID) 25 MG CAPS, Take 1 capsule by mouth daily, Disp: , Rfl:     etodolac (LODINE) 400 MG tablet, Take 1 tablet (400 mg total) by mouth 2 (two) times a day for 30 days, Disp: 60 tablet, Rfl: 0    etodolac (LODINE) 400 MG tablet, Take 1 tablet (400 mg total) by mouth 2 (two) times a day for 30 days, Disp: 60 tablet, Rfl: 0    Fluvoxamine Maleate 150 MG CP24, , Disp: , Rfl:     gabapentin (NEURONTIN) 300 mg capsule, Take 1 capsule (300 mg total) by mouth 2 (two) times a day for 30 days, Disp: 60 capsule, Rfl: 0    Glucosamine-Chondroitin (OSTEO BI-FLEX REGULAR STRENGTH) 250-200 MG TABS, Take 1 tablet by mouth daily, Disp: , Rfl:     hydrOXYzine HCL (ATARAX) 25 mg tablet, Take 1 tablet by mouth, Disp: , Rfl:     lisinopril (ZESTRIL) 10 mg tablet, Take 1 tablet (10 mg total) by mouth daily, Disp: 90 tablet, Rfl: 1    meloxicam (MOBIC) 7 5 mg tablet, Take 2 tablets (15 mg total) by mouth daily for 20 days, Disp: 20 tablet, Rfl: 0    Misc Natural Products (OSTEO BI-FLEX JOINT SHIELD PO), Take by mouth, Disp: , Rfl:     montelukast (SINGULAIR) 10 mg tablet, Take 1 tablet (10 mg total) by mouth daily, Disp: 90 tablet, Rfl: 1    Multiple Vitamin (MULTI-DAY PO), Take 1 tablet by mouth daily, Disp: , Rfl:     omeprazole (PriLOSEC) 40 MG capsule, Take 1 capsule (40 mg total) by mouth daily, Disp: 90 capsule, Rfl: 1    oxymetazoline (AFRIN) 0 05 % nasal spray, 2 sprays by Each Nare route 2 (two) times a day, Disp: , Rfl:          Patient Active Problem List   Diagnosis    Achilles tendinitis of left lower extremity    Acne    ADD (attention deficit disorder) without hyperactivity    Allergic rhinitis    Aphthous ulcer    Arthralgia of left foot    Bony exostosis    Depression    External hemorrhoids    GE reflux    Lumbar radiculopathy    Obesity (BMI 30-39  9)    Obsessive compulsive disorder    Sleep apnea    Tarsal tunnel syndrome of left side    Plantar fasciitis    Radiculopathy of lumbar region    Left foot pain    Difficulty walking    Closed nondisplaced fracture of body of left calcaneus with routine healing    Screening for cardiovascular, respiratory, and genitourinary diseases    Screening for diabetes mellitus (DM)    Essential hypertension    Fatigue    Healthcare maintenance       HPI     The following portions of the patient's history were reviewed and updated as appropriate: allergies, current medications, past family history, past medical history, past social history, past surgical history and problem list      Review of Systems       Historical Information        Past Medical History:   Diagnosis Date    Acne 09/01/2010     Last Assessed 01 Sep 2010   Acquired ankle/foot deformity       Last assessed 23 Feb 2017  Resolved 14 Aug 2017   Change in hearing       Resolved 16 Aug 2017    Closed displaced avulsion fracture of tuberosity of left calcaneus       Last Assessed 25 May 2017  Resolved 14 Aug 2017   Epistaxis       Last Assessed 18 Feb 2014  Resolved 08 Jun 2016   Foreign body in foot       Last Assessed 30 Jul 2013  Resolved 12 Oct 2013   Gastritis       Last Assessed 22 Oct 2012    GERD (gastroesophageal reflux disease)      History of oral aphthous ulcers       Last Assessed 08 June 2016  Resolved 14 Aug 2017   Hypersomnia 02/18/2009     Last Assessed 18 Feb 2009  Resolved 16 Aug 2017   Impacted cerumen       Last Assessed 08 Jun 2016  Resolved 08 Jul 2016   Insomnia      Irritable bowel syndrome      Knee joint pain 03/10/2008    Left foot pain       Last Assessed 19 May 2017  Resolved 14 Aug 2017   OCD (obsessive compulsive disorder)      Plantar fibromatosis       Last Assessed 19 Oct 2016  Resolved 14 Aug 2017   Seasonal allergies      Tarsal tunnel syndrome of left side       Last Assessed 21 Aug 2017  Resolved 21 Aug 2017   Tinea corporis       Last Assessed 05 Dec 2011      Past Surgical History:   Procedure Laterality Date    ESOPHAGOGASTRODUODENOSCOPY N/A 10/31/2017     Procedure: ESOPHAGOGASTRODUODENOSCOPY (EGD); Surgeon: Luci Garvin MD;  Location: [facility] GI [facility];   Service: Gastroenterology    NO PAST SURGERIES        [place] LENGTH/SHORT LEG/ANKL TENDON,SINGLE Left 11/3/2017     Procedure: CALCANEAL OSTECTOMY, ACHILLEST TENDON LENGTHING;  Surgeon: Lois Martinez DPM;  Location: [facility];  Service: Podiatry      Social History       History   Alcohol Use No          History   Drug Use No      Family History:         Family History   Problem Relation Age of Onset    Kidney disease Mother      Thyroid disease Mother      Allergic rhinitis Mother      Hypertension Father      Kidney disease Father      Gout Father      No Known Problems Sister      Colonic polyp Maternal Grandmother      Cancer Family      Diabetes Family           Meds/Allergies        Allergies   Allergen Reactions    Acetazolamide      Penicillins Hives    Seasonal Ic  [Cholestatin]      Sulfa Antibiotics GI Intolerance                Current Outpatient Prescriptions on File Prior to Visit   Medication Sig Dispense Refill    Alpha-D-Galactosidase (BEANO PO) Take by mouth        Ascorbic Acid (VITAMIN C) 100 MG tablet Take 1 tablet by mouth daily        B Complex Vitamins (B COMPLEX 1 PO) Take by mouth daily        budesonide (RINOCORT AQUA) 32 MCG/ACT nasal spray 1 spray into each nostril daily at bedtime        Cholecalciferol (VITAMIN D3) 1000 units CAPS Take 1 capsule by mouth daily        DiphenhydrAMINE HCl, Sleep, (SLEEP AID) 25 MG CAPS Take 1 capsule by mouth daily        etodolac (LODINE) 400 MG tablet Take 1 tablet (400 mg total) by mouth 2 (two) times a day for 30 days 60 tablet 0    etodolac (LODINE) 400 MG tablet Take 1 tablet (400 mg total) by mouth 2 (two) times a day for 30 days 60 tablet 0    Fluvoxamine Maleate 150 MG CP24          gabapentin (NEURONTIN) 300 mg capsule Take 1 capsule (300 mg total) by mouth 2 (two) times a day for 30 days 60 capsule 0    Glucosamine-Chondroitin (OSTEO BI-FLEX REGULAR STRENGTH) 250-200 MG TABS Take 1 tablet by mouth daily        hydrOXYzine HCL (ATARAX) 25 mg tablet Take 1 tablet by mouth        lisinopril (ZESTRIL) 10 mg tablet Take 1 tablet (10 mg total) by mouth daily 90 tablet 1    meloxicam (MOBIC) 7 5 mg tablet Take 2 tablets (15 mg total) by mouth daily for 20 days 20 tablet 0    Misc Natural Products (OSTEO BI-FLEX JOINT SHIELD PO) Take by mouth        montelukast (SINGULAIR) 10 mg tablet Take 1 tablet (10 mg total) by mouth daily 90 tablet 1    Multiple Vitamin (MULTI-DAY PO) Take 1 tablet by mouth daily        omeprazole (PriLOSEC) 40 MG capsule Take 1 capsule (40 mg total) by mouth daily 90 capsule 1    oxymetazoline (AFRIN) 0 05 % nasal spray 2 sprays by Each Nare route 2 (two) times a day          No current facility-administered medications on file prior to visit              Objective:  Patient's shoes and socks removed  Foot ExamPhysical Exam   Constitutional: He appears well-developed and well-nourished  Cardiovascular: Normal rate and regular rhythm  Musculoskeletal:   Left foot is grossly within normal limits  There is minimal pain with palpation of Achilles tendon body  No evidence of mass  No evidence of rupture

## 2018-09-14 NOTE — TELEPHONE ENCOUNTER
Patient called again to see if Dr Nilesh Freed was going to fix the bill so he didn't have to pay  Please advise

## 2018-09-19 DIAGNOSIS — Z13.29 SCREENING FOR THYROID DISORDER: Primary | ICD-10-CM

## 2018-09-19 NOTE — TELEPHONE ENCOUNTER
Patient aware, I did explain that it is not a guarantee his insurance will cover the $32  06 since the bill states that his his responsibility, he will  letter  No further action

## 2018-09-19 NOTE — TELEPHONE ENCOUNTER
Letter was printed for pickup but according to the copy of the bill we received, I agree that it looks like the amount he is being charged is his copay/co-insurance and does not have to do with the diagnosis codes I used when ordering the test

## 2018-10-11 ENCOUNTER — OFFICE VISIT (OUTPATIENT)
Dept: PODIATRY | Facility: CLINIC | Age: 28
End: 2018-10-11
Payer: COMMERCIAL

## 2018-10-11 VITALS
WEIGHT: 233 LBS | DIASTOLIC BLOOD PRESSURE: 92 MMHG | HEIGHT: 67 IN | SYSTOLIC BLOOD PRESSURE: 132 MMHG | BODY MASS INDEX: 36.57 KG/M2

## 2018-10-11 DIAGNOSIS — M79.672 LEFT FOOT PAIN: ICD-10-CM

## 2018-10-11 DIAGNOSIS — M77.52 BURSITIS OF LEFT ANKLE: Primary | ICD-10-CM

## 2018-10-11 DIAGNOSIS — M25.572 ARTHRALGIA OF LEFT FOOT: ICD-10-CM

## 2018-10-11 PROCEDURE — 20551 NJX 1 TENDON ORIGIN/INSJ: CPT | Performed by: PODIATRIST

## 2018-10-11 RX ORDER — GABAPENTIN 300 MG/1
300 CAPSULE ORAL 2 TIMES DAILY
Qty: 60 CAPSULE | Refills: 0 | Status: SHIPPED | OUTPATIENT
Start: 2018-10-11 | End: 2018-11-21 | Stop reason: SDUPTHER

## 2018-10-11 RX ORDER — ETODOLAC 400 MG/1
400 TABLET, FILM COATED ORAL 2 TIMES DAILY
Qty: 60 TABLET | Refills: 0 | Status: SHIPPED | OUTPATIENT
Start: 2018-10-11 | End: 2018-11-21

## 2018-10-11 NOTE — PROGRESS NOTES
Procedures   Foot Exam     Patient states he is making significant headway  He is feeling much better  He is requesting to be out of work  He is requesting injection therapy  0  No change in neurovascular status  Minimal pain with left Achilles tendon insertion  Decrease in fusiform swelling of tendon noted  Plan  Patient remain in [employer] for 2 more weeks  He will continue take sulindac and gabapentin  Today, 1 cc dexamethasone phosphate injected the area of tendon insertion without pain or complication  A return 3 weeks for check

## 2018-11-12 ENCOUNTER — OFFICE VISIT (OUTPATIENT)
Dept: PODIATRY | Facility: CLINIC | Age: 28
End: 2018-11-12
Payer: COMMERCIAL

## 2018-11-12 VITALS
SYSTOLIC BLOOD PRESSURE: 136 MMHG | BODY MASS INDEX: 36.57 KG/M2 | WEIGHT: 233 LBS | HEIGHT: 67 IN | HEART RATE: 78 BPM | RESPIRATION RATE: 16 BRPM | DIASTOLIC BLOOD PRESSURE: 81 MMHG

## 2018-11-12 DIAGNOSIS — M79.672 LEFT FOOT PAIN: ICD-10-CM

## 2018-11-12 DIAGNOSIS — M72.2 PLANTAR FASCIITIS: Primary | ICD-10-CM

## 2018-11-12 PROCEDURE — 20550 NJX 1 TENDON SHEATH/LIGAMENT: CPT | Performed by: PODIATRIST

## 2018-11-12 NOTE — PROGRESS NOTES
Assessment/Plan:  Plantar fasciitis left foot    Plan  Trigger point injection done  1 cc Kenalog 10 injected into left heel without pain or complication  Patient will stretch daily  He will use orthotics  He will remain on anti-inflammatory medicine  Patient requests being out of work  This has been obliged     There are no diagnoses linked to this encounter  Subjective:  Patient has continued pain in his left foot  He suffers from post static dyskinesia  Patient ID: Farooq Landon is a 33 y o  male  Past Medical History:   Diagnosis Date    Acne 09/01/2010    Last Assessed 01 Sep 2010   Acquired ankle/foot deformity     Last assessed 23 Feb 2017  Resolved 14 Aug 2017   Change in hearing     Resolved 16 Aug 2017    Closed displaced avulsion fracture of tuberosity of left calcaneus     Last Assessed 25 May 2017  Resolved 14 Aug 2017   Epistaxis     Last Assessed 18 Feb 2014  Resolved 08 Jun 2016   Foreign body in foot     Last Assessed 30 Jul 2013  Resolved 12 Oct 2013   Gastritis     Last Assessed 22 Oct 2012    GERD (gastroesophageal reflux disease)     History of oral aphthous ulcers     Last Assessed 08 June 2016  Resolved 14 Aug 2017   Hypersomnia 02/18/2009    Last Assessed 18 Feb 2009  Resolved 16 Aug 2017   Impacted cerumen     Last Assessed 08 Jun 2016  Resolved 08 Jul 2016   Insomnia     Irritable bowel syndrome     Knee joint pain 03/10/2008    Left foot pain     Last Assessed 19 May 2017  Resolved 14 Aug 2017   OCD (obsessive compulsive disorder)     Plantar fibromatosis     Last Assessed 19 Oct 2016  Resolved 14 Aug 2017   Seasonal allergies     Tarsal tunnel syndrome of left side     Last Assessed 21 Aug 2017  Resolved 21 Aug 2017   Tinea corporis     Last Assessed 05 Dec 2011       Past Surgical History:   Procedure Laterality Date    ESOPHAGOGASTRODUODENOSCOPY N/A 10/31/2017    Procedure: ESOPHAGOGASTRODUODENOSCOPY (EGD);   Surgeon: Tony Morales Evangelina Snow MD;  Location: [facility] GI LAB;   Service: Gastroenterology    NO PAST SURGERIES      [place] LENGTH/SHORT LEG/ANKL TENDON,SINGLE Left 11/3/2017    Procedure: CALCANEAL OSTECTOMY, ACHILLEST TENDON LENGTHING;  Surgeon: Taylor Hammond DPM;  Location: [facility];  Service: Podiatry       Allergies   Allergen Reactions    Acetazolamide     Penicillins Hives    Seasonal Ic  [Cholestatin]     Sulfa Antibiotics GI Intolerance         Current Outpatient Prescriptions:     Alpha-D-Galactosidase (BEANO PO), Take by mouth, Disp: , Rfl:     Ascorbic Acid (VITAMIN C) 100 MG tablet, Take 1 tablet by mouth daily, Disp: , Rfl:     B Complex Vitamins (B COMPLEX 1 PO), Take by mouth daily, Disp: , Rfl:     budesonide (RINOCORT AQUA) 32 MCG/ACT nasal spray, 1 spray into each nostril daily at bedtime, Disp: , Rfl:     Cholecalciferol (VITAMIN D3) 1000 units CAPS, Take 1 capsule by mouth daily, Disp: , Rfl:     DiphenhydrAMINE HCl, Sleep, (SLEEP AID) 25 MG CAPS, Take 1 capsule by mouth daily, Disp: , Rfl:     etodolac (LODINE) 400 MG tablet, Take 1 tablet (400 mg total) by mouth 2 (two) times a day for 30 days, Disp: 60 tablet, Rfl: 0    etodolac (LODINE) 400 MG tablet, Take 1 tablet (400 mg total) by mouth 2 (two) times a day for 30 days, Disp: 60 tablet, Rfl: 0    Fluvoxamine Maleate 150 MG CP24, , Disp: , Rfl:     gabapentin (NEURONTIN) 300 mg capsule, Take 1 capsule (300 mg total) by mouth 2 (two) times a day for 30 days, Disp: 60 capsule, Rfl: 0    Glucosamine-Chondroitin (OSTEO BI-FLEX REGULAR STRENGTH) 250-200 MG TABS, Take 1 tablet by mouth daily, Disp: , Rfl:     hydrOXYzine HCL (ATARAX) 25 mg tablet, Take 1 tablet by mouth, Disp: , Rfl:     lisinopril (ZESTRIL) 10 mg tablet, Take 1 tablet (10 mg total) by mouth daily, Disp: 90 tablet, Rfl: 1    meloxicam (MOBIC) 7 5 mg tablet, Take 2 tablets (15 mg total) by mouth daily for 20 days, Disp: 20 tablet, Rfl: 0    Methylprednisolone 4 MG TBPK, Use as directed on package, Disp: 21 tablet, Rfl: 0    Misc Natural Products (OSTEO BI-FLEX JOINT SHIELD PO), Take by mouth, Disp: , Rfl:     montelukast (SINGULAIR) 10 mg tablet, Take 1 tablet (10 mg total) by mouth daily, Disp: 90 tablet, Rfl: 1    Multiple Vitamin (MULTI-DAY PO), Take 1 tablet by mouth daily, Disp: , Rfl:     omeprazole (PriLOSEC) 40 MG capsule, Take 1 capsule (40 mg total) by mouth daily, Disp: 90 capsule, Rfl: 1    oxymetazoline (AFRIN) 0 05 % nasal spray, 2 sprays by Each Nare route 2 (two) times a day, Disp: , Rfl:     Patient Active Problem List   Diagnosis    Achilles tendinitis of left lower extremity    Acne    ADD (attention deficit disorder) without hyperactivity    Allergic rhinitis    Aphthous ulcer    Arthralgia of left foot    Bony exostosis    Depression    External hemorrhoids    GE reflux    Lumbar radiculopathy    Obesity (BMI 30-39  9)    Obsessive compulsive disorder    Sleep apnea    Tarsal tunnel syndrome of left side    Plantar fasciitis    Radiculopathy of lumbar region    Left foot pain    Difficulty walking    Closed nondisplaced fracture of body of left calcaneus with routine healing    Screening for cardiovascular, respiratory, and genitourinary diseases    Screening for diabetes mellitus (DM)    Essential hypertension    Fatigue    Healthcare maintenance    Bursitis of left ankle       HPI    The following portions of the patient's history were reviewed and updated as appropriate: allergies, current medications, past family history, past medical history, past social history, past surgical history and problem list     Review of Systems      Historical Information   Past Medical History:   Diagnosis Date    Acne 09/01/2010    Last Assessed 01 Sep 2010   Acquired ankle/foot deformity     Last assessed 23 Feb 2017  Resolved 14 Aug 2017      Change in hearing     Resolved 16 Aug 2017    Closed displaced avulsion fracture of tuberosity of left calcaneus Last Assessed 25 May 2017  Resolved 14 Aug 2017   Epistaxis     Last Assessed 18 Feb 2014  Resolved 08 Jun 2016   Foreign body in foot     Last Assessed 30 Jul 2013  Resolved 12 Oct 2013   Gastritis     Last Assessed 22 Oct 2012    GERD (gastroesophageal reflux disease)     History of oral aphthous ulcers     Last Assessed 08 June 2016  Resolved 14 Aug 2017   Hypersomnia 02/18/2009    Last Assessed 18 Feb 2009  Resolved 16 Aug 2017   Impacted cerumen     Last Assessed 08 Jun 2016  Resolved 08 Jul 2016   Insomnia     Irritable bowel syndrome     Knee joint pain 03/10/2008    Left foot pain     Last Assessed 19 May 2017  Resolved 14 Aug 2017   OCD (obsessive compulsive disorder)     Plantar fibromatosis     Last Assessed 19 Oct 2016  Resolved 14 Aug 2017   Seasonal allergies     Tarsal tunnel syndrome of left side     Last Assessed 21 Aug 2017  Resolved 21 Aug 2017   Tinea corporis     Last Assessed 05 Dec 2011     Past Surgical History:   Procedure Laterality Date    ESOPHAGOGASTRODUODENOSCOPY N/A 10/31/2017    Procedure: ESOPHAGOGASTRODUODENOSCOPY (EGD); Surgeon: Laura Corrales MD;  Location: [facility] GI LAB;   Service: Gastroenterology    NO PAST SURGERIES      [place] LENGTH/SHORT LEG/ANKL TENDON,SINGLE Left 11/3/2017    Procedure: CALCANEAL OSTECTOMY, ACHILLEST TENDON [address];  Surgeon: Asael Zuñiga DPM;  Location: [facility] OR;  Service: Podiatry     Social History   History   Alcohol Use No     History   Drug Use No     Family History:   Family History   Problem Relation Age of Onset    Kidney disease Mother     Thyroid disease Mother     Allergic rhinitis Mother     Hypertension Father     Kidney disease Father     Gout Father     No Known Problems Sister     Colonic polyp Maternal Grandmother     Cancer Family     Diabetes Family        Meds/Allergies   Allergies   Allergen Reactions    Acetazolamide     Penicillins Hives    Seasonal Ic  [Cholestatin]     Sulfa Antibiotics GI Intolerance       Current Outpatient Prescriptions on File Prior to Visit   Medication Sig Dispense Refill    Alpha-D-Galactosidase (BEANO PO) Take by mouth      Ascorbic Acid (VITAMIN C) 100 MG tablet Take 1 tablet by mouth daily      B Complex Vitamins (B COMPLEX 1 PO) Take by mouth daily      budesonide (RINOCORT AQUA) 32 MCG/ACT nasal spray 1 spray into each nostril daily at bedtime      Cholecalciferol (VITAMIN D3) 1000 units CAPS Take 1 capsule by mouth daily      DiphenhydrAMINE HCl, Sleep, (SLEEP AID) 25 MG CAPS Take 1 capsule by mouth daily      etodolac (LODINE) 400 MG tablet Take 1 tablet (400 mg total) by mouth 2 (two) times a day for 30 days 60 tablet 0    etodolac (LODINE) 400 MG tablet Take 1 tablet (400 mg total) by mouth 2 (two) times a day for 30 days 60 tablet 0    Fluvoxamine Maleate 150 MG CP24       gabapentin (NEURONTIN) 300 mg capsule Take 1 capsule (300 mg total) by mouth 2 (two) times a day for 30 days 60 capsule 0    Glucosamine-Chondroitin (OSTEO BI-FLEX REGULAR STRENGTH) 250-200 MG TABS Take 1 tablet by mouth daily      hydrOXYzine HCL (ATARAX) 25 mg tablet Take 1 tablet by mouth      lisinopril (ZESTRIL) 10 mg tablet Take 1 tablet (10 mg total) by mouth daily 90 tablet 1    meloxicam (MOBIC) 7 5 mg tablet Take 2 tablets (15 mg total) by mouth daily for 20 days 20 tablet 0    Methylprednisolone 4 MG TBPK Use as directed on package 21 tablet 0    Misc Natural Products (OSTEO BI-FLEX JOINT SHIELD PO) Take by mouth      montelukast (SINGULAIR) 10 mg tablet Take 1 tablet (10 mg total) by mouth daily 90 tablet 1    Multiple Vitamin (MULTI-DAY PO) Take 1 tablet by mouth daily      omeprazole (PriLOSEC) 40 MG capsule Take 1 capsule (40 mg total) by mouth daily 90 capsule 1    oxymetazoline (AFRIN) 0 05 % nasal spray 2 sprays by Each Nare route 2 (two) times a day       No current facility-administered medications on file prior to visit  Objective:  Patient's shoes and socks removed  Foot ExamPhysical Exam   Constitutional: He appears well-developed and well-nourished  Cardiovascular: Normal rate and regular rhythm  Musculoskeletal:   Pain with palpation plantar fascia insertion left foot  Patient is maximally pronated in stance and gait

## 2018-11-21 DIAGNOSIS — M79.672 LEFT FOOT PAIN: ICD-10-CM

## 2018-11-21 DIAGNOSIS — M77.52 BURSITIS OF LEFT ANKLE: ICD-10-CM

## 2018-11-21 RX ORDER — GABAPENTIN 300 MG/1
300 CAPSULE ORAL 2 TIMES DAILY
Qty: 60 CAPSULE | Refills: 0 | Status: SHIPPED | OUTPATIENT
Start: 2018-11-21 | End: 2019-01-25 | Stop reason: ALTCHOICE

## 2018-11-21 RX ORDER — ETODOLAC 400 MG/1
400 TABLET, FILM COATED ORAL 2 TIMES DAILY
Qty: 60 TABLET | Refills: 0 | Status: SHIPPED | OUTPATIENT
Start: 2018-11-21 | End: 2019-01-25 | Stop reason: ALTCHOICE

## 2018-12-10 ENCOUNTER — OFFICE VISIT (OUTPATIENT)
Dept: PODIATRY | Facility: CLINIC | Age: 28
End: 2018-12-10
Payer: COMMERCIAL

## 2018-12-10 VITALS
DIASTOLIC BLOOD PRESSURE: 81 MMHG | WEIGHT: 233 LBS | SYSTOLIC BLOOD PRESSURE: 139 MMHG | RESPIRATION RATE: 17 BRPM | HEIGHT: 67 IN | BODY MASS INDEX: 36.57 KG/M2 | HEART RATE: 87 BPM

## 2018-12-10 DIAGNOSIS — M76.62 ACHILLES TENDINITIS OF LEFT LOWER EXTREMITY: Primary | ICD-10-CM

## 2018-12-10 PROCEDURE — 20551 NJX 1 TENDON ORIGIN/INSJ: CPT | Performed by: PODIATRIST

## 2018-12-10 NOTE — PROGRESS NOTES
Assessment/Plan:  Patient has continued pain in his left foot  He has pain in the heel  He suffers with plantar fasciitis and mild Achilles tendinitis  He states this precludes him from working  He is requesting out of work  Plan  Today trigger point injection done  Into the area of the left Achilles tendon insertion, 1 cc dexamethasone phosphate injected without pain or complication  Patient will stretch daily  Patient has been urged to get sit-down job  He is remaining out of work  He will stay on anti-inflammatory medicine    No problem-specific Assessment & Plan notes found for this encounter  There are no diagnoses linked to this encounter  Subjective:  Patient has continued pain in his foot  Patient has pain with ambulation  He is requesting injection therapy  Past Medical History:   Diagnosis Date    Acne 09/01/2010    Last Assessed 01 Sep 2010   Acquired ankle/foot deformity     Last assessed 23 Feb 2017  Resolved 14 Aug 2017   Change in hearing     Resolved 16 Aug 2017    Closed displaced avulsion fracture of tuberosity of left calcaneus     Last Assessed 25 May 2017  Resolved 14 Aug 2017   Epistaxis     Last Assessed 18 Feb 2014  Resolved 08 Jun 2016   Foreign body in foot     Last Assessed 30 Jul 2013  Resolved 12 Oct 2013   Gastritis     Last Assessed 22 Oct 2012    GERD (gastroesophageal reflux disease)     History of oral aphthous ulcers     Last Assessed 08 June 2016  Resolved 14 Aug 2017   Hypersomnia 02/18/2009    Last Assessed 18 Feb 2009  Resolved 16 Aug 2017   Impacted cerumen     Last Assessed 08 Jun 2016  Resolved 08 Jul 2016   Insomnia     Irritable bowel syndrome     Knee joint pain 03/10/2008    Left foot pain     Last Assessed 19 May 2017  Resolved 14 Aug 2017   OCD (obsessive compulsive disorder)     Plantar fibromatosis     Last Assessed 19 Oct 2016  Resolved 14 Aug 2017      Seasonal allergies     Tarsal tunnel syndrome of left side     Last Assessed 21 Aug 2017  Resolved 21 Aug 2017   Tinea corporis     Last Assessed 05 Dec 2011       Past Surgical History:   Procedure Laterality Date    ESOPHAGOGASTRODUODENOSCOPY N/A 10/31/2017    Procedure: ESOPHAGOGASTRODUODENOSCOPY (EGD); Surgeon: Ashley Goins MD;  Location: [facility];   Service: Gastroenterology    NO PAST SURGERIES      HI LENGTH/SHORT LEG/ANKL TENDON,SINGLE Left 11/3/2017    Procedure: CALCANEAL OSTECTOMY, ACHILLEST TENDON LENGTHING;  Surgeon: Katt Lagunas DPM;  Location: [facility];  Service: Podiatry       Allergies   Allergen Reactions    Acetazolamide     Penicillins Hives    Seasonal Ic  [Cholestatin]     Sulfa Antibiotics GI Intolerance         Current Outpatient Prescriptions:     Alpha-D-Galactosidase (BEANO PO), Take by mouth, Disp: , Rfl:     Ascorbic Acid (VITAMIN C) 100 MG tablet, Take 1 tablet by mouth daily, Disp: , Rfl:     B Complex Vitamins (B COMPLEX 1 PO), Take by mouth daily, Disp: , Rfl:     budesonide (RINOCORT AQUA) 32 MCG/ACT nasal spray, 1 spray into each nostril daily at bedtime, Disp: , Rfl:     Cholecalciferol (VITAMIN D3) 1000 units CAPS, Take 1 capsule by mouth daily, Disp: , Rfl:     DiphenhydrAMINE HCl, Sleep, (SLEEP AID) 25 MG CAPS, Take 1 capsule by mouth daily, Disp: , Rfl:     etodolac (LODINE) 400 MG tablet, Take 1 tablet (400 mg total) by mouth 2 (two) times a day for 30 days, Disp: 60 tablet, Rfl: 0    Fluvoxamine Maleate 150 MG CP24, , Disp: , Rfl:     gabapentin (NEURONTIN) 300 mg capsule, Take 1 capsule (300 mg total) by mouth 2 (two) times a day for 30 days, Disp: 60 capsule, Rfl: 0    Glucosamine-Chondroitin (OSTEO BI-FLEX REGULAR STRENGTH) 250-200 MG TABS, Take 1 tablet by mouth daily, Disp: , Rfl:     hydrOXYzine HCL (ATARAX) 25 mg tablet, Take 1 tablet by mouth, Disp: , Rfl:     lisinopril (ZESTRIL) 10 mg tablet, Take 1 tablet (10 mg total) by mouth daily, Disp: 90 tablet, Rfl: 1    meloxicam (MOBIC) 7 5 mg tablet, Take 2 tablets (15 mg total) by mouth daily for 20 days, Disp: 20 tablet, Rfl: 0    Methylprednisolone 4 MG TBPK, Use as directed on package, Disp: 21 tablet, Rfl: 0    Misc Natural Products (OSTEO BI-FLEX JOINT SHIELD PO), Take by mouth, Disp: , Rfl:     montelukast (SINGULAIR) 10 mg tablet, Take 1 tablet (10 mg total) by mouth daily, Disp: 90 tablet, Rfl: 1    Multiple Vitamin (MULTI-DAY PO), Take 1 tablet by mouth daily, Disp: , Rfl:     omeprazole (PriLOSEC) 40 MG capsule, Take 1 capsule (40 mg total) by mouth daily, Disp: 90 capsule, Rfl: 1    oxymetazoline (AFRIN) 0 05 % nasal spray, 2 sprays by Each Nare route 2 (two) times a day, Disp: , Rfl:     Patient Active Problem List   Diagnosis    Achilles tendinitis of left lower extremity    Acne    ADD (attention deficit disorder) without hyperactivity    Allergic rhinitis    Aphthous ulcer    Arthralgia of left foot    Bony exostosis    Depression    External hemorrhoids    GE reflux    Lumbar radiculopathy    Obesity (BMI 30-39  9)    Obsessive compulsive disorder    Sleep apnea    Tarsal tunnel syndrome of left side    Plantar fasciitis    Radiculopathy of lumbar region    Left foot pain    Difficulty walking    Closed nondisplaced fracture of body of left calcaneus with routine healing    Screening for cardiovascular, respiratory, and genitourinary diseases    Screening for diabetes mellitus (DM)    Essential hypertension    Fatigue    Healthcare maintenance    Bursitis of left ankle          Patient ID: Maria Elena Ellis is a 33 y o  male  HPI    The following portions of the patient's history were reviewed and updated as appropriate: allergies, current medications, past family history, past medical history, past social history, past surgical history and problem list     Review of Systems      Objective:  Patient's shoes and socks removed     Foot ExamPhysical Exam   Constitutional: He appears well-developed and well-nourished  Cardiovascular: Normal rate and regular rhythm  Musculoskeletal:   Patient is maximally pronated in stance and gait  Left foot demonstrates pain with palpation plantar fashion Achilles tendon insertions  No evidence of rupture or mass   Nursing note and vitals reviewed

## 2019-01-07 ENCOUNTER — OFFICE VISIT (OUTPATIENT)
Dept: PODIATRY | Facility: CLINIC | Age: 29
End: 2019-01-07
Payer: COMMERCIAL

## 2019-01-07 VITALS
SYSTOLIC BLOOD PRESSURE: 139 MMHG | HEIGHT: 67 IN | DIASTOLIC BLOOD PRESSURE: 81 MMHG | BODY MASS INDEX: 36.57 KG/M2 | WEIGHT: 233 LBS

## 2019-01-07 DIAGNOSIS — M79.672 LEFT FOOT PAIN: ICD-10-CM

## 2019-01-07 DIAGNOSIS — M77.52 BURSITIS OF LEFT ANKLE: Primary | ICD-10-CM

## 2019-01-07 PROCEDURE — 20551 NJX 1 TENDON ORIGIN/INSJ: CPT | Performed by: PODIATRIST

## 2019-01-07 NOTE — PROGRESS NOTES
Procedures   Foot Exam       Assessment/Plan:  Patient has continued pain in his left foot  He has pain in the heel  He suffers with plantar fasciitis and mild Achilles tendinitis  He states this precludes him from working  He is requesting out of work      Plan  Today trigger point injection done  Into the area of the left Achilles tendon insertion, 1 cc dexamethasone phosphate injected without pain or complication  Patient will stretch daily  Patient has been urged to get sit-down job  He is remaining out of work  He will stay on anti-inflammatory medicine  patient is urged to attend physical therapy      No problem-specific Assessment & Plan notes found for this encounter          There are no diagnoses linked to this encounter        Subjective:  Patient has continued pain in his foot  Patient has pain with ambulation  He is requesting injection therapy           Past Medical History:   Diagnosis Date    Acne 09/01/2010     Last Assessed 01 Sep 2010   Acquired ankle/foot deformity       Last assessed 23 Feb 2017  Resolved 14 Aug 2017   Change in hearing       Resolved 16 Aug 2017    Closed displaced avulsion fracture of tuberosity of left calcaneus       Last Assessed 25 May 2017  Resolved 14 Aug 2017   Epistaxis       Last Assessed 18 Feb 2014  Resolved 08 Jun 2016   Foreign body in foot       Last Assessed 30 Jul 2013  Resolved 12 Oct 2013   Gastritis       Last Assessed 22 Oct 2012    GERD (gastroesophageal reflux disease)      History of oral aphthous ulcers       Last Assessed 08 June 2016  Resolved 14 Aug 2017   Hypersomnia 02/18/2009     Last Assessed 18 Feb 2009  Resolved 16 Aug 2017   Impacted cerumen       Last Assessed 08 Jun 2016  Resolved 08 Jul 2016   Insomnia      Irritable bowel syndrome      Knee joint pain 03/10/2008    Left foot pain       Last Assessed 19 May 2017  Resolved 14 Aug 2017      OCD (obsessive compulsive disorder)      Plantar fibromatosis       Last Assessed 19 Oct 2016  Resolved 14 Aug 2017   Seasonal allergies      Tarsal tunnel syndrome of left side       Last Assessed 21 Aug 2017  Resolved 21 Aug 2017   Tinea corporis       Last Assessed 05 Dec 2011               Past Surgical History:   Procedure Laterality Date    ESOPHAGOGASTRODUODENOSCOPY N/A 10/31/2017     Procedure: ESOPHAGOGASTRODUODENOSCOPY (EGD); Surgeon: Urbano Bryant MD;  Location: [facility];   Service: Gastroenterology    NO PAST SURGERIES        AL LENGTH/SHORT LEG/ANKL TENDON,SINGLE Left 11/3/2017     Procedure: CALCANEAL OSTECTOMY, ACHILLEST TENDON LENGTHING;  Surgeon: Solis Portillo DPM;  Location: [facility];  Service: Podiatry              Allergies   Allergen Reactions    Acetazolamide      Penicillins Hives    Seasonal Ic  [Cholestatin]      Sulfa Antibiotics GI Intolerance            Current Outpatient Prescriptions:     Alpha-D-Galactosidase (BEANO PO), Take by mouth, Disp: , Rfl:     Ascorbic Acid (VITAMIN C) 100 MG tablet, Take 1 tablet by mouth daily, Disp: , Rfl:     B Complex Vitamins (B COMPLEX 1 PO), Take by mouth daily, Disp: , Rfl:     budesonide (RINOCORT AQUA) 32 MCG/ACT nasal spray, 1 spray into each nostril daily at bedtime, Disp: , Rfl:     Cholecalciferol (VITAMIN D3) 1000 units CAPS, Take 1 capsule by mouth daily, Disp: , Rfl:     DiphenhydrAMINE HCl, Sleep, (SLEEP AID) 25 MG CAPS, Take 1 capsule by mouth daily, Disp: , Rfl:     etodolac (LODINE) 400 MG tablet, Take 1 tablet (400 mg total) by mouth 2 (two) times a day for 30 days, Disp: 60 tablet, Rfl: 0    Fluvoxamine Maleate 150 MG CP24, , Disp: , Rfl:     gabapentin (NEURONTIN) 300 mg capsule, Take 1 capsule (300 mg total) by mouth 2 (two) times a day for 30 days, Disp: 60 capsule, Rfl: 0    Glucosamine-Chondroitin (OSTEO BI-FLEX REGULAR STRENGTH) 250-200 MG TABS, Take 1 tablet by mouth daily, Disp: , Rfl:     hydrOXYzine HCL (ATARAX) 25 mg tablet, Take 1 tablet by mouth, Disp: , Rfl:     lisinopril (ZESTRIL) 10 mg tablet, Take 1 tablet (10 mg total) by mouth daily, Disp: 90 tablet, Rfl: 1    meloxicam (MOBIC) 7 5 mg tablet, Take 2 tablets (15 mg total) by mouth daily for 20 days, Disp: 20 tablet, Rfl: 0    Methylprednisolone 4 MG TBPK, Use as directed on package, Disp: 21 tablet, Rfl: 0    Misc Natural Products (OSTEO BI-FLEX JOINT SHIELD PO), Take by mouth, Disp: , Rfl:     montelukast (SINGULAIR) 10 mg tablet, Take 1 tablet (10 mg total) by mouth daily, Disp: 90 tablet, Rfl: 1    Multiple Vitamin (MULTI-DAY PO), Take 1 tablet by mouth daily, Disp: , Rfl:     omeprazole (PriLOSEC) 40 MG capsule, Take 1 capsule (40 mg total) by mouth daily, Disp: 90 capsule, Rfl: 1    oxymetazoline (AFRIN) 0 05 % nasal spray, 2 sprays by Each Nare route 2 (two) times a day, Disp: , Rfl:          Patient Active Problem List   Diagnosis    Achilles tendinitis of left lower extremity    Acne    ADD (attention deficit disorder) without hyperactivity    Allergic rhinitis    Aphthous ulcer    Arthralgia of left foot    Bony exostosis    Depression    External hemorrhoids    GE reflux    Lumbar radiculopathy    Obesity (BMI 30-39  9)    Obsessive compulsive disorder    Sleep apnea    Tarsal tunnel syndrome of left side    Plantar fasciitis    Radiculopathy of lumbar region    Left foot pain    Difficulty walking    Closed nondisplaced fracture of body of left calcaneus with routine healing    Screening for cardiovascular, respiratory, and genitourinary diseases    Screening for diabetes mellitus (DM)    Essential hypertension    Fatigue    Healthcare maintenance    Bursitis of left ankle             Patient ID: Maria Elena Ellis is a 33 y o  male      HPI     The following portions of the patient's history were reviewed and updated as appropriate: allergies, current medications, past family history, past medical history, past social history, past surgical history and problem list      Review of Systems       Objective:  Patient's shoes and socks removed  Foot ExamPhysical Exam   Constitutional: He appears well-developed and well-nourished  Cardiovascular: Normal rate and regular rhythm  Musculoskeletal:   Patient is maximally pronated in stance and gait  Left foot demonstrates pain with palpation plantar fashion Achilles tendon insertions    No evidence of rupture or mass   Nursing note and vitals reviewed

## 2019-01-14 ENCOUNTER — APPOINTMENT (OUTPATIENT)
Dept: PHYSICAL THERAPY | Facility: REHABILITATION | Age: 29
End: 2019-01-14
Payer: COMMERCIAL

## 2019-01-14 ENCOUNTER — EVALUATION (OUTPATIENT)
Dept: PHYSICAL THERAPY | Facility: REHABILITATION | Age: 29
End: 2019-01-14
Payer: COMMERCIAL

## 2019-01-14 DIAGNOSIS — M79.672 LEFT FOOT PAIN: Primary | ICD-10-CM

## 2019-01-14 PROCEDURE — G8979 MOBILITY GOAL STATUS: HCPCS | Performed by: PHYSICAL MEDICINE & REHABILITATION

## 2019-01-14 PROCEDURE — 97161 PT EVAL LOW COMPLEX 20 MIN: CPT | Performed by: PHYSICAL MEDICINE & REHABILITATION

## 2019-01-14 PROCEDURE — G8978 MOBILITY CURRENT STATUS: HCPCS | Performed by: PHYSICAL MEDICINE & REHABILITATION

## 2019-01-14 NOTE — PROGRESS NOTES
PT Evaluation     Today's date: 2019  Patient name: Damion Winston  : 1990  MRN: [id number]  Referring provider: Malachi Sagastume DPM  Dx:   Encounter Diagnosis     ICD-10-CM    1  Left foot pain M79 672                   Assessment  Assessment details: Pt is a pleasant 33 y o  male presenting to outpatient physical therapy with Left foot pain  (primary encounter diagnosis)   Pt presents with pain, decreased range of motion, decreased strength, and decreased tolerance to activity  Pt is a good candidate for outpatient physical therapy and would benefit from skilled physical therapy to address limitations and to achieve goals  Thank you for this referral    Impairments: abnormal coordination, abnormal or restricted ROM, activity intolerance, impaired physical strength and pain with function  Understanding of Dx/Px/POC: good   Prognosis: good    Goals  ST  Patient will report 25% decrease in pain in 4 weeks  2  Patient will demonstrate 25% improvement in ROM in 4 weeks  3  Patient will demonstrate 1/2 grade improvement in strength in 4 weeks  LT  Patient will be able to perform IADLS without restriction or pain by discharge  2  Patient will be independent in HEP by discharge  3  Patient will be able to return to recreational/work duties without restriction or pain by discharge        Plan  Patient would benefit from: PT eval and skilled PT  Planned modality interventions: cryotherapy and thermotherapy: hydrocollator packs  Planned therapy interventions: IADL retraining, body mechanics training, flexibility, functional ROM exercises, home exercise program, neuromuscular re-education, manual therapy, postural training, strengthening, stretching, therapeutic activities, therapeutic exercise and joint mobilization  Frequency: 2x week  Duration in visits: 8  Duration in weeks: 4  Treatment plan discussed with: patient        Subjective Evaluation    History of Present Illness  Mechanism of injury: Patient presents with reports of L foot pain, surgery to remove bone spur in 2017 and was unable to attend formal therapy afterwards  Increased pain with weightbearing activity  Decreased pain with "being asleep " Monthly injections provide a few days of relief, performed over the past 6 months  Patient denies N/T, notes tightness     Pain  Current pain ratin  At best pain ratin  At worst pain rating: 10          Objective     Passive Range of Motion   Left Ankle/Foot    Dorsiflexion (ke): 5 degrees   Plantar flexion: WFL  Inversion: 30 degrees   Eversion: 5 degrees     Strength/Myotome Testing     Additional Strength Details  L ankle strength grossly WFL without pain    General Comments     Ankle/Foot Comments   Gait: increased pronation, limited push off, decreased heel strike, increased BLE ER  Discomfort with heel raise, limited range  TTP through posterior calcaneus, superolateral calcaneus  - Minimal TTP through plantar fascia          Precautions: h/o L foot surgery    Daily Treatment Diary     Manual              IASTM L Achilles             Ankle DF stretch             Posterior TC mobs                                           Exercise Diary              Bike             PF stretch             Towel curls             Long foot             HR with eccentric lower             Great toe abd             Ankle 4 way with TB                                                                                                                                                                                          Modalities

## 2019-01-17 ENCOUNTER — OFFICE VISIT (OUTPATIENT)
Dept: PHYSICAL THERAPY | Facility: REHABILITATION | Age: 29
End: 2019-01-17
Payer: COMMERCIAL

## 2019-01-17 DIAGNOSIS — M79.672 LEFT FOOT PAIN: Primary | ICD-10-CM

## 2019-01-17 PROCEDURE — 97110 THERAPEUTIC EXERCISES: CPT | Performed by: PHYSICAL THERAPIST

## 2019-01-17 PROCEDURE — 97530 THERAPEUTIC ACTIVITIES: CPT | Performed by: PHYSICAL THERAPIST

## 2019-01-17 PROCEDURE — 97140 MANUAL THERAPY 1/> REGIONS: CPT | Performed by: PHYSICAL THERAPIST

## 2019-01-17 NOTE — PROGRESS NOTES
Daily Note     Today's date: 2019  Patient name: Marta Yeung  : 1990  MRN: [id number]  Referring provider: Hair Peacock DPM  Dx:   Encounter Diagnosis     ICD-10-CM    1  Left foot pain M79 672        Start Time: 1130  Stop Time: 1220  Total time in clinic (min): 50 minutes    Subjective: Connor Esquivel reports that his  Ankle felt fine after previous session  Objective: See treatment diary below      Assessment: Tolerated treatment well  Patient demonstrated fatigue post treatment, exhibited good technique with therapeutic exercises and would benefit from continued PT  Unable to tolerate standing heel raises at this time  Plan: Continue per plan of care          Precautions: h/o L foot surgery    Daily Treatment Diary     Manual              IASTM L Achilles             Ankle DF stretch 5'            Posterior TC mobs 5'                                          Exercise Diary              Bike 5'            Calf stretch 3x30"            Towel curls 3 min            Bridges 3x15            HR with eccentric lower pain            Great toe abd             Ankle 4 way with TB* BTB 3x15            TG squats 3x12 L 22                                                                                                                                                                            Modalities              CP L ankle 10'

## 2019-01-21 ENCOUNTER — OFFICE VISIT (OUTPATIENT)
Dept: PHYSICAL THERAPY | Facility: REHABILITATION | Age: 29
End: 2019-01-21
Payer: COMMERCIAL

## 2019-01-21 DIAGNOSIS — M79.672 LEFT FOOT PAIN: Primary | ICD-10-CM

## 2019-01-21 PROCEDURE — 97530 THERAPEUTIC ACTIVITIES: CPT | Performed by: PHYSICAL THERAPIST

## 2019-01-21 PROCEDURE — 97140 MANUAL THERAPY 1/> REGIONS: CPT | Performed by: PHYSICAL THERAPIST

## 2019-01-21 PROCEDURE — 97112 NEUROMUSCULAR REEDUCATION: CPT | Performed by: PHYSICAL THERAPIST

## 2019-01-21 NOTE — PROGRESS NOTES
Daily Note     Today's date: 2019  Patient name: Venu Borges  : 1990  MRN: [id number]  Referring provider: Dionne Fregoso DPM  Dx:   Encounter Diagnosis     ICD-10-CM    1  Left foot pain M79 672        Start Time: 1034  Stop Time: 1130  Total time in clinic (min): 56 minutes    Subjective: Ama Sommer reports that his foot has been feeling about the same  Objective: See treatment diary below      Assessment: Tolerated treatment well  Patient demonstrated fatigue post treatment, exhibited good technique with therapeutic exercises and would benefit from continued PT Trialed tandem stance and SLS this session with patient having fair tolerance, no pain noted, minimal dynamic instability  Plan: Continue per plan of care  Progress treatment as tolerated           Precautions: h/o L foot surgery    Daily Treatment Diary     Manual             IASTM L Achilles             Ankle DF stretch 5' 5'           Posterior TC mobs 5' 5'                                         Exercise Diary             Bike 5' 5'           Calf stretch 3x30" 3x30"           Towel curls 3 min 3 min           Bridges 3x15 3x15 BTB           HR with eccentric lower pain            Great toe add             Ankle 4 way with TB* BTB 3x15 BTB 3x15           TG squats 3x12 L 22 3x15 L22                                                                                         Balance             Tandem  :30x2 ea           Single leg  :30x2 ea                                                      Modalities              CP L ankle 10' 10'

## 2019-01-24 ENCOUNTER — OFFICE VISIT (OUTPATIENT)
Dept: PHYSICAL THERAPY | Facility: REHABILITATION | Age: 29
End: 2019-01-24
Payer: COMMERCIAL

## 2019-01-24 DIAGNOSIS — M79.672 LEFT FOOT PAIN: Primary | ICD-10-CM

## 2019-01-24 PROCEDURE — 97140 MANUAL THERAPY 1/> REGIONS: CPT | Performed by: PHYSICAL THERAPIST

## 2019-01-24 PROCEDURE — 97112 NEUROMUSCULAR REEDUCATION: CPT | Performed by: PHYSICAL THERAPIST

## 2019-01-24 PROCEDURE — 97530 THERAPEUTIC ACTIVITIES: CPT | Performed by: PHYSICAL THERAPIST

## 2019-01-24 PROCEDURE — 97110 THERAPEUTIC EXERCISES: CPT | Performed by: PHYSICAL THERAPIST

## 2019-01-24 NOTE — PROGRESS NOTES
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, "everything else feels tight though"       Objective: See treatment diary below      Assessment: Tolerated treatment well  Patient demonstrated fatigue post treatment, exhibited good technique with therapeutic exercises and would benefit from continued PT Trialed step ups with patient tolerating well, no discomfort noted  Patient reports increased pain with completion of step overs on both 4'' and 6'' step  Increased instability noted with SLS, with frequent use of UE to regain balance  Plan: Continue per plan of care  Progress treatment as tolerated           Precautions: h/o L foot surgery    Daily Treatment Diary     Manual            IASTM L Achilles             Ankle DF stretch 5' 5' 5'          Posterior TC mobs 5' 5' 5'                                        Exercise Diary            Bike 5' 5' 5'          Calf stretch 3x30" 3x30" 3x30"          Towel curls 3 min 3 min           Bridges 3x15 3x15 BTB 3x15 BTB          HR with eccentric lower pain            Great toe add             Ankle 4 way with TB* BTB 3x15 BTB 3x15 BTB 3x15          TG squats 3x12 L 22 3x15 L22 3x15 L22          Step ups   6'' 3x10          Step overs   Pain w/ 4'' and 6''                                                              Balance             Tandem  :30x2 ea :30x3 ea          Single leg  :30x2 ea :30x3 ea                                                     Modalities             CP L ankle 10' 10' 10'                                    Patient taken through exercise by [facility] PTA from [id number]

## 2019-01-25 ENCOUNTER — OFFICE VISIT (OUTPATIENT)
Dept: FAMILY MEDICINE CLINIC | Facility: CLINIC | Age: 29
End: 2019-01-25
Payer: COMMERCIAL

## 2019-01-25 VITALS
HEIGHT: 67 IN | HEART RATE: 76 BPM | DIASTOLIC BLOOD PRESSURE: 86 MMHG | SYSTOLIC BLOOD PRESSURE: 128 MMHG | WEIGHT: 239 LBS | BODY MASS INDEX: 37.51 KG/M2 | RESPIRATION RATE: 16 BRPM | TEMPERATURE: 100.2 F

## 2019-01-25 DIAGNOSIS — Z13.83 SCREENING FOR CARDIOVASCULAR, RESPIRATORY, AND GENITOURINARY DISEASES: ICD-10-CM

## 2019-01-25 DIAGNOSIS — Z13.89 SCREENING FOR CARDIOVASCULAR, RESPIRATORY, AND GENITOURINARY DISEASES: ICD-10-CM

## 2019-01-25 DIAGNOSIS — K21.9 GASTROESOPHAGEAL REFLUX DISEASE, ESOPHAGITIS PRESENCE NOT SPECIFIED: ICD-10-CM

## 2019-01-25 DIAGNOSIS — Z13.6 SCREENING FOR CARDIOVASCULAR, RESPIRATORY, AND GENITOURINARY DISEASES: ICD-10-CM

## 2019-01-25 DIAGNOSIS — J30.9 ALLERGIC RHINITIS, UNSPECIFIED SEASONALITY, UNSPECIFIED TRIGGER: ICD-10-CM

## 2019-01-25 DIAGNOSIS — I10 ESSENTIAL HYPERTENSION: Primary | ICD-10-CM

## 2019-01-25 DIAGNOSIS — E66.9 OBESITY (BMI 30-39.9): ICD-10-CM

## 2019-01-25 PROCEDURE — 3074F SYST BP LT 130 MM HG: CPT | Performed by: FAMILY MEDICINE

## 2019-01-25 PROCEDURE — 3008F BODY MASS INDEX DOCD: CPT | Performed by: FAMILY MEDICINE

## 2019-01-25 PROCEDURE — 99214 OFFICE O/P EST MOD 30 MIN: CPT | Performed by: FAMILY MEDICINE

## 2019-01-25 PROCEDURE — 1036F TOBACCO NON-USER: CPT | Performed by: FAMILY MEDICINE

## 2019-01-25 PROCEDURE — 3079F DIAST BP 80-89 MM HG: CPT | Performed by: FAMILY MEDICINE

## 2019-01-25 RX ORDER — OMEPRAZOLE 40 MG/1
40 CAPSULE, DELAYED RELEASE ORAL DAILY
Qty: 90 CAPSULE | Refills: 1 | Status: SHIPPED | OUTPATIENT
Start: 2019-01-25 | End: 2019-11-20 | Stop reason: SDUPTHER

## 2019-01-25 RX ORDER — LISINOPRIL 10 MG/1
10 TABLET ORAL DAILY
Qty: 90 TABLET | Refills: 1 | Status: SHIPPED | OUTPATIENT
Start: 2019-01-25 | End: 2019-08-25 | Stop reason: SDUPTHER

## 2019-01-25 RX ORDER — MONTELUKAST SODIUM 10 MG/1
10 TABLET ORAL DAILY
Qty: 90 TABLET | Refills: 1 | Status: SHIPPED | OUTPATIENT
Start: 2019-01-25 | End: 2019-08-24 | Stop reason: SDUPTHER

## 2019-01-25 NOTE — PROGRESS NOTES
Assessment/Plan:    No problem-specific Assessment & Plan notes found for this encounter     htn stable  Labs reviewed  q6m f/u  Obesity advised  He will try to lose at least 20# in next 6m by next labs and visit  Allergies stable  gerd stable  PPI risks aware, tries to skip days at times  Low acid diet encouraged     Diagnoses and all orders for this visit:    Essential hypertension  -     lisinopril (ZESTRIL) 10 mg tablet; Take 1 tablet (10 mg total) by mouth daily  -     Comprehensive metabolic panel; Future    Obesity (BMI 30-39  9)    Screening for cardiovascular, respiratory, and genitourinary diseases  -     Lipid Panel with Direct LDL reflex; Future    Allergic rhinitis, unspecified seasonality, unspecified trigger  -     montelukast (SINGULAIR) 10 mg tablet; Take 1 tablet (10 mg total) by mouth daily    Gastroesophageal reflux disease, esophagitis presence not specified  -     omeprazole (PriLOSEC) 40 MG capsule; Take 1 capsule (40 mg total) by mouth daily        Recent data suggesting increased risk of ischemic CVA and chronic kidney damage with PPI use was advised  Return in about 6 months (around 7/25/2019) for Annual physical after 7/17/19  Subjective:      Patient ID: Soto Faulkner is a 33 y o  male  Chief Complaint   Patient presents with    Follow-up     6m f/u       HPI  Wt gain  Trying to start  Takes bp meds, misses occasional  Some low fat/salt  Labs reviewed  gerd controlled  Allergies controlled on singulair    The following portions of the patient's history were reviewed and updated as appropriate: allergies, current medications, past family history, past medical history, past social history, past surgical history and problem list     Review of Systems   Respiratory: Negative for shortness of breath  Cardiovascular: Negative for chest pain, palpitations and leg swelling           Current Outpatient Prescriptions   Medication Sig Dispense Refill    Alpha-D-Galactosidase [facility] PO) Take by mouth      DiphenhydrAMINE HCl, Sleep, (SLEEP AID) 25 MG CAPS Take 1 capsule by mouth daily      Fluvoxamine Maleate 150 MG CP24       lisinopril (ZESTRIL) 10 mg tablet Take 1 tablet (10 mg total) by mouth daily 90 tablet 1    montelukast (SINGULAIR) 10 mg tablet Take 1 tablet (10 mg total) by mouth daily 90 tablet 1    omeprazole (PriLOSEC) 40 MG capsule Take 1 capsule (40 mg total) by mouth daily 90 capsule 1    oxymetazoline (AFRIN) 0 05 % nasal spray 2 sprays by Each Nare route 2 (two) times a day       No current facility-administered medications for this visit  Objective:    /86   Pulse 76   Temp 100 2 °F (37 9 °C)   Resp 16   Ht 5' 6 5" (1 689 m)   Wt 108 kg (239 lb)   BMI 38 00 kg/m²        Physical Exam   Constitutional: He appears well-developed  No distress  HENT:   Head: Normocephalic  Mouth/Throat: No oropharyngeal exudate  Eyes: Conjunctivae are normal  No scleral icterus  Neck: Neck supple  Cardiovascular: Normal rate and intact distal pulses  No murmur heard  Pulmonary/Chest: Effort normal  No respiratory distress  He has no wheezes  He has no rales  Abdominal: Soft  He exhibits no distension  There is no tenderness  obese   Musculoskeletal: He exhibits no edema or deformity  Lymphadenopathy:     He has no cervical adenopathy  Neurological: He is alert  He exhibits normal muscle tone  Skin: Skin is warm and dry  No rash noted  No pallor  Psychiatric: His behavior is normal  Thought content normal    Nursing note and vitals reviewed               Peter Mcfadden DO

## 2019-01-28 ENCOUNTER — OFFICE VISIT (OUTPATIENT)
Dept: PHYSICAL THERAPY | Facility: REHABILITATION | Age: 29
End: 2019-01-28
Payer: COMMERCIAL

## 2019-01-28 DIAGNOSIS — M79.672 LEFT FOOT PAIN: Primary | ICD-10-CM

## 2019-01-28 PROCEDURE — 97530 THERAPEUTIC ACTIVITIES: CPT | Performed by: PHYSICAL THERAPIST

## 2019-01-28 PROCEDURE — 97110 THERAPEUTIC EXERCISES: CPT | Performed by: PHYSICAL THERAPIST

## 2019-01-28 PROCEDURE — 97140 MANUAL THERAPY 1/> REGIONS: CPT | Performed by: PHYSICAL THERAPIST

## 2019-01-28 NOTE — PROGRESS NOTES
Daily Note     Today's date: 2019  Patient name: Alfredo Alpers  : 1990  MRN: [id number]  Referring provider: Bobby Lopez DPM  Dx:   Encounter Diagnosis     ICD-10-CM    1  Left foot pain M79 672        Start Time: 1030  Stop Time: 1135  Total time in clinic (min): 65 minutes    Subjective: Raymond Lange reports that his plantar aspect of his heel is uncomfortable today  Objective: See treatment diary below      Assessment: Tolerated treatment well  Patient demonstrated fatigue post treatment, exhibited good technique with therapeutic exercises and would benefit from continued PT  IASTM, no discomfort, increased difficulty with balance, no pain during step ups this session  Plan: Continue per plan of care          Precautions: h/o L foot surgery    Daily Treatment Diary     Manual           IASTM L Achilles    10' total         Ankle DF stretch 5' 5' 5' done         Posterior TC mobs 5' 5' 5' done                                       Exercise Diary           Bike 5' 5' 5' 5'         Calf stretch 3x30" 3x30" 3x30" 3x30"         Towel curls 3 min 3 min           Bridges 3x15 3x15 BTB 3x15 BTB 3x15 BTB         HR with eccentric lower pain            Great toe add             Ankle 4 way with TB* BTB 3x15 BTB 3x15 BTB 3x15 BTB 3x15         TG squats 3x12 L 22 3x15 L22 3x15 L22 3x15 L 22         Step ups   6'' 3x10 6" 3x12         Step overs   Pain w/ 4'' and 6'' np                                                             Balance             Tandem  :30x2 ea :30x3 ea :30x3 ea         Single leg  :30x2 ea :30x3 ea :30x3 ea                                                    Modalities            CP L ankle 10' 10' 10' 10'

## 2019-01-31 ENCOUNTER — APPOINTMENT (OUTPATIENT)
Dept: PHYSICAL THERAPY | Facility: REHABILITATION | Age: 29
End: 2019-01-31
Payer: COMMERCIAL

## 2019-01-31 ENCOUNTER — OFFICE VISIT (OUTPATIENT)
Dept: PHYSICAL THERAPY | Facility: REHABILITATION | Age: 29
End: 2019-01-31
Payer: COMMERCIAL

## 2019-01-31 DIAGNOSIS — M79.672 LEFT FOOT PAIN: Primary | ICD-10-CM

## 2019-01-31 PROCEDURE — 97110 THERAPEUTIC EXERCISES: CPT

## 2019-01-31 PROCEDURE — 97140 MANUAL THERAPY 1/> REGIONS: CPT

## 2019-01-31 PROCEDURE — 97530 THERAPEUTIC ACTIVITIES: CPT

## 2019-01-31 NOTE — PROGRESS NOTES
Daily Note     Today's date: 2019  Patient name: Martín Smith  : 1990  MRN: [id number]  Referring provider: Shayne Benitez DPM  Dx:   Encounter Diagnosis     ICD-10-CM    1  Left foot pain M79 672                   Subjective: Patient reports ankle feeling "a little better" prior to session today  He denies any complaints after previous session  "It's a little tender today, but not bad today "       Objective: See treatment diary below  Precautions: h/o L foot surgery    Daily Treatment Diary     Manual          IASTM L Achilles    10' total 10' total        Ankle DF stretch 5' 5' 5' done Done         Posterior TC mobs 5' 5' 5' done                                       Exercise Diary          Bike 5' 5' 5' 5' 5'        Calf stretch 3x30" 3x30" 3x30" 3x30" 3x30''        Towel curls 3 min 3 min           Bridges 3x15 3x15 BTB 3x15 BTB 3x15 BTB 3x15 BTB        HR with eccentric lower pain            Great toe add             Ankle 4 way with TB* BTB 3x15 BTB 3x15 BTB 3x15 BTB 3x15 BTB 3x15        TG squats 3x12 L 22 3x15 L22 3x15 L22 3x15 L 22 3x15 L22        Step ups   6'' 3x10 6" 3x12 8'' 3x10        Step overs   Pain w/ 4'' and 6'' np                                                             Balance             Tandem  :30x2 ea :30x3 ea :30x3 ea :30x3 ea        Single leg  :30x2 ea :30x3 ea :30x3 ea :30x3 ea                                                   Modalities           CP L ankle 10' 10' 10' 10' 10'                                        Assessment: Tolerated treatment fair  Patient demonstrated fatigue post treatment, exhibited good technique with therapeutic exercises and would benefit from continued PT Trialed increased step height with step ups this session, with no pain reported by patient  Patient continues to report increased pain with step overs  Some improvements noted with balance TE, less instability noted  Plan: Continue per plan of care  Progress treatment as tolerated

## 2019-02-04 ENCOUNTER — OFFICE VISIT (OUTPATIENT)
Dept: PHYSICAL THERAPY | Facility: REHABILITATION | Age: 29
End: 2019-02-04
Payer: COMMERCIAL

## 2019-02-04 ENCOUNTER — OFFICE VISIT (OUTPATIENT)
Dept: PODIATRY | Facility: CLINIC | Age: 29
End: 2019-02-04
Payer: COMMERCIAL

## 2019-02-04 VITALS
WEIGHT: 239 LBS | HEART RATE: 76 BPM | HEIGHT: 67 IN | SYSTOLIC BLOOD PRESSURE: 114 MMHG | DIASTOLIC BLOOD PRESSURE: 87 MMHG | RESPIRATION RATE: 16 BRPM | BODY MASS INDEX: 37.51 KG/M2

## 2019-02-04 DIAGNOSIS — M72.2 PLANTAR FASCIITIS: ICD-10-CM

## 2019-02-04 DIAGNOSIS — M79.672 LEFT FOOT PAIN: Primary | ICD-10-CM

## 2019-02-04 DIAGNOSIS — M77.52 BURSITIS OF LEFT ANKLE: ICD-10-CM

## 2019-02-04 DIAGNOSIS — M79.672 LEFT FOOT PAIN: ICD-10-CM

## 2019-02-04 DIAGNOSIS — M54.16 RADICULOPATHY OF LUMBAR REGION: Primary | ICD-10-CM

## 2019-02-04 PROCEDURE — 97110 THERAPEUTIC EXERCISES: CPT

## 2019-02-04 PROCEDURE — 97140 MANUAL THERAPY 1/> REGIONS: CPT

## 2019-02-04 PROCEDURE — 97530 THERAPEUTIC ACTIVITIES: CPT

## 2019-02-04 PROCEDURE — 99211 OFF/OP EST MAY X REQ PHY/QHP: CPT | Performed by: PODIATRIST

## 2019-02-04 RX ORDER — GABAPENTIN 300 MG/1
300 CAPSULE ORAL 2 TIMES DAILY
Qty: 60 CAPSULE | Refills: 0 | Status: SHIPPED | OUTPATIENT
Start: 2019-02-04 | End: 2019-02-04 | Stop reason: SDUPTHER

## 2019-02-04 RX ORDER — GABAPENTIN 300 MG/1
300 CAPSULE ORAL 2 TIMES DAILY
Qty: 60 CAPSULE | Refills: 2 | Status: SHIPPED | OUTPATIENT
Start: 2019-02-04 | End: 2020-11-02

## 2019-02-04 RX ORDER — ETODOLAC 400 MG/1
400 TABLET, FILM COATED ORAL 2 TIMES DAILY
Qty: 60 TABLET | Refills: 0 | Status: SHIPPED | OUTPATIENT
Start: 2019-02-04 | End: 2020-11-02

## 2019-02-04 NOTE — PROGRESS NOTES
Procedures   Foot Exam       Assessment/Plan:  Patient has continued pain in his left foot   He has pain in the heel   He suffers with plantar fasciitis and mild Achilles tendinitis   He states this precludes him from working  Wiliam Reyes is requesting out of work   Javi Huynhs will continue physical therapy  He will remain on medication as prescribed  Patient has been referred for orthotics   Patient will stretch daily  Cricket Horvath has been urged to get sit-down job  Wiliam Reyes is remaining out of work  Wiliam Reyes will stay on anti-inflammatory medicine  patient is urged to attend physical therapy      No problem-specific Assessment & Plan notes found for this encounter          There are no diagnoses linked to this encounter        Subjective:  Patient has continued pain in his foot   Patient has pain with ambulation  He is taking medication as prescribed  He states he has pain at the end of the day              Past Medical History:   Diagnosis Date    Acne 09/01/2010     Last Assessed 01 Sep 2010   Acquired ankle/foot deformity       Last assessed 23 Feb 2017  Resolved 14 Aug 2017   Change in hearing       Resolved 16 Aug 2017    Closed displaced avulsion fracture of tuberosity of left calcaneus       Last Assessed 25 May 2017  Resolved 14 Aug 2017   Epistaxis       Last Assessed 18 Feb 2014  Resolved 08 Jun 2016   Foreign body in foot       Last Assessed 30 Jul 2013  Resolved 12 Oct 2013   Gastritis       Last Assessed 22 Oct 2012    GERD (gastroesophageal reflux disease)      History of oral aphthous ulcers       Last Assessed 08 June 2016  Resolved 14 Aug 2017   Hypersomnia 02/18/2009     Last Assessed 18 Feb 2009  Resolved 16 Aug 2017   Impacted cerumen       Last Assessed 08 Jun 2016  Resolved 08 Jul 2016   Insomnia      Irritable bowel syndrome      Knee joint pain 03/10/2008    Left foot pain       Last Assessed 19 May 2017  Resolved 14 Aug 2017      OCD (obsessive compulsive disorder)    Plantar fibromatosis       Last Assessed 19 Oct 2016  Resolved 14 Aug 2017   Seasonal allergies      Tarsal tunnel syndrome of left side       Last Assessed 21 Aug 2017  Resolved 21 Aug 2017      Tinea corporis       Last Assessed 05 Dec 2011                   Past Surgical History:   Procedure Laterality Date    ESOPHAGOGASTRODUODENOSCOPY N/A 10/31/2017     Procedure: ESOPHAGOGASTRODUODENOSCOPY (EGD);  Surgeon: Shelbi Chris MD;  Location: [place] [address] [facility];  Service: Gastroenterology    NO PAST SURGERIES        NH LENGTH/SHORT LEG/ANKL TENDON,SINGLE Left 11/3/2017     Procedure: CALCANEAL OSTECTOMY, ACHILLEST TENDON LENGTHING;  Surgeon: Clifford Moulton DPM;  Location: [facility];  Service: Podiatry                 Allergies   Allergen Reactions    Acetazolamide      Penicillins Hives    Seasonal Ic  [Cholestatin]      Sulfa Antibiotics GI Intolerance            Current Outpatient Prescriptions:     Alpha-D-Galactosidase (BEANO PO), Take by mouth, Disp: , Rfl:     Ascorbic Acid (VITAMIN C) 100 MG tablet, Take 1 tablet by mouth daily, Disp: , Rfl:     B Complex Vitamins (B COMPLEX 1 PO), Take by mouth daily, Disp: , Rfl:     budesonide (RINOCORT AQUA) 32 MCG/ACT nasal spray, 1 spray into each nostril daily at bedtime, Disp: , Rfl:     Cholecalciferol (VITAMIN D3) 1000 units CAPS, Take 1 capsule by mouth daily, Disp: , Rfl:     DiphenhydrAMINE HCl, Sleep, (SLEEP AID) 25 MG CAPS, Take 1 capsule by mouth daily, Disp: , Rfl:     etodolac (LODINE) 400 MG tablet, Take 1 tablet (400 mg total) by mouth 2 (two) times a day for 30 days, Disp: 60 tablet, Rfl: 0    Fluvoxamine Maleate 150 MG CP24, , Disp: , Rfl:     gabapentin (NEURONTIN) 300 mg capsule, Take 1 capsule (300 mg total) by mouth 2 (two) times a day for 30 days, Disp: 60 capsule, Rfl: 0    Glucosamine-Chondroitin (OSTEO BI-FLEX REGULAR STRENGTH) 250-200 MG TABS, Take 1 tablet by mouth daily, Disp: , Rfl:     hydrOXYzine HCL (ATARAX) 25 mg tablet, Take 1 tablet by mouth, Disp: , Rfl:     lisinopril (ZESTRIL) 10 mg tablet, Take 1 tablet (10 mg total) by mouth daily, Disp: 90 tablet, Rfl: 1    meloxicam (MOBIC) 7 5 mg tablet, Take 2 tablets (15 mg total) by mouth daily for 20 days, Disp: 20 tablet, Rfl: 0    Methylprednisolone 4 MG TBPK, Use as directed on package, Disp: 21 tablet, Rfl: 0    Misc Natural Products (OSTEO BI-FLEX JOINT SHIELD PO), Take by mouth, Disp: , Rfl:     montelukast (SINGULAIR) 10 mg tablet, Take 1 tablet (10 mg total) by mouth daily, Disp: 90 tablet, Rfl: 1    Multiple Vitamin (MULTI-DAY PO), Take 1 tablet by mouth daily, Disp: , Rfl:     omeprazole (PriLOSEC) 40 MG capsule, Take 1 capsule (40 mg total) by mouth daily, Disp: 90 capsule, Rfl: 1    oxymetazoline (AFRIN) 0 05 % nasal spray, 2 sprays by Each Nare route 2 (two) times a day, Disp: , Rfl:            Patient Active Problem List   Diagnosis    Achilles tendinitis of left lower extremity    Acne    ADD (attention deficit disorder) without hyperactivity    Allergic rhinitis    Aphthous ulcer    Arthralgia of left foot    Bony exostosis    Depression    External hemorrhoids    GE reflux    Lumbar radiculopathy    Obesity (BMI 30-39  9)    Obsessive compulsive disorder    Sleep apnea    Tarsal tunnel syndrome of left side    Plantar fasciitis    Radiculopathy of lumbar region    Left foot pain    Difficulty walking    Closed nondisplaced fracture of body of left calcaneus with routine healing    Screening for cardiovascular, respiratory, and genitourinary diseases    Screening for diabetes mellitus (DM)    Essential hypertension    Fatigue    Healthcare maintenance    Bursitis of left ankle             Patient ID: Harlan Dowell is a 33 y  o  male      HPI     The following portions of the patient's history were reviewed and updated as appropriate: allergies, current medications, past family history, past medical history, past social history, past surgical history and problem list      Review of Systems       Objective:  Patient's shoes and socks removed    Foot ExamPhysical Exam   Constitutional: He appears well-developed and well-nourished     Cardiovascular: Normal rate and regular rhythm     Musculoskeletal:   Patient is maximally pronated in stance and gait   Left foot demonstrates pain with palpation plantar fashion Achilles tendon insertions   No evidence of rupture or mass   Nursing note and vitals reviewed

## 2019-02-04 NOTE — PROGRESS NOTES
Daily Note     Today's date: 2019  Patient name: Annabel Bravo  : 1990  MRN: [id number]  Referring provider: Martha Conroy DPM  Dx:   Encounter Diagnosis     ICD-10-CM    1  Left foot pain M79 672                   Subjective: Patient reports increased ankle/foot discomfort over the weekend stating "didn't do anything out of the ordinary "  Patient reports semi-compliance with HEP with therapist educated patient on importance of HEP with patient reporting understanding  Patient reports having follow up with MD earlier this morning stating "he said just to keep doing PT and maybe get new inserts for my shoes "       Objective: See treatment diary below    Precautions: h/o L foot surgery    Daily Treatment Diary     Manual   2/4       IASTM L Achilles    10' total 10' total 10' total       Ankle DF stretch 5' 5' 5' done Done  Done        Posterior TC mobs 5' 5' 5' done                                       Exercise Diary   2/4       Bike 5' 5' 5' 5' 5' 5'       Calf stretch 3x30" 3x30" 3x30" 3x30" 3x30'' 3x30''       Towel curls 3 min 3 min    3 min       Bridges 3x15 3x15 BTB 3x15 BTB 3x15 BTB 3x15 BTB 3x15 MTB       HR with eccentric lower pain            Great toe add             Ankle 4 way with TB* BTB 3x15 BTB 3x15 BTB 3x15 BTB 3x15 BTB 3x15 MTB 3x12       TG squats 3x12 L 22 3x15 L22 3x15 L22 3x15 L 22 3x15 L22 3x15 L22       Step ups   6'' 3x10 6" 3x12 8'' 3x10 8'' 3x12 fwd/lat       Step overs   Pain w/ 4'' and 6'' np  4'' 2x10                                                           Balance             Tandem  :30x2 ea :30x3 ea :30x3 ea :30x3 ea :30x3 ea       Single leg  :30x2 ea :30x3 ea :30x3 ea :30x3 ea :30x3 ea                                                  Modalities    2/4       CP L ankle 10' 10' 10' 10' 10' 10'                                   Assessment: Tolerated treatment fair   Patient demonstrated fatigue post treatment, exhibited good technique with therapeutic exercises and would benefit from continued PT Trialed step overs on 4'' step this session with patient tolerating well, no pain noted with patient stating "actually feels good " Moderate instability noted with all balance TE  Plan: Continue per plan of care  Progress treatment as tolerated

## 2019-02-07 ENCOUNTER — OFFICE VISIT (OUTPATIENT)
Dept: PHYSICAL THERAPY | Facility: REHABILITATION | Age: 29
End: 2019-02-07
Payer: COMMERCIAL

## 2019-02-07 DIAGNOSIS — M79.672 LEFT FOOT PAIN: Primary | ICD-10-CM

## 2019-02-07 PROCEDURE — 97530 THERAPEUTIC ACTIVITIES: CPT | Performed by: PHYSICAL THERAPIST

## 2019-02-07 PROCEDURE — 97140 MANUAL THERAPY 1/> REGIONS: CPT | Performed by: PHYSICAL THERAPIST

## 2019-02-07 PROCEDURE — 97110 THERAPEUTIC EXERCISES: CPT | Performed by: PHYSICAL THERAPIST

## 2019-02-07 NOTE — PROGRESS NOTES
Daily Note     Today's date: 2019  Patient name: Wilber Ramires  : 1990  MRN: [id number]  Referring provider: Jackelyn López DPM  Dx:   Encounter Diagnosis     ICD-10-CM    1  Left foot pain M79 672        Start Time: 828  Stop Time: 930  Total time in clinic (min): 62 minutes    Subjective: Rolan Abbott reports that his foot and ankle were more sore today which he realized was due to him moving folding tables over the weekend  Objective: See treatment diary below      Assessment: Tolerated treatment well  Patient demonstrated fatigue post treatment, exhibited good technique with therapeutic exercises and would benefit from continued PT  Increased fatigue noted with heel raises, no pain       Plan: Continue per plan of care          Precautions: h/o L foot surgery    Daily Treatment Diary     Manual   2 2/7      IASTM L Achilles    10' total 10' total 10' total 10' total      Ankle DF stretch 5' 5' 5' done Done  Done        Posterior TC mobs 5' 5' 5' done                                       Exercise Diary   2/ 2/7      Bike 5' 5' 5' 5' 5' 5' 5'      Calf stretch 3x30" 3x30" 3x30" 3x30" 3x30'' 3x30'' 3x30"      Towel curls 3 min 3 min    3 min       Bridges 3x15 3x15 BTB 3x15 BTB 3x15 BTB 3x15 BTB 3x15 MTB 3x15 MTB      clamshells       3x12 MTB      HR with eccentric lower pain            Great toe add             Ankle 4 way with TB* BTB 3x15 BTB 3x15 BTB 3x15 BTB 3x15 BTB 3x15 MTB 3x12 MTB 3x12      TG squats 3x12 L 22 3x15 L22 3x15 L22 3x15 L 22 3x15 L22 3x15 L22 3x15 L 22 15#      Step ups   6'' 3x10 6" 3x12 8'' 3x10 8'' 3x12 fwd/lat 8" 3x12 ea      Step overs   Pain w/ 4'' and 6'' np  4'' 2x10 4" 2x10                                                          Balance             Tandem  :30x2 ea :30x3 ea :30x3 ea :30x3 ea :30x3 ea :30x3ea      Single leg  :30x2 ea :30x3 ea :30x3 ea :30x3 ea :30x3 ea 3x30" ea      Skater       nv Modalities   1/21 1/24 1/28 1/31 2/4 2/7      CP L ankle 10' 10' 10' 10' 10' 10' 10'                                  Treated by Carmen Priest -[id number]

## 2019-02-11 ENCOUNTER — EVALUATION (OUTPATIENT)
Dept: PHYSICAL THERAPY | Facility: REHABILITATION | Age: 29
End: 2019-02-11
Payer: COMMERCIAL

## 2019-02-11 DIAGNOSIS — M79.672 LEFT FOOT PAIN: Primary | ICD-10-CM

## 2019-02-11 PROCEDURE — 97140 MANUAL THERAPY 1/> REGIONS: CPT | Performed by: PHYSICAL THERAPIST

## 2019-02-11 PROCEDURE — 97530 THERAPEUTIC ACTIVITIES: CPT | Performed by: PHYSICAL THERAPIST

## 2019-02-11 PROCEDURE — 97112 NEUROMUSCULAR REEDUCATION: CPT | Performed by: PHYSICAL THERAPIST

## 2019-02-11 NOTE — LETTER
2019    Jaun Cruz DPM  [address] [facility] [address]    Patient: Marat Yeung   YOB: 1990   Date of Visit: 2019     Encounter Diagnosis     ICD-10-CM    1  Left foot pain M79 672        Dear Dr Judy Magallanes:    Please review the attached Plan of Care from [facility] recent visit  Please verify that you agree therapy should continue by signing the attached document and sending it back to our office  If you have any questions or concerns, please don't hesitate to call  Sincerely,    María Linares, PT      Referring Provider:      I certify that I have read the below Plan of Care and certify the need for these services furnished under this plan of treatment while under my care  Jaun Cruz DPM  [address] [facility] [address]  [address] [place]          PT Re-Evaluation     Today's date: 2019  Patient name: Marta Yeung  : 1990  MRN: [id number]  Referring provider: Hair Peacock DPM  Dx:   Encounter Diagnosis     ICD-10-CM    1  Left foot pain M79 672        Start Time: 901  Stop Time:   Total time in clinic (min): 59 minutes    Assessment  Assessment details: Marta Yeung has been compliant with attending PT and home exercise program since initial eval   Connor Esquivel  has made improvements in objective data since initial eval but is still limited compared to prior level of function  Connor Esquivel continues with above listed impairments and would benefit from additional skilled PT to address these deficits to return to prior level of function  Impairments: abnormal coordination, abnormal or restricted ROM, activity intolerance, impaired physical strength and pain with function  Understanding of Dx/Px/POC: good   Prognosis: good    Goals  ST  Patient will report 25% decrease in pain in 4 weeks  progressing  2  Patient will demonstrate 25% improvement in ROM in 4 weeks  met  3   Patient will demonstrate 1/2 grade improvement in strength in 4 weeks  Met    LT  Patient will be able to perform IADLS without restriction or pain by discharge  progressing  2  Patient will be independent in HEP by discharge  progressing  3  Patient will be able to return to recreational/work duties without restriction or pain by discharge  progressing      Plan  Patient would benefit from: PT eval and skilled PT  Planned modality interventions: cryotherapy and thermotherapy: hydrocollator packs  Planned therapy interventions: IADL retraining, body mechanics training, flexibility, functional ROM exercises, home exercise program, neuromuscular re-education, manual therapy, postural training, strengthening, stretching, therapeutic activities, therapeutic exercise and joint mobilization  Frequency: 2x week  Duration in weeks: 6  Plan of Care beginning date: 2019  Plan of Care expiration date: 3/25/2019  Treatment plan discussed with: patient        Subjective Evaluation    History of Present Illness  Mechanism of injury: Kedar Jameson has been seen for total of 9 visits for OP PT for [unfilled]   Patient rates overall improvement since beginning PT 30%  Patient's global rating of change is " Somewhat better (3) " Patient reports improvements with "it doesn't hurt as much"   Patient reports most difficulty with prolonged standing, "somedays it feels like I've been standing for 5hrs somedays 8hrs "  The "toughest thing is standing too long or walking long distances "     Pain  Current pain ratin  At best pain rating: 3  At worst pain ratin  Quality: dull ache  Relieving factors: rest  Aggravating factors: standing, walking and running  Progression: improved    Patient Goals  Patient goals for therapy: decreased pain, improved balance, increased motion and increased strength          Objective     Active Range of Motion   Left Ankle/Foot   Dorsiflexion (ke): 10 degrees   Inversion: 30 degrees   Eversion: 30 degrees     Passive Range of Motion   Left Ankle/Foot    Dorsiflexion (ke): 15 degrees   Plantar flexion: WFL  Inversion: 35 degrees   Eversion: 35 degrees     Strength/Myotome Testing     Left Ankle/Foot   Dorsiflexion: 4    Additional Strength Details  L ankle strength grossly WFL without pain    General Comments:       Ankle/Foot Comments   Gait: increased pronation, limited push off, decreased heel strike, increased BLE ER  Discomfort with heel raise, limited range  TTP through posterior calcaneus, superolateral calcaneus  - Minimal TTP through plantar fascia      Flowsheet Rows      Most Recent Value   PT/OT G-Codes   Current Score  46   Projected Score  55                Precautions: h/o L foot surgery    Daily Treatment Diary     Manual  1/17 1/21 1/24 1/28 1/31 2/4 2/7 2/11     IASTM L Achilles    10' total 10' total 10' total 10' total 10' total     Ankle DF stretch 5' 5' 5' done Done  Done        Posterior TC mobs 5' 5' 5' done                                       Exercise Diary  1/17 1/21 1/24 1/28 1/31 2/4 2/7 2/11     Bike 5' 5' 5' 5' 5' 5' 5' 5'     Calf stretch 3x30" 3x30" 3x30" 3x30" 3x30'' 3x30'' 3x30" 3x30"     Towel curls 3 min 3 min    3 min       Bridges 3x15 3x15 BTB 3x15 BTB 3x15 BTB 3x15 BTB 3x15 MTB 3x15 MTB np     clamshells       3x12 MTB np     HR with eccentric lower pain            Great toe add             Ankle 4 way with TB* BTB 3x15 BTB 3x15 BTB 3x15 BTB 3x15 BTB 3x15 MTB 3x12 MTB 3x12 np     TG squats 3x12 L 22 3x15 L22 3x15 L22 3x15 L 22 3x15 L22 3x15 L22 3x15 L 22 15# dc     Step ups   6'' 3x10 6" 3x12 8'' 3x10 8'' 3x12 fwd/lat 8" 3x12 ea 8" 3x12      Step overs   Pain w/ 4'' and 6'' np  4'' 2x10 4" 2x10                                                          Balance             Tandem  :30x2 ea :30x3 ea :30x3 ea :30x3 ea :30x3 ea :30x3ea dc     Single leg  :30x2 ea :30x3 ea :30x3 ea :30x3 ea :30x3 ea 3x30" ea 3x30"     Skater       nv                                    Modalities   1/21 1/24 1/28 1/31 2/4 2/7      CP L ankle 10' 10' 10' 10' 10' 10' 10'                                  Treated by Ty Stewart -[id number]

## 2019-02-11 NOTE — LETTER
2019    Devan Pereira DPM  [address] [facility] [address]    Patient: Tiago Freed   YOB: 1990   Date of Visit: 2019     Encounter Diagnosis     ICD-10-CM    1  Left foot pain M79 672        Dear Dr Rivera :    Please review the attached Plan of Care from [facility] recent visit  Please verify that you agree therapy should continue by signing the attached document and sending it back to our office  If you have any questions or concerns, please don't hesitate to call  Sincerely,    Rajiv Murdock PT      Referring Provider:      I certify that I have read the below Plan of Care and certify the need for these services furnished under this plan of treatment while under my care  Devan Pereira DPM  [address] [facility] [address]  [address] [place] [address] Avenue: [phone]          PT Re-Evaluation     Today's date: 2019  Patient name: Tiago Freed  : 1990  MRN: [id number]  Referring provider: John Caruso DPM  Dx:   Encounter Diagnosis     ICD-10-CM    1  Left foot pain M79 672        Start Time: 901  Stop Time: 1000  Total time in clinic (min): 59 minutes    Assessment  Assessment details: Tiago Freed has been compliant with attending PT and home exercise program since initial eval   Jabier Wagner  has made improvements in objective data since initial eval but is still limited compared to prior level of function  Jabier Wagner continues with above listed impairments and would benefit from additional skilled PT to address these deficits to return to prior level of function  Impairments: abnormal coordination, abnormal or restricted ROM, activity intolerance, impaired physical strength and pain with function  Understanding of Dx/Px/POC: good   Prognosis: good    Goals  ST  Patient will report 25% decrease in pain in 4 weeks  progressing  2  Patient will demonstrate 25% improvement in ROM in 4 weeks  met  3   Patient will demonstrate 1/2 grade improvement in strength in 4 weeks  Met    LT  Patient will be able to perform IADLS without restriction or pain by discharge  progressing  2  Patient will be independent in HEP by discharge  progressing  3  Patient will be able to return to recreational/work duties without restriction or pain by discharge  progressing      Plan  Patient would benefit from: PT eval and skilled PT  Planned modality interventions: cryotherapy and thermotherapy: hydrocollator packs  Planned therapy interventions: IADL retraining, body mechanics training, flexibility, functional ROM exercises, home exercise program, neuromuscular re-education, manual therapy, postural training, strengthening, stretching, therapeutic activities, therapeutic exercise and joint mobilization  Frequency: 2x week  Duration in weeks: 6  Plan of Care beginning date: 2019  Plan of Care expiration date: 3/25/2019  Treatment plan discussed with: patient        Subjective Evaluation    History of Present Illness  Mechanism of injury: Keith Dang has been seen for total of 9 visits for OP PT  Patient rates overall improvement since beginning PT 30%  Patient's global rating of change is " Somewhat better (3) " Patient reports improvements with "it doesn't hurt as much"   Patient reports most difficulty with prolonged standing, "somedays it feels like I've been standing for 5hrs somedays 8hrs "  The "toughest thing is standing too long or walking long distances "     Pain  Current pain ratin  At best pain rating: 3  At worst pain ratin  Location: L foot  Quality: dull ache  Relieving factors: rest  Aggravating factors: standing, walking and running  Progression: improved    Social Support    Employment status: not working  Treatments  Current treatment: physical therapy  Patient Goals  Patient goals for therapy: decreased pain, improved balance, increased motion and increased strength          Objective     Active Range of Motion   Left Ankle/Foot Dorsiflexion (ke): 10 degrees   Inversion: 30 degrees   Eversion: 30 degrees     Passive Range of Motion   Left Ankle/Foot    Dorsiflexion (ke): 15 degrees   Plantar flexion: WFL  Inversion: 35 degrees   Eversion: 35 degrees     Strength/Myotome Testing     Left Ankle/Foot   Dorsiflexion: 4    Additional Strength Details  L ankle strength grossly WFL without pain    General Comments:       Ankle/Foot Comments   Gait: increased pronation, limited push off, decreased heel strike, increased BLE ER  Discomfort with heel raise, limited range  TTP through posterior calcaneus, superolateral calcaneus  - Minimal TTP through plantar fascia      Flowsheet Rows      Most Recent Value   PT/OT G-Codes   Current Score  46   Projected Score  55                Precautions: h/o L foot surgery    Daily Treatment Diary     Manual  1/17 1/21 1/24 1/28 1/31 2/4 2/7 2/11     IASTM L Achilles    10' total 10' total 10' total 10' total 10' total     Ankle DF stretch 5' 5' 5' done Done  Done        Posterior TC mobs 5' 5' 5' done                                       Exercise Diary  1/17 1/21 1/24 1/28 1/31 2/4 2/7 2/11     Bike 5' 5' 5' 5' 5' 5' 5' 5'     Calf stretch 3x30" 3x30" 3x30" 3x30" 3x30'' 3x30'' 3x30" 3x30"     Towel curls 3 min 3 min    3 min       Bridges 3x15 3x15 BTB 3x15 BTB 3x15 BTB 3x15 BTB 3x15 MTB 3x15 MTB np     clamshells       3x12 MTB np     HR with eccentric lower pain            Great toe add             Ankle 4 way with TB* BTB 3x15 BTB 3x15 BTB 3x15 BTB 3x15 BTB 3x15 MTB 3x12 MTB 3x12 np     TG squats 3x12 L 22 3x15 L22 3x15 L22 3x15 L 22 3x15 L22 3x15 L22 3x15 L 22 15# dc     Step ups   6'' 3x10 6" 3x12 8'' 3x10 8'' 3x12 fwd/lat 8" 3x12 ea 8" 3x12      Step overs   Pain w/ 4'' and 6'' np  4'' 2x10 4" 2x10 4" 2x10     Heel raises        3x10                                            Balance             Tandem  :30x2 ea :30x3 ea :30x3 ea :30x3 ea :30x3 ea :30x3ea dc     Single leg  :30x2 ea :30x3 ea :30x3 ea :30x3 ea :30x3 ea 3x30" ea 3x30"     Skater       nv 2x10 ea                                   Modalities   1/21 1/24 1/28 1/31 2/4 2/7 2/11     CP L ankle 10' 10' 10' 10' 10' 10' 10' 10'                                 Treated by [facility] -[id number]

## 2019-02-11 NOTE — PROGRESS NOTES
PT Re-Evaluation     Today's date: 2019  Patient name: Wilber Ramires  : 1990  MRN: [id number]  Referring provider: Jackelyn López DPM  Dx:   Encounter Diagnosis     ICD-10-CM    1  Left foot pain M79 672        Start Time: 901  Stop Time: 1000  Total time in clinic (min): 59 minutes    Assessment  Assessment details: Wilber Ramires has been compliant with attending PT and home exercise program since initial eval   Rolan Abbott  has made improvements in objective data since initial eval but is still limited compared to prior level of function  Rolan Abbott continues with above listed impairments and would benefit from additional skilled PT to address these deficits to return to prior level of function  Impairments: abnormal coordination, abnormal or restricted ROM, activity intolerance, impaired physical strength and pain with function  Understanding of Dx/Px/POC: good   Prognosis: good    Goals  ST  Patient will report 25% decrease in pain in 4 weeks  progressing  2  Patient will demonstrate 25% improvement in ROM in 4 weeks  met  3  Patient will demonstrate 1/2 grade improvement in strength in 4 weeks  Met    LT  Patient will be able to perform IADLS without restriction or pain by discharge  progressing  2  Patient will be independent in HEP by discharge  progressing  3  Patient will be able to return to recreational/work duties without restriction or pain by discharge   progressing      Plan  Patient would benefit from: PT eval and skilled PT  Planned modality interventions: cryotherapy and thermotherapy: hydrocollator packs  Planned therapy interventions: IADL retraining, body mechanics training, flexibility, functional ROM exercises, home exercise program, neuromuscular re-education, manual therapy, postural training, strengthening, stretching, therapeutic activities, therapeutic exercise and joint mobilization  Frequency: 2x week  Duration in weeks: 6  Plan of Care beginning date: 2019  Plan of Care expiration date: 3/25/2019  Treatment plan discussed with: patient        Subjective Evaluation    History of Present Illness  Mechanism of injury: Cari Rubio has been seen for total of 9 visits for OP PT  Patient rates overall improvement since beginning PT 30%  Patient's global rating of change is " Somewhat better (3) " Patient reports improvements with "it doesn't hurt as much"  Patient reports most difficulty with prolonged standing, "somedays it feels like I've been standing for 5hrs somedays 8hrs "  The "toughest thing is standing too long or walking long distances "     Pain  Current pain ratin  At best pain rating: 3  At worst pain ratin  Location: L foot  Quality: dull ache  Relieving factors: rest  Aggravating factors: standing, walking and running  Progression: improved    Social Support    Employment status: not working  Treatments  Current treatment: physical therapy  Patient Goals  Patient goals for therapy: decreased pain, improved balance, increased motion and increased strength          Objective     Active Range of Motion   Left Ankle/Foot   Dorsiflexion (ke): 10 degrees   Inversion: 30 degrees   Eversion: 30 degrees     Passive Range of Motion   Left Ankle/Foot    Dorsiflexion (ke): 15 degrees   Plantar flexion: WFL  Inversion: 35 degrees   Eversion: 35 degrees     Strength/Myotome Testing     Left Ankle/Foot   Dorsiflexion: 4    Additional Strength Details  L ankle strength grossly WFL without pain    General Comments:       Ankle/Foot Comments   Gait: increased pronation, limited push off, decreased heel strike, increased BLE ER  Discomfort with heel raise, limited range  TTP through posterior calcaneus, superolateral calcaneus  - Minimal TTP through plantar fascia      Flowsheet Rows      Most Recent Value   PT/OT G-Codes   Current Score  46   Projected Score  55                Precautions: h/o L foot surgery    Daily Treatment Diary     Manual   IASTM L Achilles    10' total 10' total 10' total 10' total 10' total     Ankle DF stretch 5' 5' 5' done Done  Done        Posterior TC mobs 5' 5' 5' done                                       Exercise Diary  1/17 1/21 1/24 1/28 1/31 2/4 2/7 2/11     Bike 5' 5' 5' 5' 5' 5' 5' 5'     Calf stretch 3x30" 3x30" 3x30" 3x30" 3x30'' 3x30'' 3x30" 3x30"     Towel curls 3 min 3 min    3 min       Bridges 3x15 3x15 BTB 3x15 BTB 3x15 BTB 3x15 BTB 3x15 MTB 3x15 MTB np     clamshells       3x12 MTB np     HR with eccentric lower pain            Great toe add             Ankle 4 way with TB* BTB 3x15 BTB 3x15 BTB 3x15 BTB 3x15 BTB 3x15 MTB 3x12 MTB 3x12 np     TG squats 3x12 L 22 3x15 L22 3x15 L22 3x15 L 22 3x15 L22 3x15 L22 3x15 L 22 15# dc     Step ups   6'' 3x10 6" 3x12 8'' 3x10 8'' 3x12 fwd/lat 8" 3x12 ea 8" 3x12      Step overs   Pain w/ 4'' and 6'' np  4'' 2x10 4" 2x10 4" 2x10     Heel raises        3x10                                            Balance             Tandem  :30x2 ea :30x3 ea :30x3 ea :30x3 ea :30x3 ea :30x3ea dc     Single leg  :30x2 ea :30x3 ea :30x3 ea :30x3 ea :30x3 ea 3x30" ea 3x30"     Skater       nv 2x10 ea                                   Modalities   1/21 1/24 1/28 1/31 2/4 2/7 2/11     CP L ankle 10' 10' 10' 10' 10' 10' 10' 10'                                 Treated by Stephanie Cai -[id number]

## 2019-02-13 ENCOUNTER — TRANSCRIBE ORDERS (OUTPATIENT)
Dept: PHYSICAL THERAPY | Facility: REHABILITATION | Age: 29
End: 2019-02-13

## 2019-02-13 DIAGNOSIS — M79.672 LEFT FOOT PAIN: Primary | ICD-10-CM

## 2019-02-14 ENCOUNTER — OFFICE VISIT (OUTPATIENT)
Dept: PHYSICAL THERAPY | Facility: REHABILITATION | Age: 29
End: 2019-02-14
Payer: COMMERCIAL

## 2019-02-14 DIAGNOSIS — M79.672 LEFT FOOT PAIN: Primary | ICD-10-CM

## 2019-02-14 PROCEDURE — 97140 MANUAL THERAPY 1/> REGIONS: CPT

## 2019-02-14 PROCEDURE — 97112 NEUROMUSCULAR REEDUCATION: CPT

## 2019-02-14 PROCEDURE — 97110 THERAPEUTIC EXERCISES: CPT

## 2019-02-14 NOTE — PROGRESS NOTES
Daily Note     Today's date: 2019  Patient name: Rosaura Edwards  : 1990  MRN: [id number]  Referring provider: Shaun Ferrari DPM  Dx:   Encounter Diagnosis     ICD-10-CM    1  Left foot pain M79 672                   Subjective: Patient reports no pain prior to session today stating "it actually feels really good today, I'm walking better, I feel good " He reports increased soreness after previous session         Objective: See treatment diary below  Precautions: h/o L foot surgery    Daily Treatment Diary     Manual      IASTM L Achilles    10' total 10' total 10' total 10' total 10' total 10' total    Ankle DF stretch 5' 5' 5' done Done  Done    Done     Posterior TC mobs 5' 5' 5' done                                       Exercise Diary   2    Bike 5' 5' 5' 5' 5' 5' 5' 5' 5'    Calf stretch 3x30" 3x30" 3x30" 3x30" 3x30'' 3x30'' 3x30" 3x30" 3x30''    Towel curls 3 min 3 min    3 min       Bridges 3x15 3x15 BTB 3x15 BTB 3x15 BTB 3x15 BTB 3x15 MTB 3x15 MTB np     clamshells       3x12 MTB np     HR with eccentric lower pain            Great toe add             Ankle 4 way with TB* BTB 3x15 BTB 3x15 BTB 3x15 BTB 3x15 BTB 3x15 MTB 3x12 MTB 3x12 np     TG squats 3x12 L 22 3x15 L22 3x15 L22 3x15 L 22 3x15 L22 3x15 L22 3x15 L 22 15# dc     Step ups   6'' 3x10 6" 3x12 8'' 3x10 8'' 3x12 fwd/lat 8" 3x12 ea 8" 3x12  8'' 3x15 ea    Step overs   Pain w/ 4'' and 6'' np  4'' 2x10 4" 2x10 4" 2x10 6'' 2x10    Heel raises        3x10 3x12                                           Balance             Tandem  :30x2 ea :30x3 ea :30x3 ea :30x3 ea :30x3 ea :30x3ea dc     Single leg  :30x2 ea :30x3 ea :30x3 ea :30x3 ea :30x3 ea 3x30" ea 3x30" 3x30''    Skater       nv 2x10 ea 2x12 ea                                  Modalities       CP L ankle 10' 10' 10' 10' 10' 10' 10' 10' 10' Assessment: Tolerated treatment fair  Patient demonstrated fatigue post treatment, exhibited good technique with therapeutic exercises and would benefit from continued PT Patient able to tolerate increased step height this session for step overs stating "actually feels pretty easy "       Plan: Continue per plan of care  Progress treatment as tolerated

## 2019-02-18 ENCOUNTER — APPOINTMENT (OUTPATIENT)
Dept: PHYSICAL THERAPY | Facility: REHABILITATION | Age: 29
End: 2019-02-18
Payer: COMMERCIAL

## 2019-02-21 ENCOUNTER — APPOINTMENT (OUTPATIENT)
Dept: PHYSICAL THERAPY | Facility: REHABILITATION | Age: 29
End: 2019-02-21
Payer: COMMERCIAL

## 2019-03-16 ENCOUNTER — APPOINTMENT (OUTPATIENT)
Dept: LAB | Age: 29
End: 2019-03-16
Attending: PREVENTIVE MEDICINE

## 2019-03-16 ENCOUNTER — TRANSCRIBE ORDERS (OUTPATIENT)
Dept: ADMINISTRATIVE | Age: 29
End: 2019-03-16

## 2019-03-16 DIAGNOSIS — Z02.1 PRE-EMPLOYMENT HEALTH SCREENING EXAMINATION: ICD-10-CM

## 2019-03-16 DIAGNOSIS — Z01.84 IMMUNITY STATUS TESTING: Primary | ICD-10-CM

## 2019-03-16 PROCEDURE — 36415 COLL VENOUS BLD VENIPUNCTURE: CPT

## 2019-03-16 PROCEDURE — 86480 TB TEST CELL IMMUN MEASURE: CPT

## 2019-03-18 LAB
GAMMA INTERFERON BACKGROUND BLD IA-ACNC: 0.03 IU/ML
M TB IFN-G BLD-IMP: NEGATIVE
M TB IFN-G CD4+ BCKGRND COR BLD-ACNC: -0.01 IU/ML
M TB IFN-G CD4+ BCKGRND COR BLD-ACNC: -0.01 IU/ML
MITOGEN IGNF BCKGRD COR BLD-ACNC: >10 IU/ML

## 2019-03-19 NOTE — PROGRESS NOTES
PT Discharge    Today's date: 3/19/2019  Patient name: Hanna Sanchez  : 1990  MRN: [id number]  Referring provider: Lisa Ware DPM  Dx:   Encounter Diagnosis     ICD-10-CM    1  Left foot pain M79 672        Start Time: 923  Stop Time:   Total time in clinic (min): 55 minutes    Assessment/Plan  Hanna Sanchez has not returned since last appointment, unable to perform objective measures since then  At this time, Hanna Sanchez will be discharged from physical therapy services     Subjective    Objective

## 2019-04-11 ENCOUNTER — APPOINTMENT (OUTPATIENT)
Dept: LAB | Facility: CLINIC | Age: 29
End: 2019-04-11

## 2019-04-11 DIAGNOSIS — Z01.84 IMMUNITY STATUS TESTING: ICD-10-CM

## 2019-04-11 PROCEDURE — 36415 COLL VENOUS BLD VENIPUNCTURE: CPT

## 2019-04-11 PROCEDURE — 86787 VARICELLA-ZOSTER ANTIBODY: CPT

## 2019-04-15 LAB — VZV IGG SER IA-ACNC: NORMAL

## 2019-07-15 PROBLEM — M77.52 BURSITIS OF LEFT ANKLE: Status: RESOLVED | Noted: 2018-09-13 | Resolved: 2019-07-15

## 2019-07-15 PROBLEM — S92.015D CLOSED NONDISPLACED FRACTURE OF BODY OF LEFT CALCANEUS WITH ROUTINE HEALING: Status: RESOLVED | Noted: 2018-06-13 | Resolved: 2019-07-15

## 2019-07-15 PROBLEM — M79.672 LEFT FOOT PAIN: Status: RESOLVED | Noted: 2018-02-06 | Resolved: 2019-07-15

## 2019-07-15 PROBLEM — M72.2 PLANTAR FASCIITIS: Status: RESOLVED | Noted: 2018-02-06 | Resolved: 2019-07-15

## 2019-07-15 PROBLEM — M25.572 ARTHRALGIA OF LEFT FOOT: Status: RESOLVED | Noted: 2017-09-08 | Resolved: 2019-07-15

## 2019-07-15 PROBLEM — M54.16 RADICULOPATHY OF LUMBAR REGION: Status: RESOLVED | Noted: 2018-02-06 | Resolved: 2019-07-15

## 2019-07-15 PROBLEM — R26.2 DIFFICULTY WALKING: Status: RESOLVED | Noted: 2018-03-06 | Resolved: 2019-07-15

## 2019-07-15 PROBLEM — M89.8X9 BONY EXOSTOSIS: Status: RESOLVED | Noted: 2017-10-27 | Resolved: 2019-07-15

## 2019-07-15 PROBLEM — G57.52 TARSAL TUNNEL SYNDROME OF LEFT SIDE: Status: RESOLVED | Noted: 2017-08-21 | Resolved: 2019-07-15

## 2019-07-15 PROBLEM — M76.62 ACHILLES TENDINITIS OF LEFT LOWER EXTREMITY: Status: RESOLVED | Noted: 2017-02-23 | Resolved: 2019-07-15

## 2019-08-24 DIAGNOSIS — J30.9 ALLERGIC RHINITIS, UNSPECIFIED SEASONALITY, UNSPECIFIED TRIGGER: ICD-10-CM

## 2019-08-25 DIAGNOSIS — I10 ESSENTIAL HYPERTENSION: ICD-10-CM

## 2019-08-25 RX ORDER — LISINOPRIL 10 MG/1
TABLET ORAL
Qty: 30 TABLET | Refills: 2 | Status: SHIPPED | OUTPATIENT
Start: 2019-08-25 | End: 2019-11-20 | Stop reason: SDUPTHER

## 2019-08-25 RX ORDER — MONTELUKAST SODIUM 10 MG/1
TABLET ORAL
Qty: 30 TABLET | Refills: 2 | Status: SHIPPED | OUTPATIENT
Start: 2019-08-25 | End: 2019-11-20 | Stop reason: SDUPTHER

## 2019-11-20 DIAGNOSIS — I10 ESSENTIAL HYPERTENSION: ICD-10-CM

## 2019-11-20 DIAGNOSIS — K21.9 GASTROESOPHAGEAL REFLUX DISEASE, ESOPHAGITIS PRESENCE NOT SPECIFIED: ICD-10-CM

## 2019-11-20 DIAGNOSIS — J30.9 ALLERGIC RHINITIS, UNSPECIFIED SEASONALITY, UNSPECIFIED TRIGGER: ICD-10-CM

## 2019-11-20 RX ORDER — LISINOPRIL 10 MG/1
TABLET ORAL
Qty: 30 TABLET | Refills: 0 | Status: SHIPPED | OUTPATIENT
Start: 2019-11-20 | End: 2019-12-21 | Stop reason: SDUPTHER

## 2019-11-20 RX ORDER — OMEPRAZOLE 40 MG/1
CAPSULE, DELAYED RELEASE ORAL
Qty: 30 CAPSULE | Refills: 0 | Status: SHIPPED | OUTPATIENT
Start: 2019-11-20 | End: 2019-12-21 | Stop reason: SDUPTHER

## 2019-11-20 RX ORDER — MONTELUKAST SODIUM 10 MG/1
TABLET ORAL
Qty: 30 TABLET | Refills: 0 | Status: SHIPPED | OUTPATIENT
Start: 2019-11-20 | End: 2019-12-21 | Stop reason: SDUPTHER

## 2019-12-21 DIAGNOSIS — J30.9 ALLERGIC RHINITIS, UNSPECIFIED SEASONALITY, UNSPECIFIED TRIGGER: ICD-10-CM

## 2019-12-21 DIAGNOSIS — I10 ESSENTIAL HYPERTENSION: ICD-10-CM

## 2019-12-21 DIAGNOSIS — K21.9 GASTROESOPHAGEAL REFLUX DISEASE, ESOPHAGITIS PRESENCE NOT SPECIFIED: ICD-10-CM

## 2019-12-22 RX ORDER — LISINOPRIL 10 MG/1
TABLET ORAL
Qty: 30 TABLET | Refills: 0 | Status: SHIPPED | OUTPATIENT
Start: 2019-12-22 | End: 2020-01-19

## 2019-12-22 RX ORDER — OMEPRAZOLE 40 MG/1
CAPSULE, DELAYED RELEASE ORAL
Qty: 30 CAPSULE | Refills: 0 | Status: SHIPPED | OUTPATIENT
Start: 2019-12-22 | End: 2020-01-19

## 2019-12-22 RX ORDER — MONTELUKAST SODIUM 10 MG/1
TABLET ORAL
Qty: 30 TABLET | Refills: 0 | Status: SHIPPED | OUTPATIENT
Start: 2019-12-22 | End: 2019-12-24

## 2019-12-24 DIAGNOSIS — J30.9 ALLERGIC RHINITIS, UNSPECIFIED SEASONALITY, UNSPECIFIED TRIGGER: ICD-10-CM

## 2019-12-24 RX ORDER — MONTELUKAST SODIUM 10 MG/1
TABLET ORAL
Qty: 30 TABLET | Refills: 0 | Status: SHIPPED | OUTPATIENT
Start: 2019-12-24 | End: 2020-01-19

## 2019-12-30 NOTE — TELEPHONE ENCOUNTER
Spoke with pt and he is losing his insurance  I am not sure if this will cause problems with his future refills  Pt will not make any further appt at this time   AKANKSHA

## 2020-01-18 DIAGNOSIS — J30.9 ALLERGIC RHINITIS, UNSPECIFIED SEASONALITY, UNSPECIFIED TRIGGER: ICD-10-CM

## 2020-01-18 DIAGNOSIS — I10 ESSENTIAL HYPERTENSION: ICD-10-CM

## 2020-01-18 DIAGNOSIS — K21.9 GASTROESOPHAGEAL REFLUX DISEASE, ESOPHAGITIS PRESENCE NOT SPECIFIED: ICD-10-CM

## 2020-01-19 RX ORDER — LISINOPRIL 10 MG/1
TABLET ORAL
Qty: 30 TABLET | Refills: 0 | Status: SHIPPED | OUTPATIENT
Start: 2020-01-19 | End: 2020-02-19

## 2020-01-19 RX ORDER — MONTELUKAST SODIUM 10 MG/1
TABLET ORAL
Qty: 30 TABLET | Refills: 0 | Status: SHIPPED | OUTPATIENT
Start: 2020-01-19 | End: 2020-02-19

## 2020-01-19 RX ORDER — OMEPRAZOLE 40 MG/1
CAPSULE, DELAYED RELEASE ORAL
Qty: 30 CAPSULE | Refills: 0 | Status: SHIPPED | OUTPATIENT
Start: 2020-01-19 | End: 2020-02-19

## 2020-01-20 NOTE — TELEPHONE ENCOUNTER
Spoke with pt and he said he will call to [facility] appt    He is aware that this may be the last refill since he was last seen jan 2019    No further action needed

## 2020-02-19 DIAGNOSIS — J30.9 ALLERGIC RHINITIS, UNSPECIFIED SEASONALITY, UNSPECIFIED TRIGGER: ICD-10-CM

## 2020-02-19 DIAGNOSIS — I10 ESSENTIAL HYPERTENSION: ICD-10-CM

## 2020-02-19 DIAGNOSIS — K21.9 GASTROESOPHAGEAL REFLUX DISEASE, ESOPHAGITIS PRESENCE NOT SPECIFIED: ICD-10-CM

## 2020-02-19 RX ORDER — LISINOPRIL 10 MG/1
TABLET ORAL
Qty: 20 TABLET | Refills: 0 | Status: SHIPPED | OUTPATIENT
Start: 2020-02-19 | End: 2020-03-17

## 2020-02-19 RX ORDER — OMEPRAZOLE 40 MG/1
CAPSULE, DELAYED RELEASE ORAL
Qty: 20 CAPSULE | Refills: 0 | Status: SHIPPED | OUTPATIENT
Start: 2020-02-19 | End: 2020-03-11

## 2020-02-19 RX ORDER — MONTELUKAST SODIUM 10 MG/1
TABLET ORAL
Qty: 20 TABLET | Refills: 0 | Status: SHIPPED | OUTPATIENT
Start: 2020-02-19 | End: 2020-03-11

## 2020-03-11 DIAGNOSIS — K21.9 GASTROESOPHAGEAL REFLUX DISEASE, ESOPHAGITIS PRESENCE NOT SPECIFIED: ICD-10-CM

## 2020-03-11 DIAGNOSIS — J30.9 ALLERGIC RHINITIS, UNSPECIFIED SEASONALITY, UNSPECIFIED TRIGGER: ICD-10-CM

## 2020-03-11 RX ORDER — MONTELUKAST SODIUM 10 MG/1
TABLET ORAL
Qty: 20 TABLET | Refills: 0 | Status: SHIPPED | OUTPATIENT
Start: 2020-03-11 | End: 2020-04-06

## 2020-03-11 RX ORDER — OMEPRAZOLE 40 MG/1
CAPSULE, DELAYED RELEASE ORAL
Qty: 20 CAPSULE | Refills: 0 | Status: SHIPPED | OUTPATIENT
Start: 2020-03-11 | End: 2020-04-06

## 2020-03-17 DIAGNOSIS — I10 ESSENTIAL HYPERTENSION: ICD-10-CM

## 2020-03-17 RX ORDER — LISINOPRIL 10 MG/1
TABLET ORAL
Qty: 14 TABLET | Refills: 0 | Status: SHIPPED | OUTPATIENT
Start: 2020-03-17 | End: 2020-04-06

## 2020-04-05 DIAGNOSIS — K21.9 GASTROESOPHAGEAL REFLUX DISEASE, ESOPHAGITIS PRESENCE NOT SPECIFIED: ICD-10-CM

## 2020-04-05 DIAGNOSIS — J30.9 ALLERGIC RHINITIS, UNSPECIFIED SEASONALITY, UNSPECIFIED TRIGGER: ICD-10-CM

## 2020-04-05 DIAGNOSIS — I10 ESSENTIAL HYPERTENSION: ICD-10-CM

## 2020-04-06 RX ORDER — MONTELUKAST SODIUM 10 MG/1
TABLET ORAL
Qty: 20 TABLET | Refills: 0 | Status: SHIPPED | OUTPATIENT
Start: 2020-04-06 | End: 2020-04-24

## 2020-04-06 RX ORDER — OMEPRAZOLE 40 MG/1
CAPSULE, DELAYED RELEASE ORAL
Qty: 20 CAPSULE | Refills: 0 | Status: SHIPPED | OUTPATIENT
Start: 2020-04-06 | End: 2020-04-24

## 2020-04-06 RX ORDER — LISINOPRIL 10 MG/1
TABLET ORAL
Qty: 14 TABLET | Refills: 0 | Status: SHIPPED | OUTPATIENT
Start: 2020-04-06 | End: 2020-04-24

## 2020-04-23 DIAGNOSIS — K21.9 GASTROESOPHAGEAL REFLUX DISEASE, ESOPHAGITIS PRESENCE NOT SPECIFIED: ICD-10-CM

## 2020-04-23 DIAGNOSIS — J30.9 ALLERGIC RHINITIS, UNSPECIFIED SEASONALITY, UNSPECIFIED TRIGGER: ICD-10-CM

## 2020-04-23 DIAGNOSIS — I10 ESSENTIAL HYPERTENSION: ICD-10-CM

## 2020-04-24 RX ORDER — OMEPRAZOLE 40 MG/1
CAPSULE, DELAYED RELEASE ORAL
Qty: 21 CAPSULE | Refills: 0 | Status: SHIPPED | OUTPATIENT
Start: 2020-04-24 | End: 2020-05-11

## 2020-04-24 RX ORDER — LISINOPRIL 10 MG/1
TABLET ORAL
Qty: 21 TABLET | Refills: 0 | Status: SHIPPED | OUTPATIENT
Start: 2020-04-24 | End: 2020-05-11

## 2020-04-24 RX ORDER — MONTELUKAST SODIUM 10 MG/1
TABLET ORAL
Qty: 21 TABLET | Refills: 0 | Status: SHIPPED | OUTPATIENT
Start: 2020-04-24 | End: 2020-10-01 | Stop reason: SDUPTHER

## 2020-05-11 DIAGNOSIS — I10 ESSENTIAL HYPERTENSION: ICD-10-CM

## 2020-05-11 DIAGNOSIS — K21.9 GASTROESOPHAGEAL REFLUX DISEASE, ESOPHAGITIS PRESENCE NOT SPECIFIED: ICD-10-CM

## 2020-05-11 RX ORDER — OMEPRAZOLE 40 MG/1
CAPSULE, DELAYED RELEASE ORAL
Qty: 7 CAPSULE | Refills: 0 | Status: SHIPPED | OUTPATIENT
Start: 2020-05-11 | End: 2020-09-02 | Stop reason: SDUPTHER

## 2020-05-11 RX ORDER — LISINOPRIL 10 MG/1
TABLET ORAL
Qty: 7 TABLET | Refills: 0 | Status: SHIPPED | OUTPATIENT
Start: 2020-05-11 | End: 2020-10-01 | Stop reason: SDUPTHER

## 2020-05-12 DIAGNOSIS — J30.9 ALLERGIC RHINITIS, UNSPECIFIED SEASONALITY, UNSPECIFIED TRIGGER: ICD-10-CM

## 2020-05-12 RX ORDER — MONTELUKAST SODIUM 10 MG/1
TABLET ORAL
Qty: 21 TABLET | Refills: 0 | OUTPATIENT
Start: 2020-05-12

## 2020-06-13 DIAGNOSIS — I10 ESSENTIAL HYPERTENSION: ICD-10-CM

## 2020-06-13 DIAGNOSIS — J30.9 ALLERGIC RHINITIS, UNSPECIFIED SEASONALITY, UNSPECIFIED TRIGGER: ICD-10-CM

## 2020-06-13 DIAGNOSIS — K21.9 GASTROESOPHAGEAL REFLUX DISEASE, ESOPHAGITIS PRESENCE NOT SPECIFIED: ICD-10-CM

## 2020-06-15 RX ORDER — LISINOPRIL 10 MG/1
TABLET ORAL
Qty: 7 TABLET | Refills: 0 | OUTPATIENT
Start: 2020-06-15

## 2020-06-15 RX ORDER — OMEPRAZOLE 40 MG/1
CAPSULE, DELAYED RELEASE ORAL
Qty: 7 CAPSULE | Refills: 0 | OUTPATIENT
Start: 2020-06-15

## 2020-06-15 RX ORDER — MONTELUKAST SODIUM 10 MG/1
TABLET ORAL
Qty: 21 TABLET | Refills: 0 | OUTPATIENT
Start: 2020-06-15

## 2020-07-01 ENCOUNTER — OFFICE VISIT (OUTPATIENT)
Dept: PSYCHIATRY | Facility: CLINIC | Age: 30
End: 2020-07-01
Payer: COMMERCIAL

## 2020-07-01 DIAGNOSIS — F42.9 OBSESSIVE-COMPULSIVE DISORDER, UNSPECIFIED TYPE: Primary | ICD-10-CM

## 2020-07-01 PROCEDURE — 99213 OFFICE O/P EST LOW 20 MIN: CPT | Performed by: NURSE PRACTITIONER

## 2020-07-01 NOTE — PSYCH
Subjective: pt says he is doing good, just refilled both doses of fluvoxamine (100 mg ER and 150 mg ER) and doesn't need new Rxs for them today     Patient ID: Hector Alston is a 30 y o  male  HPI ROS Appetite Changes and Sleep: normal number of sleep hours    Review Of Systems:     Mood Normal   Behavior Normal    Thought Content Normal   General Normal    Personality Normal   Other Psych Symptoms Normal   Constitutional As Noted in HPI   ENT As Noted in HPI   Cardiovascular As Noted in HPI   Respiratory As Noted in HPI   Gastrointestinal As Noted in HPI   Genitourinary As Noted in HPI   Musculoskeletal As Noted in HPI   Integumentary Negative and As Noted in HPI   Neurological As Noted in HPI   Endocrine Normal    Other Symptoms Normal              Laboratory Results: No results found for this or any previous visit      Substance Abuse History:  Social History     Substance and Sexual Activity   Drug Use No       Family Psychiatric History:   Family History   Problem Relation Age of Onset    Kidney disease Mother     Thyroid disease Mother     Allergic rhinitis Mother     Hypertension Father     Kidney disease Father     Gout Father     No Known Problems Sister     Colonic polyp Maternal Grandmother     Cancer Family     Diabetes Family        No Change in Social Hx    Social History     Socioeconomic History    Marital status: Single     Spouse name: Not on file    Number of children: Not on file    Years of education: Not on file    Highest education level: Not on file   Occupational History    Not on file   Social Needs    Financial resource strain: Not on file    Food insecurity:     Worry: Not on file     Inability: Not on file    Transportation needs:     Medical: Not on file     Non-medical: Not on file   Tobacco Use    Smoking status: Never Smoker    Smokeless tobacco: Never Used   Substance and Sexual Activity    Alcohol use: No    Drug use: No    Sexual activity: Not on file Lifestyle    Physical activity:     Days per week: Not on file     Minutes per session: Not on file    Stress: Not on file   Relationships    Social connections:     Talks on phone: Not on file     Gets together: Not on file     Attends Sabianism service: Not on file     Active member of club or organization: Not on file     Attends meetings of clubs or organizations: Not on file     Relationship status: Not on file    Intimate partner violence:     Fear of current or ex partner: Not on file     Emotionally abused: Not on file     Physically abused: Not on file     Forced sexual activity: Not on file   Other Topics Concern    Not on file   Social History Narrative    Feels safe at home     Social History     Social History Narrative    Feels safe at home       Objective:       Mental status:  Appearance calm and cooperative  and adequate hygiene and grooming   Mood euthymic   Affect affect appropriate    Speech normal rate, rhythm, volume   Thought Processes normal thought processes   Hallucinations no hallucinations present    Thought Content no delusions   Abnormal Thoughts no suicidal thoughts  and no homicidal thoughts    Orientation  oriented to person and place and time   Remote Memory long and short term memory grossly intact   Attention Span concentration intact   Intellect Not Formally Assessed   Insight Insight intact   Judgement judgment was intact   Muscle Strength not assessed   Language no difficulty naming common objects and no difficulty repeating a phrase    Fund of Knowledge displays adequate knowledge of current events   Pain not assessed   Pain Scale Not assessed       Assessment/Plan:       Diagnoses and all orders for this visit:    Obsessive-compulsive disorder, unspecified type            Treatment Recommendations- Risks Benefits      Immediate Medical/Psychiatric/Psychotherapy Treatments and Any Precautions: continue current doses of current medication    Risks, Benefits And Possible Side Effects Of Medications:  Reviewed with pt    Controlled Medication Discussion: None prescribed in over eight months per [place]- search

## 2020-07-01 NOTE — BH TREATMENT PLAN
TREATMENT PLAN (Medication Management Only)        [facility]    Name and Date of Birth:  Edgar Clinton 30 y o  1990  Date of Treatment Plan: July 1, 2020  Diagnosis/Diagnoses:    1  Obsessive-compulsive disorder, unspecified type      Strengths/Personal Resources for Self-Care: taking medications as prescribed  Area/Areas of need (in own words): "none"  1  Long Term Goal: continue improvement in obsessive thoughts  Target Date:  - 12/23/2020  Person/Persons responsible for completion of goal: patient  2  Short Term Objective (s) - How will we reach this goal?:   A  Provider  continue current medications as prescribed Luvox  2  Patient take medications as prescribed, report side effects, adverse reactions if any; report decreased effectiveness if any  Target Date: 4 months - 12/23/2020   Person/Persons Responsible for Completion of Goal: patient  Progress Towards Goals: symptoms have resolved  Treatment Modality: medication management every 3 months  Review due 90 to 120 days from date of this plan: 3 months - 12/23/2020Expected length of service: ongoing treatment  My NP and I have developed this plan together and I agree to work on the goals and objectives  I understand the treatment goals that were developed for my treatment

## 2020-08-31 DIAGNOSIS — F42.2 MIXED OBSESSIONAL THOUGHTS AND ACTS: Primary | ICD-10-CM

## 2020-08-31 RX ORDER — FLUVOXAMINE MALEATE 150 MG/1
1 CAPSULE, EXTENDED RELEASE ORAL DAILY
Qty: 30 EACH | Refills: 0 | Status: SHIPPED | OUTPATIENT
Start: 2020-08-31 | End: 2020-09-30 | Stop reason: SDUPTHER

## 2020-09-02 ENCOUNTER — APPOINTMENT (OUTPATIENT)
Dept: RADIOLOGY | Facility: MEDICAL CENTER | Age: 30
End: 2020-09-02
Payer: COMMERCIAL

## 2020-09-02 ENCOUNTER — OFFICE VISIT (OUTPATIENT)
Dept: FAMILY MEDICINE CLINIC | Facility: CLINIC | Age: 30
End: 2020-09-02
Payer: COMMERCIAL

## 2020-09-02 ENCOUNTER — APPOINTMENT (OUTPATIENT)
Dept: LAB | Facility: MEDICAL CENTER | Age: 30
End: 2020-09-02
Payer: COMMERCIAL

## 2020-09-02 VITALS
TEMPERATURE: 98.6 F | WEIGHT: 250 LBS | SYSTOLIC BLOOD PRESSURE: 130 MMHG | HEIGHT: 67 IN | OXYGEN SATURATION: 98 % | RESPIRATION RATE: 18 BRPM | BODY MASS INDEX: 39.24 KG/M2 | DIASTOLIC BLOOD PRESSURE: 76 MMHG | HEART RATE: 82 BPM

## 2020-09-02 DIAGNOSIS — G47.30 SLEEP APNEA, UNSPECIFIED TYPE: ICD-10-CM

## 2020-09-02 DIAGNOSIS — I10 ESSENTIAL HYPERTENSION: Primary | ICD-10-CM

## 2020-09-02 DIAGNOSIS — J30.9 ALLERGIC RHINITIS, UNSPECIFIED SEASONALITY, UNSPECIFIED TRIGGER: ICD-10-CM

## 2020-09-02 DIAGNOSIS — I10 ESSENTIAL HYPERTENSION: ICD-10-CM

## 2020-09-02 DIAGNOSIS — F42.9 OBSESSIVE-COMPULSIVE DISORDER, UNSPECIFIED TYPE: ICD-10-CM

## 2020-09-02 DIAGNOSIS — R06.02 SOB (SHORTNESS OF BREATH): ICD-10-CM

## 2020-09-02 DIAGNOSIS — Z13.220 SCREENING CHOLESTEROL LEVEL: ICD-10-CM

## 2020-09-02 DIAGNOSIS — K21.9 GASTROESOPHAGEAL REFLUX DISEASE, ESOPHAGITIS PRESENCE NOT SPECIFIED: ICD-10-CM

## 2020-09-02 LAB
ALBUMIN SERPL BCP-MCNC: 4.3 G/DL (ref 3.5–5)
ALP SERPL-CCNC: 75 U/L (ref 46–116)
ALT SERPL W P-5'-P-CCNC: 88 U/L (ref 12–78)
ANION GAP SERPL CALCULATED.3IONS-SCNC: 5 MMOL/L (ref 4–13)
AST SERPL W P-5'-P-CCNC: 48 U/L (ref 5–45)
BACTERIA UR QL AUTO: NORMAL /HPF
BASOPHILS # BLD AUTO: 0.06 THOUSANDS/ΜL (ref 0–0.1)
BASOPHILS NFR BLD AUTO: 1 % (ref 0–1)
BILIRUB SERPL-MCNC: 0.45 MG/DL (ref 0.2–1)
BILIRUB UR QL STRIP: NEGATIVE
BUN SERPL-MCNC: 11 MG/DL (ref 5–25)
CALCIUM SERPL-MCNC: 9.7 MG/DL (ref 8.3–10.1)
CHLORIDE SERPL-SCNC: 108 MMOL/L (ref 100–108)
CHOLEST SERPL-MCNC: 216 MG/DL (ref 50–200)
CLARITY UR: CLEAR
CO2 SERPL-SCNC: 27 MMOL/L (ref 21–32)
COLOR UR: YELLOW
CREAT SERPL-MCNC: 0.88 MG/DL (ref 0.6–1.3)
EOSINOPHIL # BLD AUTO: 0.11 THOUSAND/ΜL (ref 0–0.61)
EOSINOPHIL NFR BLD AUTO: 1 % (ref 0–6)
ERYTHROCYTE [DISTWIDTH] IN BLOOD BY AUTOMATED COUNT: 12.7 % (ref 11.6–15.1)
GFR SERPL CREATININE-BSD FRML MDRD: 116 ML/MIN/1.73SQ M
GLUCOSE P FAST SERPL-MCNC: 86 MG/DL (ref 65–99)
GLUCOSE UR STRIP-MCNC: NEGATIVE MG/DL
HCT VFR BLD AUTO: 47.6 % (ref 36.5–49.3)
HDLC SERPL-MCNC: 39 MG/DL
HGB BLD-MCNC: 15.6 G/DL (ref 12–17)
HGB UR QL STRIP.AUTO: NEGATIVE
IMM GRANULOCYTES # BLD AUTO: 0.04 THOUSAND/UL (ref 0–0.2)
IMM GRANULOCYTES NFR BLD AUTO: 0 % (ref 0–2)
KETONES UR STRIP-MCNC: NEGATIVE MG/DL
LDLC SERPL CALC-MCNC: 142 MG/DL (ref 0–100)
LEUKOCYTE ESTERASE UR QL STRIP: NEGATIVE
LYMPHOCYTES # BLD AUTO: 3.31 THOUSANDS/ΜL (ref 0.6–4.47)
LYMPHOCYTES NFR BLD AUTO: 36 % (ref 14–44)
MAGNESIUM SERPL-MCNC: 2.4 MG/DL (ref 1.6–2.6)
MCH RBC QN AUTO: 31.1 PG (ref 26.8–34.3)
MCHC RBC AUTO-ENTMCNC: 32.8 G/DL (ref 31.4–37.4)
MCV RBC AUTO: 95 FL (ref 82–98)
MONOCYTES # BLD AUTO: 0.89 THOUSAND/ΜL (ref 0.17–1.22)
MONOCYTES NFR BLD AUTO: 10 % (ref 4–12)
NEUTROPHILS # BLD AUTO: 4.9 THOUSANDS/ΜL (ref 1.85–7.62)
NEUTS SEG NFR BLD AUTO: 52 % (ref 43–75)
NITRITE UR QL STRIP: NEGATIVE
NON-SQ EPI CELLS URNS QL MICRO: NORMAL /HPF
NRBC BLD AUTO-RTO: 0 /100 WBCS
PH UR STRIP.AUTO: 8 [PH]
PLATELET # BLD AUTO: 317 THOUSANDS/UL (ref 149–390)
PMV BLD AUTO: 10.3 FL (ref 8.9–12.7)
POTASSIUM SERPL-SCNC: 4.5 MMOL/L (ref 3.5–5.3)
PROT SERPL-MCNC: 8.1 G/DL (ref 6.4–8.2)
PROT UR STRIP-MCNC: NEGATIVE MG/DL
RBC # BLD AUTO: 5.02 MILLION/UL (ref 3.88–5.62)
RBC #/AREA URNS AUTO: NORMAL /HPF
SODIUM SERPL-SCNC: 140 MMOL/L (ref 136–145)
SP GR UR STRIP.AUTO: 1.02 (ref 1–1.03)
T4 FREE SERPL-MCNC: 0.98 NG/DL (ref 0.76–1.46)
TRIGL SERPL-MCNC: 177 MG/DL
TSH SERPL DL<=0.05 MIU/L-ACNC: 2.28 UIU/ML (ref 0.36–3.74)
URATE SERPL-MCNC: 8.3 MG/DL (ref 4.2–8)
UROBILINOGEN UR QL STRIP.AUTO: 0.2 E.U./DL
WBC # BLD AUTO: 9.31 THOUSAND/UL (ref 4.31–10.16)
WBC #/AREA URNS AUTO: NORMAL /HPF

## 2020-09-02 PROCEDURE — 80061 LIPID PANEL: CPT

## 2020-09-02 PROCEDURE — 85025 COMPLETE CBC W/AUTO DIFF WBC: CPT

## 2020-09-02 PROCEDURE — 84439 ASSAY OF FREE THYROXINE: CPT

## 2020-09-02 PROCEDURE — 3725F SCREEN DEPRESSION PERFORMED: CPT | Performed by: FAMILY MEDICINE

## 2020-09-02 PROCEDURE — 81001 URINALYSIS AUTO W/SCOPE: CPT | Performed by: FAMILY MEDICINE

## 2020-09-02 PROCEDURE — 84443 ASSAY THYROID STIM HORMONE: CPT

## 2020-09-02 PROCEDURE — 83735 ASSAY OF MAGNESIUM: CPT

## 2020-09-02 PROCEDURE — 80053 COMPREHEN METABOLIC PANEL: CPT

## 2020-09-02 PROCEDURE — 84550 ASSAY OF BLOOD/URIC ACID: CPT

## 2020-09-02 PROCEDURE — 99214 OFFICE O/P EST MOD 30 MIN: CPT | Performed by: FAMILY MEDICINE

## 2020-09-02 PROCEDURE — 71046 X-RAY EXAM CHEST 2 VIEWS: CPT

## 2020-09-02 PROCEDURE — 36415 COLL VENOUS BLD VENIPUNCTURE: CPT

## 2020-09-02 RX ORDER — OMEPRAZOLE 40 MG/1
40 CAPSULE, DELAYED RELEASE ORAL DAILY
Qty: 30 CAPSULE | Refills: 1 | Status: SHIPPED | OUTPATIENT
Start: 2020-09-02 | End: 2020-10-29

## 2020-09-02 NOTE — PROGRESS NOTES
BMI Counseling: Body mass index is 39 16 kg/m²  The BMI is above normal  Nutrition recommendations include decreasing portion sizes, consuming healthier snacks, limiting drinks that contain sugar, moderation in carbohydrate intake, increasing intake of lean protein, reducing intake of saturated and trans fat and reducing intake of cholesterol  Exercise recommendations include exercising 3-5 times per week  No pharmacotherapy was ordered  Patient referred to PCP due to patient being overweight  Assessment/Plan:      Diagnoses and all orders for this visit:    Essential hypertension          Subjective:     Patient ID: Edgar Clinton is a 35 y o  male  Pt here for checkup on HTN,  OCD and GERD and obesity  Pt  Would like to see  About gaining  Weight  Pt  With SOB with  Exertion and gets  SOB when he  Showers  Pt   With no calf pain  Pt also with sleep apnea and would  ke to see someone about  Using something else  besdies  CPAP  Review of Systems   Constitutional: Negative for activity change, appetite change, chills, fatigue, fever and unexpected weight change  HENT: Negative for congestion, ear pain, hearing loss, mouth sores, postnasal drip, sinus pressure, sinus pain, sneezing and sore throat  Respiratory: Positive for shortness of breath  Negative for apnea, cough and wheezing  Cardiovascular: Negative for chest pain, palpitations and leg swelling  Gastrointestinal: Negative for abdominal pain, constipation, diarrhea, nausea and vomiting  Endocrine: Negative for cold intolerance and heat intolerance  Genitourinary: Negative for dysuria, frequency and hematuria  Musculoskeletal: Negative for arthralgias, back pain, gait problem, joint swelling and neck pain  Skin: Negative for rash  Neurological: Negative for dizziness, weakness and numbness  Hematological: Does not bruise/bleed easily     Psychiatric/Behavioral: Negative for agitation, behavioral problems, confusion, hallucinations and sleep disturbance  The patient is not nervous/anxious  Objective:     Physical Exam  Vitals signs and nursing note reviewed  Constitutional:       Appearance: He is well-developed  HENT:      Head: Normocephalic and atraumatic  Nose: Nose normal    Eyes:      General: No scleral icterus  Conjunctiva/sclera: Conjunctivae normal       Pupils: Pupils are equal, round, and reactive to light  Neck:      Musculoskeletal: Normal range of motion and neck supple  Thyroid: No thyromegaly  Cardiovascular:      Rate and Rhythm: Normal rate and regular rhythm  Heart sounds: Normal heart sounds  Pulmonary:      Effort: Pulmonary effort is normal  No respiratory distress  Breath sounds: Normal breath sounds  No wheezing  Abdominal:      General: Bowel sounds are normal       Palpations: Abdomen is soft  Tenderness: There is no abdominal tenderness  There is no guarding or rebound  Musculoskeletal: Normal range of motion  Skin:     General: Skin is warm and dry  Findings: No rash  Neurological:      Mental Status: He is alert and oriented to person, place, and time     Psychiatric:         Behavior: Behavior normal

## 2020-09-04 ENCOUNTER — TELEPHONE (OUTPATIENT)
Dept: FAMILY MEDICINE CLINIC | Facility: CLINIC | Age: 30
End: 2020-09-04

## 2020-09-14 ENCOUNTER — OFFICE VISIT (OUTPATIENT)
Dept: SLEEP CENTER | Facility: CLINIC | Age: 30
End: 2020-09-14
Payer: COMMERCIAL

## 2020-09-14 ENCOUNTER — HOSPITAL ENCOUNTER (OUTPATIENT)
Dept: SLEEP CENTER | Facility: CLINIC | Age: 30
Discharge: HOME | End: 2020-09-14
Payer: COMMERCIAL

## 2020-09-14 VITALS
HEART RATE: 94 BPM | WEIGHT: 250.4 LBS | SYSTOLIC BLOOD PRESSURE: 108 MMHG | DIASTOLIC BLOOD PRESSURE: 76 MMHG | BODY MASS INDEX: 39.22 KG/M2

## 2020-09-14 DIAGNOSIS — E66.9 OBESITY (BMI 30-39.9): ICD-10-CM

## 2020-09-14 DIAGNOSIS — G25.81 RESTLESS LEG SYNDROME: ICD-10-CM

## 2020-09-14 DIAGNOSIS — J30.9 ALLERGIC RHINITIS, UNSPECIFIED SEASONALITY, UNSPECIFIED TRIGGER: ICD-10-CM

## 2020-09-14 DIAGNOSIS — I10 ESSENTIAL HYPERTENSION: ICD-10-CM

## 2020-09-14 DIAGNOSIS — G47.30 SLEEP APNEA, UNSPECIFIED TYPE: Primary | ICD-10-CM

## 2020-09-14 DIAGNOSIS — F42.9 OBSESSIVE-COMPULSIVE DISORDER, UNSPECIFIED TYPE: ICD-10-CM

## 2020-09-14 DIAGNOSIS — G47.30 SLEEP APNEA, UNSPECIFIED TYPE: ICD-10-CM

## 2020-09-14 DIAGNOSIS — F45.8 BRUXISM: ICD-10-CM

## 2020-09-14 DIAGNOSIS — G47.09 OTHER INSOMNIA: ICD-10-CM

## 2020-09-14 DIAGNOSIS — K21.9 GASTROESOPHAGEAL REFLUX DISEASE, ESOPHAGITIS PRESENCE NOT SPECIFIED: ICD-10-CM

## 2020-09-14 DIAGNOSIS — R51.9 HEADACHE UPON AWAKENING: ICD-10-CM

## 2020-09-14 DIAGNOSIS — G47.19 EXCESSIVE DAYTIME SLEEPINESS: ICD-10-CM

## 2020-09-14 DIAGNOSIS — F32.A DEPRESSION, UNSPECIFIED DEPRESSION TYPE: ICD-10-CM

## 2020-09-14 PROCEDURE — 99244 OFF/OP CNSLTJ NEW/EST MOD 40: CPT | Performed by: INTERNAL MEDICINE

## 2020-09-14 PROCEDURE — 95810 POLYSOM 6/> YRS 4/> PARAM: CPT

## 2020-09-14 NOTE — PROGRESS NOTES
Consultation - [address]  35 y o  male  Paul Herr  LLW:[id number]    Physician Requesting Consult: Kimmy Love MD     Reason for Consult : At your kind request I saw this patient for initial sleep evaluation today  He was diagnosed with obstructive sleep apnea in the past and prescribed CPAP  He discontinued use because he was unable to adjust to CPAP  He is now here because of ongoing symptoms  Results of prior studies are no longer available  PFSH, Problem List, Medications & Allergies were reviewed in EMR  He  has a past medical history of Acne (09/01/2010), Acquired ankle/foot deformity, Change in hearing, Closed displaced avulsion fracture of tuberosity of left calcaneus, Epistaxis, Foreign body in foot, Gastritis, GERD (gastroesophageal reflux disease), History of oral aphthous ulcers, Hypersomnia (02/18/2009), Impacted cerumen, Insomnia, Irritable bowel syndrome, Knee joint pain (03/10/2008), Left foot pain, OCD (obsessive compulsive disorder), Plantar fibromatosis, Seasonal allergies, Tarsal tunnel syndrome of left side, and Tinea corporis  He has a current medication list which includes the following prescription(s): alpha-d-galactosidase, diphenhydramine hcl (sleep), etodolac, fluvoxamine maleate, gabapentin, lisinopril, montelukast, omeprazole, and oxymetazoline  HPI:  He reports constant fatigue irrespective of sleep  He sleeps alone and is not aware of snoring  However, on occasion when he is on his back he has some difficulty with breathing  Other Complaints: none  Restless Leg Syndrome: has suggestive symptoms   Parasomnia: reports teeth grinding during sleep, but no other features of parasomnia   Sleep Routine:   Typical Bedtime:  10:00 p m  Gets OOB:  11:00 a m  TIB:13 hrs  Sleep latency: several hours because he is on his phone Sleep Interruptions:infrequent/nite and is able to fall with sleep again if he does   Awakens: with aid of an alarm by about 7 but struggles to awaken and goes back to sleep for several hours  Upon awakening: never feels rested  He awakens with headache several times a week  He estimates getting 6 hrs sleep  He has Excessive Daytime Sleepiness , yawns excessively and naps for several hours  [place] Sleepiness Scale rated at Total score: 13 /24  Habits:  reports that he has never smoked  He has never used smokeless tobacco  ;   E-Cigarette/Vaping    ;  reports no history of drug use ;  reports no history of alcohol use  ; Caffeine use:moderate until 5-6 p m ; Exercise routine: none   Family History:  Father sujit  ROS - constitutional, psychiatric, ENT, respiratory,CVS, GI, UGS, CNS, MSK, integumentary, endocrine, hematological reviewed- see attached  Significant for approximately 20 lb weight gain in the past year  He has nasal congestion for which he is on medication but in addition is using Afrin several times a day and daily  He has acid reflux  He reports shortness of breath  He has musculoskeletal aches and pains  Has ongoing feelings of anxiety and depression    EXAM:  /76   Pulse 94   Wt 114 kg (250 lb 6 4 oz)   BMI 39 22 kg/m²    General: Well groomed male, well appearing, in no apparent distress  Psychiatric: Alert and orientated; Cooperative; Speech:  Pressure; appears anxious  HEENT:  Craniofacial anatomy: normal Sinuses: non- tender  TMJ: Normal    Eyes: EOM's intact;  conjunctiva/corneas clear   Ears: Externallyappear normal     Nasal Airway: airflow is reduced Septum:intact; Mucosa: normal; Turbinates: normal; Rhinorrhea: None   Mouth: Lips: normal posture; Dentition: normal   Mucosa:moist  ; Hard Palate:long  and narrow   Oropharryx: crowded, AP narrowing  and laterally narrowedTongue: Mallampati:Class IV, Mobile and ScallopedSoft Palate:  redundant  Tonsils: no hypertrophy  Neck:is thick and excess fatty tissue; Neck Circumference: 17 "; Supple; no abnormal masses;  Thyroid:normal  Trachea:central     Lymph: No Cervical or Submandibular Lymhadenopathy  Heart: S1,S2 normal; RRR; no gallop; nomurmurs   Lungs: Respiratory Effort:normal  Air entry good bilaterally  No wheezes  No rales  Abdomen: Obese, Soft & non-tender    Extremities: No pedal edema  No clubbing or cyanosis  Skin: warm and dry; Color& Hydration good; no facial rashes or lesions   Neurological: CNII-XII intact; Motor normal; Sensation normal  Musculoskeletal: Muscle bulk, tone and power WNL Gait:normal        Bicarb level on his last CMP was 27 millimoles per L    IMPRESSION: Primary, Secondary Sleep Diagnoses (to Medical or Psych conditions) & Comorbidities   1  Sleep apnea, unspecified type  Ambulatory referral to Sleep Medicine    Diagnostic Sleep Study   2  Other insomnia     3  Restless leg syndrome     4  Excessive daytime sleepiness     5  Headache upon awakening     6  Bruxism     7  Allergic rhinitis, unspecified seasonality, unspecified trigger     8  Essential hypertension     9  Gastroesophageal reflux disease, esophagitis presence not specified     10  Obesity (BMI 30-39 9)     11  Obsessive-compulsive disorder, unspecified type     12  Depression, unspecified depression type          PLAN:   1  Comprehensive counseling was provided on pathophysiology, diagnostic strategies & treatment options; effects on symptoms and comorbidities; risks of inadequate therapy; costs and insurance aspects  2  I advised on weight reduction, avoiding sleeping supine, using alcohol or sedating medications close to bed time and on safe driving practices  3  Cognitive behavioral therapy was initiated with advise on Sleep Hygiene and behavioral techniques to manage Insomnia  4  Nocturnal polysomnography is indicated and a diagnostic study will be scheduled  Home sleep testing is contraindicated because Severe insomnia, RLS and High risk for sleep-related hypoventilation  5  I advised discontinuing use of Afrin    Nasal symptoms may improve with regular nasal saline rinse followed by topical nasal steroid if necessary  6  Patient is averse to Positive airway pressure therapy and is considering surgery  7  Follow-up will be scheduled after the studies to review results, further details of treatment options and to initiate/adjust therapy  Thank you for allowing me to participate in the care of this patient  I will keep you apprised of developments          Sincerely,     Authenticated electronically by Lisette Fisher MD   on 55/81/83   Board Certified Specialist

## 2020-09-14 NOTE — PATIENT INSTRUCTIONS
What is YOSELIN? Obstructive sleep apnea is a common and serious sleep disorder that causes you to stop breathing during sleep  The airway repeatedly becomes blocked, limiting the amount of air that reaches your lungs  When this happens, you may snore loudly or making choking noises as you try to breathe  Your brain and body becomes oxygen deprived and you may wake up  This may happen a few times a night, or in more severe cases, several hundred times a night  Sleep apnea can make you wake up in the morning feeling tired or unrefreshed even though you have had a full night of sleep  During the day, you may feel fatigued, have difficulty concentrating or you may even unintentionally fall asleep  This is because your body is waking up numerous times throughout the night, even though you might not be conscious of each awakening  The lack of oxygen your body receives can have negative long-term consequences for your health  This includes:  High blood pressure  Heart disease  Irregular heart rhythms  Stroke  Pre-diabetes and diabetes  Depression    Testing  An objective evaluation of your sleep may be needed before your board certified sleep physician can make a diagnosis  Options include:   In-lab overnight sleep study  This type of sleep study requires you to stay overnight at a sleep center, in a bed that may resemble a hotel room  You will sleep with sensors hooked up to various parts of your body  These sensors record your brain waves, heartbeat, breathing and movement  An overnight sleep study also provides your doctor with the most complete information about your sleep  Learn more about an overnight sleep study      Home sleep apnea test  Some patients with high risk factors for obstructive sleep apnea and no other medical disorders may be candidates for a home sleep apnea test  The testing equipment differs in that it is less complicated than what is used in an overnight sleep study   As such, does not give all the data an in-lab will and if negative, may not mean you do not have the problem  Treatment for sleep apnea  includes using a continuous positive airway pressure (CPAP) machine to keep your airway open during sleep  A mask is placed over your nose and mouth, or just your nose  The mask is hooked to the CPAP machine that blows a gentle stream of air into the mask when you breathe  This helps keep your airway open so you can breathe more regularly  Extra oxygen may be given to you through the machine  You may be given a mouth device  It looks like a mouth guard or dental retainer and stops your tongue and mouth tissues from blocking your throat while you sleep  Surgery may be needed to remove extra tissues that block your mouth, throat, or nose  Manage sleep apnea:   Do not smoke  Nicotine and other chemicals in cigarettes and cigars can cause lung damage  Ask your healthcare provider for information if you currently smoke and need help to quit  E-cigarettes or smokeless tobacco still contain nicotine  Talk to your healthcare provider before you use these products  Do not drink alcohol or take sedative medicine before you go to sleep  Alcohol and sedatives can relax the muscles and tissues around your throat  This can block the airflow to your lungs  Maintain a healthy weight  Excess tissue around your throat may restrict your breathing  Ask your healthcare provider for information if you need to lose weight  Sleep on your side or use pillows designed to prevent sleep apnea  This prevents your tongue or other tissues from blocking your throat  You can also raise the head of your bed  Driving Safety  Refrain from driving when drowsy  Follow up with your healthcare provider as directed:  Write down your questions so you remember to ask them during your visits  Go to [employer]ASM website for more information: Sleepeducation  org     What is YOSELIN?    Obstructive sleep apnea is a common and serious sleep disorder that causes you to stop breathing during sleep  The airway repeatedly becomes blocked, limiting the amount of air that reaches your lungs  When this happens, you may snore loudly or making choking noises as you try to breathe  Your brain and body becomes oxygen deprived and you may wake up  This may happen a few times a night, or in more severe cases, several hundred times a night  Sleep apnea can make you wake up in the morning feeling tired or unrefreshed even though you have had a full night of sleep  During the day, you may feel fatigued, have difficulty concentrating or you may even unintentionally fall asleep  This is because your body is waking up numerous times throughout the night, even though you might not be conscious of each awakening  The lack of oxygen your body receives can have negative long-term consequences for your health  This includes:  High blood pressure  Heart disease  Irregular heart rhythms  Stroke  Pre-diabetes and diabetes  Depression    Testing  An objective evaluation of your sleep may be needed before your board certified sleep physician can make a diagnosis  Options include:   In-lab overnight sleep study  This type of sleep study requires you to stay overnight at a sleep center, in a bed that may resemble a hotel room  You will sleep with sensors hooked up to various parts of your body  These sensors record your brain waves, heartbeat, breathing and movement  An overnight sleep study also provides your doctor with the most complete information about your sleep  Learn more about an overnight sleep study      Home sleep apnea test  Some patients with high risk factors for obstructive sleep apnea and no other medical disorders may be candidates for a home sleep apnea test  The testing equipment differs in that it is less complicated than what is used in an overnight sleep study   As such, does not give all the data an in-lab will and if negative, may not mean you do not have the problem  Treatment for sleep apnea  includes using a continuous positive airway pressure (CPAP) machine to keep your airway open during sleep  A mask is placed over your nose and mouth, or just your nose  The mask is hooked to the CPAP machine that blows a gentle stream of air into the mask when you breathe  This helps keep your airway open so you can breathe more regularly  Extra oxygen may be given to you through the machine  You may be given a mouth device  It looks like a mouth guard or dental retainer and stops your tongue and mouth tissues from blocking your throat while you sleep  Surgery may be needed to remove extra tissues that block your mouth, throat, or nose  Manage sleep apnea:   Do not smoke  Nicotine and other chemicals in cigarettes and cigars can cause lung damage  Ask your healthcare provider for information if you currently smoke and need help to quit  E-cigarettes or smokeless tobacco still contain nicotine  Talk to your healthcare provider before you use these products  Do not drink alcohol or take sedative medicine before you go to sleep  Alcohol and sedatives can relax the muscles and tissues around your throat  This can block the airflow to your lungs  Maintain a healthy weight  Excess tissue around your throat may restrict your breathing  Ask your healthcare provider for information if you need to lose weight  Sleep on your side or use pillows designed to prevent sleep apnea  This prevents your tongue or other tissues from blocking your throat  You can also raise the head of your bed  Driving Safety  Refrain from driving when drowsy  Follow up with your healthcare provider as directed:  Write down your questions so you remember to ask them during your visits  Go to AASM website for more information: Sleepeducation  org           What you can do to improve your sleep: (Sleep Hygiene) Basic rules for a good night's sleep    Create a regular sleep schedule    This will help you form a sleep routine  Keep a record of your sleep patterns, and any sleeping problems you have  Bring the record to follow-up visits with healthcare providers  Avoid prolonged use of light-emitting screens before bedtime or watching TV in bed  Avoid forcing sleep  Do not take naps  Naps could make it hard for you to fall asleep at bedtime  Deal with your worries before bedtime  Keep your bedroom cool, quiet, and dark  Turn on white noise, such as a fan, to help you relax  Do not use your bed for any activity that will keep you awake  Do not read, exercise, eat, or watch TV in your bedroom  Get up if you do not fall asleep within 20 minutes  Move to another room and do something relaxing until you become sleepy  Limit caffeine, alcohol, nicotine and food to earlier in the day  Only drink caffeine in the morning  Do not drink alcohol within 6 hours of bedtime  Do not eat a heavy meal right before you go to bed  Avoid smoking, especially in the evening  Exercise regularly  Daily exercise will help you sleep better  Do not exercise within 4 hours of bedtime  Stimulus control therapy rules  1  Go to bed only when sleepy  2  Do not watch television, read, eat, or worry while in bed  Use bed only for sleep and sex  3  Get out of bed if unable to fall asleep within 20 minutes and go to another room  Return to bed only when sleepy  Repeat this step as many times as necessary throughout the night  4  Set an alarm clock to wake up at a fixed time each morning, including weekends  5  Do not take a nap during the day  Data from: [address] [place] [address], [address] Nonpharmacologic treatments of insomnia  [facility] 3855; 53:37  Go to AASM website for more information: Sleepeducation  org     Recommended Reading:  Book by authors [address]   No More sleepless nights    Nursing Support:  When: Monday through Friday 7A-5PM except holidays  Where: Our direct line is [phone]  If you are having a true emergency please call 911  In the event that the line is busy or it is after hours please leave a voice message and we will return your call  Please speak clearly, leaving your full name, birth date, best number to reach you and the reason for your call  Medication refills: We will need the name of the medication, the dosage, the ordering provider, whether you get a 30 or 90 day refill, and the pharmacy name and address  Medications will be ordered by the provider only  Nurses cannot call in prescriptions  Please allow 7 days for medication refills  Physician requested updates: If your provider requested that you call with an update after starting medication, please be ready to provide us the medication and dosage, what time you take your medication, the time you attempt to fall asleep, time you fall asleep, when you wake up, and what time you get out of bed  Sleep Study Results: We will contact you with sleep study results and/or next steps after the physician has reviewed your testing

## 2020-09-14 NOTE — PROGRESS NOTES
Review of Systems      Genitourinary need to urinate more than twice a night and difficulty with erection   Cardiology Frequent chest pain or angina,    Gastrointestinal frequent heartburn/acid reflux   Neurology frequent headaches and need to move extremities   Constitutional claustrophobia, fatigue and excessive sweating at night   Integumentary none   Psychiatry anxiety and depression   Musculoskeletal joint pain, muscle aches, back pain and legs twitching/jerking   Pulmonary shortness of breath with activity, chest tightness, snoring and difficulty breathing when lying flat    ENT none   Endocrine none   Hematological none

## 2020-09-15 NOTE — PROGRESS NOTES
Sleep Study Documentation    Pre-Sleep Study       Sleep testing procedure explained to patient:YES    Patient napped prior to study:NO    Caffeine:Dayshift worker after 12PM   Caffeine use:NO    Alcohol:Dayshift workers after 5PM: Alcohol use:NO    Typical day for patient:YES       Study Documentation    Sleep Study Indications: Snoring, Excessive Daytime Sleepiness, Unrefreshing sleep, HTN, Mood Disturbance    Sleep Study: Diagnostic   Snore:Severe  Supplemental O2: no    O2 flow rate (L/min) range   O2 flow rate (L/min) final   Minimum SaO2 70%  Baseline SaO2 94%    EKG abnormalities: no     EEG abnormalities: no    Sleep Study Recorded < 2 hours: N/A    Sleep Study Recorded > 2 hours but incomplete study: N/A    Sleep Study Recorded 6 hours but no sleep obtained: NO    Patient classification: employed       Post-Sleep Study    Medication used at bedtime or during sleep study:YES over the counter sleep aid and other prescription medications    Patient reports time it took to fall asleep:greater than 60 minutes    Patient reports waking up during study:1 to 2 times  Patient reports returning to sleep in 10 to 30 minutes  Patient reports sleeping 4 to 6 hours without dreaming  Patient reports sleep during study:typical    Patient rated sleepiness: Somewhat sleepy or tired    PAP treatment:no

## 2020-09-20 DIAGNOSIS — F42.2 MIXED OBSESSIONAL THOUGHTS AND ACTS: ICD-10-CM

## 2020-09-22 RX ORDER — FLUVOXAMINE MALEATE 150 MG/1
CAPSULE, EXTENDED RELEASE ORAL
Qty: 30 CAPSULE | Refills: 0 | OUTPATIENT
Start: 2020-09-22

## 2020-09-23 ENCOUNTER — TELEPHONE (OUTPATIENT)
Dept: SLEEP CENTER | Facility: CLINIC | Age: 30
End: 2020-09-23

## 2020-09-23 NOTE — TELEPHONE ENCOUNTER
Left message for the patient to call back for sleep study results  Shows severe YOSELIN     Dr Frankie Tee will you write order for the CPAP titration study you recommended for the patient      Patient was seen by you on 9/14/2020

## 2020-09-28 ENCOUNTER — OFFICE VISIT (OUTPATIENT)
Dept: SLEEP CENTER | Facility: CLINIC | Age: 30
End: 2020-09-28
Payer: COMMERCIAL

## 2020-09-28 VITALS
HEART RATE: 74 BPM | HEIGHT: 67 IN | SYSTOLIC BLOOD PRESSURE: 104 MMHG | BODY MASS INDEX: 39.24 KG/M2 | DIASTOLIC BLOOD PRESSURE: 60 MMHG | TEMPERATURE: 97.2 F | WEIGHT: 250 LBS

## 2020-09-28 DIAGNOSIS — F42.9 OBSESSIVE-COMPULSIVE DISORDER, UNSPECIFIED TYPE: ICD-10-CM

## 2020-09-28 DIAGNOSIS — R51.9 HEADACHE UPON AWAKENING: ICD-10-CM

## 2020-09-28 DIAGNOSIS — G47.61 PLMD (PERIODIC LIMB MOVEMENT DISORDER): ICD-10-CM

## 2020-09-28 DIAGNOSIS — E66.9 OBESITY (BMI 30-39.9): ICD-10-CM

## 2020-09-28 DIAGNOSIS — G47.19 EXCESSIVE DAYTIME SLEEPINESS: ICD-10-CM

## 2020-09-28 DIAGNOSIS — F98.8 ADD (ATTENTION DEFICIT DISORDER) WITHOUT HYPERACTIVITY: ICD-10-CM

## 2020-09-28 DIAGNOSIS — I10 ESSENTIAL HYPERTENSION: ICD-10-CM

## 2020-09-28 DIAGNOSIS — F32.A DEPRESSION, UNSPECIFIED DEPRESSION TYPE: ICD-10-CM

## 2020-09-28 DIAGNOSIS — G47.33 OSA (OBSTRUCTIVE SLEEP APNEA): Primary | ICD-10-CM

## 2020-09-28 DIAGNOSIS — F45.8 BRUXISM: ICD-10-CM

## 2020-09-28 DIAGNOSIS — G47.09 OTHER INSOMNIA: ICD-10-CM

## 2020-09-28 PROCEDURE — 1036F TOBACCO NON-USER: CPT | Performed by: INTERNAL MEDICINE

## 2020-09-28 PROCEDURE — 3078F DIAST BP <80 MM HG: CPT | Performed by: INTERNAL MEDICINE

## 2020-09-28 PROCEDURE — 99214 OFFICE O/P EST MOD 30 MIN: CPT | Performed by: INTERNAL MEDICINE

## 2020-09-28 NOTE — PROGRESS NOTES
Follow-up Note - [address]  35 y o  male  QSI:[birth year]  YBM:[id number]    I saw Ama Sommer in sleep clinic today for his sleep complaints & comorbidities  Patient recently had a diagnostic sleep study and is here to review results / treatment options  The study demonstrated of severe obstructive sleep apnea: AHI 29 8 /hour , higher while supine at 37 per hour and considerably higher during REM at 52 per hour  Very loud intensity  snoring  was noted  Minimum oxygen saturation 70 %  and 20 minutes of total sleep time was spent with saturations less than 90%  Sleep efficiency was slightly prolonged at 47 minutes  Sleep efficiency was 84 6%  There was severe periodic limb movements of sleep for an index of 51 per hour  PFSH, Problem List, Medications & Allergies were reviewed in EMR  Interval changes: none reported  He  has a past medical history of Acne (09/01/2010), Acquired ankle/foot deformity, Change in hearing, Closed displaced avulsion fracture of tuberosity of left calcaneus, Epistaxis, Foreign body in foot, Gastritis, GERD (gastroesophageal reflux disease), History of oral aphthous ulcers, Hypersomnia (02/18/2009), Impacted cerumen, Insomnia, Irritable bowel syndrome, Knee joint pain (03/10/2008), Left foot pain, OCD (obsessive compulsive disorder), Plantar fibromatosis, Seasonal allergies, Tarsal tunnel syndrome of left side, and Tinea corporis  He has a current medication list which includes the following prescription(s): alpha-d-galactosidase, diphenhydramine hcl (sleep), etodolac, fluvoxamine maleate, gabapentin, lisinopril, montelukast, omeprazole, and oxymetazoline  ROS: constitutional, psychiatric, ENT, respiratory,CVS, GI, UGS, CNS, MSK, integumentary, endocrine, hematological reviewed  Significant for around 3 lb weight reduction since his last visit  HPI:  He continues to report constant fatigue    He sleeps alone but on occasion has a woken with difficulty breathing  He has some restless leg symptoms but is not aware of jerking movements during sleep  He continues to grind his teeth during sleep  He reports less frequent awakening with headache  Sleep Routine:  He is spending between 9 and 11 hours in bed  He takes several hours to fall asleep  He has in frequent interruptions of sleep  He struggles to awaken with the aid of an alarm  He has unrefreshed  has excessive drowsiness and feels like napping but states he is doing so less than previously  He  rated himself at Total score: 11 /24 on the Russell sleepiness scale  Habits:  reports that he has never smoked  He has never used smokeless tobacco  He reports that he does not drink alcohol or use drugs  EXAM: /60   Pulse 74   Temp (!) 97 2 °F (36 2 °C)   Ht 5' 7" (1 702 m)   Wt 113 kg (250 lb)   BMI 39 16 kg/m²     Patient is well groomed; well appearing  Skin/Extrem: warm & dry; col & hydration normal; no edema  Psych: cooperativeand in no distress  Mental state appears normal   CNS: Alert, orientated, clear & coherent speech  H&N: EOMI; NC/AT:no facial pressure marks, no rashes  ENMT Mucosa appearsnormal Nasal airway:patent  Oral airway:  crowded  Resp:effort is normal CVS: RRR ABD: truncal obesity MSK:Gait normal     IMPRESSION: Primary Sleep/Secondary(to Medical or Psych conditions) & comorbidities   1  YOSELIN (obstructive sleep apnea)  Ambulatory Referral to Otolaryngology    Mandible Advancing Appliance   2  Other insomnia     3  PLMD (periodic limb movement disorder)     4  Bruxism     5  Headache upon awakening     6  Excessive daytime sleepiness     7  Obesity (BMI 30-39 9)     8  Essential hypertension     9  Depression, unspecified depression type     10  Obsessive-compulsive disorder, unspecified type     11  ADD (attention deficit disorder) without hyperactivity         PLAN:    1  I reviewed results of the Sleep study with the patient      2  With respect to above conditions, I  counseled on pathophysiology, diagnosis, treatment options, risks and benefits; inter-relationship and effects on symptoms and comorbidities; risks of no treatment; costs and insurance aspects  3  Patient declined positive airway pressure therapy or desensitization  He states he is attempts to adjust to CPAP in the past were unsuccessful and did not want to even try again  He is interested in upper airway surgery (including inspire procedure) or the mandibular advancement device and I provided referrals for same  4  Indicates understanding risks of untreated obstructive sleep apnea, including stroke and sudden cardiac death and is willing to accept  5  I recommended adjunctive weight reduction and positional therapy  He is in the weight management program and currently is on a 2500 calorie diet  6   Patient was invited to return if  interested in attempting positive airway pressure therapy  7  Cognitive behavioral therapy was initiated, Sleep Hygiene and behavioral techniques to manage Insomnia were discussed  8  I have not initiated any medication for the restless leg symptoms or periodic limb movements of sleep since he states it is not affecting his sleep  9  He will need re-evaluation once fitted with the mandibular advancement device or upper airway surgery  He will call to schedule when he is ready  Thank you for allowing me to participate in the care of this patient  Please feel free to call me if I can be of any further assistance           Sincerely,    Authenticated electronically by Preet Wilkerson MD on 64/74/92   Board Certified Specialist

## 2020-09-28 NOTE — PROGRESS NOTES
Review of Systems      Genitourinary none   Cardiology none   Gastrointestinal frequent heartburn/acid reflux   Neurology none   Constitutional fatigue and excessive sweating at night   Integumentary none   Psychiatry anxiety and depression   Musculoskeletal back pain   Pulmonary snoring   ENT none   Endocrine none   Hematological none

## 2020-09-28 NOTE — PATIENT INSTRUCTIONS
What is YOSELIN? Obstructive sleep apnea is a common and serious sleep disorder that causes you to stop breathing during sleep  The airway repeatedly becomes blocked, limiting the amount of air that reaches your lungs  When this happens, you may snore loudly or making choking noises as you try to breathe  Your brain and body becomes oxygen deprived and you may wake up  This may happen a few times a night, or in more severe cases, several hundred times a night  Sleep apnea can make you wake up in the morning feeling tired or unrefreshed even though you have had a full night of sleep  During the day, you may feel fatigued, have difficulty concentrating or you may even unintentionally fall asleep  This is because your body is waking up numerous times throughout the night, even though you might not be conscious of each awakening  The lack of oxygen your body receives can have negative long-term consequences for your health  This includes:  High blood pressure  Heart disease  Irregular heart rhythms  Stroke  Pre-diabetes and diabetes  Depression    Testing  An objective evaluation of your sleep may be needed before your board certified sleep physician can make a diagnosis  Options include:   In-lab overnight sleep study  This type of sleep study requires you to stay overnight at a sleep center, in a bed that may resemble a hotel room  You will sleep with sensors hooked up to various parts of your body  These sensors record your brain waves, heartbeat, breathing and movement  An overnight sleep study also provides your doctor with the most complete information about your sleep  Learn more about an overnight sleep study      Home sleep apnea test  Some patients with high risk factors for obstructive sleep apnea and no other medical disorders may be candidates for a home sleep apnea test  The testing equipment differs in that it is less complicated than what is used in an overnight sleep study   As such, does not give all the data an in-lab will and if negative, may not mean you do not have the problem  Treatment for sleep apnea  includes using a continuous positive airway pressure (CPAP) machine to keep your airway open during sleep  A mask is placed over your nose and mouth, or just your nose  The mask is hooked to the CPAP machine that blows a gentle stream of air into the mask when you breathe  This helps keep your airway open so you can breathe more regularly  Extra oxygen may be given to you through the machine  You may be given a mouth device  It looks like a mouth guard or dental retainer and stops your tongue and mouth tissues from blocking your throat while you sleep  Surgery may be needed to remove extra tissues that block your mouth, throat, or nose  Manage sleep apnea:   Do not smoke  Nicotine and other chemicals in cigarettes and cigars can cause lung damage  Ask your healthcare provider for information if you currently smoke and need help to quit  E-cigarettes or smokeless tobacco still contain nicotine  Talk to your healthcare provider before you use these products  Do not drink alcohol or take sedative medicine before you go to sleep  Alcohol and sedatives can relax the muscles and tissues around your throat  This can block the airflow to your lungs  Maintain a healthy weight  Excess tissue around your throat may restrict your breathing  Ask your healthcare provider for information if you need to lose weight  Sleep on your side or use pillows designed to prevent sleep apnea  This prevents your tongue or other tissues from blocking your throat  You can also raise the head of your bed  Driving Safety  Refrain from driving when drowsy  Follow up with your healthcare provider as directed:  Write down your questions so you remember to ask them during your visits  Go to [employer] website for more information: Sleepeducation  org     What is YOSELIN?    Obstructive sleep apnea is a common and serious sleep disorder that causes you to stop breathing during sleep  The airway repeatedly becomes blocked, limiting the amount of air that reaches your lungs  When this happens, you may snore loudly or making choking noises as you try to breathe  Your brain and body becomes oxygen deprived and you may wake up  This may happen a few times a night, or in more severe cases, several hundred times a night  Sleep apnea can make you wake up in the morning feeling tired or unrefreshed even though you have had a full night of sleep  During the day, you may feel fatigued, have difficulty concentrating or you may even unintentionally fall asleep  This is because your body is waking up numerous times throughout the night, even though you might not be conscious of each awakening  The lack of oxygen your body receives can have negative long-term consequences for your health  This includes:  High blood pressure  Heart disease  Irregular heart rhythms  Stroke  Pre-diabetes and diabetes  Depression    Testing  An objective evaluation of your sleep may be needed before your board certified sleep physician can make a diagnosis  Options include:   In-lab overnight sleep study  This type of sleep study requires you to stay overnight at a sleep center, in a bed that may resemble a hotel room  You will sleep with sensors hooked up to various parts of your body  These sensors record your brain waves, heartbeat, breathing and movement  An overnight sleep study also provides your doctor with the most complete information about your sleep  Learn more about an overnight sleep study      Home sleep apnea test  Some patients with high risk factors for obstructive sleep apnea and no other medical disorders may be candidates for a home sleep apnea test  The testing equipment differs in that it is less complicated than what is used in an overnight sleep study   As such, does not give all the data an in-lab will and if negative, may not mean you do not have the problem  Treatment for sleep apnea  includes using a continuous positive airway pressure (CPAP) machine to keep your airway open during sleep  A mask is placed over your nose and mouth, or just your nose  The mask is hooked to the CPAP machine that blows a gentle stream of air into the mask when you breathe  This helps keep your airway open so you can breathe more regularly  Extra oxygen may be given to you through the machine  You may be given a mouth device  It looks like a mouth guard or dental retainer and stops your tongue and mouth tissues from blocking your throat while you sleep  Surgery may be needed to remove extra tissues that block your mouth, throat, or nose  Manage sleep apnea:   Do not smoke  Nicotine and other chemicals in cigarettes and cigars can cause lung damage  Ask your healthcare provider for information if you currently smoke and need help to quit  E-cigarettes or smokeless tobacco still contain nicotine  Talk to your healthcare provider before you use these products  Do not drink alcohol or take sedative medicine before you go to sleep  Alcohol and sedatives can relax the muscles and tissues around your throat  This can block the airflow to your lungs  Maintain a healthy weight  Excess tissue around your throat may restrict your breathing  Ask your healthcare provider for information if you need to lose weight  Sleep on your side or use pillows designed to prevent sleep apnea  This prevents your tongue or other tissues from blocking your throat  You can also raise the head of your bed  Driving Safety  Refrain from driving when drowsy  Follow up with your healthcare provider as directed:  Write down your questions so you remember to ask them during your visits  Go to [employer]AS website for more information: Sleepeducation  org             Nursing Support:  When: Monday through Friday 7A-5PM except holidays  Where: Our direct line is [phone]      If you are having a true emergency please call 911  In the event that the line is busy or it is after hours please leave a voice message and we will return your call  Please speak clearly, leaving your full name, birth date, best number to reach you and the reason for your call  Medication refills: We will need the name of the medication, the dosage, the ordering provider, whether you get a 30 or 90 day refill, and the pharmacy name and address  Medications will be ordered by the provider only  Nurses cannot call in prescriptions  Please allow 7 days for medication refills  Physician requested updates: If your provider requested that you call with an update after starting medication, please be ready to provide us the medication and dosage, what time you take your medication, the time you attempt to fall asleep, time you fall asleep, when you wake up, and what time you get out of bed  Sleep Study Results: We will contact you with sleep study results and/or next steps after the physician has reviewed your testing

## 2020-09-30 ENCOUNTER — OFFICE VISIT (OUTPATIENT)
Dept: PSYCHIATRY | Facility: CLINIC | Age: 30
End: 2020-09-30
Payer: COMMERCIAL

## 2020-09-30 DIAGNOSIS — F42.2 MIXED OBSESSIONAL THOUGHTS AND ACTS: ICD-10-CM

## 2020-09-30 DIAGNOSIS — F42.9 OBSESSIVE-COMPULSIVE DISORDER, UNSPECIFIED TYPE: Primary | ICD-10-CM

## 2020-09-30 PROCEDURE — 99212 OFFICE O/P EST SF 10 MIN: CPT | Performed by: NURSE PRACTITIONER

## 2020-09-30 RX ORDER — FLUVOXAMINE MALEATE 100 MG/1
1 CAPSULE, EXTENDED RELEASE ORAL
Qty: 30 CAPSULE | Refills: 2 | Status: SHIPPED | OUTPATIENT
Start: 2020-09-30 | End: 2020-12-16

## 2020-09-30 RX ORDER — FLUVOXAMINE MALEATE 100 MG/1
CAPSULE, EXTENDED RELEASE ORAL
Qty: 30 CAPSULE | Refills: 8 | OUTPATIENT
Start: 2020-09-30

## 2020-09-30 RX ORDER — FLUVOXAMINE MALEATE 100 MG/1
1 CAPSULE, EXTENDED RELEASE ORAL
COMMUNITY
Start: 2020-08-27 | End: 2020-09-30 | Stop reason: SDUPTHER

## 2020-09-30 RX ORDER — FLUVOXAMINE MALEATE 150 MG/1
1 CAPSULE, EXTENDED RELEASE ORAL DAILY
Qty: 30 EACH | Refills: 2 | Status: SHIPPED | OUTPATIENT
Start: 2020-09-30 | End: 2020-12-16

## 2020-09-30 NOTE — PATIENT INSTRUCTIONS
Continue fluvoxamine  mg one cap by mouth daily; fluvoxamine  mg mg daily; total daily dose 250 mg  Next appt 12 weeks

## 2020-09-30 NOTE — PSYCH
Psychiatric Progress Note   09/30/20     Farooq Landon 35 y o  male MRN: [id number]   @ Encounter: [id number]    Visit Diagnosis:   Encounter Diagnosis     ICD-10-CM    1  Obsessive-compulsive disorder, unspecified type  F42 9    2  Mixed obsessional thoughts and acts  F42 2 Fluvoxamine Maleate 100 MG CP24     Fluvoxamine Maleate 150 MG CP24        Virtual Visit no    Patient was seen in the office for medication management for 25 minutes  COVID-19 precautions were observed at all times - masks worn, social distancing, hand washing before and appt, seat cleaned before pt entered the room and after pt left  Pt reports taking medication as prescribed  Psychiatric status since the last visit: stable         Current Outpatient Medications:     Alpha-D-Galactosidase (BEANO PO), Take by mouth, Disp: , Rfl:     DiphenhydrAMINE HCl, Sleep, (SLEEP AID) 25 MG CAPS, Take 1 capsule by mouth daily, Disp: , Rfl:     etodolac (LODINE) 400 MG tablet, Take 1 tablet (400 mg total) by mouth 2 (two) times a day for 30 days, Disp: 60 tablet, Rfl: 0    Fluvoxamine Maleate 100 MG CP24, Take 1 capsule (100 mg total) by mouth daily at bedtime, Disp: 30 capsule, Rfl: 2    Fluvoxamine Maleate 150 MG CP24, Take 1 capsule (150 mg total) by mouth daily, Disp: 30 each, Rfl: 2    gabapentin (NEURONTIN) 300 mg capsule, Take 1 capsule (300 mg total) by mouth 2 (two) times a day for 30 days, Disp: 60 capsule, Rfl: 2    lisinopril (ZESTRIL) 10 mg tablet, TAKE 1 TABLET BY MOUTH EVERY DAY, Disp: 7 tablet, Rfl: 0    montelukast (SINGULAIR) 10 mg tablet, TAKE 1 TABLET BY MOUTH EVERY DAY, Disp: 21 tablet, Rfl: 0    omeprazole (PriLOSEC) 40 MG capsule, Take 1 capsule (40 mg total) by mouth daily, Disp: 30 capsule, Rfl: 1    oxymetazoline (AFRIN) 0 05 % nasal spray, 2 sprays by Each Nare route 2 (two) times a day, Disp: , Rfl:    Sleep: impaired due to severe sleep apnea   Appetite: dieting  Medication side effects: No   ROS: N/A  Vitals Taken yes 248 8 lbs        Progress Toward Goals: stable    Assessment: Pt reports continuing control of symptoms of OCD with current medication  Has been dx with severe sleep apnea, cannot use CPAP because cannot sleep with anything touching him other than his sheet, intends to get a dental implant that would maintain an open airway during sleep  Woulld like to get an Inspire implant for sleep apnea but cannot qualify now because of weight, wants to lose weight anyway, so he is working with a  to lose weight, focusing on protein and carbs, 2500 kcal daily  Suicide/Homicide Risk Assessment: See mental Status Exam Below    Mental Status Evaluation:  Appearance:  casually dressed   Behavior:  pleasant, cooperative, calm   Speech:  normal rate and volume   Mood:  euthymic   Affect:  normal range and intensity, appropriate   Thought Process:  organized, coherent, goal directed   Associations: intact associations   Thought Content:  normal   Perceptual Disturbances: none   Risk Potential: Suicidal ideation - None  Homicidal ideation - None  Potential for aggression - No   Sensorium:  oriented to person, place and time/date   Memory:  recent and remote memory grossly intact   Consciousness:  alert and awake   Attention/Concentration: attention span and concentration are age appropriate   Insight:  good   Judgment: good       Recommended Treatment:     Planned medication and treatment changes: All current active medications have been reviewed  Continue fluvoxamine  mg one cap by mouth daily; fluvoxamine  mg mg daily; total daily dose 250 mg      Risks / Benefits of Treatment:    Risks, benefits, and possible side effects of medications explained to patient and patient verbalizes understanding and agreement for treatment  Counseling / Coordination of Care:    Medications, treatment progress and treatment plan reviewed with patient      ANGIE Page 09/30/20

## 2020-10-01 ENCOUNTER — TELEPHONE (OUTPATIENT)
Dept: FAMILY MEDICINE CLINIC | Facility: CLINIC | Age: 30
End: 2020-10-01

## 2020-10-01 DIAGNOSIS — I10 ESSENTIAL HYPERTENSION: ICD-10-CM

## 2020-10-01 DIAGNOSIS — J30.9 ALLERGIC RHINITIS, UNSPECIFIED SEASONALITY, UNSPECIFIED TRIGGER: ICD-10-CM

## 2020-10-01 RX ORDER — LISINOPRIL 10 MG/1
10 TABLET ORAL DAILY
Qty: 7 TABLET | Refills: 0 | OUTPATIENT
Start: 2020-10-01

## 2020-10-01 RX ORDER — MONTELUKAST SODIUM 10 MG/1
10 TABLET ORAL DAILY
Qty: 21 TABLET | Refills: 0 | OUTPATIENT
Start: 2020-10-01

## 2020-10-02 RX ORDER — LISINOPRIL 10 MG/1
10 TABLET ORAL DAILY
Qty: 7 TABLET | Refills: 0 | Status: SHIPPED | OUTPATIENT
Start: 2020-10-02 | End: 2020-10-09 | Stop reason: SDUPTHER

## 2020-10-02 RX ORDER — MONTELUKAST SODIUM 10 MG/1
10 TABLET ORAL DAILY
Qty: 30 TABLET | Refills: 5 | Status: SHIPPED | OUTPATIENT
Start: 2020-10-02 | End: 2021-03-23

## 2020-10-09 DIAGNOSIS — I10 ESSENTIAL HYPERTENSION: ICD-10-CM

## 2020-10-11 RX ORDER — LISINOPRIL 10 MG/1
10 TABLET ORAL DAILY
Qty: 90 TABLET | Refills: 0 | Status: SHIPPED | OUTPATIENT
Start: 2020-10-11 | End: 2021-01-02 | Stop reason: SDUPTHER

## 2020-10-12 ENCOUNTER — TELEPHONE (OUTPATIENT)
Dept: SLEEP CENTER | Facility: CLINIC | Age: 30
End: 2020-10-12

## 2020-10-29 DIAGNOSIS — K21.9 GASTROESOPHAGEAL REFLUX DISEASE: ICD-10-CM

## 2020-10-29 RX ORDER — OMEPRAZOLE 40 MG/1
CAPSULE, DELAYED RELEASE ORAL
Qty: 30 CAPSULE | Refills: 1 | Status: SHIPPED | OUTPATIENT
Start: 2020-10-29 | End: 2020-12-28

## 2020-11-02 ENCOUNTER — OFFICE VISIT (OUTPATIENT)
Dept: FAMILY MEDICINE CLINIC | Facility: CLINIC | Age: 30
End: 2020-11-02
Payer: COMMERCIAL

## 2020-11-02 VITALS
OXYGEN SATURATION: 98 % | SYSTOLIC BLOOD PRESSURE: 130 MMHG | BODY MASS INDEX: 38.14 KG/M2 | HEIGHT: 67 IN | RESPIRATION RATE: 16 BRPM | DIASTOLIC BLOOD PRESSURE: 80 MMHG | WEIGHT: 243 LBS | TEMPERATURE: 98.4 F | HEART RATE: 80 BPM

## 2020-11-02 DIAGNOSIS — F32.A DEPRESSION, UNSPECIFIED DEPRESSION TYPE: ICD-10-CM

## 2020-11-02 DIAGNOSIS — E78.2 MIXED HYPERLIPIDEMIA: ICD-10-CM

## 2020-11-02 DIAGNOSIS — K21.9 GASTROESOPHAGEAL REFLUX DISEASE, UNSPECIFIED WHETHER ESOPHAGITIS PRESENT: ICD-10-CM

## 2020-11-02 DIAGNOSIS — G47.30 SLEEP APNEA, UNSPECIFIED TYPE: ICD-10-CM

## 2020-11-02 DIAGNOSIS — Z23 ENCOUNTER FOR IMMUNIZATION: ICD-10-CM

## 2020-11-02 DIAGNOSIS — E79.0 HYPERURICEMIA: ICD-10-CM

## 2020-11-02 DIAGNOSIS — I10 ESSENTIAL HYPERTENSION: ICD-10-CM

## 2020-11-02 DIAGNOSIS — Z00.00 WELL ADULT HEALTH CHECK: Primary | ICD-10-CM

## 2020-11-02 PROCEDURE — 90686 IIV4 VACC NO PRSV 0.5 ML IM: CPT

## 2020-11-02 PROCEDURE — 3075F SYST BP GE 130 - 139MM HG: CPT

## 2020-11-02 PROCEDURE — 99395 PREV VISIT EST AGE 18-39: CPT

## 2020-11-02 PROCEDURE — 3079F DIAST BP 80-89 MM HG: CPT

## 2020-11-02 PROCEDURE — 3008F BODY MASS INDEX DOCD: CPT

## 2020-11-02 PROCEDURE — 1036F TOBACCO NON-USER: CPT

## 2020-11-02 PROCEDURE — 3725F SCREEN DEPRESSION PERFORMED: CPT

## 2020-11-02 PROCEDURE — 90471 IMMUNIZATION ADMIN: CPT

## 2020-11-02 RX ORDER — FLUTICASONE PROPIONATE 50 MCG
SPRAY, SUSPENSION (ML) NASAL
COMMUNITY
Start: 2019-11-04 | End: 2020-11-02

## 2020-11-02 RX ORDER — FEXOFENADINE HCL 180 MG/1
TABLET ORAL
COMMUNITY
Start: 2020-10-05 | End: 2021-05-05 | Stop reason: ALTCHOICE

## 2020-11-02 RX ORDER — PHENOL 1.4 %
AEROSOL, SPRAY (ML) MUCOUS MEMBRANE
COMMUNITY
Start: 2020-06-08

## 2020-11-02 RX ORDER — CETIRIZINE HYDROCHLORIDE 10 MG/1
TABLET ORAL
COMMUNITY
Start: 2020-03-19 | End: 2020-11-02

## 2020-11-02 RX ORDER — FLUVOXAMINE MALEATE 100 MG/1
CAPSULE, EXTENDED RELEASE ORAL
COMMUNITY
Start: 2019-06-11 | End: 2020-11-02 | Stop reason: SDUPTHER

## 2020-11-02 RX ORDER — TRIAMCINOLONE ACETONIDE 55 UG/1
SPRAY, METERED NASAL
COMMUNITY
Start: 2020-10-05 | End: 2021-05-05 | Stop reason: ALTCHOICE

## 2020-12-15 DIAGNOSIS — F42.2 MIXED OBSESSIONAL THOUGHTS AND ACTS: ICD-10-CM

## 2020-12-16 RX ORDER — FLUVOXAMINE MALEATE 150 MG/1
CAPSULE, EXTENDED RELEASE ORAL
Qty: 30 CAPSULE | Refills: 2 | Status: SHIPPED | OUTPATIENT
Start: 2020-12-16 | End: 2021-03-23 | Stop reason: SDUPTHER

## 2020-12-16 RX ORDER — FLUVOXAMINE MALEATE 100 MG/1
CAPSULE, EXTENDED RELEASE ORAL
Qty: 30 CAPSULE | Refills: 2 | Status: SHIPPED | OUTPATIENT
Start: 2020-12-16 | End: 2021-03-23 | Stop reason: SDUPTHER

## 2020-12-28 DIAGNOSIS — K21.9 GASTROESOPHAGEAL REFLUX DISEASE: ICD-10-CM

## 2020-12-28 RX ORDER — OMEPRAZOLE 40 MG/1
CAPSULE, DELAYED RELEASE ORAL
Qty: 30 CAPSULE | Refills: 1 | Status: SHIPPED | OUTPATIENT
Start: 2020-12-28 | End: 2021-02-25

## 2020-12-30 ENCOUNTER — TELEMEDICINE (OUTPATIENT)
Dept: PSYCHIATRY | Facility: CLINIC | Age: 30
End: 2020-12-30
Payer: COMMERCIAL

## 2020-12-30 DIAGNOSIS — F42.9 OBSESSIVE-COMPULSIVE DISORDER, UNSPECIFIED TYPE: Primary | ICD-10-CM

## 2020-12-30 DIAGNOSIS — F42.2 MIXED OBSESSIONAL THOUGHTS AND ACTS: ICD-10-CM

## 2020-12-30 PROCEDURE — 99213 OFFICE O/P EST LOW 20 MIN: CPT | Performed by: NURSE PRACTITIONER

## 2021-01-02 DIAGNOSIS — I10 ESSENTIAL HYPERTENSION: ICD-10-CM

## 2021-01-03 DIAGNOSIS — I10 ESSENTIAL HYPERTENSION: ICD-10-CM

## 2021-01-05 RX ORDER — LISINOPRIL 10 MG/1
10 TABLET ORAL DAILY
Qty: 90 TABLET | Refills: 0 | Status: SHIPPED | OUTPATIENT
Start: 2021-01-05 | End: 2021-03-30

## 2021-01-05 RX ORDER — LISINOPRIL 10 MG/1
TABLET ORAL
Qty: 90 TABLET | Refills: 0 | Status: SHIPPED | OUTPATIENT
Start: 2021-01-05 | End: 2021-05-05

## 2021-02-24 DIAGNOSIS — K21.9 GASTROESOPHAGEAL REFLUX DISEASE: ICD-10-CM

## 2021-02-25 RX ORDER — OMEPRAZOLE 40 MG/1
CAPSULE, DELAYED RELEASE ORAL
Qty: 30 CAPSULE | Refills: 1 | Status: SHIPPED | OUTPATIENT
Start: 2021-02-25 | End: 2021-04-26

## 2021-03-04 ENCOUNTER — OFFICE VISIT (OUTPATIENT)
Dept: URGENT CARE | Facility: CLINIC | Age: 31
End: 2021-03-04
Payer: COMMERCIAL

## 2021-03-04 VITALS
RESPIRATION RATE: 18 BRPM | HEIGHT: 67 IN | DIASTOLIC BLOOD PRESSURE: 86 MMHG | WEIGHT: 248 LBS | SYSTOLIC BLOOD PRESSURE: 145 MMHG | BODY MASS INDEX: 38.92 KG/M2 | OXYGEN SATURATION: 95 % | HEART RATE: 101 BPM | TEMPERATURE: 98.7 F

## 2021-03-04 DIAGNOSIS — B37.2 SKIN YEAST INFECTION: Primary | ICD-10-CM

## 2021-03-04 PROCEDURE — 99283 EMERGENCY DEPT VISIT LOW MDM: CPT | Performed by: NURSE PRACTITIONER

## 2021-03-04 PROCEDURE — 99203 OFFICE O/P NEW LOW 30 MIN: CPT | Performed by: NURSE PRACTITIONER

## 2021-03-04 PROCEDURE — G0382 LEV 3 HOSP TYPE B ED VISIT: HCPCS | Performed by: NURSE PRACTITIONER

## 2021-03-04 RX ORDER — NYSTATIN 100000 [USP'U]/G
POWDER TOPICAL 3 TIMES DAILY
Qty: 15 G | Refills: 1 | Status: SHIPPED | OUTPATIENT
Start: 2021-03-04 | End: 2021-05-05 | Stop reason: ALTCHOICE

## 2021-03-04 NOTE — PROGRESS NOTES
[facility]        NAME: Ruben Castillo is a 35 y o  male  : 1990    MRN: [id number]  DATE: 2021  TIME: 10:43 AM    Assessment and Plan   Skin yeast infection [B37 2]  1  Skin yeast infection  nystatin (MYCOSTATIN) powder         Patient Instructions   Nystatin powder 3 times a day   Keep area clean and dry   If rash worsens, follow up with your PCP    Follow up with PCP in 3-5 days  Proceed to  ER if symptoms worsen  Chief Complaint     Chief Complaint   Patient presents with    Rash     For about a month patient noticed itchyness around pelvic area and has been using a cortisone cream  It has now developed into a rash which does not seem to be getting any better  History of Present Illness       Patient is a 35year old male presenting with rash in bilateral groin folds for the past 3-4 weeks  The rash is itchy and dry  Denies fever, chills, or drainage from rash  He was applying Cortisone 10 without improvement  Review of Systems   Review of Systems   Constitutional: Negative for activity change, chills and fever  Respiratory: Negative for cough and shortness of breath  Gastrointestinal: Negative for abdominal pain, diarrhea, nausea and vomiting  Musculoskeletal: Negative for arthralgias and gait problem  Skin: Positive for rash  Negative for color change and wound  Neurological: Negative for headaches           Current Medications       Current Outpatient Medications:     Alpha-D-Galactosidase (BEANO PO), Take by mouth, Disp: , Rfl:     fexofenadine (ALLEGRA) 180 MG tablet, , Disp: , Rfl:     Fluvoxamine Maleate 100 MG CP24, TAKE 1 CAPSULE BY MOUTH AT BEDTIME, Disp: 30 capsule, Rfl: 2    Fluvoxamine Maleate 150 MG CP24, TAKE 1 CAPSULE BY MOUTH EVERY DAY, Disp: 30 capsule, Rfl: 2    lisinopril (ZESTRIL) 10 mg tablet, Take 1 tablet (10 mg total) by mouth daily, Disp: 90 tablet, Rfl: 0    lisinopril (ZESTRIL) 10 mg tablet, TAKE 1 TABLET BY MOUTH EVERY DAY, Disp: 90 tablet, Rfl: 0    Melatonin 10 MG TABS, , Disp: , Rfl:     montelukast (SINGULAIR) 10 mg tablet, Take 1 tablet (10 mg total) by mouth daily, Disp: 30 tablet, Rfl: 5    omeprazole (PriLOSEC) 40 MG capsule, TAKE 1 CAPSULE BY MOUTH EVERY DAY, Disp: 30 capsule, Rfl: 1    oxymetazoline (AFRIN) 0 05 % nasal spray, 2 sprays by Each Nare route 2 (two) times a day, Disp: , Rfl:     Triamcinolone Acetonide 55 MCG/ACT AERO, , Disp: , Rfl:     nystatin (MYCOSTATIN) powder, Apply topically 3 (three) times a day, Disp: 15 g, Rfl: 1    Current Allergies     Allergies as of 03/04/2021 - Reviewed 03/04/2021   Allergen Reaction Noted    Acetazolamide      Penicillins Hives 10/30/2017    Seasonal ic  [cholestatin]  10/24/2017    Sulfa antibiotics GI Intolerance 10/30/2017            The following portions of the patient's history were reviewed and updated as appropriate: allergies, current medications, past family history, past medical history, past social history, past surgical history and problem list      Past Medical History:   Diagnosis Date    Acne 09/01/2010    Last Assessed 01 Sep 2010   Acquired ankle/foot deformity     Last assessed 23 Feb 2017  Resolved 14 Aug 2017   Change in hearing     Resolved 16 Aug 2017    Closed displaced avulsion fracture of tuberosity of left calcaneus     Last Assessed 25 May 2017  Resolved 14 Aug 2017   Epistaxis     Last Assessed 18 Feb 2014  Resolved 08 Jun 2016   Foreign body in foot     Last Assessed 30 Jul 2013  Resolved 12 Oct 2013   Gastritis     Last Assessed 22 Oct 2012    GERD (gastroesophageal reflux disease)     History of oral aphthous ulcers     Last Assessed 08 June 2016  Resolved 14 Aug 2017   Hypersomnia 02/18/2009    Last Assessed 18 Feb 2009  Resolved 16 Aug 2017   Impacted cerumen     Last Assessed 08 Jun 2016  Resolved 08 Jul 2016      Insomnia     Irritable bowel syndrome     Knee joint pain 03/10/2008    Left foot pain     Last Assessed 19 May 2017  Resolved 14 Aug 2017   OCD (obsessive compulsive disorder)     Plantar fibromatosis     Last Assessed 19 Oct 2016  Resolved 14 Aug 2017   Seasonal allergies     Tarsal tunnel syndrome of left side     Last Assessed 21 Aug 2017  Resolved 21 Aug 2017   Tinea corporis     Last Assessed 05 Dec 2011       Past Surgical History:   Procedure Laterality Date    ESOPHAGOGASTRODUODENOSCOPY N/A 10/31/2017    Procedure: ESOPHAGOGASTRODUODENOSCOPY (EGD); Surgeon: Gabrielle Loyola MD;  Location: [facility] GI [facility]; Service: Gastroenterology    NO PAST SURGERIES      WI LENGTH/SHORT LEG/ANKL TENDON,SINGLE Left 11/3/2017    Procedure: CALCANEAL OSTECTOMY, ACHILLEST TENDON LENGTHING;  Surgeon: Samantha Hunter DPM;  Location: [facility] OR;  Service: Podiatry       Family History   Problem Relation Age of Onset    Kidney disease Mother     Thyroid disease Mother     Allergic rhinitis Mother     Hypertension Father     Kidney disease Father     Gout Father     No Known Problems Sister     Colonic polyp Maternal Grandmother     Cancer Family     Diabetes Family          Medications have been verified  Objective   /86   Pulse 101   Temp 98 7 °F (37 1 °C)   Resp 18   Ht 5' 7" (1 702 m)   Wt 112 kg (248 lb)   SpO2 95%   BMI 38 84 kg/m²        Physical Exam     Physical Exam  Vitals signs reviewed  Constitutional:       General: He is awake  Appearance: Normal appearance  He is normal weight  HENT:      Head: Normocephalic  Right Ear: Hearing normal       Left Ear: Hearing normal    Cardiovascular:      Rate and Rhythm: Regular rhythm  Tachycardia present  Pulmonary:      Effort: Pulmonary effort is normal       Breath sounds: No decreased breath sounds, wheezing, rhonchi or rales  Skin:     General: Skin is warm and moist       Findings: Rash present  Rash is macular and papular  Comments: Macular papular rash in bilateral groin folds     Neurological: General: No focal deficit present  Mental Status: He is alert, oriented to person, place, and time and easily aroused  Psychiatric:         Behavior: Behavior is cooperative

## 2021-03-04 NOTE — PATIENT INSTRUCTIONS
Nystatin powder 3 times a day   Keep area clean and dry   If rash worsens, follow up with your PCP    Skin Yeast Infection   WHAT YOU NEED TO KNOW:   Yeast is normally present on the skin  Infection happens when you have too much yeast, or when it gets into a cut on your skin  Certain types of mold and fungus can cause a yeast infection  A skin yeast infection can appear anywhere on your skin or nail beds  Skin yeast infections are usually found on warm, moist parts of the body  Examples include between skin folds or under the breasts  DISCHARGE INSTRUCTIONS:   Return to the emergency department if:   · You have signs of infection, such as pus, warmth or red streaks coming from the wound, or a fever  Contact your healthcare provider if:   · Your symptoms worsen or do not get better within 7 to 10 days  · You have new or returning signs of a skin yeast infection after treatment  · You have questions or concerns about your condition or care  Medicines:   · Antifungal medicine  may be given as a cream, ointment, or pill  · Take your medicine as directed  Contact your healthcare provider if you think your medicine is not helping or if you have side effects  Tell him or her if you are allergic to any medicine  Keep a list of the medicines, vitamins, and herbs you take  Include the amounts, and when and why you take them  Bring the list or the pill bottles to follow-up visits  Carry your medicine list with you in case of an emergency  Care for the skin near the infection:  You may only have discolored patches of skin, or areas that are dry and flaking  Care for these skin problems as directed by your healthcare provider  If you have painful skin or an open sore, you will need to protect the skin and prevent damage  You will also need to keep the skin dry as much as possible  Ask your healthcare provider how to care for your skin while the infection clears   The following are general guidelines for caring for painful or open skin:  · Keep the skin clean  Ask your healthcare provider if you should wash with mild soap and water  Do not use soap that contains alcohol  Alcohol can dry and irritate the skin and make symptoms worse  Your baby's healthcare provider may tell you to use diaper cream or ointment when you change his diaper  This will protect the skin and prevent moisture from collecting  · Keep the skin dry  Pat the area dry with a towel  Do not rub, because this may irritate the skin  If you have a skin yeast infection between skin folds, lift the top part gently and hold it while you dry between your skin folds  Always dry your feet completely after you swim or bathe, including between your toes  Dry your skin if you are sweating from exercise or exposure to heat  Use a clean towel each time to prevent spreading or continuing the infection  · Keep the skin protected  Ask your healthcare provider if you should cover the area with a bandage or leave it open  Check your skin each day to make sure you do not have new or worsening problems  You may need to have someone check the skin if you cannot see the area easily      Prevent another skin yeast infection:   · Do not share clothing or towels    · Wear shower shoes if you need to use a public shower    · Dry your feet completely after you bathe, and apply antifungal powder or cream as directed    · Put on socks before you get dressed so you do not spread fungus from your feet    · Wear light clothing that allows air to get to your skin    · Manage your weight to prevent skin folds where yeast can collect    · Manage diabetes    · Change your baby's diaper often, and keep the area clean and dry as much as possible    · Use a diaper cream or ointment that contains zinc oxide or dimethicone on your baby's diaper area as directed    Follow up with your healthcare provider as directed:  Write down your questions so you remember to ask them during your visits  © Copyright [address] [place] [address] Information is for End User's use only and may not be sold, redistributed or otherwise used for commercial purposes  All illustrations and images included in CareNotes® are the copyrighted property of A D A M , Inc  or Eleonora Castro  The above information is an  only  It is not intended as medical advice for individual conditions or treatments  Talk to your doctor, nurse or pharmacist before following any medical regimen to see if it is safe and effective for you

## 2021-03-11 ENCOUNTER — CONSULT (OUTPATIENT)
Dept: MULTI SPECIALTY CLINIC | Facility: CLINIC | Age: 31
End: 2021-03-11

## 2021-03-11 VITALS
SYSTOLIC BLOOD PRESSURE: 125 MMHG | HEIGHT: 67 IN | BODY MASS INDEX: 38.61 KG/M2 | WEIGHT: 246 LBS | DIASTOLIC BLOOD PRESSURE: 79 MMHG | HEART RATE: 73 BPM | TEMPERATURE: 97.1 F

## 2021-03-11 DIAGNOSIS — G47.30 SLEEP APNEA, UNSPECIFIED TYPE: ICD-10-CM

## 2021-03-11 DIAGNOSIS — H91.93 BILATERAL HEARING LOSS, UNSPECIFIED HEARING LOSS TYPE: Primary | ICD-10-CM

## 2021-03-11 PROCEDURE — 99243 OFF/OP CNSLTJ NEW/EST LOW 30: CPT | Performed by: OTOLARYNGOLOGY

## 2021-03-11 NOTE — PROGRESS NOTES
[facility] [address] [facility] [address] [facility] Otolaryngology      Otolaryngology -- Head and Neck Surgery New Patient Visit    Jorge Zelaya is a 35 y o  who presents with a chief complaint of hearing loss    Pertinent elements of the history include:  He has trouble hearing  He has not had tinnitus  No FH of early onset hearing loss  No history of noise exposure  Denies otalgia, otorrhea, dizziness, or vertigo  He had a screening audiogram years ago and was told everything was ok  He has had symptoms for years  He has trouble understanding others in background noise  Symptoms affect both ears equally  He also has a history of YOSELIN and an AHI of 30  He has tried cpap but feels it is not helpful  Review of systems: Pertinent review of systems documented in the HPI  10 point ROS documented in a separate note, as necessary  Results reviewed; images from any scan have been personally reviewed: The past medical, surgical, social and family history have been reviewed as documented in today's record  Past Medical History:   Diagnosis Date    Acne 09/01/2010    Last Assessed 01 Sep 2010   Acquired ankle/foot deformity     Last assessed 23 Feb 2017  Resolved 14 Aug 2017   Change in hearing     Resolved 16 Aug 2017    Closed displaced avulsion fracture of tuberosity of left calcaneus     Last Assessed 25 May 2017  Resolved 14 Aug 2017   Epistaxis     Last Assessed 18 Feb 2014  Resolved 08 Jun 2016   Foreign body in foot     Last Assessed 30 Jul 2013  Resolved 12 Oct 2013   Gastritis     Last Assessed 22 Oct 2012    GERD (gastroesophageal reflux disease)     History of oral aphthous ulcers     Last Assessed 08 June 2016  Resolved 14 Aug 2017   Hypersomnia 02/18/2009    Last Assessed 18 Feb 2009  Resolved 16 Aug 2017   Impacted cerumen     Last Assessed 08 Jun 2016  Resolved 08 Jul 2016      Insomnia     Irritable bowel syndrome     Knee joint pain 03/10/2008  Left foot pain     Last Assessed 19 May 2017  Resolved 14 Aug 2017   OCD (obsessive compulsive disorder)     Plantar fibromatosis     Last Assessed 19 Oct 2016  Resolved 14 Aug 2017   Seasonal allergies     Tarsal tunnel syndrome of left side     Last Assessed 21 Aug 2017  Resolved 21 Aug 2017   Tinea corporis     Last Assessed 05 Dec 2011       Past Surgical History:   Procedure Laterality Date    ESOPHAGOGASTRODUODENOSCOPY N/A 10/31/2017    Procedure: ESOPHAGOGASTRODUODENOSCOPY (EGD); Surgeon: Javier Lopez MD;  Location: [facility] GI LAB;   Service: Gastroenterology    NO PAST SURGERIES      GA LENGTH/SHORT LEG/ANKL TENDON,SINGLE Left 11/3/2017    Procedure: CALCANEAL OSTECTOMY, ACHILLEST TENDON [address];  Surgeon: Mary Tadeo DPM;  Location: [facility];  Service: Podiatry       Family History   Problem Relation Age of Onset    Kidney disease Mother     Thyroid disease Mother     Allergic rhinitis Mother     Hypertension Father     Kidney disease Father    Osker Ok Gout Father     No Known Problems Sister     Colonic polyp Maternal Grandmother     Cancer Family     Diabetes Family        Social History     Socioeconomic History    Marital status: Single     Spouse name: Not on file    Number of children: Not on file    Years of education: Not on file    Highest education level: Not on file   Occupational History    Not on file   Social Needs    Financial resource strain: Not hard at all   Hoda-Chris insecurity     Worry: Never true     Inability: Never true    Transportation needs     Medical: No     Non-medical: No   Tobacco Use    Smoking status: Never Smoker    Smokeless tobacco: Never Used   Substance and Sexual Activity    Alcohol use: No    Drug use: No    Sexual activity: Not on file   Lifestyle    Physical activity     Days per week: 4 days     Minutes per session: 60 min    Stress: Not at all   Relationships    Social connections     Talks on phone: More than three times a week     Gets together: Twice a week     Attends [employer] service: Never     Active member of club or organization: Not on file     Attends meetings of clubs or organizations: Not on file     Relationship status: Not on file    Intimate partner violence     Fear of current or ex partner: No     Emotionally abused: No     Physically abused: No     Forced sexual activity: No   Other Topics Concern    Not on file   Social History Narrative    Feels safe at home       Current Outpatient Medications on File Prior to Visit   Medication Sig Dispense Refill    Alpha-D-Galactosidase (BEANO PO) Take by mouth      fexofenadine (ALLEGRA) 180 MG tablet       Fluvoxamine Maleate 100 MG CP24 TAKE 1 CAPSULE BY MOUTH AT BEDTIME 30 capsule 2    Fluvoxamine Maleate 150 MG CP24 TAKE 1 CAPSULE BY MOUTH EVERY DAY 30 capsule 2    lisinopril (ZESTRIL) 10 mg tablet Take 1 tablet (10 mg total) by mouth daily 90 tablet 0    Melatonin 10 MG TABS       montelukast (SINGULAIR) 10 mg tablet Take 1 tablet (10 mg total) by mouth daily 30 tablet 5    nystatin (MYCOSTATIN) powder Apply topically 3 (three) times a day 15 g 1    omeprazole (PriLOSEC) 40 MG capsule TAKE 1 CAPSULE BY MOUTH EVERY DAY 30 capsule 1    oxymetazoline (AFRIN) 0 05 % nasal spray 2 sprays by Each Nare route 2 (two) times a day      Triamcinolone Acetonide 55 MCG/ACT AERO       lisinopril (ZESTRIL) 10 mg tablet TAKE 1 TABLET BY MOUTH EVERY DAY (Patient not taking: Reported on 3/11/2021) 90 tablet 0     No current facility-administered medications on file prior to visit  Physical exam:   /79 (BP Location: Left arm, Patient Position: Sitting, Cuff Size: Large)   Pulse 73   Temp (!) 97 1 °F (36 2 °C) (Temporal)   Ht 5' 7" (1 702 m)   Wt 112 kg (246 lb)   BMI 38 53 kg/m²     Constitutional:  Well developed, well nourished and groomed, in no acute distress        Eyes:  Extra-ocular movements intact, pupils equally round and reactive to light and accommodation, the lids and conjunctivae are normal in appearance  Head: Atraumatic, normocephalic, no visible scalp lesions, bony palpation unremarkable without stepoffs, parotid and submandibular salivary glands non-tender to palpation and without masses bilaterally  Ears:  Auricles normal in appearance bilaterally, mastoid prominence non-tender, external auditory canals clear bilaterally  Tympanic membranes intact  Normal appearing ossicles  Nose/Sinuses:  External appearance unremarkable, no maxillary or frontal sinus tenderness to palpation bilaterally  Anterior rhinoscopy reveals:      Oral Cavity:  Moist mucus membranes, gums and dentition unremarkable, no oral mucosal masses or lesions, floor of mouth soft, tongue mobile without masses or lesions  Oropharynx:  Base of tongue soft and without masses, tonsils bilaterally unremarkable, soft palate mucosa unremarkable  malampati grade 4     Neck:  No visible or palpable cervical lesions or lymphadenopathy, thyroid gland is normal in size and symmetry and without masses, normal laryngeal elevation with swallowing  Cardiovascular:  Normal rate and rhythm, no palpable thrills, no jugulovenous distension observed  Respiratory:  Normal respiratory effort without evidence of retractions or use of accessory muscles  Integument:  Normal appearing without observed masses or lesions  Neurologic:  Cranial nerves II-XII intact bilaterally  Psychiatric:  Alert and oriented to time, place and person, normal affect  Procedures        Assessment:   1  Bilateral hearing loss, unspecified hearing loss type  Ambulatory referral to Audiology   2   Sleep apnea, unspecified type  Ambulatory Referral to Otolaryngology       Orders  Orders Placed This Encounter   Procedures    Ambulatory referral to Audiology     Standing Status:   Future     Standing Expiration Date:   3/11/2022     Referral Priority:   Routine     Referral Type:   Audiology Referral Reason:   Specialty Services Required     Requested Specialty:   Audiology     Number of Visits Requested:   1     Expiration Date:   3/11/2022         Discussion/Plan:    1  He notes longstanding hearing loss  Follow up after audiogram to assess further  2  He has severe varsha  Currently he does not qualify for inspire based on BMI restrictions  He will work on weight loss and follow up with Dr Ella Castleman at his next clinic visit to discuss the procedure in more detail

## 2021-03-12 ENCOUNTER — OFFICE VISIT (OUTPATIENT)
Dept: AUDIOLOGY | Age: 31
End: 2021-03-12
Payer: COMMERCIAL

## 2021-03-12 DIAGNOSIS — H90.3 SENSORINEURAL HEARING LOSS, BILATERAL: Primary | ICD-10-CM

## 2021-03-12 PROCEDURE — 92557 COMPREHENSIVE HEARING TEST: CPT | Performed by: AUDIOLOGIST

## 2021-03-12 PROCEDURE — 92567 TYMPANOMETRY: CPT | Performed by: AUDIOLOGIST

## 2021-03-12 NOTE — PROGRESS NOTES
HEARING EVALUATION    Name:  Amelie Mcdermott  :  1990  Age:  35 y o  Date of Evaluation: 21     History: Difficulty Understanding  Reason for visit: Amelie Mcdermott is being seen today at the request of Dr Jonathan Boggs for an evaluation of hearing  Patient reports marked difficulty hearing and understanding  A history of chronic ear infections as a child is reported  He denies ear pain, tinnitus and dizziness  EVALUATION:    Otoscopic Evaluation:   Right Ear: Minimum cerumen    Left Ear: Clear and healthy ear canal and tympanic membrane    Tympanometry:   Right: Type A - normal middle ear pressure and compliance   Left: Type A - normal middle ear pressure and compliance    Audiogram Results:  Normal peripheral hearing sensitivity through 4K Hz with mild high frequency hearing loss thereafter  Excellent speech discrimination under quiet conditions in each ear  *see attached audiogram      RECOMMENDATIONS:  Annual hearing eval, Return to [facility]  for F/U and Copy to Patient/Caregiver    PATIENT EDUCATION:   Discussed results and recommendations with patient  Briefly discussed auditory processing coping strategies  Questions were addressed and the patient was encouraged to contact our department should concerns arise        Nedra Alexander   Clinical Audiologist

## 2021-03-19 DIAGNOSIS — F42.2 MIXED OBSESSIONAL THOUGHTS AND ACTS: ICD-10-CM

## 2021-03-19 RX ORDER — FLUVOXAMINE MALEATE 150 MG/1
CAPSULE, EXTENDED RELEASE ORAL
Qty: 30 CAPSULE | Refills: 2 | OUTPATIENT
Start: 2021-03-19

## 2021-03-19 RX ORDER — FLUVOXAMINE MALEATE 100 MG/1
CAPSULE, EXTENDED RELEASE ORAL
Qty: 30 CAPSULE | Refills: 2 | OUTPATIENT
Start: 2021-03-19

## 2021-03-23 ENCOUNTER — TELEMEDICINE (OUTPATIENT)
Dept: PSYCHIATRY | Facility: CLINIC | Age: 31
End: 2021-03-23

## 2021-03-23 DIAGNOSIS — J30.9 ALLERGIC RHINITIS, UNSPECIFIED SEASONALITY, UNSPECIFIED TRIGGER: ICD-10-CM

## 2021-03-23 DIAGNOSIS — F42.2 MIXED OBSESSIONAL THOUGHTS AND ACTS: Primary | ICD-10-CM

## 2021-03-23 PROCEDURE — 99214 OFFICE O/P EST MOD 30 MIN: CPT | Performed by: NURSE PRACTITIONER

## 2021-03-23 RX ORDER — MONTELUKAST SODIUM 10 MG/1
TABLET ORAL
Qty: 30 TABLET | Refills: 5 | Status: SHIPPED | OUTPATIENT
Start: 2021-03-23 | End: 2021-09-01 | Stop reason: SDUPTHER

## 2021-03-23 RX ORDER — FLUVOXAMINE MALEATE 100 MG/1
CAPSULE, EXTENDED RELEASE ORAL
Qty: 30 CAPSULE | Refills: 2 | Status: SHIPPED | OUTPATIENT
Start: 2021-03-23 | End: 2021-06-16 | Stop reason: SDUPTHER

## 2021-03-23 RX ORDER — FLUVOXAMINE MALEATE 150 MG/1
CAPSULE, EXTENDED RELEASE ORAL
Qty: 30 CAPSULE | Refills: 2 | Status: SHIPPED | OUTPATIENT
Start: 2021-03-23 | End: 2021-06-16 | Stop reason: SDUPTHER

## 2021-03-23 NOTE — PSYCH
PROGRESS NOTE        [address] [place] [address]      Name and Date of Birth:  Jayden Jarrell 35 y o  1990    Date of Visit: 03/23/21    Pt was spoken to today for medication management for 30 minutes by phone because pt was unable to connect via Teams  Door to office was closed; provider was alone in office, pt aware and consented to phone session  Time spent on Epic chart review of relevant information, note composition, and after-visit plan: 5 minutes  Total time for this visit:  35 minutes      SUBJECTIVE: fluvoxamine continues to be effective  He is doing well, no changes  Is basically just chillin'  Using opportunity not to have to work to 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  Has been slacking with weight loss, but plans to start again  He has an appointment with and ENT, thinks that he will be told again to bring his weight down  He has found that with minimal effort he loses weight, just walking helps a lot  12/30/2020: fluvoxamine continues to effectively control obsessional thoughts and acts  Decreasing weight so that he can have Inspire implant for severe sleep apnea, since he cannot use a CPAP  Continue fluvoxamine 100 mg cap one cap po qd; fluvoxamine 150 mg cap one cap po qd     9/30/2020 Pt reports continuing control of symptoms of OCD with current medication  Has been dx with severe sleep apnea, cannot use CPAP because cannot sleep with anything touching him other than his sheet, intends to get a dental implant that would maintain an open airway during sleep  Would like to get an Inspire implant for sleep apnea but cannot qualify now because of weight, wants to lose weight anyway, so he is working with a  to lose weight, focusing on protein and carbs, 2500 kcal daily          He denies suicidal ideation, intent or plan at present, has no suicidal ideation, intent or plan at present      He denies any auditory hallucinations and visual hallucinations, denies any other delusional thinking, denies any delusional thinking  He denies any side effects from medications    HPI ROS Appetite Changes and Sleep: normal appetite, impaired sleep    Review Of Systems:      Constitutional Negative   ENT Negative   Cardiovascular Negative   Respiratory Negative   Gastrointestinal Negative   Genitourinary Negative   Musculoskeletal Negative   Integumentary Negative   Neurological Negative   Endocrine Negative   Other Symptoms Negative and None       Laboratory Results: No results found for this or any previous visit      Substance Abuse History:    Social History     Substance and Sexual Activity   Drug Use No       Family Psychiatric History:     Family History   Problem Relation Age of Onset    Kidney disease Mother     Thyroid disease Mother     Allergic rhinitis Mother     Hypertension Father     Kidney disease Father     Gout Father     No Known Problems Sister     Colonic polyp Maternal Grandmother     Cancer Family     Diabetes Family        The following portions of the patient's history were reviewed and updated as appropriate: past family history, past medical history, past social history, past surgical history and problem list     Social History     Socioeconomic History    Marital status: Single     Spouse name: Not on file    Number of children: Not on file    Years of education: Not on file    Highest education level: Not on file   Occupational History    Not on file   Social Needs    Financial resource strain: Not hard at all   [place] insecurity     Worry: Never true     Inability: Never true   MD [employer] needs     Medical: No     Non-medical: No   Tobacco Use    Smoking status: Never Smoker    Smokeless tobacco: Never Used   Substance and Sexual Activity    Alcohol use: No    Drug use: No    Sexual activity: Not on file   Lifestyle    Physical activity     Days per week: 4 days     Minutes per session: 60 min  Stress: Not at all   Relationships    Social connections     Talks on phone: More than three times a week     Gets together: Twice a week     Attends Denominational service: Never     Active member of club or organization: Not on file     Attends meetings of clubs or organizations: Not on file     Relationship status: Not on file    Intimate partner violence     Fear of current or ex partner: No     Emotionally abused: No     Physically abused: No     Forced sexual activity: No   Other Topics Concern    Not on file   Social History Narrative    Feels safe at home     Social History     Social History Narrative    Feels safe at home        Social History     Tobacco History     Smoking Status  Never Smoker    Smokeless Tobacco Use  Never Used          Alcohol History     Alcohol Use Status  No          Drug Use     Drug Use Status  No          Sexual Activity     Sexually Active  Not Asked          Activities of Daily Living    Not Asked                     OBJECTIVE:     Mental Status Evaluation:      Appearance Phone appointment, not assessed   Behavior pleasant, cooperative   Speech normal volume, normal pitch   Mood euthymic   Affect Phone appointment, not assessed   Thought Processes Coherent, organized, goal-directed   Associations intact associations   Thought Content normal   Perceptual Disturbances: none   Abnormal Thoughts  Risk Potential Suicidal ideation - None  Homicidal ideation - None  Potential for aggression - No   Orientation oriented to person, place, time/date and situation   Memory recent and remote memory grossly intact   Cosciousness alert and awake   Attention Span attention span and concentration are age appropriate   Intellect Nor formally assessed   Insight age appropriate    Judgement good    Muscle Strength and  Gait Phone appointment, not assessed   Language no difficulty naming common objects   Fund of Knowledge displays adequate knowledge of current events   Pain none   Pain Scale 0 Assessment/Plan:       Diagnoses and all orders for this visit:    Mixed obsessional thoughts and acts          Treatment Recommendations/Precautions:    Continue current medications: fluvoxamine 100 mg cap one cap po qd; fluvoxamine 150 mg cap one cap po qd       Risks/Benefits      Risks, Benefits And Possible Side Effects Of Medications:    Risks, benefits, and possible side effects of medications explained to patient and patient verbalizes understanding and agreement for treatment      Controlled Medication Discussion:     Not applicable    Psychotherapy Provided:     Individual psychotherapy provided: No

## 2021-03-30 DIAGNOSIS — I10 ESSENTIAL HYPERTENSION: ICD-10-CM

## 2021-03-30 RX ORDER — LISINOPRIL 10 MG/1
TABLET ORAL
Qty: 90 TABLET | Refills: 0 | Status: SHIPPED | OUTPATIENT
Start: 2021-03-30 | End: 2021-06-22

## 2021-04-09 ENCOUNTER — OFFICE VISIT (OUTPATIENT)
Dept: MULTI SPECIALTY CLINIC | Facility: CLINIC | Age: 31
End: 2021-04-09

## 2021-04-09 VITALS
WEIGHT: 247 LBS | DIASTOLIC BLOOD PRESSURE: 68 MMHG | BODY MASS INDEX: 38.69 KG/M2 | SYSTOLIC BLOOD PRESSURE: 103 MMHG | TEMPERATURE: 98.3 F | HEART RATE: 96 BPM

## 2021-04-09 DIAGNOSIS — G47.33 MODERATE OBSTRUCTIVE SLEEP APNEA: Primary | ICD-10-CM

## 2021-04-09 DIAGNOSIS — H90.3 SENSORINEURAL HEARING LOSS (SNHL), BILATERAL: ICD-10-CM

## 2021-04-09 PROCEDURE — 99214 OFFICE O/P EST MOD 30 MIN: CPT | Performed by: OTOLARYNGOLOGY

## 2021-04-09 NOTE — PROGRESS NOTES
Yuriy Mendiola is a 35 y o male who presents for re-evaluation of   Sleep apnea, and hearing loss  He underwent an audiogram which demonstrated mild high-frequency sensorineural hearing loss  He feels that this study was inadequate  He believes the  Procedure did not characterize his hearing loss adequately and he has substantially more hearing loss than is noted on the audiogram   He wants to get hearing aids and has tried amplification devices but does not feel that they help him  He has a history of moderate obstructive sleep apnea bordering on to severe with an AHI of 29 8 he is interested in inspire therapy as he does not feel that he can wear a mask and has not tolerated the mask previously due to discomfort  Last sleep study was in September of 2020  No previous airway evaluation  Past Medical History:   Diagnosis Date    Acne 09/01/2010    Last Assessed 01 Sep 2010   Acquired ankle/foot deformity     Last assessed 23 Feb 2017  Resolved 14 Aug 2017   Change in hearing     Resolved 16 Aug 2017    Closed displaced avulsion fracture of tuberosity of left calcaneus     Last Assessed 25 May 2017  Resolved 14 Aug 2017   Epistaxis     Last Assessed 18 Feb 2014  Resolved 08 Jun 2016   Foreign body in foot     Last Assessed 30 Jul 2013  Resolved 12 Oct 2013   Gastritis     Last Assessed 22 Oct 2012    GERD (gastroesophageal reflux disease)     History of oral aphthous ulcers     Last Assessed 08 June 2016  Resolved 14 Aug 2017   Hypersomnia 02/18/2009    Last Assessed 18 Feb 2009  Resolved 16 Aug 2017   Impacted cerumen     Last Assessed 08 Jun 2016  Resolved 08 Jul 2016   Insomnia     Irritable bowel syndrome     Knee joint pain 03/10/2008    Left foot pain     Last Assessed 19 May 2017  Resolved 14 Aug 2017   OCD (obsessive compulsive disorder)     Plantar fibromatosis     Last Assessed 19 Oct 2016  Resolved 14 Aug 2017      Seasonal allergies     Tarsal tunnel syndrome of left side     Last Assessed 21 Aug 2017  Resolved 21 Aug 2017   Tinea corporis     Last Assessed 05 Dec 2011       /68 (BP Location: Right arm, Patient Position: Sitting, Cuff Size: Large)   Pulse 96   Temp 98 3 °F (36 8 °C) (Temporal)   Wt 112 kg (247 lb)   BMI 38 69 kg/m²       Physical Exam   Constitutional: Oriented to person, place, and time  Well-developed and well-nourished, no apparent distress, non-toxic appearance  Cooperative, able to hear and answer questions without difficulty  Voice: Normal voice quality  Head: Normocephalic, atraumatic  No scars, masses or lesions  Face: Symmetric, no edema, no sinus tenderness  Eyes: Vision grossly intact, extra-ocular movement intact  Ears: External ear normal   Bilateral tympanic membranes are intact with intact normal landmarks  No post-auricular erythema or tenderness  Nose: Septum midline, nares clear  Mucosa moist, turbinates well appearing  No crusting, polyps or discharge evident  Oral cavity: Dentition intact  Mucosa moist, lips normal   Tongue mobile, floor of mouth normal   Hard palate unremarkable  No masses or lesions  Oropharynx: Uvula is midline, soft palate normal   Unremarkable oropharyngeal inlet  Tonsils unremarkable  Posterior pharyngeal wall clear  No masses or lesions  Salivary glands:  Parotid glands and submandibular glands symmetric, no enlargement or tenderness  Neck: Normal laryngeal elevation with swallow  Trachea midline  No masses or lesions  No palpable adenopathy  Thyroid: normal in size, unremarkable without tenderness or palpable nodules  Pulmonary/Chest: Normal effort and rate  No respiratory distress  Musculoskeletal: Normal range of motion  Neurological: Cranial nerves 2-12 intact  Skin: Skin is warm and dry  Psychiatric: Normal mood and affect      A/P:   Bilateral sensorineural hearing loss:  I have given him multiple locations that he can go to to seek a repeat audiogram but have reassured him that the findings are likely correct  I have discussed with him that even though the majority of his hearing is within the normal range the test cannot determine what his hearing was previously and he may have lost some prior to this wall still remaining in the normal range and this could indicate why he feels a greater loss than is currently being demonstrated  Moderate obstructive sleep apnea: We discussed the nature of sleep apnea  We discussed options for management  We discussed that at this time due to his BMI of 38 69 he does not meet criteria for inspire therapy  We discussed options for weight loss and he would prefer to perform this on his own  Offered the option of weight management and he declined  He will attempt to lose the weight on his own  We also discussed that at this time his insurance does not cover the procedure and would leave him with a significant out-of-pocket cost   I have recommended that he look into the [address] [place] [address] options that are available for him for purchase during the time in which he is losing weight  We will see him back in 2-3 months for re-evaluation or sooner should he have any further problems

## 2021-04-26 DIAGNOSIS — K21.9 GASTROESOPHAGEAL REFLUX DISEASE: ICD-10-CM

## 2021-04-26 RX ORDER — OMEPRAZOLE 40 MG/1
CAPSULE, DELAYED RELEASE ORAL
Qty: 30 CAPSULE | Refills: 1 | Status: SHIPPED | OUTPATIENT
Start: 2021-04-26 | End: 2021-06-28

## 2021-05-03 ENCOUNTER — APPOINTMENT (OUTPATIENT)
Dept: LAB | Facility: HOSPITAL | Age: 31
End: 2021-05-03
Payer: COMMERCIAL

## 2021-05-03 DIAGNOSIS — E79.0 HYPERURICEMIA: ICD-10-CM

## 2021-05-03 DIAGNOSIS — E78.2 MIXED HYPERLIPIDEMIA: ICD-10-CM

## 2021-05-03 LAB
ALBUMIN SERPL BCP-MCNC: 4.8 G/DL (ref 3.4–4.8)
ALP SERPL-CCNC: 78 U/L (ref 10–129)
ALT SERPL W P-5'-P-CCNC: 58 U/L (ref 5–63)
ANION GAP SERPL CALCULATED.3IONS-SCNC: 13 MMOL/L (ref 4–13)
AST SERPL W P-5'-P-CCNC: 31 U/L (ref 15–41)
BILIRUB SERPL-MCNC: 0.43 MG/DL (ref 0.3–1.2)
BUN SERPL-MCNC: 9 MG/DL (ref 6–20)
CALCIUM SERPL-MCNC: 10.4 MG/DL (ref 8.4–10.2)
CHLORIDE SERPL-SCNC: 103 MMOL/L (ref 96–108)
CHOLEST SERPL-MCNC: 245 MG/DL
CO2 SERPL-SCNC: 26 MMOL/L (ref 22–33)
CREAT SERPL-MCNC: 0.94 MG/DL (ref 0.5–1.2)
GFR SERPL CREATININE-BSD FRML MDRD: 109 ML/MIN/1.73SQ M
GLUCOSE P FAST SERPL-MCNC: 89 MG/DL (ref 70–105)
HDLC SERPL-MCNC: 42 MG/DL
LDLC SERPL CALC-MCNC: 173 MG/DL (ref 0–100)
POTASSIUM SERPL-SCNC: 4.2 MMOL/L (ref 3.5–5)
PROT SERPL-MCNC: 8 G/DL (ref 6.4–8.3)
SODIUM SERPL-SCNC: 142 MMOL/L (ref 133–145)
TRIGL SERPL-MCNC: 152.4 MG/DL
URATE SERPL-MCNC: 8 MG/DL (ref 3.4–8.7)

## 2021-05-03 PROCEDURE — 80053 COMPREHEN METABOLIC PANEL: CPT

## 2021-05-03 PROCEDURE — 84550 ASSAY OF BLOOD/URIC ACID: CPT

## 2021-05-03 PROCEDURE — 36415 COLL VENOUS BLD VENIPUNCTURE: CPT

## 2021-05-03 PROCEDURE — 80061 LIPID PANEL: CPT

## 2021-05-05 ENCOUNTER — OFFICE VISIT (OUTPATIENT)
Dept: FAMILY MEDICINE CLINIC | Facility: CLINIC | Age: 31
End: 2021-05-05
Payer: COMMERCIAL

## 2021-05-05 VITALS
HEART RATE: 70 BPM | OXYGEN SATURATION: 98 % | WEIGHT: 247 LBS | TEMPERATURE: 98.7 F | SYSTOLIC BLOOD PRESSURE: 124 MMHG | BODY MASS INDEX: 38.77 KG/M2 | HEIGHT: 67 IN | DIASTOLIC BLOOD PRESSURE: 74 MMHG | RESPIRATION RATE: 16 BRPM

## 2021-05-05 DIAGNOSIS — K21.9 GASTROESOPHAGEAL REFLUX DISEASE WITHOUT ESOPHAGITIS: Primary | ICD-10-CM

## 2021-05-05 DIAGNOSIS — Z11.4 SCREENING FOR HIV (HUMAN IMMUNODEFICIENCY VIRUS): ICD-10-CM

## 2021-05-05 DIAGNOSIS — I10 ESSENTIAL HYPERTENSION: ICD-10-CM

## 2021-05-05 DIAGNOSIS — E78.2 MIXED HYPERLIPIDEMIA: ICD-10-CM

## 2021-05-05 DIAGNOSIS — F32.5 MAJOR DEPRESSIVE DISORDER WITH SINGLE EPISODE, IN REMISSION (HCC): ICD-10-CM

## 2021-05-05 DIAGNOSIS — Z23 ENCOUNTER FOR IMMUNIZATION: ICD-10-CM

## 2021-05-05 PROCEDURE — 99214 OFFICE O/P EST MOD 30 MIN: CPT | Performed by: FAMILY MEDICINE

## 2021-05-05 PROCEDURE — 3725F SCREEN DEPRESSION PERFORMED: CPT | Performed by: FAMILY MEDICINE

## 2021-06-09 ENCOUNTER — TELEPHONE (OUTPATIENT)
Dept: SLEEP CENTER | Facility: CLINIC | Age: 31
End: 2021-06-09

## 2021-06-09 ENCOUNTER — OFFICE VISIT (OUTPATIENT)
Dept: SLEEP CENTER | Facility: CLINIC | Age: 31
End: 2021-06-09
Payer: COMMERCIAL

## 2021-06-09 VITALS
HEART RATE: 92 BPM | WEIGHT: 242 LBS | DIASTOLIC BLOOD PRESSURE: 70 MMHG | SYSTOLIC BLOOD PRESSURE: 120 MMHG | HEIGHT: 67 IN | BODY MASS INDEX: 37.98 KG/M2

## 2021-06-09 DIAGNOSIS — Z01.812 ENCOUNTER FOR PREPROCEDURE SCREENING LABORATORY TESTING FOR COVID-19: Primary | ICD-10-CM

## 2021-06-09 DIAGNOSIS — G47.19 EXCESSIVE DAYTIME SLEEPINESS: ICD-10-CM

## 2021-06-09 DIAGNOSIS — F45.8 BRUXISM: ICD-10-CM

## 2021-06-09 DIAGNOSIS — G47.33 OSA (OBSTRUCTIVE SLEEP APNEA): Primary | ICD-10-CM

## 2021-06-09 DIAGNOSIS — I10 ESSENTIAL HYPERTENSION: ICD-10-CM

## 2021-06-09 DIAGNOSIS — J30.9 ALLERGIC RHINITIS, UNSPECIFIED SEASONALITY, UNSPECIFIED TRIGGER: ICD-10-CM

## 2021-06-09 DIAGNOSIS — F42.9 OBSESSIVE-COMPULSIVE DISORDER, UNSPECIFIED TYPE: ICD-10-CM

## 2021-06-09 DIAGNOSIS — G47.09 OTHER INSOMNIA: ICD-10-CM

## 2021-06-09 DIAGNOSIS — F98.8 ADD (ATTENTION DEFICIT DISORDER) WITHOUT HYPERACTIVITY: ICD-10-CM

## 2021-06-09 DIAGNOSIS — E66.9 OBESITY (BMI 30-39.9): ICD-10-CM

## 2021-06-09 DIAGNOSIS — F32.A DEPRESSION, UNSPECIFIED DEPRESSION TYPE: ICD-10-CM

## 2021-06-09 DIAGNOSIS — G47.61 PLMD (PERIODIC LIMB MOVEMENT DISORDER): ICD-10-CM

## 2021-06-09 DIAGNOSIS — Z20.822 ENCOUNTER FOR PREPROCEDURE SCREENING LABORATORY TESTING FOR COVID-19: Primary | ICD-10-CM

## 2021-06-09 PROCEDURE — 3074F SYST BP LT 130 MM HG: CPT | Performed by: INTERNAL MEDICINE

## 2021-06-09 PROCEDURE — 1036F TOBACCO NON-USER: CPT | Performed by: INTERNAL MEDICINE

## 2021-06-09 PROCEDURE — 3078F DIAST BP <80 MM HG: CPT | Performed by: INTERNAL MEDICINE

## 2021-06-09 PROCEDURE — 99214 OFFICE O/P EST MOD 30 MIN: CPT | Performed by: INTERNAL MEDICINE

## 2021-06-09 NOTE — PROGRESS NOTES
Follow-Up Note - [address]  35 y o  male  SIE:[birth year]  RFK:[id number]    CC: I saw this patient for follow-up in clinic today for obstructive sleep apnea, Coexisting Sleep and Medical Problems  He returns and now wants to try CPAP  The diagnostic study in September of 2020 demonstrated severe obstructive sleep apnea: AHI 29 8 /hour , higher while supine at 37 per hour and considerably higher during REM at 52 per hour  Very loud intensity  snoring  was noted  Minimum oxygen saturation 70 %  and 20 minutes of total sleep time was spent with saturations less than 90%  Sleep efficiency was slightly prolonged at 47 minutes  Sleep efficiency was 84 6%  There was severe periodic limb movements of sleep for an index of 51 per hour  PFSH, Problem List, Medications & Allergies were reviewed in EMR  Interval changes: none reported  He  has a past medical history of Acne (09/01/2010), Acquired ankle/foot deformity, Change in hearing, Closed displaced avulsion fracture of tuberosity of left calcaneus, Epistaxis, Foreign body in foot, Gastritis, GERD (gastroesophageal reflux disease), History of oral aphthous ulcers, Hypersomnia (02/18/2009), Impacted cerumen, Insomnia, Irritable bowel syndrome, Knee joint pain (03/10/2008), Left foot pain, Mixed hyperlipidemia (11/2/2020), OCD (obsessive compulsive disorder), Plantar fibromatosis, Seasonal allergies, Tarsal tunnel syndrome of left side, and Tinea corporis  He has a current medication list which includes the following prescription(s): alpha-d-galactosidase, fluvoxamine maleate, fluvoxamine maleate, lisinopril, melatonin, montelukast, omeprazole, and oxymetazoline  HPI:  He did follow up with his dentist and ENT  No treatment was initiated because neither of these options are covered by his insurance  He has ongoing symptoms of sleep disordered breathing as outlined in his initial report    He sleeps alone and is not aware of teeth grinding during sleep or jerking movements  Sleep Routine: He reports getting 5 hrs sleep out of approximately 9 hours in bed; he difficulty initiating and maintaining sleep   He struggles to awaken with alarms and never feels rested  Roxana Delgado reports Excessive Daytime Sleepiness feels like napping & does when has the opportunity but rated himself at Total score: 8 /24 on the [place] Sleepiness Scale  Habits: reports that he has never smoked  He has never used smokeless tobacco ,  reports no history of alcohol use ,  reports no history of drug use , Caffeine use: limited  , Exercise routine: regular    ROS: as attached  Significant for some intentional weight reduction  Allergies are controlled  Is no longer awakening with headaches  Acid reflux is controlled on Prilosec  Psychiatric conditions are stable on current medications  Charly Negrete EXAM: /70   Pulse 92   Ht 5' 7" (1 702 m)   Wt 110 kg (242 lb)   BMI 37 90 kg/m²   Patient is well groomed; well appearing  H&N: EOMI; NC/AT:no facial pressure marks, no rashes  Psych: cooperativeand in no distress  Mental state appears normal   CNS: Alert, orientated, clear & coherent speech  Skin/Extrem: col & hydration normal; no edema  Resp:  Respiratory effort is normal  Physical findings are otherwise unchanged from previous    IMPRESSION: Diagnoses, Problem List Items & Comorbidities Addressed this Visit   1  YOSELIN (obstructive sleep apnea)  CPAP Study   2  Other insomnia  CPAP Study   3  PLMD (periodic limb movement disorder)  CPAP Study   4  Excessive daytime sleepiness     5  Bruxism     6  Allergic rhinitis, unspecified seasonality, unspecified trigger     7  Essential hypertension     8  Depression, unspecified depression type     9  Obsessive-compulsive disorder, unspecified type     10  ADD (attention deficit disorder) without hyperactivity     11  Obesity (BMI 30-39  9)         PLAN:  1  I reviewed results of the Sleep study with the patient  2  With respect to above conditions, I counseled on pathophysiology, diagnosis, treatment options, risks and benefits; inter-relationship and effects on symptoms and comorbidities; risks of no treatment; costs and insurance aspects  3  Patient elected positive airway pressure therapy and wanted to undertake a titration study before initiating CPAP  4  Cognitive behavioral therapy was initiated, Sleep Hygiene and behavioral techniques to manage Insomnia were discussed  5  I also advised on weight reduction  6  Follow-up to be scheduled after the study to review results and to initiate therapy           Sincerely,    Authenticated electronically by Kassie Grant MD on 54/91/72   Board Certified Specialist

## 2021-06-09 NOTE — TELEPHONE ENCOUNTER
Patient informed that COVID testing is to be performed 5-10 days prior to PAP study  COVID tests performed more than 10 days prior to the sleep study will not be accepted  Testing site information provided as well as letter with testing dates  -EU

## 2021-06-09 NOTE — PATIENT INSTRUCTIONS

## 2021-06-16 ENCOUNTER — OFFICE VISIT (OUTPATIENT)
Dept: PSYCHIATRY | Facility: CLINIC | Age: 31
End: 2021-06-16

## 2021-06-16 VITALS — BODY MASS INDEX: 38.08 KG/M2 | WEIGHT: 242.6 LBS | HEIGHT: 67 IN

## 2021-06-16 DIAGNOSIS — F42.2 MIXED OBSESSIONAL THOUGHTS AND ACTS: Primary | ICD-10-CM

## 2021-06-16 PROCEDURE — 99213 OFFICE O/P EST LOW 20 MIN: CPT | Performed by: NURSE PRACTITIONER

## 2021-06-16 PROCEDURE — 3008F BODY MASS INDEX DOCD: CPT | Performed by: INTERNAL MEDICINE

## 2021-06-16 RX ORDER — FLUVOXAMINE MALEATE 100 MG/1
CAPSULE, EXTENDED RELEASE ORAL
Qty: 30 CAPSULE | Refills: 0 | Status: SHIPPED | OUTPATIENT
Start: 2021-06-16 | End: 2021-07-30 | Stop reason: SDUPTHER

## 2021-06-16 RX ORDER — FLUVOXAMINE MALEATE 150 MG/1
CAPSULE, EXTENDED RELEASE ORAL
Qty: 30 CAPSULE | Refills: 0 | Status: SHIPPED | OUTPATIENT
Start: 2021-06-16 | End: 2021-07-30 | Stop reason: SDUPTHER

## 2021-06-16 NOTE — PSYCH
PROGRESS NOTE        [facility]      Name and Date of Birth:  Edgar Clinton 35 y o  1990    Date of Visit: 06/16/21    Patient was seen in the office today for medication management and psychotherapy  COVID-19 precautions were followed at all times: masks were worn by pt and by writer at all times, social distancing was maintained, hand-washing was performed before and after appointment; and room was ventilated before pt entered the room and after pt left it  Time spent on psychotherapy: 7 minutes      Treatment modality: medication management; medication education, COVID vaccine information      SUBJECTIVE: doing good, no return of symptoms  Started losing weight again after brief pause due to a muscle strain in his neck, finally figured out the right diet for him  Doesn't think he is going to get the COVID vaccine  He denies suicidal ideation, intent or plan at present, has no suicidal ideation, intent or plan at present  He denies any auditory hallucinations and visual hallucinations, denies any other delusional thinking, denies any delusional thinking  He denies any side effects from medications    HPI ROS Appetite Changes and Sleep: normal appetite, sleep - apnea    Review Of Systems:      Constitutional Negative   ENT Negative   Cardiovascular Negative   Respiratory Negative   Gastrointestinal Negative   Genitourinary Negative   Musculoskeletal Negative   Integumentary Negative   Neurological Negative   Endocrine Negative   Other Symptoms Negative and None       Laboratory Results: No results found for this or any previous visit      Substance Abuse History:    Social History     Substance and Sexual Activity   Drug Use No       Family Psychiatric History:     Family History   Problem Relation Age of Onset    Kidney disease Mother     Thyroid disease Mother     Allergic rhinitis Mother     Hypertension Father     Kidney disease Father     Gout Father     No Known Problems Sister     Colonic polyp Maternal Grandmother     Cancer Family     Diabetes Family        The following portions of the patient's history were reviewed and updated as appropriate: past family history, past medical history, past social history, past surgical history and problem list     Social History     Socioeconomic History    Marital status: Single     Spouse name: Not on file    Number of children: Not on file    Years of education: Not on file    Highest education level: Not on file   Occupational History    Not on file   Tobacco Use    Smoking status: Never Smoker    Smokeless tobacco: Never Used   Vaping Use    Vaping Use: Never used   Substance and Sexual Activity    Alcohol use: No    Drug use: No    Sexual activity: Not on file   Other Topics Concern    Not on file   Social History Narrative    Feels safe at home     Social Determinants of Health     Financial Resource Strain: Low Risk     Difficulty of Paying Living Expenses: Not hard at all   Food Insecurity: No Food Insecurity    Worried About [employer] [employer] in the Last Year: Never true    [address] [facility] [employer] in the Last Year: Never true   Transportation Needs: No Transportation Needs    Lack of Transportation (Medical): No    Lack of Transportation (Non-Medical):  No   Physical Activity: Sufficiently Active    Days of Exercise per Week: 4 days    Minutes of Exercise per Session: 60 min   Stress: No Stress Concern Present    Feeling of Stress : Not at all   Social Connections: Unknown    Frequency of Communication with Friends and Family: More than three times a week    Frequency of Social Gatherings with Friends and Family: Twice a week    Attends Sabianism Services: Never    Active Member of Clubs or Organizations: Not on file    Attends Club or Organization Meetings: Not on file    Marital Status: Not on file   Intimate Partner Violence: Not At Risk    Fear of Current or Ex-Partner: No    Emotionally Abused: No    Physically Abused: No    Sexually Abused: No     Social History     Social History Narrative    Feels safe at home        Social History     Tobacco History     Smoking Status  Never Smoker    Smokeless Tobacco Use  Never Used          Alcohol History     Alcohol Use Status  No          Drug Use     Drug Use Status  No          Sexual Activity     Sexually Active  Not Asked          Activities of Daily Living    Not Asked                     OBJECTIVE:     Mental Status Evaluation:    Appearance age appropriate, casually dressed   Behavior pleasant, cooperative   Speech normal volume, normal pitch   Mood good   Affect Mood-congruent   Thought Processes Coherent, organized, goal-directed   Associations intact associations   Thought Content normal   Perceptual Disturbances: none   Abnormal Thoughts  Risk Potential Suicidal ideation - None  Homicidal ideation - None  Potential for aggression - No   Orientation oriented to person, place, time/date and situation   Memory recent and remote memory grossly intact   Cosciousness alert and awake   Attention Span attention span and concentration are age appropriate   Intellect Not formally assessed   Insight age appropriate    Judgement good    Muscle Strength and  Gait muscle strength and tone were normal   Language no difficulty naming common objects   Fund of Knowledge displays adequate knowledge of current events   Pain none   Pain Scale 0     Patient's chart was reviewed for relevant lab reports and recent encounters with other providers  Medications, treatment progress and treatment plan were reviewed with patient  Treatment plan was updated with patient who agreed with updated plan      Assessment/Plan:       Diagnoses and all orders for this visit:    Mixed obsessional thoughts and acts        Treatment Recommendations/Precautions:    Continue current medications:    fluvoxamine 100 mg capsule - take 1 capsule by mouth every day   fluvoxamine 150 mg capsule - take 1 capsule by mouth every day        Risks/Benefits       Risks, Benefits And Possible Side Effects Of Medications:     Risks, benefits, and possible side effects of medications explained to patient and patient verbalizes understanding and agreement for treatment      Controlled Medication Discussion:      Not applicable

## 2021-06-16 NOTE — PATIENT INSTRUCTIONS
Continue current medications:    fluvoxamine 100 mg capsule - take 1 capsule by mouth every day   fluvoxamine 150 mg capsule - take 1 capsule by mouth every day

## 2021-06-16 NOTE — BH TREATMENT PLAN
TREATMENT PLAN         [facility]    Name and Date of Birth:  Wilber Ramires 35 y o  1990    Date of Treatment Plan: June 16, 2021    Diagnosis/Diagnoses:    1  Mixed obsessional thoughts and acts         Strengths/Personal Resources for Self-Care: taking medications as prescribed, ability to communicate needs, motivation for treatment      Area/Areas of need (in own words): obsessional thoughts and acts  1  Long Term Goal: continue current status with medication  Target date - 12/16/2021  Person/Persons responsible for completion of goal: Rolan Abbott provider     2  Short Term Objective (s) - How will we reach this goal?:   A  Provider new recommended medication/dosage changes and/or continue medication(s): continue current medications as prescribed Luvox  B  Take medications as prescribed, attend scheduled appts  C  N/A  Target date - 12/16/2021  Person/Persons Responsible for Completion of Goal: ramandeep Bradley     Progress Towards Goals: stable     Treatment Modality: medication management every 12 weeks     Review due 120 - 180 days from date of this plan:  12/16/2021  Expected length of service: maintenance  My Physician/PA/NP and I have developed this plan together and I agree to work on the goals and objectives  I understand the treatment goals that were developed for my treatment

## 2021-06-22 DIAGNOSIS — I10 ESSENTIAL HYPERTENSION: ICD-10-CM

## 2021-06-22 RX ORDER — LISINOPRIL 10 MG/1
TABLET ORAL
Qty: 90 TABLET | Refills: 0 | Status: SHIPPED | OUTPATIENT
Start: 2021-06-22 | End: 2021-08-27

## 2021-06-28 DIAGNOSIS — K21.9 GASTROESOPHAGEAL REFLUX DISEASE: ICD-10-CM

## 2021-06-28 RX ORDER — OMEPRAZOLE 40 MG/1
CAPSULE, DELAYED RELEASE ORAL
Qty: 30 CAPSULE | Refills: 1 | Status: SHIPPED | OUTPATIENT
Start: 2021-06-28 | End: 2021-08-27

## 2021-07-26 ENCOUNTER — DOCUMENTATION (OUTPATIENT)
Dept: DERMATOLOGY | Facility: CLINIC | Age: 31
End: 2021-07-26

## 2021-07-26 ENCOUNTER — TELEPHONE (OUTPATIENT)
Dept: DERMATOLOGY | Facility: CLINIC | Age: 31
End: 2021-07-26

## 2021-07-30 DIAGNOSIS — F42.2 MIXED OBSESSIONAL THOUGHTS AND ACTS: Primary | ICD-10-CM

## 2021-07-30 RX ORDER — FLUVOXAMINE MALEATE 150 MG/1
CAPSULE, EXTENDED RELEASE ORAL
Qty: 7 CAPSULE | Refills: 0 | Status: SHIPPED | OUTPATIENT
Start: 2021-07-30 | End: 2021-08-06

## 2021-07-30 RX ORDER — FLUVOXAMINE MALEATE 100 MG/1
CAPSULE, EXTENDED RELEASE ORAL
Qty: 7 CAPSULE | Refills: 0 | Status: SHIPPED | OUTPATIENT
Start: 2021-07-30 | End: 2021-08-06

## 2021-07-31 ENCOUNTER — APPOINTMENT (OUTPATIENT)
Dept: LAB | Facility: HOSPITAL | Age: 31
End: 2021-07-31
Payer: COMMERCIAL

## 2021-07-31 DIAGNOSIS — E78.2 MIXED HYPERLIPIDEMIA: ICD-10-CM

## 2021-07-31 DIAGNOSIS — Z11.4 SCREENING FOR HIV (HUMAN IMMUNODEFICIENCY VIRUS): ICD-10-CM

## 2021-07-31 LAB
CHOLEST SERPL-MCNC: 210 MG/DL
HDLC SERPL-MCNC: 34 MG/DL
LDLC SERPL CALC-MCNC: 147 MG/DL (ref 0–100)
TRIGL SERPL-MCNC: 144.4 MG/DL

## 2021-07-31 PROCEDURE — 36415 COLL VENOUS BLD VENIPUNCTURE: CPT

## 2021-07-31 PROCEDURE — 80061 LIPID PANEL: CPT

## 2021-07-31 PROCEDURE — 87389 HIV-1 AG W/HIV-1&-2 AB AG IA: CPT

## 2021-08-02 LAB — HIV 1+2 AB+HIV1 P24 AG SERPL QL IA: NORMAL

## 2021-08-03 NOTE — PROGRESS NOTES
Depression Screening and Follow-up Plan: Patient assessed for underlying major depression  Brief counseling provided and recommend additional follow-up/re-evaluation next office visit  Patient advised to follow-up with PCP for further management  Assessment/Plan:         Problem List Items Addressed This Visit        Digestive    GE reflux - Primary     Patient to continue with present therapy and decrease caffeine, avoid ETOH and smoking to decrease acid production  Pt should also cease eating prior to bedtime and avoid excessive fluid intake prior to sleep  May use antacids as needed for breakthrough GERD  All pateint questions answered today about this condition  Respiratory    Allergic rhinitis     Patient is stable  and will continue present plan of care and reassess at next routine visit  All questions about this problem from patient were answered today  Relevant Medications    loratadine (CLARITIN) 10 mg tablet       Cardiovascular and Mediastinum    Essential hypertension     Patient is stable with current anti-hypertensive medicine and continue to follow a low sodium diet and take current medication  All questions about this condition were answered today  Other    Depression     Patient to continue utilizing medical therapy as well and counseling sources as applicable for condition  If  suicidal thought or fear of suicide to contact 911 and seek help immediately  Meds reviewed and patient questions answered today         Obesity (BMI 30-39  9)     Patient to increase exercise and partake of a diet with less calories to promote  weight loss         Mixed hyperlipidemia     To start lipitor 10mg today and recheck in 2 months  Discussed liver functions and signs of muscle pain           Relevant Medications    atorvastatin (LIPITOR) 10 mg tablet      Other Visit Diagnoses     Need for hepatitis C screening test        Encounter for immunization        Gastroesophageal reflux disease        Malnutrition, unspecified type (HCC)        Relevant Medications    Bacillus Coagulans-Inulin (Probiotic Formula) 1-250 BILLION-MG CAPS    Echinacea-Alatorre Seal Complex CAPS    zinc gluconate 50 mg tablet    Alpha-Lipoic Acid 200 MG CAPS    Vitamin D-Vitamin K (Vitamin K2-Vitamin D3)  MCG-UNIT CAPS    Ascorbic Acid (vitamin C) 1000 MG tablet    Omega-3 Fatty Acids (Fish Oil Concentrate) 1000 MG CAPS    Multiple Vitamin (multivitamin) capsule            Subjective:      Patient ID: Iram Taylor is a 28 y o  male  A 81-year-old white male who today for his yearly evaluation as well as checkup on his GERD OCD and BMI of 38 3  Patient well with his medication I also his blood pressure is doing well with that today too  Patient also with a growth on his neck he wants is to evaluate today  Patient also with high cholesterol will start him also Lipitor 10 mg low dose and recheck him back in 2 months please  The following portions of the patient's history were reviewed and updated as appropriate:   Past Medical History:  He has a past medical history of Acne (09/01/2010), Acquired ankle/foot deformity, Change in hearing, Closed displaced avulsion fracture of tuberosity of left calcaneus, Epistaxis, Foreign body in foot, Gastritis, GERD (gastroesophageal reflux disease), History of oral aphthous ulcers, Hypersomnia (02/18/2009), Impacted cerumen, Insomnia, Irritable bowel syndrome, Knee joint pain (03/10/2008), Left foot pain, Mixed hyperlipidemia (11/2/2020), OCD (obsessive compulsive disorder), Plantar fibromatosis, Seasonal allergies, Tarsal tunnel syndrome of left side, and Tinea corporis  ,  _______________________________________________________________________  Medical Problems:  does not have any pertinent problems on file ,  _______________________________________________________________________  Past Surgical History:   has a past surgical history that includes No past surgeries; Esophagogastroduodenoscopy (N/A, 10/31/2017); and pr length/short leg/ankl tendon,single (Left, 11/3/2017)  ,  _______________________________________________________________________  Family History:  family history includes Allergic rhinitis in his mother; Cancer in his family; Colonic polyp in his maternal grandmother; Diabetes in his family; Gout in his father; Hypertension in his father; Kidney disease in his father and mother; No Known Problems in his sister; Thyroid disease in his mother ,  _______________________________________________________________________  Social History:   reports that he has never smoked  He has never used smokeless tobacco  He reports that he does not drink alcohol and does not use drugs  ,  _______________________________________________________________________  Allergies:  is allergic to acetazolamide, penicillins, seasonal ic  [cholestatin], and sulfa antibiotics     _______________________________________________________________________  Current Outpatient Medications   Medication Sig Dispense Refill    Alpha-D-Galactosidase (BEANO PO) Take by mouth      Fluvoxamine Maleate 100 MG CP24 Take 1 capsule by mouth daily 7 capsule 0    Fluvoxamine Maleate 150 MG CP24 Take 1 capsule by mouth daily 7 capsule 0    lisinopril (ZESTRIL) 10 mg tablet TAKE 1 TABLET BY MOUTH EVERY DAY 90 tablet 0    Melatonin 10 MG TABS       montelukast (SINGULAIR) 10 mg tablet TAKE 1 TABLET BY MOUTH EVERY DAY 30 tablet 5    omeprazole (PriLOSEC) 40 MG capsule TAKE 1 CAPSULE BY MOUTH EVERY DAY 30 capsule 1    oxymetazoline (AFRIN) 0 05 % nasal spray 2 sprays by Each Nare route 2 (two) times a day      Alpha-Lipoic Acid 200 MG CAPS Take 1 capsule (200 mg total) by mouth daily 30 capsule 5    Ascorbic Acid (vitamin C) 1000 MG tablet Take 1 tablet (1,000 mg total) by mouth daily 30 tablet 5    atorvastatin (LIPITOR) 10 mg tablet Take 1 tablet (10 mg total) by mouth daily 30 tablet 5    Bacillus Coagulans-Inulin (Probiotic Formula) 1-250 BILLION-MG CAPS Take 1 Units by mouth daily 30 capsule 5    Echinacea-Alatorre Seal Complex CAPS Take 1 capsule by mouth daily 30 capsule 5    loratadine (CLARITIN) 10 mg tablet Take 1 tablet (10 mg total) by mouth daily 30 tablet 5    Multiple Vitamin (multivitamin) capsule Take 1 capsule by mouth daily 30 capsule 5    Omega-3 Fatty Acids (Fish Oil Concentrate) 1000 MG CAPS Take 3 capsules (3,000 mg total) by mouth daily 90 capsule 5    Vitamin D-Vitamin K (Vitamin K2-Vitamin D3)  MCG-UNIT CAPS Take 1 tablet by mouth 2 (two) times a day 60 capsule 1    zinc gluconate 50 mg tablet Take 1 tablet (50 mg total) by mouth daily 30 tablet 5     No current facility-administered medications for this visit      _______________________________________________________________________  Review of Systems   Constitutional: Negative for activity change, appetite change, chills, fatigue, fever and unexpected weight change  HENT: Negative for congestion, ear pain, hearing loss, mouth sores, postnasal drip, sinus pressure, sinus pain, sneezing and sore throat  Respiratory: Negative for apnea, cough, shortness of breath and wheezing  Cardiovascular: Negative for chest pain, palpitations and leg swelling  Gastrointestinal: Negative for abdominal pain, constipation, diarrhea, nausea and vomiting  Endocrine: Negative for cold intolerance and heat intolerance  Genitourinary: Negative for dysuria, frequency and hematuria  Musculoskeletal: Negative for arthralgias, back pain, gait problem, joint swelling and neck pain  Skin: Negative for rash  Neurological: Negative for dizziness, weakness and numbness  Hematological: Does not bruise/bleed easily  Psychiatric/Behavioral: Negative for agitation, behavioral problems, confusion, hallucinations and sleep disturbance  The patient is not nervous/anxious            Objective:  Vitals:    08/04/21 0923   BP: 122/70   BP Location: Left arm   Patient Position: Sitting   Cuff Size: Large   Pulse: 71   Resp: 18   Temp: 99 1 °F (37 3 °C)   SpO2: 97%   Weight: 111 kg (245 lb)   Height: 5' 7" (1 702 m)     Body mass index is 38 37 kg/m²  Physical Exam  Vitals and nursing note reviewed  Constitutional:       Appearance: He is well-developed  He is obese  HENT:      Head: Normocephalic and atraumatic  Nose: Nose normal       Mouth/Throat:      Mouth: Mucous membranes are moist    Eyes:      General: No scleral icterus  Conjunctiva/sclera: Conjunctivae normal       Pupils: Pupils are equal, round, and reactive to light  Neck:      Thyroid: No thyromegaly  Cardiovascular:      Rate and Rhythm: Normal rate and regular rhythm  Heart sounds: Normal heart sounds  Pulmonary:      Effort: Pulmonary effort is normal  No respiratory distress  Breath sounds: Normal breath sounds  No wheezing  Abdominal:      General: Bowel sounds are normal       Palpations: Abdomen is soft  Tenderness: There is no abdominal tenderness  There is no guarding or rebound  Musculoskeletal:         General: Normal range of motion  Cervical back: Normal range of motion and neck supple  Skin:     General: Skin is warm and dry  Findings: No rash  Neurological:      Mental Status: He is alert and oriented to person, place, and time  Psychiatric:         Mood and Affect: Mood normal          Behavior: Behavior normal          Thought Content:  Thought content normal          Judgment: Judgment normal

## 2021-08-04 ENCOUNTER — OFFICE VISIT (OUTPATIENT)
Dept: FAMILY MEDICINE CLINIC | Facility: CLINIC | Age: 31
End: 2021-08-04
Payer: COMMERCIAL

## 2021-08-04 VITALS
SYSTOLIC BLOOD PRESSURE: 122 MMHG | TEMPERATURE: 99.1 F | BODY MASS INDEX: 38.45 KG/M2 | WEIGHT: 245 LBS | HEART RATE: 71 BPM | OXYGEN SATURATION: 97 % | HEIGHT: 67 IN | DIASTOLIC BLOOD PRESSURE: 70 MMHG | RESPIRATION RATE: 18 BRPM

## 2021-08-04 DIAGNOSIS — E78.2 MIXED HYPERLIPIDEMIA: ICD-10-CM

## 2021-08-04 DIAGNOSIS — F32.5 MAJOR DEPRESSIVE DISORDER WITH SINGLE EPISODE, IN REMISSION (HCC): ICD-10-CM

## 2021-08-04 DIAGNOSIS — Z11.59 NEED FOR HEPATITIS C SCREENING TEST: ICD-10-CM

## 2021-08-04 DIAGNOSIS — K21.9 GASTROESOPHAGEAL REFLUX DISEASE WITHOUT ESOPHAGITIS: Primary | ICD-10-CM

## 2021-08-04 DIAGNOSIS — J30.9 ALLERGIC RHINITIS, UNSPECIFIED SEASONALITY, UNSPECIFIED TRIGGER: ICD-10-CM

## 2021-08-04 DIAGNOSIS — I10 ESSENTIAL HYPERTENSION: ICD-10-CM

## 2021-08-04 DIAGNOSIS — Z23 ENCOUNTER FOR IMMUNIZATION: ICD-10-CM

## 2021-08-04 DIAGNOSIS — E66.9 OBESITY (BMI 30-39.9): ICD-10-CM

## 2021-08-04 DIAGNOSIS — E46 MALNUTRITION, UNSPECIFIED TYPE (HCC): ICD-10-CM

## 2021-08-04 DIAGNOSIS — K21.9 GASTROESOPHAGEAL REFLUX DISEASE: ICD-10-CM

## 2021-08-04 PROCEDURE — 99214 OFFICE O/P EST MOD 30 MIN: CPT | Performed by: FAMILY MEDICINE

## 2021-08-04 PROCEDURE — 3008F BODY MASS INDEX DOCD: CPT | Performed by: FAMILY MEDICINE

## 2021-08-04 PROCEDURE — 3078F DIAST BP <80 MM HG: CPT | Performed by: FAMILY MEDICINE

## 2021-08-04 PROCEDURE — 1036F TOBACCO NON-USER: CPT | Performed by: FAMILY MEDICINE

## 2021-08-04 PROCEDURE — 3074F SYST BP LT 130 MM HG: CPT | Performed by: FAMILY MEDICINE

## 2021-08-04 PROCEDURE — 3725F SCREEN DEPRESSION PERFORMED: CPT | Performed by: FAMILY MEDICINE

## 2021-08-04 RX ORDER — LORATADINE 10 MG/1
10 TABLET ORAL DAILY
Qty: 30 TABLET | Refills: 5 | Status: SHIPPED | OUTPATIENT
Start: 2021-08-04 | End: 2021-09-27

## 2021-08-04 RX ORDER — SWAB
3 SWAB, NON-MEDICATED MISCELLANEOUS DAILY
Qty: 90 CAPSULE | Refills: 5 | Status: SHIPPED | OUTPATIENT
Start: 2021-08-04 | End: 2022-02-07

## 2021-08-04 RX ORDER — ATORVASTATIN CALCIUM 10 MG/1
10 TABLET, FILM COATED ORAL DAILY
Qty: 30 TABLET | Refills: 5 | Status: SHIPPED | OUTPATIENT
Start: 2021-08-04 | End: 2021-09-01 | Stop reason: SDUPTHER

## 2021-08-04 RX ORDER — BACITRACIN ZINC AND POLYMYXIN B SULFATE 500; 10000 [USP'U]/G; [USP'U]/G
1 OINTMENT TOPICAL DAILY
Qty: 30 CAPSULE | Refills: 5 | Status: SHIPPED | OUTPATIENT
Start: 2021-08-04 | End: 2022-02-07

## 2021-08-04 RX ORDER — CEPHRADINE 500 MG
1 CAPSULE ORAL DAILY
Qty: 30 CAPSULE | Refills: 5 | Status: SHIPPED | OUTPATIENT
Start: 2021-08-04 | End: 2022-02-07

## 2021-08-04 RX ORDER — ZINC GLUCONATE 50 MG
50 TABLET ORAL DAILY
Qty: 30 TABLET | Refills: 5 | Status: SHIPPED | OUTPATIENT
Start: 2021-08-04 | End: 2022-02-07

## 2021-08-04 RX ORDER — MULTIVIT WITH MINERALS/LUTEIN
1000 TABLET ORAL DAILY
Qty: 30 TABLET | Refills: 5 | Status: SHIPPED | OUTPATIENT
Start: 2021-08-04 | End: 2022-02-07

## 2021-08-04 RX ORDER — MULTIVITAMIN
1 CAPSULE ORAL DAILY
Qty: 30 CAPSULE | Refills: 5 | Status: SHIPPED | OUTPATIENT
Start: 2021-08-04 | End: 2022-03-08 | Stop reason: SDUPTHER

## 2021-08-04 NOTE — ASSESSMENT & PLAN NOTE
To start lipitor 10mg today and recheck in 2 months  Discussed liver functions and signs of muscle pain

## 2021-08-13 PROCEDURE — U0003 INFECTIOUS AGENT DETECTION BY NUCLEIC ACID (DNA OR RNA); SEVERE ACUTE RESPIRATORY SYNDROME CORONAVIRUS 2 (SARS-COV-2) (CORONAVIRUS DISEASE [COVID-19]), AMPLIFIED PROBE TECHNIQUE, MAKING USE OF HIGH THROUGHPUT TECHNOLOGIES AS DESCRIBED BY CMS-2020-01-R: HCPCS | Performed by: INTERNAL MEDICINE

## 2021-08-13 PROCEDURE — U0005 INFEC AGEN DETEC AMPLI PROBE: HCPCS | Performed by: INTERNAL MEDICINE

## 2021-08-20 ENCOUNTER — HOSPITAL ENCOUNTER (OUTPATIENT)
Dept: SLEEP CENTER | Facility: CLINIC | Age: 31
Discharge: HOME | End: 2021-08-20
Payer: COMMERCIAL

## 2021-08-20 DIAGNOSIS — G47.09 OTHER INSOMNIA: ICD-10-CM

## 2021-08-20 DIAGNOSIS — G47.33 OSA (OBSTRUCTIVE SLEEP APNEA): ICD-10-CM

## 2021-08-20 DIAGNOSIS — G47.61 PLMD (PERIODIC LIMB MOVEMENT DISORDER): ICD-10-CM

## 2021-08-20 PROCEDURE — 95811 POLYSOM 6/>YRS CPAP 4/> PARM: CPT

## 2021-08-21 NOTE — PROGRESS NOTES
Sleep Study Documentation    Pre-Sleep Study       Sleep testing procedure explained to patient:YES    Patient napped prior to study:NO    Caffeine:Dayshift worker after 12PM   Caffeine use:YES- tea  6 to 18 ounces, chocolate  6 ounces and ice tea  12 ounces    Alcohol:Dayshift workers after 5PM: Alcohol use:NO    Typical day for patient:YES       Study Documentation    Sleep Study Indications: YOSELIN diagnosed in lab    Sleep Study: Treatment   Optimal PAP pressure: 11cm  Leak:None  Snore:Eliminated  REM Obtained:yes  Supplemental O2: no    Minimum SaO2 88  Baseline SaO2 96 8%  PAP mask tried (list all)[employer] Simplus Full face medium, Resmed Airfit P10 Nasal pillows medium  PAP mask choice (final)Resmed Airfit P10 Nasal pillows medium  PAP mask type:pillows  PAP pressure at which snoring was eliminated 11cm  Minimum SaO2 at final PAP pressure 93  Mode of Therapy:CPAP  ETCO2:No  CPAP changed to BiPAP:No    EKG abnormalities: no     EEG abnormalities: no    Sleep Study Recorded < 2 hours: N/A    Sleep Study Recorded > 2 hours but incomplete study: N/A    Sleep Study Recorded 6 hours but no sleep obtained: NO    Patient classification: employed       Post-Sleep Study    Medication used at bedtime or during sleep study:YES over the counter sleep aid    Patient reports time it took to fall asleep:greater than 60 minutes    Patient reports waking up during study:1 to 2 times  Patient reports returning to sleep in 10 to 30 minutes  Patient reports sleeping 4 to 6 hours without dreaming  Patient reports sleep during study:worse than usual    Patient rated sleepiness: Somewhat sleepy or tired    PAP treatment:yes: Post PAP treatment patient reports feeling unchanged and would wear PAP mask at home

## 2021-08-25 ENCOUNTER — TELEPHONE (OUTPATIENT)
Dept: SLEEP CENTER | Facility: CLINIC | Age: 31
End: 2021-08-25

## 2021-08-27 DIAGNOSIS — K21.9 GASTROESOPHAGEAL REFLUX DISEASE: ICD-10-CM

## 2021-08-27 DIAGNOSIS — I10 ESSENTIAL HYPERTENSION: ICD-10-CM

## 2021-08-27 RX ORDER — OMEPRAZOLE 40 MG/1
CAPSULE, DELAYED RELEASE ORAL
Qty: 30 CAPSULE | Refills: 1 | Status: SHIPPED | OUTPATIENT
Start: 2021-08-27 | End: 2021-10-12

## 2021-08-27 RX ORDER — LISINOPRIL 10 MG/1
TABLET ORAL
Qty: 90 TABLET | Refills: 0 | Status: SHIPPED | OUTPATIENT
Start: 2021-08-27 | End: 2021-08-31 | Stop reason: SDUPTHER

## 2021-08-31 DIAGNOSIS — I10 ESSENTIAL HYPERTENSION: ICD-10-CM

## 2021-08-31 RX ORDER — LISINOPRIL 10 MG/1
10 TABLET ORAL DAILY
Qty: 90 TABLET | Refills: 1 | Status: SHIPPED | OUTPATIENT
Start: 2021-08-31 | End: 2022-02-07

## 2021-08-31 NOTE — TELEPHONE ENCOUNTER
Spoke with patient regarding fax received from [facility] patient will now be getting prescriptions filled through [facility] simple dose   Please resend lisinopril

## 2021-09-01 ENCOUNTER — OFFICE VISIT (OUTPATIENT)
Dept: URGENT CARE | Age: 31
End: 2021-09-01
Payer: COMMERCIAL

## 2021-09-01 ENCOUNTER — TELEPHONE (OUTPATIENT)
Dept: SLEEP CENTER | Facility: CLINIC | Age: 31
End: 2021-09-01

## 2021-09-01 VITALS
TEMPERATURE: 98.2 F | HEART RATE: 83 BPM | OXYGEN SATURATION: 97 % | RESPIRATION RATE: 18 BRPM | DIASTOLIC BLOOD PRESSURE: 91 MMHG | SYSTOLIC BLOOD PRESSURE: 140 MMHG

## 2021-09-01 DIAGNOSIS — H66.91 RIGHT OTITIS MEDIA, UNSPECIFIED OTITIS MEDIA TYPE: Primary | ICD-10-CM

## 2021-09-01 DIAGNOSIS — H61.23 BILATERAL IMPACTED CERUMEN: ICD-10-CM

## 2021-09-01 DIAGNOSIS — J30.9 ALLERGIC RHINITIS, UNSPECIFIED SEASONALITY, UNSPECIFIED TRIGGER: ICD-10-CM

## 2021-09-01 DIAGNOSIS — E46 MALNUTRITION, UNSPECIFIED TYPE (HCC): ICD-10-CM

## 2021-09-01 DIAGNOSIS — E78.2 MIXED HYPERLIPIDEMIA: ICD-10-CM

## 2021-09-01 PROCEDURE — 99213 OFFICE O/P EST LOW 20 MIN: CPT | Performed by: NURSE PRACTITIONER

## 2021-09-01 PROCEDURE — 69209 REMOVE IMPACTED EAR WAX UNI: CPT | Performed by: NURSE PRACTITIONER

## 2021-09-01 RX ORDER — AZITHROMYCIN 250 MG/1
TABLET, FILM COATED ORAL
Qty: 6 TABLET | Refills: 0 | Status: SHIPPED | OUTPATIENT
Start: 2021-09-01 | End: 2021-09-05

## 2021-09-01 RX ORDER — MONTELUKAST SODIUM 10 MG/1
10 TABLET ORAL DAILY
Qty: 30 TABLET | Refills: 0 | Status: SHIPPED | OUTPATIENT
Start: 2021-09-01 | End: 2021-10-05

## 2021-09-01 NOTE — PROGRESS NOTES
[facility]        NAME: Yasmeen Olvera is a 28 y o  male  : 1990    MRN: [id number]  DATE: 2021  TIME: 7:09 PM    Assessment and Plan   Right otitis media, unspecified otitis media type [H66 91]  1  Right otitis media, unspecified otitis media type  azithromycin (ZITHROMAX) 250 mg tablet   2  Bilateral impacted cerumen  Ear cerumen removal         Patient Instructions       Follow up with PCP in 3-5 days  Proceed to  ER if symptoms worsen  Chief Complaint     Chief Complaint   Patient presents with    Earache     c/o ear wax         History of Present Illness       HPI   Reports ears feel clogged  Says he gets wax in the ears often  He tried removing the wax at home with a home kit and was not successfully  Review of Systems   Review of Systems   Constitutional: Negative for chills and fever  HENT: Positive for hearing loss (feeling clogged)  Negative for ear discharge, postnasal drip and sore throat  Respiratory: Negative for shortness of breath  Neurological: Negative for headaches           Current Medications       Current Outpatient Medications:     Alpha-D-Galactosidase (BEANO PO), Take by mouth, Disp: , Rfl:     Alpha-Lipoic Acid 200 MG CAPS, Take 1 capsule (200 mg total) by mouth daily, Disp: 30 capsule, Rfl: 5    Ascorbic Acid (vitamin C) 1000 MG tablet, Take 1 tablet (1,000 mg total) by mouth daily, Disp: 30 tablet, Rfl: 5    atorvastatin (LIPITOR) 10 mg tablet, Take 1 tablet (10 mg total) by mouth daily, Disp: 30 tablet, Rfl: 5    azithromycin (ZITHROMAX) 250 mg tablet, Take 2 tablets today then 1 tablet daily x 4 days, Disp: 6 tablet, Rfl: 0    Bacillus Coagulans-Inulin (Probiotic Formula) 1-250 BILLION-MG CAPS, Take 1 Units by mouth daily, Disp: 30 capsule, Rfl: 5    Echinacea-Alatorre Seal Complex CAPS, Take 1 capsule by mouth daily, Disp: 30 capsule, Rfl: 5    Fluvoxamine Maleate 100 MG CP24, TAKE 1 CAPSULE BY MOUTH EVERY DAY, Disp: 30 capsule, Rfl: 0    Fluvoxamine Maleate 150 MG CP24, TAKE 1 CAPSULE BY MOUTH EVERY DAY, Disp: 30 capsule, Rfl: 0    lisinopril (ZESTRIL) 10 mg tablet, Take 1 tablet (10 mg total) by mouth daily, Disp: 90 tablet, Rfl: 1    loratadine (CLARITIN) 10 mg tablet, Take 1 tablet (10 mg total) by mouth daily, Disp: 30 tablet, Rfl: 5    Melatonin 10 MG TABS, , Disp: , Rfl:     montelukast (SINGULAIR) 10 mg tablet, Take 1 tablet (10 mg total) by mouth daily, Disp: 30 tablet, Rfl: 0    Multiple Vitamin (multivitamin) capsule, Take 1 capsule by mouth daily, Disp: 30 capsule, Rfl: 5    Omega-3 Fatty Acids (Fish Oil Concentrate) 1000 MG CAPS, Take 3 capsules (3,000 mg total) by mouth daily, Disp: 90 capsule, Rfl: 5    omeprazole (PriLOSEC) 40 MG capsule, TAKE 1 CAPSULE BY MOUTH EVERY DAY, Disp: 30 capsule, Rfl: 1    oxymetazoline (AFRIN) 0 05 % nasal spray, 2 sprays by Each Nare route 2 (two) times a day, Disp: , Rfl:     Vitamin D-Vitamin K (Vitamin K2-Vitamin D3)  MCG-UNIT CAPS, Take 1 tablet by mouth 2 (two) times a day, Disp: 60 capsule, Rfl: 1    zinc gluconate 50 mg tablet, Take 1 tablet (50 mg total) by mouth daily, Disp: 30 tablet, Rfl: 5    Current Allergies     Allergies as of 09/01/2021 - Reviewed 09/01/2021   Allergen Reaction Noted    Acetazolamide      Penicillins Hives 10/30/2017    Seasonal ic  [cholestatin]  10/24/2017    Sulfa antibiotics GI Intolerance 10/30/2017            The following portions of the patient's history were reviewed and updated as appropriate: allergies, current medications, past family history, past medical history, past social history, past surgical history and problem list      Past Medical History:   Diagnosis Date    Acne 09/01/2010    Last Assessed 01 Sep 2010   Acquired ankle/foot deformity     Last assessed 23 Feb 2017  Resolved 14 Aug 2017      Change in hearing     Resolved 16 Aug 2017    Closed displaced avulsion fracture of tuberosity of left calcaneus     Last Assessed 25 May 2017  Resolved 14 Aug 2017   Epistaxis     Last Assessed 18 Feb 2014  Resolved 08 Jun 2016   Foreign body in foot     Last Assessed 30 Jul 2013  Resolved 12 Oct 2013   Gastritis     Last Assessed 22 Oct 2012    GERD (gastroesophageal reflux disease)     History of oral aphthous ulcers     Last Assessed 08 June 2016  Resolved 14 Aug 2017   Hypersomnia 02/18/2009    Last Assessed 18 Feb 2009  Resolved 16 Aug 2017   Impacted cerumen     Last Assessed 08 Jun 2016  Resolved 08 Jul 2016   Insomnia     Irritable bowel syndrome     Knee joint pain 03/10/2008    Left foot pain     Last Assessed 19 May 2017  Resolved 14 Aug 2017   Mixed hyperlipidemia 11/2/2020    OCD (obsessive compulsive disorder)     Plantar fibromatosis     Last Assessed 19 Oct 2016  Resolved 14 Aug 2017   Seasonal allergies     Tarsal tunnel syndrome of left side     Last Assessed 21 Aug 2017  Resolved 21 Aug 2017   Tinea corporis     Last Assessed 05 Dec 2011       Past Surgical History:   Procedure Laterality Date    ESOPHAGOGASTRODUODENOSCOPY N/A 10/31/2017    Procedure: ESOPHAGOGASTRODUODENOSCOPY (EGD); Surgeon: Parmjit Smith MD;  Location: [facility]; Service: Gastroenterology    NO PAST SURGERIES      HI LENGTH/SHORT LEG/ANKL TENDON,SINGLE Left 11/3/2017    Procedure: CALCANEAL OSTECTOMY, ACHILLEST TENDON LENGTHING;  Surgeon: Adam Escobar DPM;  Location: [facility];  Service: Podiatry       Family History   Problem Relation Age of Onset    Kidney disease Mother     Thyroid disease Mother     Allergic rhinitis Mother     Hypertension Father     Kidney disease Father     Gout Father     No Known Problems Sister     Colonic polyp Maternal Grandmother     Cancer Family     Diabetes Family          Medications have been verified  Objective   /91   Pulse 83   Temp 98 2 °F (36 8 °C)   Resp 18   SpO2 97%   No LMP for male patient         Physical Exam     Physical Exam  Constitutional:       Appearance: He is not ill-appearing or diaphoretic  HENT:      Right Ear: There is impacted cerumen  Left Ear: There is impacted cerumen  Nose: No rhinorrhea  Mouth/Throat:      Mouth: Mucous membranes are moist       Pharynx: No posterior oropharyngeal erythema  Cardiovascular:      Rate and Rhythm: Regular rhythm  Heart sounds: Normal heart sounds  Pulmonary:      Effort: Pulmonary effort is normal       Breath sounds: Normal breath sounds  Neurological:      Mental Status: He is alert  Ear cerumen removal    Date/Time: 9/1/2021 6:54 PM  Performed by: TINO Gill  Authorized by: TINO Gill   Universal Protocol:  Consent given by: patient  Patient understanding: patient states understanding of the procedure being performed  Patient identity confirmed: verbally with patient      Patient location:  Clinic  Procedure details:     Local anesthetic:  None    Location:  L ear and R ear    Procedure type: irrigation only      Visualization (free text): Both ears havev impacted cerumen  Post-procedure details:     Complication:  None    Hearing quality:  Improved    Patient tolerance of procedure: Tolerated well, no immediate complications  Comments:      Post irrigation, there is an infection in the right ear

## 2021-09-01 NOTE — PATIENT INSTRUCTIONS
Cerumen Impaction   WHAT YOU NEED TO KNOW:   Cerumen impaction is the blockage of the outer ear canal by tightly packed cerumen (earwax)  It is generally treated with procedures such as flushing or suctioning the ear canal or the use of instruments to remove the impaction  DISCHARGE INSTRUCTIONS:   Medicines:  · Ear drops: These are used to soften the wax in your ear  Wax softening ear drops may be bought without a prescription  Ask your healthcare provider how often you should use this medicine  Read the instructions carefully before you use the ear drops  Do the following when you put in ear drops:     ? Warm the drops by holding the bottle in your hands for a few minutes  Cold ear drops may make you dizzy  ? Lie down with the affected ear toward the ceiling  You may also stand with your head tilted to one side  ? Pull your ear lobe up and back, and place the correct number of drops into the ear  ? Keep your ear facing up for 5 to 10 minutes so the drops coat the outer ear canal      ? Gently clean the outer part of the ear with a cotton swab  Do not  place the cotton swab or anything inside your ear canal  This increases the risk of damaging your eardrum  · Take your medicine as directed  Contact your healthcare provider if you think your medicine is not helping or if you have side effects  Tell him of her if you are allergic to any medicine  Keep a list of the medicines, vitamins, and herbs you take  Include the amounts, and when and why you take them  Bring the list or the pill bottles to follow-up visits  Carry your medicine list with you in case of an emergency  Follow up with your healthcare provider as directed:  Write down your questions so you remember to ask them during your visits  Contact your healthcare provider if:   · You have a fever  · You have trouble hearing or ringing in your ear  · You have questions about your condition or care      Return to the emergency department if:   · You feel dizzy  · You have discharge or blood coming out of your ear  · Your ear pain does not go away or gets worse  © Copyright [employer] 2018 Information is for End User's use only and may not be sold, redistributed or otherwise used for commercial purposes  All illustrations and images included in CareNotes® are the copyrighted property of A D A M , Inc  or [facility] [employer]   The above information is an  only  It is not intended as medical advice for individual conditions or treatments  Talk to your doctor, nurse or pharmacist before following any medical regimen to see if it is safe and effective for you

## 2021-09-02 RX ORDER — ATORVASTATIN CALCIUM 10 MG/1
10 TABLET, FILM COATED ORAL DAILY
Qty: 30 TABLET | Refills: 0 | Status: SHIPPED | OUTPATIENT
Start: 2021-09-02 | End: 2022-01-09

## 2021-09-08 ENCOUNTER — OFFICE VISIT (OUTPATIENT)
Dept: PSYCHIATRY | Facility: CLINIC | Age: 31
End: 2021-09-08

## 2021-09-08 VITALS — WEIGHT: 243.2 LBS | BODY MASS INDEX: 38.17 KG/M2 | HEIGHT: 67 IN

## 2021-09-08 DIAGNOSIS — F32.0 CURRENT MILD EPISODE OF MAJOR DEPRESSIVE DISORDER WITHOUT PRIOR EPISODE (HCC): ICD-10-CM

## 2021-09-08 DIAGNOSIS — F42.2 MIXED OBSESSIONAL THOUGHTS AND ACTS: Primary | ICD-10-CM

## 2021-09-08 DIAGNOSIS — Z63.4 RECENT BEREAVEMENT: ICD-10-CM

## 2021-09-08 PROCEDURE — 99212 OFFICE O/P EST SF 10 MIN: CPT | Performed by: NURSE PRACTITIONER

## 2021-09-08 RX ORDER — BUPROPION HYDROCHLORIDE 150 MG/1
150 TABLET ORAL EVERY MORNING
Qty: 30 TABLET | Refills: 2 | Status: SHIPPED | OUTPATIENT
Start: 2021-09-08

## 2021-09-08 RX ORDER — FLUVOXAMINE MALEATE 100 MG/1
CAPSULE, EXTENDED RELEASE ORAL
Qty: 30 CAPSULE | Refills: 2 | Status: SHIPPED | OUTPATIENT
Start: 2021-09-08 | End: 2021-12-06 | Stop reason: SDUPTHER

## 2021-09-08 RX ORDER — FLUVOXAMINE MALEATE 150 MG/1
CAPSULE, EXTENDED RELEASE ORAL
Qty: 30 CAPSULE | Refills: 2 | Status: SHIPPED | OUTPATIENT
Start: 2021-09-08 | End: 2021-12-06 | Stop reason: SDUPTHER

## 2021-09-08 SDOH — SOCIAL STABILITY - SOCIAL INSECURITY: DISSAPEARANCE AND DEATH OF FAMILY MEMBER: Z63.4

## 2021-09-08 NOTE — PATIENT INSTRUCTIONS
Start: bupropion  mg tablet - take 1 tablet by mouth every morning    Continue current medications:               fluvoxamine 100 mg capsule - take 1 capsule by mouth every day              fluvoxamine 150 mg capsule - take 1 capsule by mouth every day

## 2021-09-08 NOTE — PSYCH
PROGRESS NOTE        Grey Gray      Name and Date of Birth:  Marge Braxton 28 y o  1990   Date of Visit: 21    This patient was seen in the office today for medication management and psychotherapy  COVID-19 precautions were followed at all times: masks were worn by patient and provider, goggles were worn by provider, social distancing was maintained, hand-washing was performed before and after appointment, pts seat and providers electronic signature device and stylus were cleaned with germicidal disposable wipes according to product instructions, and room was ventilated before pt entered the room and after pt left it  Time spent on psychotherapy:  5 minutes    Treatment modality: medication management; medication education, supportive psychotherapy re death of friend; COVID vaccination education      SUBJECTIVE: PHQ9 score =7  good friend of pt  on Aug 2, pt's 36th birthday, she was like a mother to him and very important in their Jehov[employer]'s witness  He was doing well that week, had been working out every day  He is struggling with the loss, affecting his lillian and self-identity  Denies return of symptoms of OCD  Would like an antidepressant to help him temporarily  Not getting the COVID vaccine any time soon, he's young and healthy and not worried about it  2021: doing good, no return of symptoms  Started losing weight again after brief pause due to a muscle strain in his neck, finally figured out the right diet for him  Doesn't think he is going to get the COVID vaccine          He denies suicidal ideation, intent or plan at present, has no suicidal ideation, intent or plan at present  He denies any auditory hallucinations and visual hallucinations, denies any other delusional thinking, denies any delusional thinking  He denies any side effects from medications      HPI ROS Appetite Changes and Sleep: normal appetite, normal sleep    Review Of Systems:      Constitutional Negative   ENT Negative   Cardiovascular Negative   Respiratory Negative   Gastrointestinal Negative   Genitourinary Negative   Musculoskeletal Negative   Integumentary Negative   Neurological Negative   Endocrine Negative   Other Symptoms Negative and None       Laboratory Results: No results found for this or any previous visit  Substance Abuse History:    Social History     Substance and Sexual Activity   Drug Use No       Family Psychiatric History:     Family History   Problem Relation Age of Onset    Kidney disease Mother     Thyroid disease Mother     Allergic rhinitis Mother     Hypertension Father     Kidney disease Father     Gout Father     No Known Problems Sister     Colonic polyp Maternal Grandmother     Cancer Family     Diabetes Family        The following portions of the patient's history were reviewed and updated as appropriate: past family history, past medical history, past social history, past surgical history and problem list     Social History     Socioeconomic History    Marital status: Single     Spouse name: Not on file    Number of children: Not on file    Years of education: Not on file    Highest education level: Not on file   Occupational History    Not on file   Tobacco Use    Smoking status: Never Smoker    Smokeless tobacco: Never Used   Vaping Use    Vaping Use: Never used   Substance and Sexual Activity    Alcohol use: No    Drug use: No    Sexual activity: Not on file   Other Topics Concern    Not on file   Social History Narrative    Feels safe at home     Social Determinants of Health     Financial Resource Strain: Low Risk     Difficulty of Paying Living Expenses: Not hard at all   Food Insecurity: No Food Insecurity    Worried About Running Out of Food in the Last Year: Never true    Chanell of Food in the Last Year: Never true   Transportation Needs: No Transportation Needs    Lack of Transportation (Medical):  No  Lack of Transportation (Non-Medical):  No   Physical Activity: Sufficiently Active    Days of Exercise per Week: 4 days    Minutes of Exercise per Session: 60 min   Stress: No Stress Concern Present    Feeling of Stress : Not at all   Social Connections: Unknown    Frequency of Communication with Friends and Family: More than three times a week    Frequency of Social Gatherings with Friends and Family: Twice a week    Attends [employer] Services: Never    Active Member of Clubs or Organizations: Not on file    Attends Club or Organization Meetings: Not on file    Marital Status: Not on file   Intimate Partner Violence: Not At Risk    Fear of Current or Ex-Partner: No    Emotionally Abused: No    Physically Abused: No    Sexually Abused: No     Social History     Social History Narrative    Feels safe at home        Social History     Tobacco History     Smoking Status  Never Smoker    Smokeless Tobacco Use  Never Used          Alcohol History     Alcohol Use Status  No          Drug Use     Drug Use Status  No          Sexual Activity     Sexually Active  Not Asked          Activities of Daily Living    Not Asked                     OBJECTIVE:     Mental Status Evaluation:    Appearance age appropriate, casually dressed   Behavior pleasant, cooperative   Speech normal volume, normal pitch   Mood sad   Affect Full, appropriate   Thought Processes Coherent, organized, goal-directed   Associations intact associations   Thought Content normal   Perceptual Disturbances: none   Abnormal Thoughts  Risk Potential Suicidal ideation - None  Homicidal ideation - None  Potential for aggression - No   Orientation oriented to person, place, time/date and situation   Memory recent and remote memory grossly intact   Consciousness alert and awake   Attention Span attention span and concentration are age appropriate   Intellect Not formally assessed   Insight age appropriate    Judgement good    Muscle Strength and  Gait muscle strength and tone were normal   Language no difficulty naming common objects   Fund of Knowledge displays adequate knowledge of current events   Pain none   Pain Scale 0     The patient's chart was reviewed for relevant lab reports and recent encounters with other providers  Medications, treatment progress and treatment plan were reviewed with the patient  Assessment/Plan:       Diagnoses and all orders for this visit:    Mixed obsessional thoughts and acts  -     Fluvoxamine Maleate 100 MG CP24; TAKE 1 CAPSULE BY MOUTH EVERY DAY  -     Fluvoxamine Maleate 150 MG CP24; TAKE 1 CAPSULE BY MOUTH EVERY DAY    Current mild episode of major depressive disorder without prior episode (HCC)  -     buPROPion (WELLBUTRIN XL) 150 mg 24 hr tablet;  Take 1 tablet (150 mg total) by mouth every morning    Recent bereavement          Treatment Recommendations/Precautions:    Start: bupropion  mg tablet - take 1 tablet by mouth every morning    Continue current medications:               fluvoxamine 100 mg capsule - take 1 capsule by mouth every day              fluvoxamine 150 mg capsule - take 1 capsule by mouth every day        Risks/Benefits       Risks, Benefits And Possible Side Effects Of Medications:     Risks, benefits, and possible side effects of medications explained to patient and patient verbalizes understanding and agreement for treatment      Controlled Medication Discussion:      Not applicable

## 2021-09-27 DIAGNOSIS — E55.9 VITAMIN D DEFICIENCY: Primary | ICD-10-CM

## 2021-09-27 DIAGNOSIS — J30.9 ALLERGIC RHINITIS, UNSPECIFIED SEASONALITY, UNSPECIFIED TRIGGER: Primary | ICD-10-CM

## 2021-09-27 RX ORDER — FEXOFENADINE HCL 180 MG/1
180 TABLET ORAL DAILY
Qty: 90 TABLET | Refills: 1 | Status: SHIPPED | OUTPATIENT
Start: 2021-09-27 | End: 2022-01-07 | Stop reason: SDUPTHER

## 2021-10-04 ENCOUNTER — APPOINTMENT (OUTPATIENT)
Dept: LAB | Facility: CLINIC | Age: 31
End: 2021-10-04
Payer: COMMERCIAL

## 2021-10-04 ENCOUNTER — TELEPHONE (OUTPATIENT)
Dept: FAMILY MEDICINE CLINIC | Facility: CLINIC | Age: 31
End: 2021-10-04

## 2021-10-04 DIAGNOSIS — E78.2 MIXED HYPERLIPIDEMIA: ICD-10-CM

## 2021-10-04 DIAGNOSIS — G47.33 OBSTRUCTIVE SLEEP APNEA SYNDROME: Primary | ICD-10-CM

## 2021-10-04 DIAGNOSIS — Z11.59 NEED FOR HEPATITIS C SCREENING TEST: ICD-10-CM

## 2021-10-04 PROCEDURE — 36415 COLL VENOUS BLD VENIPUNCTURE: CPT

## 2021-10-04 PROCEDURE — 86803 HEPATITIS C AB TEST: CPT

## 2021-10-04 PROCEDURE — 80061 LIPID PANEL: CPT

## 2021-10-04 PROCEDURE — 80053 COMPREHEN METABOLIC PANEL: CPT

## 2021-10-05 DIAGNOSIS — J30.9 ALLERGIC RHINITIS, UNSPECIFIED SEASONALITY, UNSPECIFIED TRIGGER: ICD-10-CM

## 2021-10-05 LAB
ALBUMIN SERPL BCP-MCNC: 4.2 G/DL (ref 3.5–5)
ALP SERPL-CCNC: 103 U/L (ref 46–116)
ALT SERPL W P-5'-P-CCNC: 64 U/L (ref 12–78)
ANION GAP SERPL CALCULATED.3IONS-SCNC: 4 MMOL/L (ref 4–13)
AST SERPL W P-5'-P-CCNC: 32 U/L (ref 5–45)
BILIRUB SERPL-MCNC: 0.44 MG/DL (ref 0.2–1)
BUN SERPL-MCNC: 12 MG/DL (ref 5–25)
CALCIUM SERPL-MCNC: 10 MG/DL (ref 8.3–10.1)
CHLORIDE SERPL-SCNC: 107 MMOL/L (ref 100–108)
CHOLEST SERPL-MCNC: 157 MG/DL (ref 50–200)
CO2 SERPL-SCNC: 28 MMOL/L (ref 21–32)
CREAT SERPL-MCNC: 0.94 MG/DL (ref 0.6–1.3)
GFR SERPL CREATININE-BSD FRML MDRD: 108 ML/MIN/1.73SQ M
GLUCOSE P FAST SERPL-MCNC: 84 MG/DL (ref 65–99)
HCV AB SER QL: NORMAL
HDLC SERPL-MCNC: 38 MG/DL
LDLC SERPL CALC-MCNC: 91 MG/DL (ref 0–100)
POTASSIUM SERPL-SCNC: 4.4 MMOL/L (ref 3.5–5.3)
PROT SERPL-MCNC: 8.3 G/DL (ref 6.4–8.2)
SODIUM SERPL-SCNC: 139 MMOL/L (ref 136–145)
TRIGL SERPL-MCNC: 139 MG/DL

## 2021-10-05 RX ORDER — MONTELUKAST SODIUM 10 MG/1
TABLET ORAL
Qty: 30 TABLET | Refills: 4 | Status: SHIPPED | OUTPATIENT
Start: 2021-10-05 | End: 2022-02-07

## 2021-10-06 ENCOUNTER — OFFICE VISIT (OUTPATIENT)
Dept: FAMILY MEDICINE CLINIC | Facility: CLINIC | Age: 31
End: 2021-10-06
Payer: COMMERCIAL

## 2021-10-06 VITALS
DIASTOLIC BLOOD PRESSURE: 76 MMHG | BODY MASS INDEX: 38.77 KG/M2 | TEMPERATURE: 98.6 F | SYSTOLIC BLOOD PRESSURE: 134 MMHG | WEIGHT: 247 LBS | OXYGEN SATURATION: 97 % | RESPIRATION RATE: 18 BRPM | HEART RATE: 68 BPM | HEIGHT: 67 IN

## 2021-10-06 DIAGNOSIS — E78.2 MIXED HYPERLIPIDEMIA: ICD-10-CM

## 2021-10-06 DIAGNOSIS — F32.5 MAJOR DEPRESSIVE DISORDER WITH SINGLE EPISODE, IN REMISSION (HCC): ICD-10-CM

## 2021-10-06 DIAGNOSIS — K21.9 GASTROESOPHAGEAL REFLUX DISEASE WITHOUT ESOPHAGITIS: ICD-10-CM

## 2021-10-06 DIAGNOSIS — J30.2 SEASONAL ALLERGIES: ICD-10-CM

## 2021-10-06 DIAGNOSIS — Z23 IMMUNIZATION DUE: ICD-10-CM

## 2021-10-06 DIAGNOSIS — Z11.59 NEED FOR HEPATITIS C SCREENING TEST: Primary | ICD-10-CM

## 2021-10-06 PROCEDURE — 99214 OFFICE O/P EST MOD 30 MIN: CPT | Performed by: FAMILY MEDICINE

## 2021-10-06 PROCEDURE — 90682 RIV4 VACC RECOMBINANT DNA IM: CPT | Performed by: FAMILY MEDICINE

## 2021-10-06 PROCEDURE — 90471 IMMUNIZATION ADMIN: CPT | Performed by: FAMILY MEDICINE

## 2021-10-11 ENCOUNTER — OFFICE VISIT (OUTPATIENT)
Dept: SLEEP CENTER | Facility: CLINIC | Age: 31
End: 2021-10-11
Payer: COMMERCIAL

## 2021-10-11 ENCOUNTER — TELEPHONE (OUTPATIENT)
Dept: FAMILY MEDICINE CLINIC | Facility: CLINIC | Age: 31
End: 2021-10-11

## 2021-10-11 VITALS
SYSTOLIC BLOOD PRESSURE: 122 MMHG | BODY MASS INDEX: 38.77 KG/M2 | WEIGHT: 247 LBS | DIASTOLIC BLOOD PRESSURE: 64 MMHG | HEIGHT: 67 IN

## 2021-10-11 DIAGNOSIS — G47.09 OTHER INSOMNIA: ICD-10-CM

## 2021-10-11 DIAGNOSIS — F45.8 BRUXISM: ICD-10-CM

## 2021-10-11 DIAGNOSIS — I10 ESSENTIAL HYPERTENSION: ICD-10-CM

## 2021-10-11 DIAGNOSIS — G47.61 PLMD (PERIODIC LIMB MOVEMENT DISORDER): ICD-10-CM

## 2021-10-11 DIAGNOSIS — F32.A DEPRESSION, UNSPECIFIED DEPRESSION TYPE: ICD-10-CM

## 2021-10-11 DIAGNOSIS — G47.33 OBSTRUCTIVE SLEEP APNEA SYNDROME: Primary | ICD-10-CM

## 2021-10-11 DIAGNOSIS — F42.9 OBSESSIVE-COMPULSIVE DISORDER, UNSPECIFIED TYPE: ICD-10-CM

## 2021-10-11 DIAGNOSIS — J30.9 ALLERGIC RHINITIS, UNSPECIFIED SEASONALITY, UNSPECIFIED TRIGGER: ICD-10-CM

## 2021-10-11 DIAGNOSIS — G47.19 EXCESSIVE DAYTIME SLEEPINESS: ICD-10-CM

## 2021-10-11 DIAGNOSIS — E66.9 OBESITY (BMI 30-39.9): ICD-10-CM

## 2021-10-11 PROCEDURE — 99214 OFFICE O/P EST MOD 30 MIN: CPT | Performed by: INTERNAL MEDICINE

## 2021-10-12 ENCOUNTER — TELEPHONE (OUTPATIENT)
Dept: SLEEP CENTER | Facility: CLINIC | Age: 31
End: 2021-10-12

## 2021-10-12 DIAGNOSIS — K21.9 GASTROESOPHAGEAL REFLUX DISEASE: ICD-10-CM

## 2021-10-12 RX ORDER — OMEPRAZOLE 40 MG/1
CAPSULE, DELAYED RELEASE ORAL
Qty: 30 CAPSULE | Refills: 8 | Status: SHIPPED | OUTPATIENT
Start: 2021-10-12 | End: 2022-07-08 | Stop reason: SDUPTHER

## 2021-10-18 DIAGNOSIS — Z88.8: Primary | ICD-10-CM

## 2021-10-18 DIAGNOSIS — E55.9 VITAMIN D DEFICIENCY: ICD-10-CM

## 2021-10-18 RX ORDER — MELATONIN
1000 DAILY
Qty: 90 TABLET | Refills: 3 | Status: SHIPPED | OUTPATIENT
Start: 2021-10-18

## 2021-11-08 ENCOUNTER — OFFICE VISIT (OUTPATIENT)
Dept: DERMATOLOGY | Facility: CLINIC | Age: 31
End: 2021-11-08
Payer: COMMERCIAL

## 2021-11-08 VITALS — WEIGHT: 255.9 LBS | TEMPERATURE: 97.7 F | BODY MASS INDEX: 40.08 KG/M2

## 2021-11-08 DIAGNOSIS — L70.0 ACNE VULGARIS: Primary | ICD-10-CM

## 2021-11-08 PROCEDURE — 99204 OFFICE O/P NEW MOD 45 MIN: CPT | Performed by: DERMATOLOGY

## 2021-11-08 RX ORDER — MINOCYCLINE HYDROCHLORIDE 100 MG/1
100 CAPSULE ORAL EVERY 12 HOURS SCHEDULED
Qty: 120 CAPSULE | Refills: 1 | Status: SHIPPED | OUTPATIENT
Start: 2021-11-08 | End: 2022-01-07

## 2021-11-08 RX ORDER — ADAPALENE 3 MG/G
GEL TOPICAL
Qty: 59 G | Refills: 2 | Status: SHIPPED | OUTPATIENT
Start: 2021-11-08 | End: 2021-11-08

## 2021-11-09 ENCOUNTER — OFFICE VISIT (OUTPATIENT)
Dept: UROLOGY | Facility: AMBULATORY SURGERY CENTER | Age: 31
End: 2021-11-09
Payer: COMMERCIAL

## 2021-11-09 VITALS
WEIGHT: 257 LBS | SYSTOLIC BLOOD PRESSURE: 122 MMHG | BODY MASS INDEX: 40.34 KG/M2 | DIASTOLIC BLOOD PRESSURE: 84 MMHG | HEIGHT: 67 IN

## 2021-11-09 DIAGNOSIS — L70.0 ACNE VULGARIS: Primary | ICD-10-CM

## 2021-11-09 DIAGNOSIS — R35.0 URINARY FREQUENCY: Primary | ICD-10-CM

## 2021-11-09 LAB
SL AMB  POCT GLUCOSE, UA: NORMAL
SL AMB LEUKOCYTE ESTERASE,UA: NORMAL
SL AMB POCT BILIRUBIN,UA: NORMAL
SL AMB POCT BLOOD,UA: NORMAL
SL AMB POCT CLARITY,UA: CLEAR
SL AMB POCT COLOR,UA: YELLOW
SL AMB POCT KETONES,UA: NORMAL
SL AMB POCT NITRITE,UA: NORMAL
SL AMB POCT PH,UA: 5
SL AMB POCT SPECIFIC GRAVITY,UA: 1.02
SL AMB POCT URINE PROTEIN: NORMAL
SL AMB POCT UROBILINOGEN: 0.2

## 2021-11-09 PROCEDURE — 81002 URINALYSIS NONAUTO W/O SCOPE: CPT | Performed by: NURSE PRACTITIONER

## 2021-11-09 PROCEDURE — 99204 OFFICE O/P NEW MOD 45 MIN: CPT | Performed by: NURSE PRACTITIONER

## 2021-11-09 RX ORDER — LORATADINE 10 MG/1
TABLET ORAL
COMMUNITY
Start: 2021-10-04 | End: 2022-01-09

## 2021-12-06 ENCOUNTER — TELEPHONE (OUTPATIENT)
Dept: SLEEP CENTER | Facility: CLINIC | Age: 31
End: 2021-12-06

## 2021-12-06 DIAGNOSIS — F42.2 MIXED OBSESSIONAL THOUGHTS AND ACTS: ICD-10-CM

## 2021-12-06 RX ORDER — FLUVOXAMINE MALEATE 150 MG/1
CAPSULE, EXTENDED RELEASE ORAL
Qty: 90 CAPSULE | Refills: 1 | Status: SHIPPED | OUTPATIENT
Start: 2021-12-06 | End: 2022-05-09

## 2021-12-06 RX ORDER — FLUVOXAMINE MALEATE 100 MG/1
CAPSULE, EXTENDED RELEASE ORAL
Qty: 30 CAPSULE | Refills: 2 | Status: SHIPPED | OUTPATIENT
Start: 2021-12-06 | End: 2021-12-06 | Stop reason: SDUPTHER

## 2021-12-06 RX ORDER — FLUVOXAMINE MALEATE 100 MG/1
CAPSULE, EXTENDED RELEASE ORAL
Qty: 90 CAPSULE | Refills: 1 | Status: SHIPPED | OUTPATIENT
Start: 2021-12-06 | End: 2022-05-09

## 2021-12-06 RX ORDER — FLUVOXAMINE MALEATE 150 MG/1
CAPSULE, EXTENDED RELEASE ORAL
Qty: 30 CAPSULE | Refills: 2 | Status: SHIPPED | OUTPATIENT
Start: 2021-12-06 | End: 2021-12-06 | Stop reason: SDUPTHER

## 2021-12-09 DIAGNOSIS — E55.9 VITAMIN D DEFICIENCY: Primary | ICD-10-CM

## 2021-12-09 RX ORDER — CEPHRADINE 500 MG
CAPSULE ORAL
Qty: 60 TABLET | Refills: 1 | Status: SHIPPED | OUTPATIENT
Start: 2021-12-09 | End: 2022-02-07

## 2022-01-03 ENCOUNTER — OFFICE VISIT (OUTPATIENT)
Dept: DERMATOLOGY | Facility: CLINIC | Age: 32
End: 2022-01-03
Payer: COMMERCIAL

## 2022-01-03 ENCOUNTER — APPOINTMENT (OUTPATIENT)
Dept: LAB | Facility: CLINIC | Age: 32
End: 2022-01-03
Payer: COMMERCIAL

## 2022-01-03 VITALS — WEIGHT: 274 LBS | TEMPERATURE: 97.2 F | BODY MASS INDEX: 43 KG/M2 | HEIGHT: 67 IN

## 2022-01-03 DIAGNOSIS — Z11.59 NEED FOR HEPATITIS C SCREENING TEST: ICD-10-CM

## 2022-01-03 DIAGNOSIS — L70.0 ACNE VULGARIS: Primary | ICD-10-CM

## 2022-01-03 DIAGNOSIS — L25.9 CONTACT DERMATITIS, UNSPECIFIED CONTACT DERMATITIS TYPE, UNSPECIFIED TRIGGER: ICD-10-CM

## 2022-01-03 LAB — HCV AB SER QL: NORMAL

## 2022-01-03 PROCEDURE — 86803 HEPATITIS C AB TEST: CPT

## 2022-01-03 PROCEDURE — 36415 COLL VENOUS BLD VENIPUNCTURE: CPT

## 2022-01-03 PROCEDURE — 99213 OFFICE O/P EST LOW 20 MIN: CPT | Performed by: DERMATOLOGY

## 2022-01-03 RX ORDER — DAPSONE 75 MG/G
GEL TOPICAL
Qty: 90 G | Refills: 3 | Status: SHIPPED | OUTPATIENT
Start: 2022-01-03

## 2022-01-03 NOTE — PATIENT INSTRUCTIONS
YOUR PERSONALIZED ACNE ACTION PLAN    MORNING ROUTINE    1) SKIN HYGEINE:  In the shower, wash your face, chest and back gently with Cetaphil moisturizing cleanser or Dove Fragrance-free bar  Do not use a luffa or washcloth as these tend to be too irritating to acne-prone skin  2) ANTIBIOTICS:       Oral Minocycline 100 mg after breakfast with a full glass of water   Apply topically Dapsone once daily     EVENING ROUTINE    1) SKIN HYGEINE:  In the shower, wash your face, chest and back gently with Cetaphil moisturizing cleanser or Dove Fragrance-free bar  Do not use a lufa or washcloth as these tend to be too irritating to acne-prone skin  2) ANTIBIOTICS:       Oral Minocycline 100 mg after dinner with a full glass of water      3) TOPICAL RETINOID:  At 1 hour before bedtime (after washing your face and allowing the skin to completely dry), spread only a single pea-sized amount of this medication evenly over your entire face (avoiding your eyes or mouth):     Adapalene 0 3% 1 hour before bedtime    REMEMBER:  Always take your acne pills with lots of water! A pill stuck in your throat can cause significant burning and irritation  Drink a full glass of water to ensure the pill gets into your stomach  Avoid popping a pill right before bed, and stay upright for at least 1 hour after taking a pill  ACNE:  WHAT ZIT ALL ABOUT? WHY DO I HAVE ACNE/PIMPLES? Your skin is made of layers  To keep the skin from becoming dry and cracked, the skin needs oil  The oil is made in little wells in the deeper layers in the skin  People with acne have glands that make more oil and are more easily plugged, causing the glands to swell  Hormones, bacteria and your inherited tendency to have acne all play a role  The medical term for pimples is acne or acne vulgaris (vulgaris means common)  Most people get some acne  Acne does not come from being dirty    Instead, it is an expected consequence of changes that occur during normal growth and development  Hormones, bacteria, and your family's tendency to have acne may all play a role  Whiteheads or blackheads are openings of the glands (glands are the oil factories) onto the surface of the skin  Blackheads are not caused by dirt blocking the pores; instead, they result from the oxidation reaction of oil and skin in the pores with the air (like a rust reaction)  WHAT ABOUT STRESS? Stress does not cause acne but it can make it worse  Make sure you get enough sleep and daily exercise! WHAT ABOUT FOODS/DIET? Try to eat a balanced, healthy diet  Some people feel that certain foods worsen their acne  While there aren't many studies available on this question, severe dietary changes are unlikely to help your acne and may be harmful to the health of your skin  If you find that a certain food seems to aggravate your acne, you may consider avoiding that food  Discuss this with your physician! WHAT CAUSES MY ACNE? There are four contributors to acne--the body's natural oil (sebum), clogged pores, bacteria (with the scientific name Propionibacterium acnes, or P  acnes, for short), and the body's reaction to the bacteria living in the clogged pores (which causes inflammation)  Here's what happens:     Sebum is produced in the normal oil-making glands in the deeper layers of the skin and reaches the surface through the skin's pores  An increase in certain hormones occurs around the time of puberty, and these hormones trigger the oil glands to produce increased amounts of sebum   Pores with excess oil tend to become clogged more easily   At the same time, P  acnes--one of the many types of bacteria that normally live on everyone's skin--thrives in the excess oil and causes a skin reaction (inflammation)   If a pore is clogged close to the surface, there is little inflammation   However, this results in the formation of whiteheads (closed comedones) or blackheads (open comedones) at the surface of the skin   A plug that extends to, or forms a little deeper in the pore, or one that enlarges or ruptures may cause more inflammation  The result is red bumps (papules) and pus-filled pimples (pustules)   If plugging happens in the deepest skin layer, the inflammation may be even more severe, resulting in the formation of nodules or cysts  When these types of acne heal, they may leave behind discolored areas or true scars  SKIN HYGIENE:  HOW SHOULD I [address] MY SKIN? Acne does not come from being dirty, however, washing your face is part of taking good care of your skin and will help keep your face clear  Good skin hygiene is, therefore, critical to support any acne treatment plan  Here are several specific suggestions for practicing good skin hygiene and keeping your skin looking its best:     You should wash acne-prone skin TWICE A DAY: Once in the morning and once in the evening  This does include any showers you take that day, so do not overdo it!  Do not scrub the skin with a washcloth or loofah as these can irritate and inflame your acne  Acne does not come from dirt, so it is not necessary to scrub the skin clean  In fact, scrubbing may lead to dryness and irritation that makes the acne even worse and harder for patients to tolerate acne medications   Use a gentle facial moisturizing cleanser (Cetaphil Moisturizing Cleanser or Dove Fragrance-Free bar)  Avoid using soaps like Tatyana Santiago, Myah Zun[address], [facility], or soft/liquid soaps as these products will dry your skin   Do not use any over-the-counter acne washes without your doctor's specific instruction to do so  These products often contain salicylic acid or benzoyl peroxide  These ingredients can be helpful in clearing oil from the skin and reducing bacteria, but they may also be drying and can add to irritation      Do not use exfoliating products with microbeads or brushes as these can cause irritation to the skin   Facials and other treatments to remove, squeeze, or clean out pores are not recommended  Manipulating the skin in this way can make acne worse and can lead to severe infections and/or scarring  It also increases the likelihood that the skin will not be able to tolerate acne medications   Try not to pop pimples or pick at your acne as this can delay healing and may result in scarring or skin color changes (dark spots) that are often more noticeable than the acne itself  Picking/popping acne can also cause a serious skin infection   Wash or change your pillow case once to twice a week, especially if you use products in your hair   Wash the skin as soon as possible after playing sports or other activities that cause a lot of sweating  Also, pay attention to how your sports equipment (shoulder pads, helmet strap, etc ) might be making your acne worse   When you use makeup, moisturizer, or sunscreen make sure that these products are labeled non-comedogenic, or won't clog pores, or won't cause acne         SHOULD I TREAT MY ACNE? There are a number of other skin conditions that can look like acne  If there is any question about the diagnosis, then the person should be evaluated by a board certified pediatric and adolescent dermatologist   A physician should examine any child with acne who is between the ages of 3and 9years of age, as acne in this mid-childhood age group is not normal and may signal an underlying problem  If a preadolescent (9to 6years of age) or adolescent (15to 25years of age) has mild acne and the condition is not bothersome to the individual, proper and regular skin care (what your doctor may call skin hygiene) may be all that is needed at this point  Many people do, however, need specific acne medications to help their skin look and feel its best  Your doctor will tell you if you are one of these people   If so, you may be advised to use an over-the-counter or prescription medication that is applied to the skin (a topical medication) or if the addition of an oral medication (a medication taken by [employer]) is needed  The good news is that the medications work well when used properly! Some specific factors that may influence the choice of acne therapy include:     Severity  The number and type of skin lesions (papules or comedones) and the degree of inflammation (mild, moderate or severe)   Scarring  Scarring is most common when acne is severe, but it can happen even in children with mild acne   Impact  If a child is experiencing emotional complications because of the acne or is experiencing negative comments from other children   Cost of the acne medications  An acne expert can help to keep out of pocket costs to a minimum by utilizing the correct medications and the least expensive options   The patient's skin type (oily versus dry or combination skin, for example)   Potential side effects of the medication   The ease or overall complexity of the treatment plan or medication  WHAT ACNE TREATMENTS ARE AVAILABLE? Medications for acne try to stop the formation of new pimples by reducing or removing the oil, bacteria, and other things (like dead skin cells) that clog the pores  They can also decrease the inflammation or irritation response of the skin to bacteria  It may take from 6 to 8 weeks (about 2 months!) before you see any improvement and know if the medication is effective  It takes the layers of skin this long to regenerate  Remember, these medications do not cure the condition--the acne improves because of the medication  Therefore, treatment must be continued in order to prevent the return of acne lesions  There are many types of acne treatments  Some are applied to the skin (topical medications) and some are taken by mouth (oral medications)   In most cases of mild acne, the doctor will start with a topical medication  There are many different topical medications that are helpful for acne  If acne is more severe and it does not respond adequately to a topical medication, or if it covers large body surface areas such as the back and/or chest, oral antibiotics such as Doxycycline or Minocycline and/or oral hormone therapy such as Oral Contraceptive Pills or Spironolactone may be prescribed  In the most severe cases, isotretinoin (Accutane) may be used  In general, it is usually best to start with acne medications that are least likely to cause side effects but are at the same time capable of addressing the specific causes for the acne  Some patients have a good result with just one medication, but many will need to use a combination of treatments: two or more different topical agents or an oral medication plus a topical medication  Another treatment used for acne may include corticosteroid injections, which are used to help relieve pain, decrease the size, and encourage the healing of large, inflamed acne nodules  Also, dermatologists sometimes perform acne surgery, using a fine needle, a pointed blade, or an instrument known as a comedone extractor to mechanically clean out clogged pores  One must always weigh the risk for inducing a scar with the potential benefits of any procedure  Prior treatment with topical retinoids can loosen whiteheads and blackheads and make it easier to physically remove such lesions  Heat-based devices, and light and laser therapy are being studied to see whether there is any role for such treatments in mild to moderate acne  At this time, there is not enough evidence to make general recommendations about their use  TOPICAL ACNE MEDICATIONS    WHAT KIND OF TOPICALS ARE THERE?    Benzoyl peroxide (BP) helps to fight inflammation and is anti-microbial (kills bacteria, viruses, and other microorganisms) and is believed to help prevent resistance of bacteria to topical antibiotics  A benzoyl peroxide wash may be recommended for use on large areas such as the chest and/or back  Mild irritation and dryness are common when first using benzoyl peroxide-containing products  Be careful because benzoyl peroxide can bleach towels and clothing!  Retinoids (such as adapalene, tretinoin, or tazarotene) unplug the oil glands by helping peel away the layers of skin and other things plugging the opening of the glands  Mild irritation and dryness are common when first using these products  Facial waxing and other skin procedures can lead to excessive irritation and should be avoided during retinoid therapy   Antibiotics fight bacteria and help decrease inflammation  Topical antibiotics commonly used in acne include clindamycin, erythromycin, and combination agents (such as clindamycin/benzoyl peroxide or erythromycin/benzoyl peroxide)  Mild irritation and dryness are common when first using these products  Typically, topical antibiotics should not be used alone as treatment for acne   Other topical agents include salicylic acid, azelaic acid, dapsone, and sulfacetamide  Mild irritation and dryness can also occur when first using these products  USING YOUR TOPICAL TREATMENTS LIKE A PRO   Apply topical medications only to clean, dry skin  Topical medications may lead to significant dryness of the affected areas  To minimize this, wait 15-20 minutes after washing before applying your topical medication   These medications work deep in the skin to prevent new breakouts  Spot treatment of individual pimples does not do much  When applying topical medications to the face, use the 5-dot method  Start by placing a small pea-sized amount of the medication on your finger  Then, place dots in each of five locations of your face: Mid-forehead, each cheek, nose, and chin  Next, rub the medication into the entire area of skin - not just on individual pimples!  Try to avoid the delicate skin around your eyes and corners of your mouth   The medications are not magic! They take weeks if not months to work  Be patient and use your medicine on a daily basis or as directed for six weeks before asking if your skin looks better  Try not to miss more than one or two days each week when using your medications   If you are starting a new medication, then try using it every other night or even every third night   Gradually work up to Magana & Sudhakar a day    This will give your skin time to adjust    The same medications often come in various forms or formulations: Creams, ointments, lotions, gels, microspheres, or foams  Use the formulation that has been recommended and don't switch to other forms unless instructed  Some forms (such as alcohol based gels) may be more drying and less tolerable for certain skin types   Sometimes individual medications are not as effective as a combination of two or more agents  The doctor may need to try several medications or combinations before finding the one that is best for that patient   Moisturizer, sunscreen, and make-up may be used in conjunction with topical acne medications  In general, acne medications are applied first so they may directly contact the skin  Ask your physician to review specific application instructions!  It is especially important to always use sunscreen when using a topical retinoid or oral antibiotic  These drugs can make your skin more sensitive to the sun  In general, sunscreen gets applied AFTER any acne medications   Don't stop using your acne medications just because your acne got better  Remember, the acne is better because of the medication, and prevention is the ghotra to treatment        ORAL ACNE MEDICATIONS    ORAL ANTIBIOTICS  Antibiotics include tetracycline-class medicines (which include the most commonly used oral antibiotics for acne, minocycline, and doxycycline), erythromycin, trimethoprim-sulfamethoxazole, and occasionally cephalexin or azithromycin  These drugs may decrease bacteria and inflammation, and they are most effective for moderate-to-severe inflammatory acne  A product containing benzoyl peroxide should be used along with these antibiotics to help decrease the possibility of microbial resistance  Always take your acne pills with lots of water! A pill stuck in your throat can cause significant burning and irritation  Drink a full glass of water to ensure the pill gets into your stomach  Avoid popping a pill right before bed, and stay upright for at least 1 hour after taking a pill  DOXYCYCLINE   This medication is usually taken ONCE or TWICE per day, as instructed by your physician  NOTE: Always take this medication with lots of water! A pill stuck in the throat can cause significant burning and irritation  Avoid popping a pill right before bed & stay upright for at least one hour after taking a pill  WARNING: Doxycycline increases your sensitivity to the sun, so practice excellent sun protection! If you notice any of the following, stop using the medication and notify your health care provider: headaches; blurred vision; dizziness; sun sensitivity; heartburn-stomach pain; irritation of the esophagus; darkening of scars, gums, or teeth (more often with minocycline); nail changes; yellowing of the eyes or skin (indicating possible liver disease); joint pains-and flu-like symptoms  Taking oral antibiotics with food may help with symptoms of upset stomach  COMMON SIDE EFFECTS: Headaches; dizziness; sun sensitivity; irritation of the throat; discoloration of scars, gums, or teeth; nail changes  MINOCYCLINE   This medication is usually taken ONCE or TWICE per day, as instructed by your physician  NOTE: Always take this medication with lots of water! A pill stuck in the throat can cause significant burning and irritation   Avoid popping a pill right before bed & stay upright for at least one hour after taking a pill  WARNING: Though less likely than doxycycline, minocycline may increase your sensitivity to the sun, so practice excellent sun protection! If you notice any of the following, stop using the medication and notify your health care provider: headaches; blurred vision; dizziness; sun sensitivity; heartburn-stomach pain; irritation of the throat; darkening of scars, gums, or teeth; nail changes; yellowing of the eyes or skin (indicating possible liver disease); joint pains-and flu-like symptoms  Taking oral antibiotics with food may help with symptoms of upset stomach  COMMON SIDE EFFECTS: Headaches; dizziness; sun sensitivity; irritation of the throat; discoloration of scars, gums, or teeth (often with minocycline); nail changes  Minocycline can rarely cause liver disease, joint pains, severe skin rashes, and flu-like symptoms  If you should notice yellowing of the eyes or skin, or any of the above, notify your doctor and stop using the medication immediately  HORMONAL THERAPY  Hormonal treatment is used only in females and usually consists of oral contraceptives (birth control pills)  Spironolactone is also sometimes used  ORAL CONTRACEPTIVE PILLS   This medication is also known as the Birth Control Pill    We use it for hormonal regulation of acne  Take this medication as directed on the medication packet  NOTE: Try to find a regular time in your day to take the pill so that you don't forget  The best time is about half an hour after a meal or snack, or at bedtime  If you do forget to take your daily pill at the regular time, take one as soon as you remember and take the next at your regular scheduled time     WARNING: Do not take this medication until discussing it with your physician if you smoke, are pregnant (or trying to become pregnant or could be pregnant), have a personal history of breast cancer, have any artificial hardware or implants, have a condition called Factor 5 Leiden deficiency, have a family history of clotting problems, regularly have migraine headaches (especially with aura or due to flashing lights), or have any vaginal bleeding other than that associated with your menstrual cycle  ORAL ISOTRETINOIN (used to be called the brand name Alix Fletcher)  Isotretinoin, a derivative of vitamin A, is a powerful drug with several significant potential side effects  It is reserved for acne which is severe or when other medications have not worked well enough  It used to be sold under the brand name Accutane but now several versions exist       HAVING PROBLEMS WITH ANY OF YOUR TREATMENTS? You should not be able to see any of the medicines on your face  If you can see a white film on your skin after you apply the medication, there is too much medicine in that area and you need to apply a thinner coat and make sure it is spread evenly on your face  If your skin gets too dry, you can apply a light (non-comedogenic) moisturizer on top of your medicine or you may switch to using the medicine every other day instead of every day  If your skin is still too irritated, you may need to switch to a milder medication  If your skin is red and very itchy, you may be allergic to the medication and you should stop using it  COMMON POSSIBLE SIDE EFFECTS OF MEDICATIONS     Retinoids - dryness, redness, increased sun sensitivity   Benzoyl peroxide - drying, redness, bleaching of clothes, towels and sheets, allergy   Doxycycline - headaches; dizziness; irritation of the throat; nail changes; discoloration of teeth   Sun sensitivity - even if you have dark skin, this medicine can make you burn more easily    Make sure you protect yourself from the sun, either by avoiding being outside between 11 AM and 3 PM, wearing and reapplying sunscreen/sunblock, or wearing sun protective clothing   Nausea/vomiting - if you experience nausea with this medication, take it with food   Minocycline - headaches; dizziness; vision problems,  irritation of the throat; discoloration of scars, gums, or teeth  Can rarely cause liver disease, joint pains, and flu-like symptoms   If you should notice yellowing of the skin or any of the above, notify your doctor and stop using the medication   Birth Control Pills - nausea; headaches; breast tenderness; feeling bloated; mood changes   Spotting between periods may occur for the first three weeks of the medication, but this is not serious  It may last for two or three cycles  Please call us if the bleeding is heavier than a light flow or lasts for more than a few days  WHEN AND WHERE TO CALL WITH CONCERNS  We are here to help! If you experience any unusual symptoms, then stop taking or using the medication and call our office at [phone] (SKIN)  It is better to be safe than to be sorry!     Assessment and Plan: CONTACT DERMATITIS (HANDS)  Based on a thorough discussion of this condition and the management approach to it (including a comprehensive discussion of the known risks, side effects and potential benefits of treatment), the patient (family) agrees to implement the following specific plan:   Triamcinolone Ointment 0 1 % twice a day for 14 days straight

## 2022-01-03 NOTE — PROGRESS NOTES
[facility] Follow Up Note    Patient Name: Haleigh Coy  Encounter Date: 01/03/2022    Today's Chief Concerns:  Howard Pert Concern #1:  Follow Up Acne     Current Medications:    Current Outpatient Medications:     Adapalene 0 3 % gel, SPREAD ONE PEA-SIZED AMOUNT OF MEDICATION OVER ENTIRE FACE ABOUT ONE HOUR BEFORE BEDTIME , Disp: 45 g, Rfl: 2    Alpha-D-Galactosidase (BEANO PO), Take by mouth, Disp: , Rfl:     Alpha-Lipoic Acid 200 MG CAPS, Take 1 capsule (200 mg total) by mouth daily, Disp: 30 capsule, Rfl: 5    Ascorbic Acid (vitamin C) 1000 MG tablet, Take 1 tablet (1,000 mg total) by mouth daily, Disp: 30 tablet, Rfl: 5    atorvastatin (LIPITOR) 10 mg tablet, Take 1 tablet (10 mg total) by mouth daily, Disp: 30 tablet, Rfl: 0    Bacillus Coagulans-Inulin (Probiotic Formula) 1-250 BILLION-MG CAPS, Take 1 Units by mouth daily, Disp: 30 capsule, Rfl: 5    buPROPion (WELLBUTRIN XL) 150 mg 24 hr tablet, Take 1 tablet (150 mg total) by mouth every morning, Disp: 30 tablet, Rfl: 2    Cholecalciferol (KP Vitamin D3) 50 MCG (2000 UT) CAPS, Take 1 capsule (2,000 Units total) by mouth 2 (two) times a day, Disp: 180 capsule, Rfl: 1    cholecalciferol (VITAMIN D3) 1,000 units tablet, Take 1 tablet (1,000 Units total) by mouth daily, Disp: 90 tablet, Rfl: 3    Echinacea-Alatorre Seal Complex CAPS, Take 1 capsule by mouth daily, Disp: 30 capsule, Rfl: 0    fexofenadine (ALLEGRA) 180 MG tablet, Take 1 tablet (180 mg total) by mouth daily, Disp: 90 tablet, Rfl: 1    Fluvoxamine Maleate 100 MG CP24, TAKE 1 CAPSULE BY MOUTH EVERY DAY, Disp: 90 capsule, Rfl: 1    Fluvoxamine Maleate 150 MG CP24, TAKE 1 CAPSULE BY MOUTH EVERY DAY, Disp: 90 capsule, Rfl: 1    lisinopril (ZESTRIL) 10 mg tablet, Take 1 tablet (10 mg total) by mouth daily, Disp: 90 tablet, Rfl: 1    loratadine (CLARITIN) 10 mg tablet, , Disp: , Rfl:     minocycline (MINOCIN) 100 mg capsule, Take 1 capsule (100 mg total) by mouth every 12 (twelve) hours, Disp: 120 capsule, Rfl: 1    montelukast (SINGULAIR) 10 mg tablet, TAKE 1 TABLET BY MOUTH EVERY DAY, Disp: 30 tablet, Rfl: 4    Multiple Vitamin (multivitamin) capsule, Take 1 capsule by mouth daily, Disp: 30 capsule, Rfl: 5    Omega-3 Fatty Acids (Fish Oil Concentrate) 1000 MG CAPS, Take 3 capsules (3,000 mg total) by mouth daily, Disp: 90 capsule, Rfl: 5    omeprazole (PriLOSEC) 40 MG capsule, TAKE 1 CAPSULE BY MOUTH EVERY DAY, Disp: 30 capsule, Rfl: 8    oxymetazoline (AFRIN) 0 05 % nasal spray, 2 sprays by Each Nare route 2 (two) times a day, Disp: , Rfl:     Vitamin D-Vitamin K (K2 Plus D3) 100-1000 MCG-UNIT TABS, TAKE 1 TABLET BY MOUTH TWICE A DAY, Disp: 60 tablet, Rfl: 1    zinc gluconate 50 mg tablet, Take 1 tablet (50 mg total) by mouth daily, Disp: 30 tablet, Rfl: 5    Melatonin 10 MG TABS, , Disp: , Rfl:     CONSTITUTIONAL:   Vitals:    01/03/22 1552   Temp: (!) 97 2 °F (36 2 °C)   TempSrc: Tympanic   Weight: 124 kg (274 lb)   Height: 5' 7" (1 702 m)       Specific Alerts:    Have you been seen by a [facility] Dermatologist in the last 3 years? YES    Are you pregnant or planning to become pregnant? N/A    Are you currently or planning to be nursing or breast feeding? N/A    Allergies   Allergen Reactions    Penicillins Hives    Sulfa Antibiotics GI Intolerance       May we call your Preferred Phone number to discuss your specific medical information? YES    May we leave a detailed message that includes your specific medical information? YES    Have you traveled outside of the [facility] in the past 3 months? No    Do you currently have a pacemaker or defibrillator? No    Do you have any artificial heart valves, joints, plates, screws, rods, stents, pins, etc? No   - If Yes, were any placed within the last 2 years? Do you require any medications prior to a surgical procedure? No   - If Yes, for which procedure? n/a   - If Yes, what medications to you require? n/a    Are you taking any medications that cause you to bleed more easily ("blood thinners") No    Have you ever experienced a rapid heartbeat with epinephrine? No    Have you ever been treated with "gold" (gold sodium thiomalate) therapy? [facility] can help with wrinkles, "laugh lines," facial volume loss, "double chin," "love handles," age spots, and more  Are you interested in learning today about some of the skin enhancement procedures that we offer? (If Yes, please provide more detail)     Review of Systems:  Have you recently had or currently have any of the following? · Fever or chills: No  · Night Sweats: No  · Headaches: No  · Weight Gain: No  · Weight Loss: No  · Blurry Vision: No  · Nausea: No  · Vomiting: No  · Diarrhea: No  · Blood in Stool: No  · Abdominal Pain: No  · Itchy Skin: No  · Painful Joints: No  · Swollen Joints: No  · Muscle Pain: No  · Irregular Mole: No  · Sun Burn: No  · Dry Skin: No  · Skin Color Changes: No  · Scar or Keloid: No  · Cold Sores/Fever Blisters: No  · Bacterial Infections/MRSA: No  · Anxiety: No  · Depression: No  · Suicidal or Homicidal Thoughts: No    PSYCH: Normal mood and affect  EYES: Normal conjunctiva  ENT: Normal lips and oral mucosa  CARDIOVASCULAR: No edema  RESPIRATORY: Normal respirations  HEME/LYMPH/IMMUNO:  No regional lymphadenopathy except as noted below in ASSESSMENT AND PLAN BY DIAGNOSIS    FULL ORGAN SYSTEM SKIN EXAM (SKIN)  Hair, Scalp, Ears, Face Normal except as noted below in Assessment   Neck, Cervical Chain Nodes Normal except as noted below in Assessment   Right Arm/Hand/Fingers    Left Arm/Hand/Fingers    Chest/Breasts/Axillae    Abdomen, Umbilicus    Back/Spine    Groin/Genitalia/Buttocks    Right Leg, Foot, Toes    Left Leg, Foot, Toes          1  ACNE VULGARIS ("COMMON ACNE"): FOLLOW UP    Physical Exam:   Psychiatric/Mood:   Anatomic Location Affected:   Face    Morphological Description:  o Open/Closed Comedones:  - Few ("Mild")  o Inflammatory Papules/Pustules:  - Several ("Moderate")  o Nodules:  - Several ("Moderate")  o Scarring:  - Several ("Moderate")  o Excoriations:  - Few ("Mild")  o Local Skin Redness/Erythema:  - Several ("Moderate")  o Local Skin Dryness/Scaling:  - Few ("Mild")  o Local Skin Dyspigmentation:  - Few ("Mild")   Pertinent Positives:   Pertinent Negatives: Additional History of Present Condition:     Previous Treatment Plan: Adapalene 0 3% Gel  Once daily at night Minocycline 100 mg twice a day    How compliant are you with the prescribed plan?:  100%%   Did you experience any side effects of treatment: N/A   Are you happy with the improvement: Slight     Assessment and Plan:   We reviewed the causes of acne, the kinds of acne, and the expected clinical course   We discussed treatment options ranging from over-the-counter products, topical retinoids, antibiotics, BP, hormonal therapies (OCPs/spironolactone), and isotretinoin (Accutane)   We reviewed specific over-the-counter interventions and medications  Recommended typical hygiene measures including water-based facial products, washing regularly with mild cleanser, and refraining from picking and popping any pimples   Recommended non-comedogenic sunscreen use daily   Expectations of therapy discussed  Side effects, risks and benefits of medications discussed   A comprehensive handout on Acne was provided   The phone number to call in case of questions or concerns (and instructions to stop medications in such a scenario) was provided     After lengthy discussion of etiology and treatment options, we decided to implement the following personalized treatment plan:    Based on a thorough discussion of this condition and the management approach to it (including a comprehensive discussion of the known risks, side effects and potential benefits of treatment), the patient (family) agrees to implement the following specific plan:    --------------------------------------------------------------------------------------  YOUR PERSONALIZED ACNE ACTION PLAN    MORNING ROUTINE    1) SKIN HYGEINE:  In the shower, wash your face, chest and back gently with Cetaphil moisturizing cleanser or Dove Fragrance-free bar  Do not use a luffa or washcloth as these tend to be too irritating to acne-prone skin  2) ANTIBIOTICS:       Oral Minocycline 100 mg after breakfast with a full glass of water   Apply topically Dapsone once daily     EVENING ROUTINE    1) SKIN HYGEINE:  In the shower, wash your face, chest and back gently with Cetaphil moisturizing cleanser or Dove Fragrance-free bar  Do not use a lufa or washcloth as these tend to be too irritating to acne-prone skin  2) ANTIBIOTICS:       Oral Minocycline 100 mg after dinner with a full glass of water      3) TOPICAL RETINOID:  At 1 hour before bedtime (after washing your face and allowing the skin to completely dry), spread only a single pea-sized amount of this medication evenly over your entire face (avoiding your eyes or mouth):     Adapalene 0 3% 1 hour before bedtime    REMEMBER:  Always take your acne pills with lots of water! A pill stuck in your throat can cause significant burning and irritation  Drink a full glass of water to ensure the pill gets into your stomach  Avoid popping a pill right before bed, and stay upright for at least 1 hour after taking a pill  ACNE:  WHAT ZIT ALL ABOUT? WHY DO I HAVE ACNE/PIMPLES? Your skin is made of layers  To keep the skin from becoming dry and cracked, the skin needs oil  The oil is made in little wells in the deeper layers in the skin  People with acne have glands that make more oil and are more easily plugged, causing the glands to swell  Hormones, bacteria and your inherited tendency to have acne all play a role  The medical term for pimples is acne or acne vulgaris (vulgaris means common)   Most people get some acne  Acne does not come from being dirty  Instead, it is an expected consequence of changes that occur during normal growth and development  Hormones, bacteria, and your family's tendency to have acne may all play a role  Whiteheads or blackheads are openings of the glands (glands are the oil factories) onto the surface of the skin  Blackheads are not caused by dirt blocking the pores; instead, they result from the oxidation reaction of oil and skin in the pores with the air (like a rust reaction)  WHAT ABOUT STRESS? Stress does not cause acne but it can make it worse  Make sure you get enough sleep and daily exercise! WHAT ABOUT FOODS/DIET? Try to eat a balanced, healthy diet  Some people feel that certain foods worsen their acne  While there aren't many studies available on this question, severe dietary changes are unlikely to help your acne and may be harmful to the health of your skin  If you find that a certain food seems to aggravate your acne, you may consider avoiding that food  Discuss this with your physician! WHAT CAUSES MY ACNE? There are four contributors to acne--the body's natural oil (sebum), clogged pores, bacteria (with the scientific name Propionibacterium acnes, or P  acnes, for short), and the body's reaction to the bacteria living in the clogged pores (which causes inflammation)  Here's what happens:     Sebum is produced in the normal oil-making glands in the deeper layers of the skin and reaches the surface through the skin's pores  An increase in certain hormones occurs around the time of puberty, and these hormones trigger the oil glands to produce increased amounts of sebum   Pores with excess oil tend to become clogged more easily   At the same time, P  acnes--one of the many types of bacteria that normally live on everyone's skin--thrives in the excess oil and causes a skin reaction (inflammation)     If a pore is clogged close to the surface, there is little inflammation  However, this results in the formation of whiteheads (closed comedones) or blackheads (open comedones) at the surface of the skin   A plug that extends to, or forms a little deeper in the pore, or one that enlarges or ruptures may cause more inflammation  The result is red bumps (papules) and pus-filled pimples (pustules)   If plugging happens in the deepest skin layer, the inflammation may be even more severe, resulting in the formation of nodules or cysts  When these types of acne heal, they may leave behind discolored areas or true scars  SKIN HYGIENE:  HOW SHOULD I [facility] MY SKIN? Acne does not come from being dirty, however, washing your face is part of taking good care of your skin and will help keep your face clear  Good skin hygiene is, therefore, critical to support any acne treatment plan  Here are several specific suggestions for practicing good skin hygiene and keeping your skin looking its best:     You should wash acne-prone skin TWICE A DAY: Once in the morning and once in the evening  This does include any showers you take that day, so do not overdo it!  Do not scrub the skin with a washcloth or loofah as these can irritate and inflame your acne  Acne does not come from dirt, so it is not necessary to scrub the skin clean  In fact, scrubbing may lead to dryness and irritation that makes the acne even worse and harder for patients to tolerate acne medications   Use a gentle facial moisturizing cleanser (Cetaphil Moisturizing Cleanser or Dove Fragrance-Free bar)  Avoid using soaps like [address], [facility], or soft/liquid soaps as these products will dry your skin   Do not use any over-the-counter acne washes without your doctor's specific instruction to do so  These products often contain salicylic acid or benzoyl peroxide   These ingredients can be helpful in clearing oil from the skin and reducing bacteria, but they may also be drying and can add to irritation   Do not use exfoliating products with microbeads or brushes as these can cause irritation to the skin   Facials and other treatments to remove, squeeze, or clean out pores are not recommended  Manipulating the skin in this way can make acne worse and can lead to severe infections and/or scarring  It also increases the likelihood that the skin will not be able to tolerate acne medications   Try not to pop pimples or pick at your acne as this can delay healing and may result in scarring or skin color changes (dark spots) that are often more noticeable than the acne itself  Picking/popping acne can also cause a serious skin infection   Wash or change your pillow case once to twice a week, especially if you use products in your hair   Wash the skin as soon as possible after playing sports or other activities that cause a lot of sweating  Also, pay attention to how your sports equipment (shoulder pads, helmet strap, etc ) might be making your acne worse   When you use makeup, moisturizer, or sunscreen make sure that these products are labeled non-comedogenic, or won't clog pores, or won't cause acne         SHOULD I TREAT MY ACNE? There are a number of other skin conditions that can look like acne  If there is any question about the diagnosis, then the person should be evaluated by a board certified pediatric and adolescent dermatologist   A physician should examine any child with acne who is between the ages of 3and 9years of age, as acne in this mid-childhood age group is not normal and may signal an underlying problem     If a preadolescent (9to 6years of age) or adolescent (15to 25years of age) has mild acne and the condition is not bothersome to the individual, proper and regular skin care (what your doctor may call skin hygiene) may be all that is needed at this point  Many people do, however, need specific acne medications to help their skin look and feel its best  Your doctor will tell you if you are one of these people  If so, you may be advised to use an over-the-counter or prescription medication that is applied to the skin (a topical medication) or if the addition of an oral medication (a medication taken by [employer]) is needed  The good news is that the medications work well when used properly! Some specific factors that may influence the choice of acne therapy include:     Severity  The number and type of skin lesions (papules or comedones) and the degree of inflammation (mild, moderate or severe)   Scarring  Scarring is most common when acne is severe, but it can happen even in children with mild acne   Impact  If a child is experiencing emotional complications because of the acne or is experiencing negative comments from other children   Cost of the acne medications  An acne expert can help to keep out of pocket costs to a minimum by utilizing the correct medications and the least expensive options   The patient's skin type (oily versus dry or combination skin, for example)   Potential side effects of the medication   The ease or overall complexity of the treatment plan or medication  WHAT ACNE TREATMENTS ARE AVAILABLE? Medications for acne try to stop the formation of new pimples by reducing or removing the oil, bacteria, and other things (like dead skin cells) that clog the pores  They can also decrease the inflammation or irritation response of the skin to bacteria  It may take from 6 to 8 weeks (about 2 months!) before you see any improvement and know if the medication is effective  It takes the layers of skin this long to regenerate  Remember, these medications do not cure the condition--the acne improves because of the medication  Therefore, treatment must be continued in order to prevent the return of acne lesions  There are many types of acne treatments   Some are applied to the skin (topical medications) and some are taken by mouth (oral medications)  In most cases of mild acne, the doctor will start with a topical medication  There are many different topical medications that are helpful for acne  If acne is more severe and it does not respond adequately to a topical medication, or if it covers large body surface areas such as the back and/or chest, oral antibiotics such as Doxycycline or Minocycline and/or oral hormone therapy such as Oral Contraceptive Pills or Spironolactone may be prescribed  In the most severe cases, isotretinoin (Accutane) may be used  In general, it is usually best to start with acne medications that are least likely to cause side effects but are at the same time capable of addressing the specific causes for the acne  Some patients have a good result with just one medication, but many will need to use a combination of treatments: two or more different topical agents or an oral medication plus a topical medication  Another treatment used for acne may include corticosteroid injections, which are used to help relieve pain, decrease the size, and encourage the healing of large, inflamed acne nodules  Also, dermatologists sometimes perform acne surgery, using a fine needle, a pointed blade, or an instrument known as a comedone extractor to mechanically clean out clogged pores  One must always weigh the risk for inducing a scar with the potential benefits of any procedure  Prior treatment with topical retinoids can loosen whiteheads and blackheads and make it easier to physically remove such lesions  Heat-based devices, and light and laser therapy are being studied to see whether there is any role for such treatments in mild to moderate acne  At this time, there is not enough evidence to make general recommendations about their use  TOPICAL ACNE MEDICATIONS    WHAT KIND OF TOPICALS ARE THERE?    Benzoyl peroxide (BP) helps to fight inflammation and is anti-microbial (kills bacteria, viruses, and other microorganisms) and is believed to help prevent resistance of bacteria to topical antibiotics  A benzoyl peroxide wash may be recommended for use on large areas such as the chest and/or back  Mild irritation and dryness are common when first using benzoyl peroxide-containing products  Be careful because benzoyl peroxide can bleach towels and clothing!  Retinoids (such as adapalene, tretinoin, or tazarotene) unplug the oil glands by helping peel away the layers of skin and other things plugging the opening of the glands  Mild irritation and dryness are common when first using these products  Facial waxing and other skin procedures can lead to excessive irritation and should be avoided during retinoid therapy   Antibiotics fight bacteria and help decrease inflammation  Topical antibiotics commonly used in acne include clindamycin, erythromycin, and combination agents (such as clindamycin/benzoyl peroxide or erythromycin/benzoyl peroxide)  Mild irritation and dryness are common when first using these products  Typically, topical antibiotics should not be used alone as treatment for acne   Other topical agents include salicylic acid, azelaic acid, dapsone, and sulfacetamide  Mild irritation and dryness can also occur when first using these products  USING YOUR TOPICAL TREATMENTS LIKE A PRO   Apply topical medications only to clean, dry skin  Topical medications may lead to significant dryness of the affected areas  To minimize this, wait 15-20 minutes after washing before applying your topical medication   These medications work deep in the skin to prevent new breakouts  Spot treatment of individual pimples does not do much  When applying topical medications to the face, use the 5-dot method  Start by placing a small pea-sized amount of the medication on your finger   Then, place dots in each of five locations of your face: Mid-forehead, each cheek, nose, and chin  Next, rub the medication into the entire area of skin - not just on individual pimples! Try to avoid the delicate skin around your eyes and corners of your mouth   The medications are not magic! They take weeks if not months to work  Be patient and use your medicine on a daily basis or as directed for six weeks before asking if your skin looks better  Try not to miss more than one or two days each week when using your medications   If you are starting a new medication, then try using it every other night or even every third night   Gradually work up to [place] [employer] a day    This will give your skin time to adjust    The same medications often come in various forms or formulations: Creams, ointments, lotions, gels, microspheres, or foams  Use the formulation that has been recommended and don't switch to other forms unless instructed  Some forms (such as alcohol based gels) may be more drying and less tolerable for certain skin types   Sometimes individual medications are not as effective as a combination of two or more agents  The doctor may need to try several medications or combinations before finding the one that is best for that patient   Moisturizer, sunscreen, and make-up may be used in conjunction with topical acne medications  In general, acne medications are applied first so they may directly contact the skin  Ask your physician to review specific application instructions!  It is especially important to always use sunscreen when using a topical retinoid or oral antibiotic  These drugs can make your skin more sensitive to the sun  In general, sunscreen gets applied AFTER any acne medications   Don't stop using your acne medications just because your acne got better  Remember, the acne is better because of the medication, and prevention is the ghotra to treatment        ORAL ACNE MEDICATIONS    ORAL ANTIBIOTICS  Antibiotics include tetracycline-class medicines (which include the most commonly used oral antibiotics for acne, minocycline, and doxycycline), erythromycin, trimethoprim-sulfamethoxazole, and occasionally cephalexin or azithromycin  These drugs may decrease bacteria and inflammation, and they are most effective for moderate-to-severe inflammatory acne  A product containing benzoyl peroxide should be used along with these antibiotics to help decrease the possibility of microbial resistance  Always take your acne pills with lots of water! A pill stuck in your throat can cause significant burning and irritation  Drink a full glass of water to ensure the pill gets into your stomach  Avoid popping a pill right before bed, and stay upright for at least 1 hour after taking a pill  DOXYCYCLINE   This medication is usually taken ONCE or TWICE per day, as instructed by your physician  NOTE: Always take this medication with lots of water! A pill stuck in the throat can cause significant burning and irritation  Avoid popping a pill right before bed & stay upright for at least one hour after taking a pill  WARNING: Doxycycline increases your sensitivity to the sun, so practice excellent sun protection! If you notice any of the following, stop using the medication and notify your health care provider: headaches; blurred vision; dizziness; sun sensitivity; heartburn-stomach pain; irritation of the esophagus; darkening of scars, gums, or teeth (more often with minocycline); nail changes; yellowing of the eyes or skin (indicating possible liver disease); joint pains-and flu-like symptoms  Taking oral antibiotics with food may help with symptoms of upset stomach  COMMON SIDE EFFECTS: Headaches; dizziness; sun sensitivity; irritation of the throat; discoloration of scars, gums, or teeth; nail changes  MINOCYCLINE   This medication is usually taken ONCE or TWICE per day, as instructed by your physician  NOTE: Always take this medication with lots of water!  A pill stuck in the throat can cause significant burning and irritation  Avoid popping a pill right before bed & stay upright for at least one hour after taking a pill  WARNING: Though less likely than doxycycline, minocycline may increase your sensitivity to the sun, so practice excellent sun protection! If you notice any of the following, stop using the medication and notify your health care provider: headaches; blurred vision; dizziness; sun sensitivity; heartburn-stomach pain; irritation of the throat; darkening of scars, gums, or teeth; nail changes; yellowing of the eyes or skin (indicating possible liver disease); joint pains-and flu-like symptoms  Taking oral antibiotics with food may help with symptoms of upset stomach  COMMON SIDE EFFECTS: Headaches; dizziness; sun sensitivity; irritation of the throat; discoloration of scars, gums, or teeth (often with minocycline); nail changes  Minocycline can rarely cause liver disease, joint pains, severe skin rashes, and flu-like symptoms  If you should notice yellowing of the eyes or skin, or any of the above, notify your doctor and stop using the medication immediately  HORMONAL THERAPY  Hormonal treatment is used only in females and usually consists of oral contraceptives (birth control pills)  Spironolactone is also sometimes used  ORAL CONTRACEPTIVE PILLS   This medication is also known as the Birth Control Pill    We use it for hormonal regulation of acne  Take this medication as directed on the medication packet  NOTE: Try to find a regular time in your day to take the pill so that you don't forget  The best time is about half an hour after a meal or snack, or at bedtime  If you do forget to take your daily pill at the regular time, take one as soon as you remember and take the next at your regular scheduled time     WARNING: Do not take this medication until discussing it with your physician if you smoke, are pregnant (or trying to become pregnant or could be pregnant), have a personal history of breast cancer, have any artificial hardware or implants, have a condition called Factor 5 Leiden deficiency, have a family history of clotting problems, regularly have migraine headaches (especially with aura or due to flashing lights), or have any vaginal bleeding other than that associated with your menstrual cycle  ORAL ISOTRETINOIN (used to be called the brand name Marvin Grant)  Isotretinoin, a derivative of vitamin A, is a powerful drug with several significant potential side effects  It is reserved for acne which is severe or when other medications have not worked well enough  It used to be sold under the brand name Accutane but now several versions exist       HAVING PROBLEMS WITH ANY OF YOUR TREATMENTS? You should not be able to see any of the medicines on your face  If you can see a white film on your skin after you apply the medication, there is too much medicine in that area and you need to apply a thinner coat and make sure it is spread evenly on your face  If your skin gets too dry, you can apply a light (non-comedogenic) moisturizer on top of your medicine or you may switch to using the medicine every other day instead of every day  If your skin is still too irritated, you may need to switch to a milder medication  If your skin is red and very itchy, you may be allergic to the medication and you should stop using it  COMMON POSSIBLE SIDE EFFECTS OF MEDICATIONS     Retinoids - dryness, redness, increased sun sensitivity   Benzoyl peroxide - drying, redness, bleaching of clothes, towels and sheets, allergy   Doxycycline - headaches; dizziness; irritation of the throat; nail changes; discoloration of teeth   Sun sensitivity - even if you have dark skin, this medicine can make you burn more easily    Make sure you protect yourself from the sun, either by avoiding being outside between 11 AM and 3 PM, wearing and reapplying sunscreen/sunblock, or wearing sun protective clothing   Nausea/vomiting - if you experience nausea with this medication, take it with food   Minocycline - headaches; dizziness; vision problems,  irritation of the throat; discoloration of scars, gums, or teeth  Can rarely cause liver disease, joint pains, and flu-like symptoms   If you should notice yellowing of the skin or any of the above, notify your doctor and stop using the medication   Birth Control Pills - nausea; headaches; breast tenderness; feeling bloated; mood changes   Spotting between periods may occur for the first three weeks of the medication, but this is not serious  It may last for two or three cycles  Please call us if the bleeding is heavier than a light flow or lasts for more than a few days  WHEN AND WHERE TO CALL WITH CONCERNS  We are here to help! If you experience any unusual symptoms, then stop taking or using the medication and call our office at [phone])  It is better to be safe than to be sorry! 2  CONTACT DERMATITIS    Physical Exam:   Anatomic Location Affected:  Right hand    Morphological Description:  Nummular pink scaly patches     Additional History of Present Condition:  Located on the right hand  Present for few months  Reports scaling and itching   No treatment attempted     Assessment and Plan:  Based on a thorough discussion of this condition and the management approach to it (including a comprehensive discussion of the known risks, side effects and potential benefits of treatment), the patient (family) agrees to implement the following specific plan:   Start Triamcinolone Ointment 0 1 % twice a day for 14 days straight      Scribe Attestation    I,:  Norma Brar MA am acting as a scribe while in the presence of the attending physician :       I,:  Gentry Pennington MD personally performed the services described in this documentation    as scribed in my presence :

## 2022-01-05 NOTE — PROGRESS NOTES
BMI Counseling: There is no height or weight on file to calculate BMI  The BMI is above normal  Nutrition recommendations include decreasing portion sizes, encouraging healthy choices of fruits and vegetables, decreasing fast food intake, consuming healthier snacks, limiting drinks that contain sugar, moderation in carbohydrate intake, increasing intake of lean protein, reducing intake of saturated and trans fat and reducing intake of cholesterol  Exercise recommendations include exercising 3-5 times per week  No pharmacotherapy was ordered  Patient referred to PCP  Rationale for BMI follow-up plan is due to patient being overweight or obese  Assessment/Plan:         Problem List Items Addressed This Visit        Digestive    GERD (gastroesophageal reflux disease)     Patient to continue with present therapy and decrease caffeine, avoid ETOH and smoking to decrease acid production  Pt should also cease eating prior to bedtime and avoid excessive fluid intake prior to sleep  May use antacids as needed for breakthrough GERD  All pateint questions answered today about this condition  Respiratory    Allergic rhinitis     Patient is stable  and will continue present plan of care and reassess at next routine visit  All questions about this problem from patient were answered today  Cardiovascular and Mediastinum    Essential hypertension - Primary     Patient is stable with current anti-hypertensive medicine and continue to follow a low sodium diet and take current medication  All questions about this condition were answered today  Other    Obesity (BMI 30-39  9)     Patient to increase exercise and partake of a diet with less calories to promote  weight loss                 Subjective:      Patient ID: Maria Elena Ellis is a 28 y o  male      HPI    The following portions of the patient's history were reviewed and updated as appropriate:   Past Medical History:  He has a past medical history of Acne (09/01/2010), Acquired ankle/foot deformity, Change in hearing, Closed displaced avulsion fracture of tuberosity of left calcaneus, Epistaxis, Essential hypertension (6/19/2018), Foreign body in foot, Gastritis, GERD (gastroesophageal reflux disease), History of oral aphthous ulcers, Hypersomnia (02/18/2009), Impacted cerumen, Insomnia, Irritable bowel syndrome, Knee joint pain (03/10/2008), Left foot pain, Mixed hyperlipidemia (11/2/2020), OCD (obsessive compulsive disorder), Plantar fibromatosis, Seasonal allergies, Tarsal tunnel syndrome of left side, and Tinea corporis  ,  _______________________________________________________________________  Medical Problems:  does not have any pertinent problems on file ,  _______________________________________________________________________  Past Surgical History:   has a past surgical history that includes No past surgeries; Esophagogastroduodenoscopy (N/A, 10/31/2017); and pr length/short leg/ankl tendon,single (Left, 11/3/2017)  ,  _______________________________________________________________________  Family History:  family history includes Allergic rhinitis in his mother; Cancer in his family; Colonic polyp in his maternal grandmother; Diabetes in his family; Gout in his father; Hypertension in his father; Kidney disease in his father and mother; No Known Problems in his sister; Thyroid disease in his mother ,  _______________________________________________________________________  Social History:   reports that he has never smoked  He has never used smokeless tobacco  He reports that he does not drink alcohol and does not use drugs  ,  _______________________________________________________________________  Allergies:  is allergic to penicillins and sulfa antibiotics     _______________________________________________________________________  Current Outpatient Medications   Medication Sig Dispense Refill    Adapalene 0 3 % gel SPREAD ONE PEA-SIZED AMOUNT OF MEDICATION OVER ENTIRE FACE ABOUT ONE HOUR BEFORE BEDTIME   45 g 2    Alpha-D-Galactosidase (BEANO PO) Take by mouth      Alpha-Lipoic Acid 200 MG CAPS Take 1 capsule (200 mg total) by mouth daily 30 capsule 5    Ascorbic Acid (vitamin C) 1000 MG tablet Take 1 tablet (1,000 mg total) by mouth daily 30 tablet 5    atorvastatin (LIPITOR) 10 mg tablet Take 1 tablet (10 mg total) by mouth daily 30 tablet 0    Bacillus Coagulans-Inulin (Probiotic Formula) 1-250 BILLION-MG CAPS Take 1 Units by mouth daily 30 capsule 5    buPROPion (WELLBUTRIN XL) 150 mg 24 hr tablet Take 1 tablet (150 mg total) by mouth every morning 30 tablet 2    Cholecalciferol (KP Vitamin D3) 50 MCG (2000 UT) CAPS Take 1 capsule (2,000 Units total) by mouth 2 (two) times a day 180 capsule 1    cholecalciferol (VITAMIN D3) 1,000 units tablet Take 1 tablet (1,000 Units total) by mouth daily 90 tablet 3    Dapsone 7 5 % GEL Apply topically once daily 90 g 3    Echinacea-Alatorre Seal Complex CAPS Take 1 capsule by mouth daily 30 capsule 0    fexofenadine (ALLEGRA) 180 MG tablet Take 1 tablet (180 mg total) by mouth daily 90 tablet 1    Fluvoxamine Maleate 100 MG CP24 TAKE 1 CAPSULE BY MOUTH EVERY DAY 90 capsule 1    Fluvoxamine Maleate 150 MG CP24 TAKE 1 CAPSULE BY MOUTH EVERY DAY 90 capsule 1    lisinopril (ZESTRIL) 10 mg tablet Take 1 tablet (10 mg total) by mouth daily 90 tablet 1    loratadine (CLARITIN) 10 mg tablet       Melatonin 10 MG TABS  (Patient not taking: Reported on 11/8/2021 )      minocycline (MINOCIN) 100 mg capsule Take 1 capsule (100 mg total) by mouth every 12 (twelve) hours 120 capsule 1    montelukast (SINGULAIR) 10 mg tablet TAKE 1 TABLET BY MOUTH EVERY DAY 30 tablet 4    Multiple Vitamin (multivitamin) capsule Take 1 capsule by mouth daily 30 capsule 5    Omega-3 Fatty Acids (Fish Oil Concentrate) 1000 MG CAPS Take 3 capsules (3,000 mg total) by mouth daily 90 capsule 5    omeprazole (PriLOSEC) 40 MG capsule TAKE 1 CAPSULE BY MOUTH EVERY DAY 30 capsule 8    oxymetazoline (AFRIN) 0 05 % nasal spray 2 sprays by Each Nare route 2 (two) times a day      triamcinolone (KENALOG) 0 1 % ointment Apply topically twice a day for 14 days starlight 30 g 0    Vitamin D-Vitamin K (K2 Plus D3) 100-1000 MCG-UNIT TABS TAKE 1 TABLET BY MOUTH TWICE A DAY 60 tablet 1    zinc gluconate 50 mg tablet Take 1 tablet (50 mg total) by mouth daily 30 tablet 5     No current facility-administered medications for this visit      _______________________________________________________________________  Review of Systems      Objective: There were no vitals filed for this visit  There is no height or weight on file to calculate BMI       Physical Exam

## 2022-01-07 ENCOUNTER — TELEPHONE (OUTPATIENT)
Dept: DERMATOLOGY | Facility: CLINIC | Age: 32
End: 2022-01-07

## 2022-01-07 ENCOUNTER — OFFICE VISIT (OUTPATIENT)
Dept: FAMILY MEDICINE CLINIC | Facility: CLINIC | Age: 32
End: 2022-01-07
Payer: COMMERCIAL

## 2022-01-07 VITALS
WEIGHT: 275 LBS | HEART RATE: 84 BPM | TEMPERATURE: 98.2 F | RESPIRATION RATE: 20 BRPM | HEIGHT: 67 IN | BODY MASS INDEX: 43.16 KG/M2 | OXYGEN SATURATION: 97 % | DIASTOLIC BLOOD PRESSURE: 80 MMHG | SYSTOLIC BLOOD PRESSURE: 142 MMHG

## 2022-01-07 DIAGNOSIS — E78.2 MIXED HYPERLIPIDEMIA: ICD-10-CM

## 2022-01-07 DIAGNOSIS — Z13.220 SCREENING CHOLESTEROL LEVEL: ICD-10-CM

## 2022-01-07 DIAGNOSIS — J30.9 ALLERGIC RHINITIS, UNSPECIFIED SEASONALITY, UNSPECIFIED TRIGGER: Primary | ICD-10-CM

## 2022-01-07 DIAGNOSIS — I10 ESSENTIAL HYPERTENSION: Primary | ICD-10-CM

## 2022-01-07 DIAGNOSIS — K21.9 GASTROESOPHAGEAL REFLUX DISEASE WITHOUT ESOPHAGITIS: ICD-10-CM

## 2022-01-07 DIAGNOSIS — Z01.00 ENCOUNTER FOR EYE EXAM: ICD-10-CM

## 2022-01-07 DIAGNOSIS — E66.9 OBESITY (BMI 30-39.9): ICD-10-CM

## 2022-01-07 DIAGNOSIS — Z23 ENCOUNTER FOR IMMUNIZATION: ICD-10-CM

## 2022-01-07 DIAGNOSIS — Z00.00 WELL ADULT HEALTH CHECK: ICD-10-CM

## 2022-01-07 DIAGNOSIS — J30.9 ALLERGIC RHINITIS, UNSPECIFIED SEASONALITY, UNSPECIFIED TRIGGER: ICD-10-CM

## 2022-01-07 PROCEDURE — 99395 PREV VISIT EST AGE 18-39: CPT | Performed by: FAMILY MEDICINE

## 2022-01-07 RX ORDER — FEXOFENADINE HCL 180 MG/1
180 TABLET ORAL DAILY
Qty: 90 TABLET | Refills: 1 | Status: SHIPPED | OUTPATIENT
Start: 2022-01-07

## 2022-01-07 NOTE — TELEPHONE ENCOUNTER
Pt is calling to inform us that the pharmacy contacted him that he needs prior authorization for his prescription    Dapsone 7 5 % GEL     Thank you!

## 2022-01-09 RX ORDER — LORATADINE 10 MG/1
TABLET ORAL
Qty: 30 TABLET | Refills: 4 | Status: SHIPPED | OUTPATIENT
Start: 2022-01-09 | End: 2022-07-08 | Stop reason: SDUPTHER

## 2022-01-09 RX ORDER — ATORVASTATIN CALCIUM 10 MG/1
TABLET, FILM COATED ORAL
Qty: 30 TABLET | Refills: 3 | Status: SHIPPED | OUTPATIENT
Start: 2022-01-09 | End: 2022-05-09

## 2022-01-13 ENCOUNTER — TELEPHONE (OUTPATIENT)
Dept: DERMATOLOGY | Facility: CLINIC | Age: 32
End: 2022-01-13

## 2022-02-05 DIAGNOSIS — E55.9 VITAMIN D DEFICIENCY: ICD-10-CM

## 2022-02-05 DIAGNOSIS — I10 ESSENTIAL HYPERTENSION: ICD-10-CM

## 2022-02-05 DIAGNOSIS — J30.9 ALLERGIC RHINITIS, UNSPECIFIED SEASONALITY, UNSPECIFIED TRIGGER: ICD-10-CM

## 2022-02-05 DIAGNOSIS — E46 MALNUTRITION, UNSPECIFIED TYPE (HCC): ICD-10-CM

## 2022-02-07 RX ORDER — CEPHRADINE 500 MG
CAPSULE ORAL
Qty: 30 CAPSULE | Refills: 5 | Status: SHIPPED | OUTPATIENT
Start: 2022-02-07

## 2022-02-07 RX ORDER — MONTELUKAST SODIUM 10 MG/1
TABLET ORAL
Qty: 30 TABLET | Refills: 4 | Status: SHIPPED | OUTPATIENT
Start: 2022-02-07 | End: 2022-03-09

## 2022-02-07 RX ORDER — GOLDENSEAL ROOT 500 MG
CAPSULE ORAL
Qty: 30 CAPSULE | Refills: 5 | Status: SHIPPED | OUTPATIENT
Start: 2022-02-07

## 2022-02-07 RX ORDER — CEPHRADINE 500 MG
CAPSULE ORAL
Qty: 60 TABLET | Refills: 1 | Status: SHIPPED | OUTPATIENT
Start: 2022-02-07

## 2022-02-07 RX ORDER — CHLORAL HYDRATE 500 MG
CAPSULE ORAL
Qty: 90 CAPSULE | Refills: 5 | Status: SHIPPED | OUTPATIENT
Start: 2022-02-07

## 2022-02-07 RX ORDER — ZINC GLUCONATE 50 MG
TABLET ORAL
Qty: 30 TABLET | Refills: 5 | Status: SHIPPED | OUTPATIENT
Start: 2022-02-07

## 2022-02-07 RX ORDER — BACILLUS COAGULANS/INULIN 1B-250 MG
CAPSULE ORAL
Qty: 30 CAPSULE | Refills: 5 | Status: SHIPPED | OUTPATIENT
Start: 2022-02-07

## 2022-02-07 RX ORDER — MULTIVIT WITH MINERALS/LUTEIN
1000 TABLET ORAL DAILY
Qty: 30 TABLET | Refills: 5 | Status: SHIPPED | OUTPATIENT
Start: 2022-02-07

## 2022-02-07 RX ORDER — LISINOPRIL 10 MG/1
TABLET ORAL
Qty: 30 TABLET | Refills: 5 | Status: SHIPPED | OUTPATIENT
Start: 2022-02-07

## 2022-03-08 DIAGNOSIS — E46 MALNUTRITION, UNSPECIFIED TYPE (HCC): ICD-10-CM

## 2022-03-08 RX ORDER — MULTIVITAMIN
1 CAPSULE ORAL DAILY
Qty: 30 CAPSULE | Refills: 5 | Status: SHIPPED | OUTPATIENT
Start: 2022-03-08 | End: 2022-03-20 | Stop reason: SDUPTHER

## 2022-03-09 DIAGNOSIS — J30.9 ALLERGIC RHINITIS, UNSPECIFIED SEASONALITY, UNSPECIFIED TRIGGER: ICD-10-CM

## 2022-03-09 RX ORDER — MONTELUKAST SODIUM 10 MG/1
TABLET ORAL
Qty: 30 TABLET | Refills: 4 | Status: SHIPPED | OUTPATIENT
Start: 2022-03-09

## 2022-03-21 ENCOUNTER — OFFICE VISIT (OUTPATIENT)
Dept: DERMATOLOGY | Facility: CLINIC | Age: 32
End: 2022-03-21
Payer: COMMERCIAL

## 2022-03-21 VITALS — TEMPERATURE: 97.6 F | BODY MASS INDEX: 45.26 KG/M2 | WEIGHT: 289 LBS

## 2022-03-21 DIAGNOSIS — L70.0 ACNE VULGARIS: Primary | ICD-10-CM

## 2022-03-21 PROCEDURE — 99213 OFFICE O/P EST LOW 20 MIN: CPT | Performed by: DERMATOLOGY

## 2022-03-21 RX ORDER — CLINDAMYCIN AND BENZOYL PEROXIDE 10; 50 MG/G; MG/G
GEL TOPICAL 2 TIMES DAILY
Qty: 50 G | Refills: 3 | Status: SHIPPED | OUTPATIENT
Start: 2022-03-21

## 2022-03-21 NOTE — PATIENT INSTRUCTIONS
ACNE VULGARIS ("COMMON ACNE"): FOLLOW UP    Assessment and Plan:   We reviewed the causes of acne, the kinds of acne, and the expected clinical course   We discussed treatment options ranging from over-the-counter products, topical retinoids, antibiotics, BP, hormonal therapies (OCPs/spironolactone), and isotretinoin (Accutane)   We reviewed specific over-the-counter interventions and medications  Recommended typical hygiene measures including water-based facial products, washing regularly with mild cleanser, and refraining from picking and popping any pimples   Recommended non-comedogenic sunscreen use daily   Expectations of therapy discussed  Side effects, risks and benefits of medications discussed   A comprehensive handout on Acne was provided   The phone number to call in case of questions or concerns (and instructions to stop medications in such a scenario) was provided   After lengthy discussion of etiology and treatment options, we decided to implement the following personalized treatment plan:    Based on a thorough discussion of this condition and the management approach to it (including a comprehensive discussion of the known risks, side effects and potential benefits of treatment), the patient (family) agrees to implement the following specific plan:    --------------------------------------------------------------------------------------  YOUR PERSONALIZED ACNE ACTION PLAN    [facility]    1) SKIN HYGEINE:  In the shower, wash your face, chest and back gently with Cetaphil moisturizing cleanser or Dove Fragrance-free bar  Do not use a luffa or washcloth as these tend to be too irritating to acne-prone skin  2) ANTIBIOTICS:     Oral Minocycline 100 mg after breakfast with a full glass of water    EVENING ROUTINE    1) SKIN HYGEINE:  In the shower, wash your face, chest and back gently with Cetaphil moisturizing cleanser or Dove Fragrance-free bar    Do not use a lufa or washcloth as these tend to be too irritating to acne-prone skin  2) ANTIBIOTICS:     Oral Minocycline 100 mg after dinner with a full glass of water    3) TOPICAL RETINOID:  At 1 hour before bedtime (after washing your face and allowing the skin to completely dry), spread only a single pea-sized amount of this medication evenly over your entire face (avoiding your eyes or mouth):  Benzaclin gel 1 hour before bedtime   Adapalene 0 3% gel 1 hour before bedtime   Follow up in 4 months      REMEMBER:  Always take your acne pills with lots of water! A pill stuck in your throat can cause significant burning and irritation  Drink a full glass of water to ensure the pill gets into your stomach  Avoid popping a pill right before bed, and stay upright for at least 1 hour after taking a pill  ACNE:  WHAT ZIT ALL ABOUT? WHY DO I HAVE ACNE/PIMPLES? Your skin is made of layers  To keep the skin from becoming dry and cracked, the skin needs oil  The oil is made in little wells in the deeper layers in the skin  People with acne have glands that make more oil and are more easily plugged, causing the glands to swell  Hormones, bacteria and your inherited tendency to have acne all play a role  The medical term for pimples is acne or acne vulgaris (vulgaris means common)  Most people get some acne  Acne does not come from being dirty  Instead, it is an expected consequence of changes that occur during normal growth and development  Hormones, bacteria, and your family's tendency to have acne may all play a role  Whiteheads or blackheads are openings of the glands (glands are the oil factories) onto the surface of the skin  Blackheads are not caused by dirt blocking the pores; instead, they result from the oxidation reaction of oil and skin in the pores with the air (like a rust reaction)  WHAT ABOUT STRESS? Stress does not cause acne but it can make it worse   Make sure you get enough sleep and daily exercise! WHAT ABOUT FOODS/DIET? Try to eat a balanced, healthy diet  Some people feel that certain foods worsen their acne  While there aren't many studies available on this question, severe dietary changes are unlikely to help your acne and may be harmful to the health of your skin  If you find that a certain food seems to aggravate your acne, you may consider avoiding that food  Discuss this with your physician! WHAT CAUSES MY ACNE? There are four contributors to acne--the body's natural oil (sebum), clogged pores, bacteria (with the scientific name Propionibacterium acnes, or P  acnes, for short), and the body's reaction to the bacteria living in the clogged pores (which causes inflammation)  Here's what happens:     Sebum is produced in the normal oil-making glands in the deeper layers of the skin and reaches the surface through the skin's pores  An increase in certain hormones occurs around the time of puberty, and these hormones trigger the oil glands to produce increased amounts of sebum   Pores with excess oil tend to become clogged more easily   At the same time, P  acnes--one of the many types of bacteria that normally live on everyone's skin--thrives in the excess oil and causes a skin reaction (inflammation)   If a pore is clogged close to the surface, there is little inflammation  However, this results in the formation of whiteheads (closed comedones) or blackheads (open comedones) at the surface of the skin   A plug that extends to, or forms a little deeper in the pore, or one that enlarges or ruptures may cause more inflammation  The result is red bumps (papules) and pus-filled pimples (pustules)   If plugging happens in the deepest skin layer, the inflammation may be even more severe, resulting in the formation of nodules or cysts  When these types of acne heal, they may leave behind discolored areas or true scars      SKIN HYGIENE:  HOW SHOULD I [address] SKIN?  Acne does not come from being dirty, however, washing your face is part of taking good care of your skin and will help keep your face clear  Good skin hygiene is, therefore, critical to support any acne treatment plan  Here are several specific suggestions for practicing good skin hygiene and keeping your skin looking its best:     You should wash acne-prone skin TWICE A DAY: Once in the morning and once in the evening  This does include any showers you take that day, so do not overdo it!  Do not scrub the skin with a washcloth or loofah as these can irritate and inflame your acne  Acne does not come from dirt, so it is not necessary to scrub the skin clean  In fact, scrubbing may lead to dryness and irritation that makes the acne even worse and harder for patients to tolerate acne medications   Use a gentle facial moisturizing cleanser (Cetaphil Moisturizing Cleanser or Dove Fragrance-Free bar)  Avoid using soaps like Brunlisbet [place], [address], [address], or soft/liquid soaps as these products will dry your skin   Do not use any over-the-counter acne washes without your doctor's specific instruction to do so  These products often contain salicylic acid or benzoyl peroxide  These ingredients can be helpful in clearing oil from the skin and reducing bacteria, but they may also be drying and can add to irritation   Do not use exfoliating products with microbeads or brushes as these can cause irritation to the skin   Facials and other treatments to remove, squeeze, or clean out pores are not recommended  Manipulating the skin in this way can make acne worse and can lead to severe infections and/or scarring  It also increases the likelihood that the skin will not be able to tolerate acne medications   Try not to pop pimples or pick at your acne as this can delay healing and may result in scarring or skin color changes (dark spots) that are often more noticeable than the acne itself  Picking/popping acne can also cause a serious skin infection   Wash or change your pillow case once to twice a week, especially if you use products in your hair   Wash the skin as soon as possible after playing sports or other activities that cause a lot of sweating  Also, pay attention to how your sports equipment (shoulder pads, helmet strap, etc ) might be making your acne worse   When you use makeup, moisturizer, or sunscreen make sure that these products are labeled non-comedogenic, or won't clog pores, or won't cause acne         SHOULD I TREAT MY ACNE? There are a number of other skin conditions that can look like acne  If there is any question about the diagnosis, then the person should be evaluated by a board certified pediatric and adolescent dermatologist   A physician should examine any child with acne who is between the ages of 3and 9years of age, as acne in this mid-childhood age group is not normal and may signal an underlying problem  If a preadolescent (9to 6years of age) or adolescent (15to 25years of age) has mild acne and the condition is not bothersome to the individual, proper and regular skin care (what your doctor may call skin hygiene) may be all that is needed at this point  Many people do, however, need specific acne medications to help their skin look and feel its best  Your doctor will tell you if you are one of these people  If so, you may be advised to use an over-the-counter or prescription medication that is applied to the skin (a topical medication) or if the addition of an oral medication (a medication taken by [employer]) is needed  The good news is that the medications work well when used properly! Some specific factors that may influence the choice of acne therapy include:     Severity  The number and type of skin lesions (papules or comedones) and the degree of inflammation (mild, moderate or severe)   Scarring   Scarring is most common when acne is severe, but it can happen even in children with mild acne   Impact  If a child is experiencing emotional complications because of the acne or is experiencing negative comments from other children   Cost of the acne medications  An acne expert can help to keep out of pocket costs to a minimum by utilizing the correct medications and the least expensive options   The patient's skin type (oily versus dry or combination skin, for example)   Potential side effects of the medication   The ease or overall complexity of the treatment plan or medication  WHAT ACNE TREATMENTS ARE AVAILABLE? Medications for acne try to stop the formation of new pimples by reducing or removing the oil, bacteria, and other things (like dead skin cells) that clog the pores  They can also decrease the inflammation or irritation response of the skin to bacteria  It may take from 6 to 8 weeks (about 2 months!) before you see any improvement and know if the medication is effective  It takes the layers of skin this long to regenerate  Remember, these medications do not cure the condition--the acne improves because of the medication  Therefore, treatment must be continued in order to prevent the return of acne lesions  There are many types of acne treatments  Some are applied to the skin (topical medications) and some are taken by mouth (oral medications)  In most cases of mild acne, the doctor will start with a topical medication  There are many different topical medications that are helpful for acne  If acne is more severe and it does not respond adequately to a topical medication, or if it covers large body surface areas such as the back and/or chest, oral antibiotics such as Doxycycline or Minocycline and/or oral hormone therapy such as Oral Contraceptive Pills or Spironolactone may be prescribed  In the most severe cases, isotretinoin (Accutane) may be used       In general, it is usually best to start with acne medications that are least likely to cause side effects but are at the same time capable of addressing the specific causes for the acne  Some patients have a good result with just one medication, but many will need to use a combination of treatments: two or more different topical agents or an oral medication plus a topical medication  Another treatment used for acne may include corticosteroid injections, which are used to help relieve pain, decrease the size, and encourage the healing of large, inflamed acne nodules  Also, dermatologists sometimes perform acne surgery, using a fine needle, a pointed blade, or an instrument known as a comedone extractor to mechanically clean out clogged pores  One must always weigh the risk for inducing a scar with the potential benefits of any procedure  Prior treatment with topical retinoids can loosen whiteheads and blackheads and make it easier to physically remove such lesions  Heat-based devices, and light and laser therapy are being studied to see whether there is any role for such treatments in mild to moderate acne  At this time, there is not enough evidence to make general recommendations about their use  TOPICAL ACNE MEDICATIONS    WHAT KIND OF TOPICALS ARE THERE?  Benzoyl peroxide (BP) helps to fight inflammation and is anti-microbial (kills bacteria, viruses, and other microorganisms) and is believed to help prevent resistance of bacteria to topical antibiotics  A benzoyl peroxide wash may be recommended for use on large areas such as the chest and/or back  Mild irritation and dryness are common when first using benzoyl peroxide-containing products  Be careful because benzoyl peroxide can bleach towels and clothing!  Retinoids (such as adapalene, tretinoin, or tazarotene) unplug the oil glands by helping peel away the layers of skin and other things plugging the opening of the glands   Mild irritation and dryness are common when first using these products  Facial waxing and other skin procedures can lead to excessive irritation and should be avoided during retinoid therapy   Antibiotics fight bacteria and help decrease inflammation  Topical antibiotics commonly used in acne include clindamycin, erythromycin, and combination agents (such as clindamycin/benzoyl peroxide or erythromycin/benzoyl peroxide)  Mild irritation and dryness are common when first using these products  Typically, topical antibiotics should not be used alone as treatment for acne   Other topical agents include salicylic acid, azelaic acid, dapsone, and sulfacetamide  Mild irritation and dryness can also occur when first using these products  USING YOUR TOPICAL TREATMENTS LIKE A PRO   Apply topical medications only to clean, dry skin  Topical medications may lead to significant dryness of the affected areas  To minimize this, wait 15-20 minutes after washing before applying your topical medication   These medications work deep in the skin to prevent new breakouts  Spot treatment of individual pimples does not do much  When applying topical medications to the face, use the 5-dot method  Start by placing a small pea-sized amount of the medication on your finger  Then, place dots in each of five locations of your face: Mid-forehead, each cheek, nose, and chin  Next, rub the medication into the entire area of skin - not just on individual pimples! Try to avoid the delicate skin around your eyes and corners of your mouth   The medications are not magic! They take weeks if not months to work  Be patient and use your medicine on a daily basis or as directed for six weeks before asking if your skin looks better  Try not to miss more than one or two days each week when using your medications   If you are starting a new medication, then try using it every other night or even every third night   Gradually work up to [place] [employer] a day    This will give your skin time to adjust    The same medications often come in various forms or formulations: Creams, ointments, lotions, gels, microspheres, or foams  Use the formulation that has been recommended and don't switch to other forms unless instructed  Some forms (such as alcohol based gels) may be more drying and less tolerable for certain skin types   Sometimes individual medications are not as effective as a combination of two or more agents  The doctor may need to try several medications or combinations before finding the one that is best for that patient   Moisturizer, sunscreen, and make-up may be used in conjunction with topical acne medications  In general, acne medications are applied first so they may directly contact the skin  Ask your physician to review specific application instructions!  It is especially important to always use sunscreen when using a topical retinoid or oral antibiotic  These drugs can make your skin more sensitive to the sun  In general, sunscreen gets applied AFTER any acne medications   Don't stop using your acne medications just because your acne got better  Remember, the acne is better because of the medication, and prevention is the ghotra to treatment  ORAL ACNE MEDICATIONS    ORAL ANTIBIOTICS  Antibiotics include tetracycline-class medicines (which include the most commonly used oral antibiotics for acne, minocycline, and doxycycline), erythromycin, trimethoprim-sulfamethoxazole, and occasionally cephalexin or azithromycin  These drugs may decrease bacteria and inflammation, and they are most effective for moderate-to-severe inflammatory acne  A product containing benzoyl peroxide should be used along with these antibiotics to help decrease the possibility of microbial resistance  Always take your acne pills with lots of water! A pill stuck in your throat can cause significant burning and irritation  Drink a full glass of water to ensure the pill gets into your stomach    Avoid popping a pill right before bed, and stay upright for at least 1 hour after taking a pill  MINOCYCLINE   This medication is usually taken ONCE or TWICE per day, as instructed by your physician  NOTE: Always take this medication with lots of water! A pill stuck in the throat can cause significant burning and irritation  Avoid popping a pill right before bed & stay upright for at least one hour after taking a pill  WARNING: Though less likely than doxycycline, minocycline may increase your sensitivity to the sun, so practice excellent sun protection! If you notice any of the following, stop using the medication and notify your health care provider: headaches; blurred vision; dizziness; sun sensitivity; heartburn-stomach pain; irritation of the throat; darkening of scars, gums, or teeth; nail changes; yellowing of the eyes or skin (indicating possible liver disease); joint pains-and flu-like symptoms  Taking oral antibiotics with food may help with symptoms of upset stomach  COMMON SIDE EFFECTS: Headaches; dizziness; sun sensitivity; irritation of the throat; discoloration of scars, gums, or teeth (often with minocycline); nail changes  Minocycline can rarely cause liver disease, joint pains, severe skin rashes, and flu-like symptoms  If you should notice yellowing of the eyes or skin, or any of the above, notify your doctor and stop using the medication immediately  HAVING PROBLEMS WITH ANY OF YOUR TREATMENTS? You should not be able to see any of the medicines on your face  If you can see a white film on your skin after you apply the medication, there is too much medicine in that area and you need to apply a thinner coat and make sure it is spread evenly on your face  If your skin gets too dry, you can apply a light (non-comedogenic) moisturizer on top of your medicine or you may switch to using the medicine every other day instead of every day    If your skin is still too irritated, you may need to switch to a milder medication  If your skin is red and very itchy, you may be allergic to the medication and you should stop using it  COMMON POSSIBLE SIDE EFFECTS OF MEDICATIONS     Retinoids - dryness, redness, increased sun sensitivity   Benzoyl peroxide - drying, redness, bleaching of clothes, towels and sheets, allergy   Minocycline - headaches; dizziness; vision problems,  irritation of the throat; discoloration of scars, gums, or teeth  Can rarely cause liver disease, joint pains, and flu-like symptoms   If you should notice yellowing of the skin or any of the above, notify your doctor and stop using the medication    WHEN AND WHERE TO CALL WITH CONCERNS  We are here to help! If you experience any unusual symptoms, then stop taking or using the medication and call our office at [phone] (SKIN)  It is better to be safe than to be sorry!

## 2022-03-21 NOTE — PROGRESS NOTES
[facility] Follow Up Note    Patient Name: Marybel Leach  Encounter Date: 03/21/22    Today's Chief Concerns:  Makael Cousin Concern #1:   Acne f/u    Current Medications:    Current Outpatient Medications:     Adapalene 0 3 % gel, SPREAD ONE PEA-SIZED AMOUNT OF MEDICATION OVER ENTIRE FACE ABOUT ONE HOUR BEFORE BEDTIME , Disp: 45 g, Rfl: 2    Alpha-D-Galactosidase (BEANO PO), Take by mouth, Disp: , Rfl:     Alpha-Lipoic Acid 200 MG CAPS, TAKE 1 CAPSULE BY MOUTH EVERY DAY, Disp: 30 capsule, Rfl: 5    Ascorbic Acid (vitamin C) 1000 MG tablet, TAKE 1 TABLET (1,000 MG TOTAL) BY MOUTH DAILY, Disp: 30 tablet, Rfl: 5    atorvastatin (LIPITOR) 10 mg tablet, TAKE 1 TABLET BY MOUTH EVERY DAY, Disp: 30 tablet, Rfl: 3    Bacillus Coagulans-Inulin (Probiotic) 1-250 BILLION-MG CAPS, TAKE 1 CAPSULE BY MOUTH EVERY DAY, Disp: 30 capsule, Rfl: 5    buPROPion (WELLBUTRIN XL) 150 mg 24 hr tablet, Take 1 tablet (150 mg total) by mouth every morning, Disp: 30 tablet, Rfl: 2    Cholecalciferol (KP Vitamin D3) 50 MCG (2000 UT) CAPS, Take 1 capsule (2,000 Units total) by mouth 2 (two) times a day, Disp: 180 capsule, Rfl: 1    cholecalciferol (VITAMIN D3) 1,000 units tablet, Take 1 tablet (1,000 Units total) by mouth daily, Disp: 90 tablet, Rfl: 3    Dapsone 7 5 % GEL, Apply topically once daily, Disp: 90 g, Rfl: 3    Echinacea-Alatorre Seal 350-100 MG CAPS, TAKE 1 CAPSULE BY MOUTH EVERY DAY, Disp: 30 capsule, Rfl: 5    Echinacea-Alatorre Seal Complex CAPS, Take 1 capsule by mouth daily, Disp: 30 capsule, Rfl: 0    fexofenadine (ALLEGRA) 180 MG tablet, Take 1 tablet (180 mg total) by mouth daily, Disp: 90 tablet, Rfl: 1    Fluvoxamine Maleate 100 MG CP24, TAKE 1 CAPSULE BY MOUTH EVERY DAY, Disp: 90 capsule, Rfl: 1    Fluvoxamine Maleate 150 MG CP24, TAKE 1 CAPSULE BY MOUTH EVERY DAY, Disp: 90 capsule, Rfl: 1    lisinopril (ZESTRIL) 10 mg tablet, TAKE 1 TABLET BY MOUTH EVERY DAY, Disp: 30 tablet, Rfl: 5    loratadine (CLARITIN) 10 mg tablet, TAKE 1 TABLET BY MOUTH EVERY DAY, Disp: 30 tablet, Rfl: 4    Melatonin 10 MG TABS, , Disp: , Rfl:     montelukast (SINGULAIR) 10 mg tablet, TAKE 1 TABLET BY MOUTH EVERY DAY, Disp: 30 tablet, Rfl: 4    Multiple Vitamin (multivitamin) capsule, Take 1 capsule by mouth daily, Disp: 100 capsule, Rfl: 3    Omega-3 Fatty Acids (fish oil) 1,000 mg, TAKE 3 CAPSULES (3,000 MG TOTAL) BY MOUTH DAILY, Disp: 90 capsule, Rfl: 5    omeprazole (PriLOSEC) 40 MG capsule, TAKE 1 CAPSULE BY MOUTH EVERY DAY, Disp: 30 capsule, Rfl: 8    oxymetazoline (AFRIN) 0 05 % nasal spray, 2 sprays by Each Nare route 2 (two) times a day, Disp: , Rfl:     triamcinolone (KENALOG) 0 1 % ointment, Apply topically twice a day for 14 days starlight, Disp: 30 g, Rfl: 0    Vitamin D-Vitamin K (K2 Plus D3) 100-1000 MCG-UNIT TABS, TAKE 1 TABLET BY MOUTH TWICE A DAY, Disp: 60 tablet, Rfl: 1    zinc gluconate 50 mg tablet, TAKE 1 TABLET BY MOUTH EVERY DAY, Disp: 30 tablet, Rfl: 5    CONSTITUTIONAL:   There were no vitals filed for this visit  Specific Alerts:    Have you been seen by a [facility] in the last 3 years? YES    Are you pregnant or planning to become pregnant? N/A    Are you currently or planning to be nursing or breast feeding? N/A    Allergies   Allergen Reactions    Penicillins Hives    Sulfa Antibiotics GI Intolerance       May we call your Preferred Phone number to discuss your specific medical information? YES    May we leave a detailed message that includes your specific medical information? YES    Have you traveled outside of the [facility] in the past 3 months? No    Do you currently have a pacemaker or defibrillator? No    Do you have any artificial heart valves, joints, plates, screws, rods, stents, pins, etc? No   - If Yes, were any placed within the last 2 years? Do you require any medications prior to a surgical procedure? No   - If Yes, for which procedure?     - If Yes, what medications to you require? Are you taking any medications that cause you to bleed more easily ("blood thinners") No    Have you ever experienced a rapid heartbeat with epinephrine? No    Review of Systems:  Have you recently had or currently have any of the following?     · Fever or chills: No  · Night Sweats: No  · Headaches: No  · Weight Gain: No  · Weight Loss: No  · Blurry Vision: No  · Nausea: No  · Vomiting: No  · Diarrhea: No  · Blood in Stool: No  · Abdominal Pain: No  · Itchy Skin: No  · Painful Joints: No  · Swollen Joints: No  · Muscle Pain: No  · Irregular Mole: No  · Sun Burn: No  · Dry Skin: No  · Skin Color Changes: No  · Scar or Keloid: No  · Cold Sores/Fever Blisters: No  · Bacterial Infections/MRSA: No  · Anxiety: YES  · Depression: YES  · Suicidal or Homicidal Thoughts: No    PSYCH: Normal mood and affect  EYES: Normal conjunctiva  ENT: Normal lips and oral mucosa  CARDIOVASCULAR: No edema  RESPIRATORY: Normal respirations  HEME/LYMPH/IMMUNO:  No regional lymphadenopathy except as noted below in ASSESSMENT AND PLAN BY DIAGNOSIS    FULL ORGAN SYSTEM SKIN EXAM (SKIN)   Hair, Scalp, Ears, Face Normal except as noted below in Assessment   Neck, Cervical Chain Nodes Normal except as noted below in Assessment   Right Arm/Hand/Fingers Normal except as noted below in Assessment   Left Arm/Hand/Fingers Normal except as noted below in Assessment   Chest/Breasts/Axillae Viewed areas Normal except as noted below in Assessment   Abdomen, Umbilicus Normal except as noted below in Assessment   Back/Spine Normal except as noted below in Assessment   Groin/Genitalia/Buttocks Viewed areas Normal except as noted below in Assessment   Right Leg, Foot, Toes Normal except as noted below in Assessment   Left Leg, Foot, Toes Normal except as noted below in Assessment         ACNE VULGARIS ("COMMON ACNE"): FOLLOW UP    Physical Exam:   Psychiatric/Mood: Normal   Anatomic Location Affected:  Face, back   Morphological Description:  o Open/Closed Comedones:  - Few ("Mild")  o Inflammatory Papules/Pustules:  - Few ("Mild")  o Nodules:  - Few ("Mild")  o Scarring:  - Few ("Mild")  o Excoriations:  - Rare ("Almost Clear")  o Local Skin Redness/Erythema:  - Few ("Mild")  o Local Skin Dryness/Scaling:  - Few ("Mild")  o Local Skin Dyspigmentation:  - Rare ("Almost Clear")   Pertinent Positives:   Pertinent Negatives: Additional History of Present Condition:     Previous Treatment Plan: Dapsone - morning, minocycline 100 mg BID, adapalene at bedtime   How compliant are you with the prescribed plan?:  75%   Did you experience any side effects of treatment: denies   Are you happy with the improvement: yes    Assessment and Plan:   We reviewed the causes of acne, the kinds of acne, and the expected clinical course   We discussed treatment options ranging from over-the-counter products, topical retinoids, antibiotics, BP, hormonal therapies (OCPs/spironolactone), and isotretinoin (Accutane)   We reviewed specific over-the-counter interventions and medications  Recommended typical hygiene measures including water-based facial products, washing regularly with mild cleanser, and refraining from picking and popping any pimples   Recommended non-comedogenic sunscreen use daily   Expectations of therapy discussed  Side effects, risks and benefits of medications discussed   A comprehensive handout on Acne was provided   The phone number to call in case of questions or concerns (and instructions to stop medications in such a scenario) was provided     After lengthy discussion of etiology and treatment options, we decided to implement the following personalized treatment plan:    Based on a thorough discussion of this condition and the management approach to it (including a comprehensive discussion of the known risks, side effects and potential benefits of treatment), the patient (family) agrees to implement the following specific plan:    --------------------------------------------------------------------------------------  YOUR PERSONALIZED ACNE ACTION PLAN    MORNING ROUTINE    1) SKIN HYGEINE:  In the shower, wash your face, chest and back gently with Cetaphil moisturizing cleanser or Dove Fragrance-free bar  Do not use a luffa or washcloth as these tend to be too irritating to acne-prone skin  2) ANTIBIOTICS:     Oral Minocycline 100 mg after breakfast with a full glass of water    EVENING ROUTINE    1) SKIN HYGEINE:  In the shower, wash your face, chest and back gently with Cetaphil moisturizing cleanser or Dove Fragrance-free bar  Do not use a lufa or washcloth as these tend to be too irritating to acne-prone skin  2) ANTIBIOTICS:     Oral Minocycline 100 mg after dinner with a full glass of water    3) TOPICAL RETINOID:  At 1 hour before bedtime (after washing your face and allowing the skin to completely dry), spread only a single pea-sized amount of this medication evenly over your entire face (avoiding your eyes or mouth):  Benzaclin gel 1 hour before bedtime   Adapalene 0 3% gel 1 hour before bedtime   Follow up in 4 months      REMEMBER:  Always take your acne pills with lots of water! A pill stuck in your throat can cause significant burning and irritation  Drink a full glass of water to ensure the pill gets into your stomach  Avoid popping a pill right before bed, and stay upright for at least 1 hour after taking a pill  ACNE:  WHAT ZIT ALL ABOUT? WHY DO I HAVE ACNE/PIMPLES? Your skin is made of layers  To keep the skin from becoming dry and cracked, the skin needs oil  The oil is made in little wells in the deeper layers in the skin  People with acne have glands that make more oil and are more easily plugged, causing the glands to swell  Hormones, bacteria and your inherited tendency to have acne all play a role      The medical term for pimples is acne or acne vulgaris (vulgaris means common)  Most people get some acne  Acne does not come from being dirty  Instead, it is an expected consequence of changes that occur during normal growth and development  Hormones, bacteria, and your family's tendency to have acne may all play a role  Whiteheads or blackheads are openings of the glands (glands are the oil factories) onto the surface of the skin  Blackheads are not caused by dirt blocking the pores; instead, they result from the oxidation reaction of oil and skin in the pores with the air (like a rust reaction)  WHAT ABOUT STRESS? Stress does not cause acne but it can make it worse  Make sure you get enough sleep and daily exercise! WHAT ABOUT FOODS/DIET? Try to eat a balanced, healthy diet  Some people feel that certain foods worsen their acne  While there aren't many studies available on this question, severe dietary changes are unlikely to help your acne and may be harmful to the health of your skin  If you find that a certain food seems to aggravate your acne, you may consider avoiding that food  Discuss this with your physician! WHAT CAUSES MY ACNE? There are four contributors to acne--the body's natural oil (sebum), clogged pores, bacteria (with the scientific name Propionibacterium acnes, or P  acnes, for short), and the body's reaction to the bacteria living in the clogged pores (which causes inflammation)  Here's what happens:     Sebum is produced in the normal oil-making glands in the deeper layers of the skin and reaches the surface through the skin's pores  An increase in certain hormones occurs around the time of puberty, and these hormones trigger the oil glands to produce increased amounts of sebum   Pores with excess oil tend to become clogged more easily     At the same time, P  acnes--one of the many types of bacteria that normally live on everyone's skin--thrives in the excess oil and causes a skin reaction (inflammation)   If a pore is clogged close to the surface, there is little inflammation  However, this results in the formation of whiteheads (closed comedones) or blackheads (open comedones) at the surface of the skin   A plug that extends to, or forms a little deeper in the pore, or one that enlarges or ruptures may cause more inflammation  The result is red bumps (papules) and pus-filled pimples (pustules)   If plugging happens in the deepest skin layer, the inflammation may be even more severe, resulting in the formation of nodules or cysts  When these types of acne heal, they may leave behind discolored areas or true scars  SKIN HYGIENE:  HOW SHOULD I [address] MY SKIN? Acne does not come from being dirty, however, washing your face is part of taking good care of your skin and will help keep your face clear  Good skin hygiene is, therefore, critical to support any acne treatment plan  Here are several specific suggestions for practicing good skin hygiene and keeping your skin looking its best:     You should wash acne-prone skin TWICE A DAY: Once in the morning and once in the evening  This does include any showers you take that day, so do not overdo it!  Do not scrub the skin with a washcloth or loofah as these can irritate and inflame your acne  Acne does not come from dirt, so it is not necessary to scrub the skin clean  In fact, scrubbing may lead to dryness and irritation that makes the acne even worse and harder for patients to tolerate acne medications   Use a gentle facial moisturizing cleanser (Cetaphil Moisturizing Cleanser or Dove Fragrance-Free bar)  Avoid using soaps like [address], [address] [facility], or soft/liquid soaps as these products will dry your skin   Do not use any over-the-counter acne washes without your doctor's specific instruction to do so  These products often contain salicylic acid or benzoyl peroxide   These ingredients can be helpful in clearing oil from the skin and reducing bacteria, but they may also be drying and can add to irritation   Do not use exfoliating products with microbeads or brushes as these can cause irritation to the skin   Facials and other treatments to remove, squeeze, or clean out pores are not recommended  Manipulating the skin in this way can make acne worse and can lead to severe infections and/or scarring  It also increases the likelihood that the skin will not be able to tolerate acne medications   Try not to pop pimples or pick at your acne as this can delay healing and may result in scarring or skin color changes (dark spots) that are often more noticeable than the acne itself  Picking/popping acne can also cause a serious skin infection   Wash or change your pillow case once to twice a week, especially if you use products in your hair   Wash the skin as soon as possible after playing sports or other activities that cause a lot of sweating  Also, pay attention to how your sports equipment (shoulder pads, helmet strap, etc ) might be making your acne worse   When you use makeup, moisturizer, or sunscreen make sure that these products are labeled non-comedogenic, or won't clog pores, or won't cause acne         SHOULD I TREAT MY ACNE? There are a number of other skin conditions that can look like acne  If there is any question about the diagnosis, then the person should be evaluated by a board certified pediatric and adolescent dermatologist   A physician should examine any child with acne who is between the ages of 3and 9years of age, as acne in this mid-childhood age group is not normal and may signal an underlying problem     If a preadolescent (9to 6years of age) or adolescent (15to 25years of age) has mild acne and the condition is not bothersome to the individual, proper and regular skin care (what your doctor may call skin hygiene) may be all that is needed at this point  Many people do, however, need specific acne medications to help their skin look and feel its best  Your doctor will tell you if you are one of these people  If so, you may be advised to use an over-the-counter or prescription medication that is applied to the skin (a topical medication) or if the addition of an oral medication (a medication taken by [employer]) is needed  The good news is that the medications work well when used properly! Some specific factors that may influence the choice of acne therapy include:     Severity  The number and type of skin lesions (papules or comedones) and the degree of inflammation (mild, moderate or severe)   Scarring  Scarring is most common when acne is severe, but it can happen even in children with mild acne   Impact  If a child is experiencing emotional complications because of the acne or is experiencing negative comments from other children   Cost of the acne medications  An acne expert can help to keep out of pocket costs to a minimum by utilizing the correct medications and the least expensive options   The patient's skin type (oily versus dry or combination skin, for example)   Potential side effects of the medication   The ease or overall complexity of the treatment plan or medication  WHAT ACNE TREATMENTS ARE AVAILABLE? Medications for acne try to stop the formation of new pimples by reducing or removing the oil, bacteria, and other things (like dead skin cells) that clog the pores  They can also decrease the inflammation or irritation response of the skin to bacteria  It may take from 6 to 8 weeks (about 2 months!) before you see any improvement and know if the medication is effective  It takes the layers of skin this long to regenerate  Remember, these medications do not cure the condition--the acne improves because of the medication  Therefore, treatment must be continued in order to prevent the return of acne lesions      There are many types of acne treatments  Some are applied to the skin (topical medications) and some are taken by mouth (oral medications)  In most cases of mild acne, the doctor will start with a topical medication  There are many different topical medications that are helpful for acne  If acne is more severe and it does not respond adequately to a topical medication, or if it covers large body surface areas such as the back and/or chest, oral antibiotics such as Doxycycline or Minocycline and/or oral hormone therapy such as Oral Contraceptive Pills or Spironolactone may be prescribed  In the most severe cases, isotretinoin (Accutane) may be used  In general, it is usually best to start with acne medications that are least likely to cause side effects but are at the same time capable of addressing the specific causes for the acne  Some patients have a good result with just one medication, but many will need to use a combination of treatments: two or more different topical agents or an oral medication plus a topical medication  Another treatment used for acne may include corticosteroid injections, which are used to help relieve pain, decrease the size, and encourage the healing of large, inflamed acne nodules  Also, dermatologists sometimes perform acne surgery, using a fine needle, a pointed blade, or an instrument known as a comedone extractor to mechanically clean out clogged pores  One must always weigh the risk for inducing a scar with the potential benefits of any procedure  Prior treatment with topical retinoids can loosen whiteheads and blackheads and make it easier to physically remove such lesions  Heat-based devices, and light and laser therapy are being studied to see whether there is any role for such treatments in mild to moderate acne  At this time, there is not enough evidence to make general recommendations about their use  TOPICAL ACNE MEDICATIONS    WHAT KIND OF TOPICALS ARE THERE?    Benzoyl peroxide (BP) helps to fight inflammation and is anti-microbial (kills bacteria, viruses, and other microorganisms) and is believed to help prevent resistance of bacteria to topical antibiotics  A benzoyl peroxide wash may be recommended for use on large areas such as the chest and/or back  Mild irritation and dryness are common when first using benzoyl peroxide-containing products  Be careful because benzoyl peroxide can bleach towels and clothing!  Retinoids (such as adapalene, tretinoin, or tazarotene) unplug the oil glands by helping peel away the layers of skin and other things plugging the opening of the glands  Mild irritation and dryness are common when first using these products  Facial waxing and other skin procedures can lead to excessive irritation and should be avoided during retinoid therapy   Antibiotics fight bacteria and help decrease inflammation  Topical antibiotics commonly used in acne include clindamycin, erythromycin, and combination agents (such as clindamycin/benzoyl peroxide or erythromycin/benzoyl peroxide)  Mild irritation and dryness are common when first using these products  Typically, topical antibiotics should not be used alone as treatment for acne   Other topical agents include salicylic acid, azelaic acid, dapsone, and sulfacetamide  Mild irritation and dryness can also occur when first using these products  USING YOUR TOPICAL TREATMENTS LIKE A PRO   Apply topical medications only to clean, dry skin  Topical medications may lead to significant dryness of the affected areas  To minimize this, wait 15-20 minutes after washing before applying your topical medication   These medications work deep in the skin to prevent new breakouts  Spot treatment of individual pimples does not do much  When applying topical medications to the face, use the 5-dot method  Start by placing a small pea-sized amount of the medication on your finger   Then, place dots in each of five locations of your face: Mid-forehead, each cheek, nose, and chin  Next, rub the medication into the entire area of skin - not just on individual pimples! Try to avoid the delicate skin around your eyes and corners of your mouth   The medications are not magic! They take weeks if not months to work  Be patient and use your medicine on a daily basis or as directed for six weeks before asking if your skin looks better  Try not to miss more than one or two days each week when using your medications   If you are starting a new medication, then try using it every other night or even every third night   Gradually work up to [place] [employer] a day    This will give your skin time to adjust    The same medications often come in various forms or formulations: Creams, ointments, lotions, gels, microspheres, or foams  Use the formulation that has been recommended and don't switch to other forms unless instructed  Some forms (such as alcohol based gels) may be more drying and less tolerable for certain skin types   Sometimes individual medications are not as effective as a combination of two or more agents  The doctor may need to try several medications or combinations before finding the one that is best for that patient   Moisturizer, sunscreen, and make-up may be used in conjunction with topical acne medications  In general, acne medications are applied first so they may directly contact the skin  Ask your physician to review specific application instructions!  It is especially important to always use sunscreen when using a topical retinoid or oral antibiotic  These drugs can make your skin more sensitive to the sun  In general, sunscreen gets applied AFTER any acne medications   Don't stop using your acne medications just because your acne got better  Remember, the acne is better because of the medication, and prevention is the ghotra to treatment        ORAL ACNE MEDICATIONS    ORAL ANTIBIOTICS  Antibiotics include tetracycline-class medicines (which include the most commonly used oral antibiotics for acne, minocycline, and doxycycline), erythromycin, trimethoprim-sulfamethoxazole, and occasionally cephalexin or azithromycin  These drugs may decrease bacteria and inflammation, and they are most effective for moderate-to-severe inflammatory acne  A product containing benzoyl peroxide should be used along with these antibiotics to help decrease the possibility of microbial resistance  Always take your acne pills with lots of water! A pill stuck in your throat can cause significant burning and irritation  Drink a full glass of water to ensure the pill gets into your stomach  Avoid popping a pill right before bed, and stay upright for at least 1 hour after taking a pill  MINOCYCLINE   This medication is usually taken ONCE or TWICE per day, as instructed by your physician  NOTE: Always take this medication with lots of water! A pill stuck in the throat can cause significant burning and irritation  Avoid popping a pill right before bed & stay upright for at least one hour after taking a pill  WARNING: Though less likely than doxycycline, minocycline may increase your sensitivity to the sun, so practice excellent sun protection! If you notice any of the following, stop using the medication and notify your health care provider: headaches; blurred vision; dizziness; sun sensitivity; heartburn-stomach pain; irritation of the throat; darkening of scars, gums, or teeth; nail changes; yellowing of the eyes or skin (indicating possible liver disease); joint pains-and flu-like symptoms  Taking oral antibiotics with food may help with symptoms of upset stomach  COMMON SIDE EFFECTS: Headaches; dizziness; sun sensitivity; irritation of the throat; discoloration of scars, gums, or teeth (often with minocycline); nail changes     Minocycline can rarely cause liver disease, joint pains, severe skin rashes, and flu-like symptoms  If you should notice yellowing of the eyes or skin, or any of the above, notify your doctor and stop using the medication immediately  HAVING PROBLEMS WITH ANY OF YOUR TREATMENTS? You should not be able to see any of the medicines on your face  If you can see a white film on your skin after you apply the medication, there is too much medicine in that area and you need to apply a thinner coat and make sure it is spread evenly on your face  If your skin gets too dry, you can apply a light (non-comedogenic) moisturizer on top of your medicine or you may switch to using the medicine every other day instead of every day  If your skin is still too irritated, you may need to switch to a milder medication  If your skin is red and very itchy, you may be allergic to the medication and you should stop using it  COMMON POSSIBLE SIDE EFFECTS OF MEDICATIONS     Retinoids - dryness, redness, increased sun sensitivity   Benzoyl peroxide - drying, redness, bleaching of clothes, towels and sheets, allergy   Minocycline - headaches; dizziness; vision problems,  irritation of the throat; discoloration of scars, gums, or teeth  Can rarely cause liver disease, joint pains, and flu-like symptoms   If you should notice yellowing of the skin or any of the above, notify your doctor and stop using the medication    WHEN AND WHERE TO CALL WITH CONCERNS  We are here to help! If you experience any unusual symptoms, then stop taking or using the medication and call our office at [phone])  It is better to be safe than to be sorry!     Scribe Attestation    I,:  Shemar Maravilla am acting as a scribe while in the presence of the attending physician :       I,:  Catalina Torres MD personally performed the services described in this documentation    as scribed in my presence :

## 2022-05-04 ENCOUNTER — OFFICE VISIT (OUTPATIENT)
Dept: PODIATRY | Facility: CLINIC | Age: 32
End: 2022-05-04
Payer: COMMERCIAL

## 2022-05-04 VITALS
DIASTOLIC BLOOD PRESSURE: 88 MMHG | HEART RATE: 82 BPM | SYSTOLIC BLOOD PRESSURE: 138 MMHG | WEIGHT: 286.4 LBS | BODY MASS INDEX: 44.95 KG/M2 | HEIGHT: 67 IN

## 2022-05-04 DIAGNOSIS — M72.2 PLANTAR FASCIITIS, LEFT: Primary | ICD-10-CM

## 2022-05-04 DIAGNOSIS — M76.62 TENDONITIS, ACHILLES, LEFT: ICD-10-CM

## 2022-05-04 PROCEDURE — 20550 NJX 1 TENDON SHEATH/LIGAMENT: CPT | Performed by: PODIATRIST

## 2022-05-04 PROCEDURE — 99243 OFF/OP CNSLTJ NEW/EST LOW 30: CPT | Performed by: PODIATRIST

## 2022-05-04 RX ORDER — LIDOCAINE HYDROCHLORIDE 10 MG/ML
1 INJECTION, SOLUTION INFILTRATION; PERINEURAL ONCE
Status: COMPLETED | OUTPATIENT
Start: 2022-05-04 | End: 2022-05-04

## 2022-05-04 RX ORDER — TRIAMCINOLONE ACETONIDE 40 MG/ML
40 INJECTION, SUSPENSION INTRA-ARTICULAR; INTRAMUSCULAR ONCE
Status: COMPLETED | OUTPATIENT
Start: 2022-05-04 | End: 2022-05-04

## 2022-05-04 RX ADMIN — TRIAMCINOLONE ACETONIDE 40 MG: 40 INJECTION, SUSPENSION INTRA-ARTICULAR; INTRAMUSCULAR at 14:56

## 2022-05-04 RX ADMIN — LIDOCAINE HYDROCHLORIDE 1 ML: 10 INJECTION, SOLUTION INFILTRATION; PERINEURAL at 14:55

## 2022-05-04 NOTE — PATIENT INSTRUCTIONS
Plantar Fasciitis Exercises   WHAT YOU NEED TO KNOW:   Plantar fasciitis exercises help stretch your plantar fascia, calf muscles, and Achilles tendon  They also help strengthen the muscles that support your heel and foot  Exercises and stretching can help prevent plantar fasciitis from getting worse or coming back  DISCHARGE INSTRUCTIONS:   Contact your healthcare provider if:   · Your pain and swelling increase  · You develop new knee, hip, or back pain  · You have questions or concerns about your condition or care  How to do plantar fasciitis exercises:  Ask your healthcare provider when to start these exercises and how often to do them  · Slant board stretch:  Stand on a slanted board with your toes higher than your heel  Press your heel into the board  Keep your knee slightly bent  Hold this position for 1 minute  Repeat 5 times  · Heel stretch:  Stand up straight with your hands on a wall  Place your injured leg slightly behind your other leg  Keep your heels flat on the floor, lean forward, and bend both knees  Hold for 30 seconds  · Calf stretch:  Stand up straight with your hands on a wall  Step forward so that your uninjured foot is in front of your injured foot  Keep your front leg bent and your back leg straight  Gently lean forward until you feel your calf stretch  Hold for 30 seconds and then relax  · Seated plantar fascia stretch:  Sit on a firm surface, such as the floor or a mat  Extend your legs out in front of you  Raise your injured foot a few inches off the ground  Keep your leg straight  Grab the toes of your injured foot and pull them toward you  With your other hand, feel your plantar fascia  You should feel it press outward  Hold for 30 seconds  If you cannot reach your toes, loop a towel or tie around your foot  Gently pull on the towel or tie and flex your toes toward you  · Heel raises:  Stand on the injured leg   Raise your other leg off the ground  Hold onto a railing or wall for balance  Slowly rise up on the toes of your injured leg  Hold for 5 seconds  Slowly lower your heel to the ground  · Toe curls:  Place a towel on the floor  Put your foot flat on the towel  Grab the towel with your toes by curling them around the towel  Lift the towel up with your toes  · Toe taps:  Sit down and place your foot flat on the floor  Keep your heel on the floor  Point all your toes up toward the ceiling  While the 4 smaller toes are pointed up, bend your big toe down and tap it on the ground  Do 10 to 50 taps  Point all 5 toes up toward the ceiling again  This time keep your big toe pointed up and tap the 4 smaller toes on the ground  Do 10 to 50 taps each time  Follow up with your doctor as directed:  Write down your questions so you remember to ask them during your visits  © Copyright [employer] 2022 Information is for End User's use only and may not be sold, redistributed or otherwise used for commercial purposes  All illustrations and images included in CareNotes® are the copyrighted property of [employer]  or [facility] Halle Mei   The above information is an  only  It is not intended as medical advice for individual conditions or treatments  Talk to your doctor, nurse or pharmacist before following any medical regimen to see if it is safe and effective for you

## 2022-05-04 NOTE — LETTER
May 5, 2022     Laura Avila MD  [id number] [facility] [address]    Patient: Mary Jo Marquez   YOB: 1990   Date of Visit: 5/4/2022       Dear Dr Melvina Jara: Thank you for referring Mary Jo Marquez to me for evaluation  Below are my notes for this consultation  If you have questions, please do not hesitate to call me  I look forward to following your patient along with you  Sincerely,        Diaz Mcintosh DPM        CC: No Recipients  [facility]  5/5/2022  7:31 AM  Signed  Assessment/Plan:         Diagnoses and all orders for this visit:    Plantar fasciitis, left  -     triamcinolone acetonide (KENALOG-40) 40 mg/mL injection 40 mg  -     lidocaine (XYLOCAINE) 1 % injection 1 mL  -     XR heel / calcaneus 2+ vw left; Future    Tendonitis, Achilles, left  -     XR heel / calcaneus 2+ vw left; Future      regarding plantar fasciitis  1  Discussed diagnosis and treatment options  2  Provided home therapy and stretching exercises  3  Stressed compliance with home/formal PT and arch supports  4  See injection below  Of note patient had vasovagal response and vomited  He was given an ice pack on his neck and put in Trendelenburg  After 5-10 minutes he did feel much better  We did take his heart rate and blood pressure which were fine  Regarding the previous posterior heel surgery in Achilles tendinopathy   1  Discussed diagnosis and treatment options  2  Provided home therapy and stretching exercises  3  Educated eccentric exercises for the achilles enthesopathy  4  Stressed compliance with home/formal PT and arch supports  5  Purchase Tuli heel cups   6  RTC 6 weeks      After answering all the patient's questions, I injected left heel with 0 5cc kenalog 40 and 1cc 1% lidocaine plain  Oral directions were given for rest and ice on the affected heel(s) and elevation  The ice is to be used for approximately 20 minutes at a time, 1-2 times a day for a few days    Home therapy instructions were demonstrated and a copy was given to the patient  The patient is to call at once if there is any increased pain, swelling, tenderness, redness, etc       Subjective:      Patient ID: Sean Rayo is a 28 y o  male  Patient presents with left ankle pain for over 5 years  In 2017 he has posterior calcaneal spurs removed and his achilles repaired  He did some PT but never really fully recovered  PAin is on the achilles, hheel and plantar heel  His strongest area of pain is plantar heel  The following portions of the patient's history were reviewed and updated as appropriate:   He  has a past medical history of Acne (09/01/2010), Acquired ankle/foot deformity, Change in hearing, Closed displaced avulsion fracture of tuberosity of left calcaneus, Epistaxis, Essential hypertension (6/19/2018), Foreign body in foot, Gastritis, GERD (gastroesophageal reflux disease), History of oral aphthous ulcers, Hypersomnia (02/18/2009), Impacted cerumen, Insomnia, Irritable bowel syndrome, Knee joint pain (03/10/2008), Left foot pain, Mixed hyperlipidemia (11/2/2020), OCD (obsessive compulsive disorder), Plantar fibromatosis, Seasonal allergies, Tarsal tunnel syndrome of left side, and Tinea corporis    He   Patient Active Problem List    Diagnosis Date Noted    GERD (gastroesophageal reflux disease)     Seasonal allergies     Sensorineural hearing loss (SNHL), bilateral 04/09/2021    Mixed hyperlipidemia 11/02/2020    Well adult health check 07/17/2018    Screening for cardiovascular, respiratory, and genitourinary diseases 06/19/2018    Screening for diabetes mellitus (DM) 06/19/2018    Essential hypertension 06/19/2018    Fatigue 06/19/2018    Obesity (BMI 30-39 9) 10/27/2017    Aphthous ulcer 09/14/2017    External hemorrhoids 08/14/2017    Lumbar radiculopathy 05/19/2017    Acne 09/05/2012    ADD (attention deficit disorder) without hyperactivity 09/05/2012    Depression 09/05/2012    Moderate obstructive sleep apnea 09/05/2012    Allergic rhinitis 09/04/2012    Obsessive compulsive disorder 09/04/2012     He  has a past surgical history that includes No past surgeries; Esophagogastroduodenoscopy (N/A, 10/31/2017); and pr length/short leg/ankl tendon,single (Left, 11/3/2017)  His family history includes Allergic rhinitis in his mother; Cancer in his family; Colonic polyp in his maternal grandmother; Diabetes in his family; Gout in his father; Hypertension in his father; Kidney disease in his father and mother; No Known Problems in his sister; Thyroid disease in his mother  He  reports that he has never smoked  He has never used smokeless tobacco  He reports that he does not drink alcohol and does not use drugs  Current Outpatient Medications   Medication Sig Dispense Refill    Adapalene 0 3 % gel SPREAD ONE PEA-SIZED AMOUNT OF MEDICATION OVER ENTIRE FACE ABOUT ONE HOUR BEFORE BEDTIME   45 g 2    Alpha-D-Galactosidase (BEANO PO) Take by mouth      Alpha-Lipoic Acid 200 MG CAPS TAKE 1 CAPSULE BY MOUTH EVERY DAY 30 capsule 5    Ascorbic Acid (vitamin C) 1000 MG tablet TAKE 1 TABLET (1,000 MG TOTAL) BY MOUTH DAILY 30 tablet 5    atorvastatin (LIPITOR) 10 mg tablet TAKE 1 TABLET BY MOUTH EVERY DAY 30 tablet 3    Bacillus Coagulans-Inulin (Probiotic) 1-250 BILLION-MG CAPS TAKE 1 CAPSULE BY MOUTH EVERY DAY 30 capsule 5    buPROPion (WELLBUTRIN XL) 150 mg 24 hr tablet Take 1 tablet (150 mg total) by mouth every morning 30 tablet 2    Cholecalciferol (KP Vitamin D3) 50 MCG (2000 UT) CAPS Take 1 capsule (2,000 Units total) by mouth 2 (two) times a day 180 capsule 1    cholecalciferol (VITAMIN D3) 1,000 units tablet Take 1 tablet (1,000 Units total) by mouth daily 90 tablet 3    clindamycin-benzoyl peroxide (BENZACLIN) gel Apply topically 2 (two) times a day 50 g 3    Dapsone 7 5 % GEL Apply topically once daily 90 g 3    Echinacea-Alatorre Seal 350-100 MG CAPS TAKE 1 CAPSULE BY MOUTH EVERY DAY 30 capsule 5    Echinacea-Alatorre Seal Complex CAPS Take 1 capsule by mouth daily 30 capsule 0    fexofenadine (ALLEGRA) 180 MG tablet Take 1 tablet (180 mg total) by mouth daily 90 tablet 1    Fluvoxamine Maleate 100 MG CP24 TAKE 1 CAPSULE BY MOUTH EVERY DAY 90 capsule 1    Fluvoxamine Maleate 150 MG CP24 TAKE 1 CAPSULE BY MOUTH EVERY DAY 90 capsule 1    lisinopril (ZESTRIL) 10 mg tablet TAKE 1 TABLET BY MOUTH EVERY DAY 30 tablet 5    loratadine (CLARITIN) 10 mg tablet TAKE 1 TABLET BY MOUTH EVERY DAY 30 tablet 4    Melatonin 10 MG TABS        montelukast (SINGULAIR) 10 mg tablet TAKE 1 TABLET BY MOUTH EVERY DAY 30 tablet 4    Multiple Vitamin (multivitamin) capsule Take 1 capsule by mouth daily 100 capsule 3    Omega-3 Fatty Acids (fish oil) 1,000 mg TAKE 3 CAPSULES (3,000 MG TOTAL) BY MOUTH DAILY 90 capsule 5    omeprazole (PriLOSEC) 40 MG capsule TAKE 1 CAPSULE BY MOUTH EVERY DAY 30 capsule 8    oxymetazoline (AFRIN) 0 05 % nasal spray 2 sprays by Each Nare route 2 (two) times a day      triamcinolone (KENALOG) 0 1 % ointment Apply topically twice a day for 14 days starlight 30 g 0    Vitamin D-Vitamin K (K2 Plus D3) 100-1000 MCG-UNIT TABS TAKE 1 TABLET BY MOUTH TWICE A DAY 60 tablet 1    zinc gluconate 50 mg tablet TAKE 1 TABLET BY MOUTH EVERY DAY 30 tablet 5     No current facility-administered medications for this visit  Current Outpatient Medications on File Prior to Visit   Medication Sig    Adapalene 0 3 % gel SPREAD ONE PEA-SIZED AMOUNT OF MEDICATION OVER ENTIRE FACE ABOUT ONE HOUR BEFORE BEDTIME      Alpha-D-Galactosidase (BEANO PO) Take by mouth    Alpha-Lipoic Acid 200 MG CAPS TAKE 1 CAPSULE BY MOUTH EVERY DAY    Ascorbic Acid (vitamin C) 1000 MG tablet TAKE 1 TABLET (1,000 MG TOTAL) BY MOUTH DAILY    atorvastatin (LIPITOR) 10 mg tablet TAKE 1 TABLET BY MOUTH EVERY DAY    Bacillus Coagulans-Inulin (Probiotic) 1-250 BILLION-MG CAPS TAKE 1 CAPSULE BY MOUTH EVERY DAY  buPROPion (WELLBUTRIN XL) 150 mg 24 hr tablet Take 1 tablet (150 mg total) by mouth every morning    Cholecalciferol (KP Vitamin D3) 50 MCG (2000 UT) CAPS Take 1 capsule (2,000 Units total) by mouth 2 (two) times a day    cholecalciferol (VITAMIN D3) 1,000 units tablet Take 1 tablet (1,000 Units total) by mouth daily    clindamycin-benzoyl peroxide (BENZACLIN) gel Apply topically 2 (two) times a day    Dapsone 7 5 % GEL Apply topically once daily    Echinacea-Alatorre Seal 350-100 MG CAPS TAKE 1 CAPSULE BY MOUTH EVERY DAY    Echinacea-Alatorre Seal Complex CAPS Take 1 capsule by mouth daily    fexofenadine (ALLEGRA) 180 MG tablet Take 1 tablet (180 mg total) by mouth daily    Fluvoxamine Maleate 100 MG CP24 TAKE 1 CAPSULE BY MOUTH EVERY DAY    Fluvoxamine Maleate 150 MG CP24 TAKE 1 CAPSULE BY MOUTH EVERY DAY    lisinopril (ZESTRIL) 10 mg tablet TAKE 1 TABLET BY MOUTH EVERY DAY    loratadine (CLARITIN) 10 mg tablet TAKE 1 TABLET BY MOUTH EVERY DAY    Melatonin 10 MG TABS      montelukast (SINGULAIR) 10 mg tablet TAKE 1 TABLET BY MOUTH EVERY DAY    Multiple Vitamin (multivitamin) capsule Take 1 capsule by mouth daily    Omega-3 Fatty Acids (fish oil) 1,000 mg TAKE 3 CAPSULES (3,000 MG TOTAL) BY MOUTH DAILY    omeprazole (PriLOSEC) 40 MG capsule TAKE 1 CAPSULE BY MOUTH EVERY DAY    oxymetazoline (AFRIN) 0 05 % nasal spray 2 sprays by Each Nare route 2 (two) times a day    triamcinolone (KENALOG) 0 1 % ointment Apply topically twice a day for 14 days [place]    Vitamin D-Vitamin K (K2 Plus D3) 100-1000 MCG-UNIT TABS TAKE 1 TABLET BY MOUTH TWICE A DAY    zinc gluconate 50 mg tablet TAKE 1 TABLET BY MOUTH EVERY DAY     No current facility-administered medications on file prior to visit  He is allergic to penicillins and sulfa antibiotics       Review of Systems   Constitutional: Negative  HENT: Negative for sinus pressure and sinus pain      Respiratory: Negative for cough and shortness of breath  Cardiovascular: Negative for leg swelling  Gastrointestinal: Negative for diarrhea, nausea and vomiting  Musculoskeletal: Positive for arthralgias and myalgias  Negative for gait problem  Skin: Negative for color change and wound  Neurological: Negative for weakness and numbness  Objective:      /88   Pulse 82   Ht 5' 7" (1 702 m) Comment: verbal  Wt 130 kg (286 lb 6 4 oz)   BMI 44 86 kg/m²          Physical Exam  Vitals reviewed  Constitutional:       Appearance: He is obese  He is not ill-appearing or diaphoretic  HENT:      Nose: No congestion or rhinorrhea  Cardiovascular:      Rate and Rhythm: Normal rate  Pulses: Normal pulses  Dorsalis pedis pulses are 2+ on the right side and 2+ on the left side  Posterior tibial pulses are 2+ on the right side and 2+ on the left side  Pulmonary:      Effort: Pulmonary effort is normal  No respiratory distress  Musculoskeletal:         General: Tenderness present  Right ankle:      Right Achilles Tendon: No tenderness or defects  Whitaker's test negative  Left ankle:      Left Achilles Tendon: Tenderness (Tenderness at insertion  There is no thickening of the Achilles tendon at the watershed area  His plantar flexion strength is normal and without pain  He only has pain with direct pressure  Normal Whitaker test ) present  No defects  Whitaker's test negative  Right foot: Normal range of motion  Left foot: Normal range of motion  Feet:    Feet:      Left foot:      Protective Sensation: 10 sites tested  10 sites sensed  Skin integrity: Skin integrity normal       Toenail Condition: Left toenails are normal    Skin:     Findings: No erythema or rash  Neurological:      Mental Status: He is alert and oriented to person, place, and time  Sensory: No sensory deficit

## 2022-05-04 NOTE — PROGRESS NOTES
Assessment/Plan:         Diagnoses and all orders for this visit:    Plantar fasciitis, left  -     triamcinolone acetonide (KENALOG-40) 40 mg/mL injection 40 mg  -     lidocaine (XYLOCAINE) 1 % injection 1 mL  -     XR heel / calcaneus 2+ vw left; Future    Tendonitis, Achilles, left  -     XR heel / calcaneus 2+ vw left; Future      regarding plantar fasciitis  1  Discussed diagnosis and treatment options  2  Provided home therapy and stretching exercises  3  Stressed compliance with home/formal PT and arch supports  4  See injection below  Of note patient had vasovagal response and vomited  He was given an ice pack on his neck and put in Trendelenburg  After 5-10 minutes he did feel much better  We did take his heart rate and blood pressure which were fine  Regarding the previous posterior heel surgery in Achilles tendinopathy   1  Discussed diagnosis and treatment options  2  Provided home therapy and stretching exercises  3  Educated eccentric exercises for the achilles enthesopathy  4  Stressed compliance with home/formal PT and arch supports  5  Purchase Tuli heel cups   6  RTC 6 weeks      After answering all the patient's questions, I injected left heel with 0 5cc kenalog 40 and 1cc 1% lidocaine plain  Oral directions were given for rest and ice on the affected heel(s) and elevation  The ice is to be used for approximately 20 minutes at a time, 1-2 times a day for a few days  Home therapy instructions were demonstrated and a copy was given to the patient  The patient is to call at once if there is any increased pain, swelling, tenderness, redness, etc       Subjective:      Patient ID: Cesilia Love is a 28 y o  male  Patient presents with left ankle pain for over 5 years  In 2017 he has posterior calcaneal spurs removed and his achilles repaired  He did some PT but never really fully recovered  PAin is on the achilles, hheel and plantar heel  His strongest area of pain is plantar heel  The following portions of the patient's history were reviewed and updated as appropriate:   He  has a past medical history of Acne (09/01/2010), Acquired ankle/foot deformity, Change in hearing, Closed displaced avulsion fracture of tuberosity of left calcaneus, Epistaxis, Essential hypertension (6/19/2018), Foreign body in foot, Gastritis, GERD (gastroesophageal reflux disease), History of oral aphthous ulcers, Hypersomnia (02/18/2009), Impacted cerumen, Insomnia, Irritable bowel syndrome, Knee joint pain (03/10/2008), Left foot pain, Mixed hyperlipidemia (11/2/2020), OCD (obsessive compulsive disorder), Plantar fibromatosis, Seasonal allergies, Tarsal tunnel syndrome of left side, and Tinea corporis  He   Patient Active Problem List    Diagnosis Date Noted    GERD (gastroesophageal reflux disease)     Seasonal allergies     Sensorineural hearing loss (SNHL), bilateral 04/09/2021    Mixed hyperlipidemia 11/02/2020    Well adult health check 07/17/2018    Screening for cardiovascular, respiratory, and genitourinary diseases 06/19/2018    Screening for diabetes mellitus (DM) 06/19/2018    Essential hypertension 06/19/2018    Fatigue 06/19/2018    Obesity (BMI 30-39 9) 10/27/2017    Aphthous ulcer 09/14/2017    External hemorrhoids 08/14/2017    Lumbar radiculopathy 05/19/2017    Acne 09/05/2012    ADD (attention deficit disorder) without hyperactivity 09/05/2012    Depression 09/05/2012    Moderate obstructive sleep apnea 09/05/2012    Allergic rhinitis 09/04/2012    Obsessive compulsive disorder 09/04/2012     He  has a past surgical history that includes No past surgeries; Esophagogastroduodenoscopy (N/A, 10/31/2017); and pr length/short leg/ankl tendon,single (Left, 11/3/2017)    His family history includes Allergic rhinitis in his mother; Cancer in his family; Colonic polyp in his maternal grandmother; Diabetes in his family; Gout in his father; Hypertension in his father; Kidney disease in his father and mother; No Known Problems in his sister; Thyroid disease in his mother  He  reports that he has never smoked  He has never used smokeless tobacco  He reports that he does not drink alcohol and does not use drugs  Current Outpatient Medications   Medication Sig Dispense Refill    Adapalene 0 3 % gel SPREAD ONE PEA-SIZED AMOUNT OF MEDICATION OVER ENTIRE FACE ABOUT ONE HOUR BEFORE BEDTIME   45 g 2    Alpha-D-Galactosidase (BEANO PO) Take by mouth      Alpha-Lipoic Acid 200 MG CAPS TAKE 1 CAPSULE BY MOUTH EVERY DAY 30 capsule 5    Ascorbic Acid (vitamin C) 1000 MG tablet TAKE 1 TABLET (1,000 MG TOTAL) BY MOUTH DAILY 30 tablet 5    atorvastatin (LIPITOR) 10 mg tablet TAKE 1 TABLET BY MOUTH EVERY DAY 30 tablet 3    Bacillus Coagulans-Inulin (Probiotic) 1-250 BILLION-MG CAPS TAKE 1 CAPSULE BY MOUTH EVERY DAY 30 capsule 5    buPROPion (WELLBUTRIN XL) 150 mg 24 hr tablet Take 1 tablet (150 mg total) by mouth every morning 30 tablet 2    Cholecalciferol (KP Vitamin D3) 50 MCG (2000 UT) CAPS Take 1 capsule (2,000 Units total) by mouth 2 (two) times a day 180 capsule 1    cholecalciferol (VITAMIN D3) 1,000 units tablet Take 1 tablet (1,000 Units total) by mouth daily 90 tablet 3    clindamycin-benzoyl peroxide (BENZACLIN) gel Apply topically 2 (two) times a day 50 g 3    Dapsone 7 5 % GEL Apply topically once daily 90 g 3    Echinacea-Alatorre Seal 350-100 MG CAPS TAKE 1 CAPSULE BY MOUTH EVERY DAY 30 capsule 5    Echinacea-Alatorre Seal Complex CAPS Take 1 capsule by mouth daily 30 capsule 0    fexofenadine (ALLEGRA) 180 MG tablet Take 1 tablet (180 mg total) by mouth daily 90 tablet 1    Fluvoxamine Maleate 100 MG CP24 TAKE 1 CAPSULE BY MOUTH EVERY DAY 90 capsule 1    Fluvoxamine Maleate 150 MG CP24 TAKE 1 CAPSULE BY MOUTH EVERY DAY 90 capsule 1    lisinopril (ZESTRIL) 10 mg tablet TAKE 1 TABLET BY MOUTH EVERY DAY 30 tablet 5    loratadine (CLARITIN) 10 mg tablet TAKE 1 TABLET BY MOUTH EVERY DAY 30 tablet 4    Melatonin 10 MG TABS        montelukast (SINGULAIR) 10 mg tablet TAKE 1 TABLET BY MOUTH EVERY DAY 30 tablet 4    Multiple Vitamin (multivitamin) capsule Take 1 capsule by mouth daily 100 capsule 3    Omega-3 Fatty Acids (fish oil) 1,000 mg TAKE 3 CAPSULES (3,000 MG TOTAL) BY MOUTH DAILY 90 capsule 5    omeprazole (PriLOSEC) 40 MG capsule TAKE 1 CAPSULE BY MOUTH EVERY DAY 30 capsule 8    oxymetazoline (AFRIN) 0 05 % nasal spray 2 sprays by Each Nare route 2 (two) times a day      triamcinolone (KENALOG) 0 1 % ointment Apply topically twice a day for 14 days starlight 30 g 0    Vitamin D-Vitamin K (K2 Plus D3) 100-1000 MCG-UNIT TABS TAKE 1 TABLET BY MOUTH TWICE A DAY 60 tablet 1    zinc gluconate 50 mg tablet TAKE 1 TABLET BY MOUTH EVERY DAY 30 tablet 5     No current facility-administered medications for this visit  Current Outpatient Medications on File Prior to Visit   Medication Sig    Adapalene 0 3 % gel SPREAD ONE PEA-SIZED AMOUNT OF MEDICATION OVER ENTIRE FACE ABOUT ONE HOUR BEFORE BEDTIME      Alpha-D-Galactosidase (BEANO PO) Take by mouth    Alpha-Lipoic Acid 200 MG CAPS TAKE 1 CAPSULE BY MOUTH EVERY DAY    Ascorbic Acid (vitamin C) 1000 MG tablet TAKE 1 TABLET (1,000 MG TOTAL) BY MOUTH DAILY    atorvastatin (LIPITOR) 10 mg tablet TAKE 1 TABLET BY MOUTH EVERY DAY    Bacillus Coagulans-Inulin (Probiotic) 1-250 BILLION-MG CAPS TAKE 1 CAPSULE BY MOUTH EVERY DAY    buPROPion (WELLBUTRIN XL) 150 mg 24 hr tablet Take 1 tablet (150 mg total) by mouth every morning    Cholecalciferol (KP Vitamin D3) 50 MCG (2000 UT) CAPS Take 1 capsule (2,000 Units total) by mouth 2 (two) times a day    cholecalciferol (VITAMIN D3) 1,000 units tablet Take 1 tablet (1,000 Units total) by mouth daily    clindamycin-benzoyl peroxide (BENZACLIN) gel Apply topically 2 (two) times a day    Dapsone 7 5 % GEL Apply topically once daily    Echinacea-Alatorre Seal 350-100 MG CAPS TAKE 1 CAPSULE BY MOUTH EVERY DAY    Echinacea-Alatorre Seal Complex CAPS Take 1 capsule by mouth daily    fexofenadine (ALLEGRA) 180 MG tablet Take 1 tablet (180 mg total) by mouth daily    Fluvoxamine Maleate 100 MG CP24 TAKE 1 CAPSULE BY MOUTH EVERY DAY    Fluvoxamine Maleate 150 MG CP24 TAKE 1 CAPSULE BY MOUTH EVERY DAY    lisinopril (ZESTRIL) 10 mg tablet TAKE 1 TABLET BY MOUTH EVERY DAY    loratadine (CLARITIN) 10 mg tablet TAKE 1 TABLET BY MOUTH EVERY DAY    Melatonin 10 MG TABS      montelukast (SINGULAIR) 10 mg tablet TAKE 1 TABLET BY MOUTH EVERY DAY    Multiple Vitamin (multivitamin) capsule Take 1 capsule by mouth daily    Omega-3 Fatty Acids (fish oil) 1,000 mg TAKE 3 CAPSULES (3,000 MG TOTAL) BY MOUTH DAILY    omeprazole (PriLOSEC) 40 MG capsule TAKE 1 CAPSULE BY MOUTH EVERY DAY    oxymetazoline (AFRIN) 0 05 % nasal spray 2 sprays by Each Nare route 2 (two) times a day    triamcinolone (KENALOG) 0 1 % ointment Apply topically twice a day for 14 days starlight    Vitamin D-Vitamin K (K2 Plus D3) 100-1000 MCG-UNIT TABS TAKE 1 TABLET BY MOUTH TWICE A DAY    zinc gluconate 50 mg tablet TAKE 1 TABLET BY MOUTH EVERY DAY     No current facility-administered medications on file prior to visit  He is allergic to penicillins and sulfa antibiotics       Review of Systems   Constitutional: Negative  HENT: Negative for sinus pressure and sinus pain  Respiratory: Negative for cough and shortness of breath  Cardiovascular: Negative for leg swelling  Gastrointestinal: Negative for diarrhea, nausea and vomiting  Musculoskeletal: Positive for arthralgias and myalgias  Negative for gait problem  Skin: Negative for color change and wound  Neurological: Negative for weakness and numbness  Objective:      /88   Pulse 82   Ht 5' 7" (1 702 m) Comment: verbal  Wt 130 kg (286 lb 6 4 oz)   BMI 44 86 kg/m²          Physical Exam  Vitals reviewed  Constitutional:       Appearance: He is obese   He is not ill-appearing or diaphoretic  HENT:      Nose: No congestion or rhinorrhea  Cardiovascular:      Rate and Rhythm: Normal rate  Pulses: Normal pulses  Dorsalis pedis pulses are 2+ on the right side and 2+ on the left side  Posterior tibial pulses are 2+ on the right side and 2+ on the left side  Pulmonary:      Effort: Pulmonary effort is normal  No respiratory distress  Musculoskeletal:         General: Tenderness present  Right ankle:      Right Achilles Tendon: No tenderness or defects  Whitaker's test negative  Left ankle:      Left Achilles Tendon: Tenderness (Tenderness at insertion  There is no thickening of the Achilles tendon at the watershed area  His plantar flexion strength is normal and without pain  He only has pain with direct pressure  Normal Whitaker test ) present  No defects  Whitaker's test negative  Right foot: Normal range of motion  Left foot: Normal range of motion  Feet:    Feet:      Left foot:      Protective Sensation: 10 sites tested  10 sites sensed  Skin integrity: Skin integrity normal       Toenail Condition: Left toenails are normal    Skin:     Findings: No erythema or rash  Neurological:      Mental Status: He is alert and oriented to person, place, and time  Sensory: No sensory deficit

## 2022-05-07 DIAGNOSIS — F42.2 MIXED OBSESSIONAL THOUGHTS AND ACTS: ICD-10-CM

## 2022-05-07 DIAGNOSIS — E78.2 MIXED HYPERLIPIDEMIA: ICD-10-CM

## 2022-05-09 RX ORDER — FLUVOXAMINE MALEATE 150 MG/1
CAPSULE, EXTENDED RELEASE ORAL
Qty: 30 CAPSULE | Refills: 5 | Status: SHIPPED | OUTPATIENT
Start: 2022-05-09

## 2022-05-09 RX ORDER — FLUVOXAMINE MALEATE 100 MG/1
CAPSULE, EXTENDED RELEASE ORAL
Qty: 30 CAPSULE | Refills: 5 | Status: SHIPPED | OUTPATIENT
Start: 2022-05-09

## 2022-05-09 RX ORDER — ATORVASTATIN CALCIUM 10 MG/1
TABLET, FILM COATED ORAL
Qty: 30 TABLET | Refills: 3 | Status: SHIPPED | OUTPATIENT
Start: 2022-05-09

## 2022-05-10 ENCOUNTER — APPOINTMENT (OUTPATIENT)
Dept: RADIOLOGY | Facility: CLINIC | Age: 32
End: 2022-05-10
Payer: COMMERCIAL

## 2022-05-10 DIAGNOSIS — M76.62 TENDONITIS, ACHILLES, LEFT: ICD-10-CM

## 2022-05-10 DIAGNOSIS — M72.2 PLANTAR FASCIITIS, LEFT: ICD-10-CM

## 2022-05-10 PROCEDURE — 73650 X-RAY EXAM OF HEEL: CPT

## 2022-06-01 ENCOUNTER — OFFICE VISIT (OUTPATIENT)
Dept: PODIATRY | Facility: CLINIC | Age: 32
End: 2022-06-01
Payer: COMMERCIAL

## 2022-06-01 VITALS — WEIGHT: 283 LBS | BODY MASS INDEX: 44.42 KG/M2 | HEIGHT: 67 IN

## 2022-06-01 DIAGNOSIS — M72.2 PLANTAR FASCIITIS, LEFT: Primary | ICD-10-CM

## 2022-06-01 PROCEDURE — 99213 OFFICE O/P EST LOW 20 MIN: CPT | Performed by: PODIATRIST

## 2022-06-01 RX ORDER — MINOCYCLINE HYDROCHLORIDE 100 MG/1
100 CAPSULE ORAL EVERY 12 HOURS
COMMUNITY
Start: 2022-05-08 | End: 2022-06-23

## 2022-06-01 RX ORDER — METHYLPREDNISOLONE 4 MG/1
TABLET ORAL
Qty: 1 EACH | Refills: 0 | Status: SHIPPED | OUTPATIENT
Start: 2022-06-01

## 2022-06-01 NOTE — PROGRESS NOTES
Assessment/Plan:         Diagnoses and all orders for this visit:    Plantar fasciitis, left  -     Ambulatory referral to Physical Therapy; Future  -     methylPREDNISolone 4 MG tablet therapy pack; Use as directed on package    Other orders  -     minocycline (MINOCIN) 100 mg capsule; Take 100 mg by mouth every 12 (twelve) hours          Medrol pack, he could not tolerate injection  Formal PT, must perform daily therapy as well (reviewed with patient)  RTC 6 weeks    Subjective:      Patient ID: Jorge Zelaya is a 28 y o  male  Patient presents with followup for left foot plantar fascitis  HE got a shot and has been doing some therapy most days  He is a little better but not resolved  The following portions of the patient's history were reviewed and updated as appropriate: allergies, current medications, past family history, past medical history, past social history, past surgical history and problem list     Review of Systems    Constitutional: Negative  HENT: Negative for sinus pressure and sinus pain  Respiratory: Negative for cough and shortness of breath  Cardiovascular: Negative for chest pain and leg swelling  Gastrointestinal: Negative for diarrhea, nausea and vomiting  Musculoskeletal: left heel pain  Skin: Negative for color change  Negative for rash and wound  Neurological: Negative for weakness, numbness and headaches  Psychiatric/Behavioral: The patient is not nervous/anxious  Objective:      Ht 5' 7" (1 702 m) Comment: verbal  Wt 128 kg (283 lb)   BMI 44 32 kg/m²          Physical Exam    Vitals reviewed    Constitutional: Patient is not distressed  Patient is well developed    Vascular: Dorsalis pedis and posterior tibial pulses 2/4  Capillary refill time within normal limits to all digits  No erythema  No edema  Dermatology: No rash, no open lesions  Present pedal hair  Musculoskeletal: Normal range of motion to subtalar joint, and midtarsal joint   Normal range of motion first MTPJ  Manual muscle testing 5 out of 5 for inversion/eversion/dorsiflexion/plantarflexion  Pain on medial band insertion of plantar fascia on the left foot    Plantar fascia is taut but intact in central arch  No fibroma palpated    Neurological exam: Monofilament sensation intact  Vibratory sensation intact  Achilles reflex is normal     XRay 2 views of the left heel personally read by Dr Charles Rivero in office today and discussed with patient:  1  2 bone anchors in calc from previous surgery, there is some calcification in the achilles but no acute concern  2   No plantar calcaneal abnormality, no acute findings

## 2022-06-01 NOTE — PATIENT INSTRUCTIONS
Plantar Fasciitis Exercises   WHAT YOU NEED TO KNOW:   Plantar fasciitis exercises help stretch your plantar fascia, calf muscles, and Achilles tendon  They also help strengthen the muscles that support your heel and foot  Exercises and stretching can help prevent plantar fasciitis from getting worse or coming back  DISCHARGE INSTRUCTIONS:   Contact your healthcare provider if:   Your pain and swelling increase  You develop new knee, hip, or back pain  You have questions or concerns about your condition or care  How to do plantar fasciitis exercises:  Ask your healthcare provider when to start these exercises and how often to do them  Slant board stretch:  Stand on a slanted board with your toes higher than your heel  Press your heel into the board  Keep your knee slightly bent  Hold this position for 1 minute  Repeat 5 times  Heel stretch:  Stand up straight with your hands on a wall  Place your injured leg slightly behind your other leg  Keep your heels flat on the floor, lean forward, and bend both knees  Hold for 30 seconds  Calf stretch:  Stand up straight with your hands on a wall  Step forward so that your uninjured foot is in front of your injured foot  Keep your front leg bent and your back leg straight  Gently lean forward until you feel your calf stretch  Hold for 30 seconds and then relax  Seated plantar fascia stretch:  Sit on a firm surface, such as the floor or a mat  Extend your legs out in front of you  Raise your injured foot a few inches off the ground  Keep your leg straight  Grab the toes of your injured foot and pull them toward you  With your other hand, feel your plantar fascia  You should feel it press outward  Hold for 30 seconds  If you cannot reach your toes, loop a towel or tie around your foot  Gently pull on the towel or tie and flex your toes toward you  Heel raises:  Stand on the injured leg  Raise your other leg off the ground   Hold onto a railing or wall for balance  Slowly rise up on the toes of your injured leg  Hold for 5 seconds  Slowly lower your heel to the ground  Toe curls:  Place a towel on the floor  Put your foot flat on the towel  Grab the towel with your toes by curling them around the towel  Lift the towel up with your toes  Toe taps:  Sit down and place your foot flat on the floor  Keep your heel on the floor  Point all your toes up toward the ceiling  While the 4 smaller toes are pointed up, bend your big toe down and tap it on the ground  Do 10 to 50 taps  Point all 5 toes up toward the ceiling again  This time keep your big toe pointed up and tap the 4 smaller toes on the ground  Do 10 to 50 taps each time  Follow up with your doctor as directed:  Write down your questions so you remember to ask them during your visits  © Copyright [employer] 2022 Information is for End User's use only and may not be sold, redistributed or otherwise used for commercial purposes  All illustrations and images included in CareNotes® are the copyrighted property of A D A M [employer] Inc  or Eleonora Mei   The above information is an  only  It is not intended as medical advice for individual conditions or treatments  Talk to your doctor, nurse or pharmacist before following any medical regimen to see if it is safe and effective for you

## 2022-06-07 ENCOUNTER — EVALUATION (OUTPATIENT)
Dept: PHYSICAL THERAPY | Facility: REHABILITATION | Age: 32
End: 2022-06-07
Payer: COMMERCIAL

## 2022-06-07 DIAGNOSIS — G89.29 CHRONIC HEEL PAIN, LEFT: ICD-10-CM

## 2022-06-07 DIAGNOSIS — M72.2 PLANTAR FASCIITIS OF LEFT FOOT: Primary | ICD-10-CM

## 2022-06-07 DIAGNOSIS — M72.2 PLANTAR FASCIITIS, LEFT: ICD-10-CM

## 2022-06-07 DIAGNOSIS — M79.672 CHRONIC HEEL PAIN, LEFT: ICD-10-CM

## 2022-06-07 PROCEDURE — 97140 MANUAL THERAPY 1/> REGIONS: CPT | Performed by: PHYSICAL THERAPIST

## 2022-06-07 PROCEDURE — 97161 PT EVAL LOW COMPLEX 20 MIN: CPT | Performed by: PHYSICAL THERAPIST

## 2022-06-07 PROCEDURE — 97110 THERAPEUTIC EXERCISES: CPT | Performed by: PHYSICAL THERAPIST

## 2022-06-13 ENCOUNTER — OFFICE VISIT (OUTPATIENT)
Dept: PHYSICAL THERAPY | Facility: REHABILITATION | Age: 32
End: 2022-06-13
Payer: COMMERCIAL

## 2022-06-13 DIAGNOSIS — M72.2 PLANTAR FASCIITIS, LEFT: ICD-10-CM

## 2022-06-13 DIAGNOSIS — M79.672 CHRONIC HEEL PAIN, LEFT: ICD-10-CM

## 2022-06-13 DIAGNOSIS — M72.2 PLANTAR FASCIITIS OF LEFT FOOT: Primary | ICD-10-CM

## 2022-06-13 DIAGNOSIS — G89.29 CHRONIC HEEL PAIN, LEFT: ICD-10-CM

## 2022-06-13 PROCEDURE — 97112 NEUROMUSCULAR REEDUCATION: CPT | Performed by: PHYSICAL THERAPIST

## 2022-06-13 PROCEDURE — 97140 MANUAL THERAPY 1/> REGIONS: CPT | Performed by: PHYSICAL THERAPIST

## 2022-06-13 PROCEDURE — 97530 THERAPEUTIC ACTIVITIES: CPT | Performed by: PHYSICAL THERAPIST

## 2022-06-13 PROCEDURE — 97110 THERAPEUTIC EXERCISES: CPT | Performed by: PHYSICAL THERAPIST

## 2022-06-13 NOTE — PROGRESS NOTES
Daily Note     Today's date: 2022  Patient name: Miguel Green  : 1990  MRN: [id number]  Referring provider: JORI Lam  Dx:   Encounter Diagnosis     ICD-10-CM    1  Plantar fasciitis of left foot  M72 2    2  Plantar fasciitis, left  M72 2    3  Chronic heel pain, left  M79 672     G89 29                   Subjective: Lew Rodríguez reports that his heel is more sore today no specific injury  Objective: See treatment diary below      Assessment: Tolerated treatment well  Patient demonstrated fatigue post treatment, exhibited good technique with therapeutic exercises and would benefit from continued PT  Fatigue with additional exercises no pain however  Plan: Continue per plan of care               Precautions: Prior L achilles lengthening surgery    Daily Treatment Diary    Manual 2022       IASTM L gastroc done Done       L TPR L medial calcaneous  done       TPR L gastroc done 12' total       Therapeutic-ex          Bike nv 5' lvl 1       Ankle TB eversion* MTB 3x15                 Bridges*  3x12       SLR abd*                  Flexibility         HS stretch         Gastroc stretch standing* 3x30" 3x30"                         Therapeutic act                  Standing eccentric heel raises* 3x12 2x12       Heel raises w ball for post tib recruitment* nv 3x20                         Step ups (involved LE up, uninvolved down) 8" nv 8" 2x12                Step overs         Mini squats w ue support         STS from chair/low mat         Squats w target         Goblet squats                  Neuromuscular RE-ed         Gabriel mcdonald holds nv nv       Gabriel mcdonald step throughs nv nv       SL balance nv 5x10"       SL skaters nv nv       SL ball toss         SL on foam         SL skaters on foam                  * = on hep

## 2022-06-17 ENCOUNTER — APPOINTMENT (OUTPATIENT)
Dept: PHYSICAL THERAPY | Facility: REHABILITATION | Age: 32
End: 2022-06-17
Payer: COMMERCIAL

## 2022-06-20 ENCOUNTER — APPOINTMENT (OUTPATIENT)
Dept: PHYSICAL THERAPY | Facility: REHABILITATION | Age: 32
End: 2022-06-20
Payer: COMMERCIAL

## 2022-06-21 ENCOUNTER — OFFICE VISIT (OUTPATIENT)
Dept: PHYSICAL THERAPY | Facility: REHABILITATION | Age: 32
End: 2022-06-21
Payer: COMMERCIAL

## 2022-06-21 DIAGNOSIS — G89.29 CHRONIC HEEL PAIN, LEFT: ICD-10-CM

## 2022-06-21 DIAGNOSIS — M79.672 CHRONIC HEEL PAIN, LEFT: ICD-10-CM

## 2022-06-21 DIAGNOSIS — M72.2 PLANTAR FASCIITIS OF LEFT FOOT: Primary | ICD-10-CM

## 2022-06-21 DIAGNOSIS — M72.2 PLANTAR FASCIITIS, LEFT: ICD-10-CM

## 2022-06-21 PROCEDURE — 97110 THERAPEUTIC EXERCISES: CPT | Performed by: PHYSICAL THERAPIST

## 2022-06-21 PROCEDURE — 97530 THERAPEUTIC ACTIVITIES: CPT | Performed by: PHYSICAL THERAPIST

## 2022-06-21 PROCEDURE — 97112 NEUROMUSCULAR REEDUCATION: CPT | Performed by: PHYSICAL THERAPIST

## 2022-06-21 PROCEDURE — 97140 MANUAL THERAPY 1/> REGIONS: CPT | Performed by: PHYSICAL THERAPIST

## 2022-06-21 NOTE — PROGRESS NOTES
Daily Note     Today's date: 2022  Patient name: Lindsay Napier  : 1990  MRN: [id number]  Referring provider: JORI Rollins  Dx:   Encounter Diagnosis     ICD-10-CM    1  Plantar fasciitis of left foot  M72 2    2  Plantar fasciitis, left  M72 2    3  Chronic heel pain, left  M79 672     G89 29        Start Time: 1215  Stop Time: 1300  Total time in clinic (min): 45 minutes    Subjective: Radha Jama reports that his heel continues to be more painful as of late  Notes less pain in his arch  Objective: See treatment diary below      Assessment: Tolerated treatment well  Patient demonstrated fatigue post treatment, exhibited good technique with therapeutic exercises and would benefit from continued PT  Cues required for correct form during sneaky lunges as well as calf stretching, will benefit from continued progression  Plan: Continue per plan of care               Precautions: Prior L achilles lengthening surgery    Daily Treatment Diary    Manual 2022      IASTM L gastroc done Done done      L TPR L medial calcaneous  done done      TPR L gastroc done 12' total 8' total      Therapeutic-ex          Bike nv 5' lvl 1 5' lvl 1      Ankle TB eversion* MTB 3x15                 Bridges*  3x12 3x15      SLR abd*                  Flexibility         HS stretch         Gastroc stretch standing* 3x30" 3x30" 3x30"                        Therapeutic act                  Standing eccentric heel raises* 3x12 2x12 3x12      Heel raises w ball for post tib recruitment* nv 3x20 3x30"                        Step ups (involved LE up, uninvolved down) 8" nv 8" 2x12 nv               Step overs         Mini squats w ue support         STS from chair/low mat         Squats w target         Goblet squats                  Neuromuscular RE-ed         Sneaky lunge holds nv nv 10x5" hold      Sneaky lunge step throughs nv nv nv      SL balance nv 5x10"       SL skaters nv nv 2x8      SL ball toss SL on foam         SL skaters on foam                  * = on hep

## 2022-06-24 ENCOUNTER — TELEPHONE (OUTPATIENT)
Dept: FAMILY MEDICINE CLINIC | Facility: CLINIC | Age: 32
End: 2022-06-24

## 2022-06-24 ENCOUNTER — OFFICE VISIT (OUTPATIENT)
Dept: PHYSICAL THERAPY | Facility: REHABILITATION | Age: 32
End: 2022-06-24
Payer: COMMERCIAL

## 2022-06-24 DIAGNOSIS — M72.2 PLANTAR FASCIITIS OF LEFT FOOT: ICD-10-CM

## 2022-06-24 DIAGNOSIS — M79.672 CHRONIC HEEL PAIN, LEFT: ICD-10-CM

## 2022-06-24 DIAGNOSIS — G89.29 CHRONIC HEEL PAIN, LEFT: ICD-10-CM

## 2022-06-24 DIAGNOSIS — M72.2 PLANTAR FASCIITIS, LEFT: Primary | ICD-10-CM

## 2022-06-24 PROCEDURE — 97110 THERAPEUTIC EXERCISES: CPT | Performed by: PHYSICAL THERAPIST

## 2022-06-24 PROCEDURE — 97112 NEUROMUSCULAR REEDUCATION: CPT | Performed by: PHYSICAL THERAPIST

## 2022-06-24 PROCEDURE — 97530 THERAPEUTIC ACTIVITIES: CPT | Performed by: PHYSICAL THERAPIST

## 2022-06-24 PROCEDURE — 97140 MANUAL THERAPY 1/> REGIONS: CPT | Performed by: PHYSICAL THERAPIST

## 2022-06-24 NOTE — TELEPHONE ENCOUNTER
Dr Masoud Minor [phone]    From previous visits with you - Enlarged Tonsils and suggested ENT       Please place referral  Call pt when completed

## 2022-06-24 NOTE — PROGRESS NOTES
Daily Note     Today's date: 2022  Patient name: Lawanda Mullins  : 1990  MRN: [id number]  Referring provider: JORI Hogan  Dx:   Encounter Diagnosis     ICD-10-CM    1  Plantar fasciitis, left  M72 2    2  Chronic heel pain, left  M79 672     G89 29    3  Plantar fasciitis of left foot  M72 2                   Subjective: Wilfrido Evans reports that his left heel is more bothersome with radiating pain going into his calf musculature  Objective: See treatment diary below      Assessment: Tolerated treatment well  Patient demonstrated fatigue post treatment, exhibited good technique with therapeutic exercises and would benefit from continued PT  Reduced lateral calcaneal mobility noted during manual mobilization  Plan: Continue per plan of care               Precautions: Prior L achilles lengthening surgery    Daily Treatment Diary    Manual 2022     IASTM L gastroc done Done done done     L TPR L medial calcaneous  done done done     Lateral calcaneal glide in SL    done              TPR L gastroc done 12' total 8' total      Therapeutic-ex          Bike nv 5' lvl 1 5' lvl 1 np     Ankle TB eversion* MTB 3x15                 Bridges*  3x12 3x15 3x15     SLR abd*                  Flexibility         HS stretch         Gastroc stretch standing* 3x30" 3x30" 3x30" 3x30"                       Therapeutic act                  Standing eccentric heel raises* 3x12 2x12 3x12 3x12     Heel raises w ball for post tib recruitment* nv 3x20 3x30 dc                       Step ups (involved LE up, uninvolved down) 8" nv 8" 2x12 nv 8" 2x10              Step overs         Mini squats w ue support         STS from chair/low mat         Squats w target         Goblet squats                  Neuromuscular RE-ed         Sneaky lunge holds nv nv 10x5" hold 10x5"      Sneaky lunge step throughs nv nv nv 2x10     SL balance nv 5x10"  5x10"x2     SL skaters nv nv 2x8 np     SL ball toss         SL on foam         SL skaters on foam                  * = on hep

## 2022-06-25 DIAGNOSIS — J35.1 TONSILLAR HYPERTROPHY: Primary | ICD-10-CM

## 2022-06-28 ENCOUNTER — OFFICE VISIT (OUTPATIENT)
Dept: PHYSICAL THERAPY | Facility: REHABILITATION | Age: 32
End: 2022-06-28
Payer: COMMERCIAL

## 2022-06-28 ENCOUNTER — TELEPHONE (OUTPATIENT)
Dept: DERMATOLOGY | Facility: CLINIC | Age: 32
End: 2022-06-28

## 2022-06-28 DIAGNOSIS — M79.672 CHRONIC HEEL PAIN, LEFT: ICD-10-CM

## 2022-06-28 DIAGNOSIS — M72.2 PLANTAR FASCIITIS OF LEFT FOOT: ICD-10-CM

## 2022-06-28 DIAGNOSIS — G89.29 CHRONIC HEEL PAIN, LEFT: ICD-10-CM

## 2022-06-28 DIAGNOSIS — M72.2 PLANTAR FASCIITIS, LEFT: Primary | ICD-10-CM

## 2022-06-28 PROCEDURE — 97110 THERAPEUTIC EXERCISES: CPT | Performed by: PHYSICAL THERAPIST

## 2022-06-28 PROCEDURE — 97140 MANUAL THERAPY 1/> REGIONS: CPT | Performed by: PHYSICAL THERAPIST

## 2022-06-28 PROCEDURE — 97112 NEUROMUSCULAR REEDUCATION: CPT | Performed by: PHYSICAL THERAPIST

## 2022-06-28 PROCEDURE — 97530 THERAPEUTIC ACTIVITIES: CPT | Performed by: PHYSICAL THERAPIST

## 2022-06-28 NOTE — PROGRESS NOTES
Daily Note     Today's date: 2022  Patient name: Angelica Christopher  : 1990  MRN: [id number]  Referring provider: JORI Helms  Dx:   Encounter Diagnosis     ICD-10-CM    1  Plantar fasciitis, left  M72 2    2  Chronic heel pain, left  M79 672     G89 29    3  Plantar fasciitis of left foot  M72 2        Start Time: 1015  Stop Time: 1100  Total time in clinic (min): 45 minutes    Subjective: Jayden Ramsay reports that his foot isnt hurting him since he hasnt been doing as much due to being sick over the weekend, as well as intermittent compliance with HEP, but reports overall the ankle feels "the same "       Objective: See treatment diary below      Assessment: Tolerated treatment well  Patient demonstrated fatigue post treatment, exhibited good technique with therapeutic exercises and would benefit from continued PT  Plan: Continue per plan of care               Precautions: Prior L achilles lengthening surgery    Daily Treatment Diary    Manual 2022    IASTM L gastroc done Done done done done    L TPR L medial calcaneous  done done done done    Lateral calcaneal glide in SL    done done             TPR L gastroc done 12' total 8' total      Therapeutic-ex          Bike nv 5' lvl 1 5' lvl 1 np     Ankle TB eversion* MTB 3x15                 Bridges*  3x12 3x15 3x15 3x15    SLR abd*                  Flexibility         HS stretch         Gastroc stretch standing* 3x30" 3x30" 3x30" 3x30" 3x30"                      Therapeutic act                  Standing eccentric heel raises* 3x12 2x12 3x12 3x12 3x12     Heel raises w ball for post tib recruitment* nv 3x20 3x30 dc                       Step ups (involved LE up, uninvolved down) L only 8" nv 8" 2x12 nv 8" 2x10 8" 3x10             Step overs         Mini squats w ue support         STS from chair/low mat         Squats w target         Goblet squats                  Neuromuscular RE-ed         Gabriel mcdonald holds nv nv 10x5" hold 10x5"  2x10x5"    Sneaky lunge step throughs nv nv nv 2x10 2x10    SL balance nv 5x10"  5x10"x2 5x10"    SL skaters nv nv 2x8 np     SL ball toss         SL on foam         SL skaters on foam                  * = on hep

## 2022-06-28 NOTE — TELEPHONE ENCOUNTER
Pt sent an on line request for a f/up appt with Dr Noam Day, called pt but n/a, left v/m with c/b info

## 2022-06-29 ENCOUNTER — TELEPHONE (OUTPATIENT)
Dept: DERMATOLOGY | Facility: CLINIC | Age: 32
End: 2022-06-29

## 2022-07-01 ENCOUNTER — OFFICE VISIT (OUTPATIENT)
Dept: PHYSICAL THERAPY | Facility: REHABILITATION | Age: 32
End: 2022-07-01
Payer: COMMERCIAL

## 2022-07-01 DIAGNOSIS — M72.2 PLANTAR FASCIITIS, LEFT: Primary | ICD-10-CM

## 2022-07-01 DIAGNOSIS — M72.2 PLANTAR FASCIITIS OF LEFT FOOT: ICD-10-CM

## 2022-07-01 DIAGNOSIS — G89.29 CHRONIC HEEL PAIN, LEFT: ICD-10-CM

## 2022-07-01 DIAGNOSIS — M79.672 CHRONIC HEEL PAIN, LEFT: ICD-10-CM

## 2022-07-01 PROCEDURE — 97530 THERAPEUTIC ACTIVITIES: CPT | Performed by: PHYSICAL THERAPIST

## 2022-07-01 PROCEDURE — 97110 THERAPEUTIC EXERCISES: CPT | Performed by: PHYSICAL THERAPIST

## 2022-07-01 PROCEDURE — 97112 NEUROMUSCULAR REEDUCATION: CPT | Performed by: PHYSICAL THERAPIST

## 2022-07-01 PROCEDURE — 97140 MANUAL THERAPY 1/> REGIONS: CPT | Performed by: PHYSICAL THERAPIST

## 2022-07-01 NOTE — PROGRESS NOTES
Daily Note     Today's date: 2022  Patient name: Soto Faulkner  : 1990  MRN: [id number]  Referring provider: JORI Ryan  Dx:   Encounter Diagnosis     ICD-10-CM    1  Plantar fasciitis, left  M72 2    2  Plantar fasciitis of left foot  M72 2    3  Chronic heel pain, left  M79 672     G89 29        Start Time: 1015          Subjective: Scout Couch reports that his foot feels okay today, notes Wednesday he felt a very sharp pain like his foot seized up in the evening while descending his steps, notes this lasted between 5 and 10 minutes  Objective: See treatment diary below  Increased tenderness/tightness felt in head of medial gastroc on left       Assessment: Tolerated treatment well  Patient demonstrated fatigue post treatment, exhibited good technique with therapeutic exercises and would benefit from continued PT  No increase of pain noted end of today's session  Plan: Continue per plan of care               Precautions: Prior L achilles lengthening surgery    Daily Treatment Diary    Manual 2022    IASTM L gastroc done Done done done done done    L TPR L medial calcaneous  done done done done done    Lateral calcaneal glide in SL    done done               TPR L gastroc done 12' total 8' total       Therapeutic-ex           Bike nv 5' lvl 1 5' lvl 1 np      Ankle TB eversion* MTB 3x15                   Bridges*  3x12 3x15 3x15 3x15 3x12 12#    SLR abd*                    Flexibility          HS stretch          Gastroc stretch standing* 3x30" 3x30" 3x30" 3x30" 3x30" 3x30"                        Therapeutic act                    Standing eccentric heel raises* 3x12 2x12 3x12 3x12 3x12  3x12    Heel raises w ball for post tib recruitment* nv 3x20 3x30 dc                          Step ups (involved LE up, uninvolved down) L only 8" nv 8" 2x12 nv 8" 2x10 8" 3x10 8" 3x12              Step overs          Mini squats w ue support          STS from chair/low mat          Squats w target          Goblet squats                    Neuromuscular RE-ed          Sneaky lunge holds nv nv 10x5" hold 10x5"  2x10x5" 10x5"    Sneaky lunge step throughs nv nv nv 2x10 2x10 2x10    SL balance nv 5x10"  5x10"x2 5x10" 5x10"x2    SL skaters nv nv 2x8 np      SL ball toss          SL on foam          SL skaters on foam                    * = on hep

## 2022-07-05 ENCOUNTER — OFFICE VISIT (OUTPATIENT)
Dept: PHYSICAL THERAPY | Facility: REHABILITATION | Age: 32
End: 2022-07-05
Payer: COMMERCIAL

## 2022-07-05 DIAGNOSIS — M79.672 CHRONIC HEEL PAIN, LEFT: ICD-10-CM

## 2022-07-05 DIAGNOSIS — M72.2 PLANTAR FASCIITIS, LEFT: Primary | ICD-10-CM

## 2022-07-05 DIAGNOSIS — G89.29 CHRONIC HEEL PAIN, LEFT: ICD-10-CM

## 2022-07-05 DIAGNOSIS — M72.2 PLANTAR FASCIITIS OF LEFT FOOT: ICD-10-CM

## 2022-07-05 PROCEDURE — 97110 THERAPEUTIC EXERCISES: CPT | Performed by: PHYSICAL THERAPIST

## 2022-07-05 PROCEDURE — 97112 NEUROMUSCULAR REEDUCATION: CPT | Performed by: PHYSICAL THERAPIST

## 2022-07-05 PROCEDURE — 97140 MANUAL THERAPY 1/> REGIONS: CPT | Performed by: PHYSICAL THERAPIST

## 2022-07-05 PROCEDURE — 97530 THERAPEUTIC ACTIVITIES: CPT | Performed by: PHYSICAL THERAPIST

## 2022-07-05 NOTE — PROGRESS NOTES
Daily Note     Today's date: 2022  Patient name: Soto Faulkner  : 1990  MRN: [id number]  Referring provider: JORI Ryan  Dx:   Encounter Diagnosis     ICD-10-CM    1  Plantar fasciitis, left  M72 2    2  Chronic heel pain, left  M79 672     G89 29    3  Plantar fasciitis of left foot  M72 2        Start Time: 09  Stop Time: 1015  Total time in clinic (min): 45 minutes    Subjective: Scout Couch reports that his ankle is pretty decent today  Reported reduced challenge with bridges today, "which is surprising since I haven't been doing anything at home really "       Objective: See treatment diary below  Reduced tenderness noted in left calf today  Assessment: Tolerated treatment well  Patient demonstrated fatigue post treatment, exhibited good technique with therapeutic exercises and would benefit from continued PT      Plan: Continue per plan of care               Precautions: Prior L achilles lengthening surgery    Daily Treatment Diary    Manual 2022   IASTM L gastroc done Done done done done done done   L TPR L medial calcaneous  done done done done done done   Lateral calcaneal glide in SL    done done               TPR L gastroc done 12' total 8' total       Therapeutic-ex           Bike nv 5' lvl 1 5' lvl 1 np      Ankle TB eversion* MTB 3x15                   Bridges*  3x12 3x15 3x15 3x15 3x12 12# 3x12 12#   SLR abd*                    Flexibility          HS stretch          Gastroc stretch standing* 3x30" 3x30" 3x30" 3x30" 3x30" 3x30" 3x30"                       Therapeutic act                    Standing eccentric heel raises* 3x12 2x12 3x12 3x12 3x12  3x12 3x12   Heel raises w ball for post tib recruitment* nv 3x20 3x30 dc                          Step ups (involved LE up, uninvolved down) L only 8" nv 8" 2x12 nv 8" 2x10 8" 3x10 8" 3x12 8" 3x12             Step overs          Mini squats w ue support          STS from chair/low mat Squats w target          Goblet squats                    Neuromuscular RE-ed          Sneaky lunge holds nv nv 10x5" hold 10x5"  2x10x5" 10x5" 10x5"   Sneaky lunge step throughs nv nv nv 2x10 2x10 2x10 3x10   SL balance nv 5x10"  5x10"x2 5x10" 5x10"x2 2x5x10"   SL skaters nv nv 2x8 np      SL ball toss          SL on foam          SL skaters on foam                    * = on hep

## 2022-07-06 ENCOUNTER — APPOINTMENT (OUTPATIENT)
Dept: LAB | Facility: CLINIC | Age: 32
End: 2022-07-06
Payer: COMMERCIAL

## 2022-07-06 DIAGNOSIS — Z13.220 SCREENING CHOLESTEROL LEVEL: ICD-10-CM

## 2022-07-06 DIAGNOSIS — I10 ESSENTIAL HYPERTENSION: ICD-10-CM

## 2022-07-06 LAB
ALBUMIN SERPL BCP-MCNC: 4.1 G/DL (ref 3.5–5)
ALP SERPL-CCNC: 96 U/L (ref 46–116)
ALT SERPL W P-5'-P-CCNC: 87 U/L (ref 12–78)
ANION GAP SERPL CALCULATED.3IONS-SCNC: 9 MMOL/L (ref 4–13)
AST SERPL W P-5'-P-CCNC: 52 U/L (ref 5–45)
BACTERIA UR QL AUTO: ABNORMAL /HPF
BASOPHILS # BLD AUTO: 0.06 THOUSANDS/ΜL (ref 0–0.1)
BASOPHILS NFR BLD AUTO: 1 % (ref 0–1)
BILIRUB SERPL-MCNC: 0.71 MG/DL (ref 0.2–1)
BILIRUB UR QL STRIP: NEGATIVE
BUN SERPL-MCNC: 10 MG/DL (ref 5–25)
CALCIUM SERPL-MCNC: 10.1 MG/DL (ref 8.3–10.1)
CHLORIDE SERPL-SCNC: 107 MMOL/L (ref 100–108)
CHOLEST SERPL-MCNC: 160 MG/DL
CLARITY UR: CLEAR
CO2 SERPL-SCNC: 24 MMOL/L (ref 21–32)
COLOR UR: ABNORMAL
CREAT SERPL-MCNC: 1.19 MG/DL (ref 0.6–1.3)
EOSINOPHIL # BLD AUTO: 0.06 THOUSAND/ΜL (ref 0–0.61)
EOSINOPHIL NFR BLD AUTO: 1 % (ref 0–6)
ERYTHROCYTE [DISTWIDTH] IN BLOOD BY AUTOMATED COUNT: 13.8 % (ref 11.6–15.1)
GFR SERPL CREATININE-BSD FRML MDRD: 81 ML/MIN/1.73SQ M
GLUCOSE P FAST SERPL-MCNC: 96 MG/DL (ref 65–99)
GLUCOSE UR STRIP-MCNC: NEGATIVE MG/DL
HCT VFR BLD AUTO: 45.9 % (ref 36.5–49.3)
HDLC SERPL-MCNC: 41 MG/DL
HGB BLD-MCNC: 14.5 G/DL (ref 12–17)
HGB UR QL STRIP.AUTO: NEGATIVE
IMM GRANULOCYTES # BLD AUTO: 0.05 THOUSAND/UL (ref 0–0.2)
IMM GRANULOCYTES NFR BLD AUTO: 1 % (ref 0–2)
KETONES UR STRIP-MCNC: NEGATIVE MG/DL
LDLC SERPL CALC-MCNC: 90 MG/DL (ref 0–100)
LEUKOCYTE ESTERASE UR QL STRIP: NEGATIVE
LYMPHOCYTES # BLD AUTO: 2.74 THOUSANDS/ΜL (ref 0.6–4.47)
LYMPHOCYTES NFR BLD AUTO: 31 % (ref 14–44)
MAGNESIUM SERPL-MCNC: 2.1 MG/DL (ref 1.6–2.6)
MCH RBC QN AUTO: 30.5 PG (ref 26.8–34.3)
MCHC RBC AUTO-ENTMCNC: 31.6 G/DL (ref 31.4–37.4)
MCV RBC AUTO: 97 FL (ref 82–98)
MONOCYTES # BLD AUTO: 0.82 THOUSAND/ΜL (ref 0.17–1.22)
MONOCYTES NFR BLD AUTO: 9 % (ref 4–12)
MUCOUS THREADS UR QL AUTO: ABNORMAL
NEUTROPHILS # BLD AUTO: 5.19 THOUSANDS/ΜL (ref 1.85–7.62)
NEUTS SEG NFR BLD AUTO: 57 % (ref 43–75)
NITRITE UR QL STRIP: NEGATIVE
NON-SQ EPI CELLS URNS QL MICRO: ABNORMAL /HPF
NRBC BLD AUTO-RTO: 0 /100 WBCS
PH UR STRIP.AUTO: 6.5 [PH]
PLATELET # BLD AUTO: 299 THOUSANDS/UL (ref 149–390)
PMV BLD AUTO: 10.9 FL (ref 8.9–12.7)
POTASSIUM SERPL-SCNC: 4.7 MMOL/L (ref 3.5–5.3)
PROT SERPL-MCNC: 8.1 G/DL (ref 6.4–8.2)
PROT UR STRIP-MCNC: NEGATIVE MG/DL
RBC # BLD AUTO: 4.75 MILLION/UL (ref 3.88–5.62)
RBC #/AREA URNS AUTO: ABNORMAL /HPF
SODIUM SERPL-SCNC: 140 MMOL/L (ref 136–145)
SP GR UR STRIP.AUTO: 1.02 (ref 1–1.03)
TRIGL SERPL-MCNC: 145 MG/DL
URATE SERPL-MCNC: 9.2 MG/DL (ref 4.2–8)
UROBILINOGEN UR STRIP-ACNC: <2 MG/DL
WBC # BLD AUTO: 8.92 THOUSAND/UL (ref 4.31–10.16)
WBC #/AREA URNS AUTO: ABNORMAL /HPF

## 2022-07-06 PROCEDURE — 80053 COMPREHEN METABOLIC PANEL: CPT

## 2022-07-06 PROCEDURE — 84550 ASSAY OF BLOOD/URIC ACID: CPT

## 2022-07-06 PROCEDURE — 80061 LIPID PANEL: CPT

## 2022-07-06 PROCEDURE — 85025 COMPLETE CBC W/AUTO DIFF WBC: CPT

## 2022-07-06 PROCEDURE — 81001 URINALYSIS AUTO W/SCOPE: CPT | Performed by: FAMILY MEDICINE

## 2022-07-06 PROCEDURE — 83735 ASSAY OF MAGNESIUM: CPT

## 2022-07-06 PROCEDURE — 36415 COLL VENOUS BLD VENIPUNCTURE: CPT

## 2022-07-07 ENCOUNTER — OFFICE VISIT (OUTPATIENT)
Dept: PHYSICAL THERAPY | Facility: REHABILITATION | Age: 32
End: 2022-07-07
Payer: COMMERCIAL

## 2022-07-07 DIAGNOSIS — M79.672 CHRONIC HEEL PAIN, LEFT: ICD-10-CM

## 2022-07-07 DIAGNOSIS — M72.2 PLANTAR FASCIITIS OF LEFT FOOT: ICD-10-CM

## 2022-07-07 DIAGNOSIS — M72.2 PLANTAR FASCIITIS, LEFT: Primary | ICD-10-CM

## 2022-07-07 DIAGNOSIS — G89.29 CHRONIC HEEL PAIN, LEFT: ICD-10-CM

## 2022-07-07 PROCEDURE — 97112 NEUROMUSCULAR REEDUCATION: CPT | Performed by: PHYSICAL THERAPIST

## 2022-07-07 PROCEDURE — 97140 MANUAL THERAPY 1/> REGIONS: CPT | Performed by: PHYSICAL THERAPIST

## 2022-07-07 PROCEDURE — 97530 THERAPEUTIC ACTIVITIES: CPT | Performed by: PHYSICAL THERAPIST

## 2022-07-07 PROCEDURE — 97110 THERAPEUTIC EXERCISES: CPT | Performed by: PHYSICAL THERAPIST

## 2022-07-08 ENCOUNTER — APPOINTMENT (OUTPATIENT)
Dept: RADIOLOGY | Facility: CLINIC | Age: 32
End: 2022-07-08
Payer: COMMERCIAL

## 2022-07-08 ENCOUNTER — OFFICE VISIT (OUTPATIENT)
Dept: FAMILY MEDICINE CLINIC | Facility: CLINIC | Age: 32
End: 2022-07-08
Payer: COMMERCIAL

## 2022-07-08 VITALS
BODY MASS INDEX: 45.2 KG/M2 | DIASTOLIC BLOOD PRESSURE: 72 MMHG | SYSTOLIC BLOOD PRESSURE: 138 MMHG | WEIGHT: 288 LBS | HEIGHT: 67 IN | OXYGEN SATURATION: 97 % | RESPIRATION RATE: 16 BRPM | HEART RATE: 69 BPM | TEMPERATURE: 97.2 F

## 2022-07-08 DIAGNOSIS — I10 ESSENTIAL HYPERTENSION: ICD-10-CM

## 2022-07-08 DIAGNOSIS — J30.9 ALLERGIC RHINITIS, UNSPECIFIED SEASONALITY, UNSPECIFIED TRIGGER: ICD-10-CM

## 2022-07-08 DIAGNOSIS — G47.33 MODERATE OBSTRUCTIVE SLEEP APNEA: ICD-10-CM

## 2022-07-08 DIAGNOSIS — R22.2 CHEST WALL MASS: ICD-10-CM

## 2022-07-08 DIAGNOSIS — E66.9 OBESITY (BMI 30-39.9): ICD-10-CM

## 2022-07-08 DIAGNOSIS — F42.9 OBSESSIVE-COMPULSIVE DISORDER, UNSPECIFIED TYPE: ICD-10-CM

## 2022-07-08 DIAGNOSIS — K21.9 GASTROESOPHAGEAL REFLUX DISEASE: ICD-10-CM

## 2022-07-08 DIAGNOSIS — F32.5 MAJOR DEPRESSIVE DISORDER WITH SINGLE EPISODE, IN REMISSION (HCC): ICD-10-CM

## 2022-07-08 DIAGNOSIS — E78.2 MIXED HYPERLIPIDEMIA: ICD-10-CM

## 2022-07-08 DIAGNOSIS — K21.9 GASTROESOPHAGEAL REFLUX DISEASE WITHOUT ESOPHAGITIS: Primary | ICD-10-CM

## 2022-07-08 PROCEDURE — 71046 X-RAY EXAM CHEST 2 VIEWS: CPT

## 2022-07-08 PROCEDURE — 99214 OFFICE O/P EST MOD 30 MIN: CPT | Performed by: FAMILY MEDICINE

## 2022-07-08 RX ORDER — OMEPRAZOLE 40 MG/1
40 CAPSULE, DELAYED RELEASE ORAL DAILY
Qty: 90 CAPSULE | Refills: 1 | Status: SHIPPED | OUTPATIENT
Start: 2022-07-08

## 2022-07-08 RX ORDER — LORATADINE 10 MG/1
10 TABLET ORAL DAILY
Qty: 90 TABLET | Refills: 1 | Status: SHIPPED | OUTPATIENT
Start: 2022-07-08

## 2022-07-08 NOTE — PROGRESS NOTES
Depression Screening and Follow-up Plan: Patient assessed for underlying major depression  Brief counseling provided and recommend additional follow-up/re-evaluation next office visit  Patient advised to follow-up with PCP for further management  Assessment/Plan:         Problem List Items Addressed This Visit        Digestive    GERD (gastroesophageal reflux disease) - Primary     Patient to continue with present therapy and decrease caffeine, avoid ETOH and smoking to decrease acid production  Pt should also cease eating prior to bedtime and avoid excessive fluid intake prior to sleep  May use antacids as needed for breakthrough GERD  All pateint questions answered today about this condition  Relevant Medications    omeprazole (PriLOSEC) 40 MG capsule       Respiratory    Allergic rhinitis     Patient to continue utilizing medical therapy as well as counseling sources as applicable to condition  If suicidal thought or fear of suicide attempt, to call 911 and seek help immediately  Medications and therapy reviewed today and all patient  questions answered today  Relevant Medications    loratadine (CLARITIN) 10 mg tablet    Moderate obstructive sleep apnea       Cardiovascular and Mediastinum    Essential hypertension     Patient is stable with current anti-hypertensive medicine and continue to follow a low sodium diet and take current medication  All questions about this condition were answered today  Other    Depression     Patient to continue utilizing medical therapy as well and counseling sources as applicable for condition  If  suicidal thought or fear of suicide to contact 911 and seek help immediately  Meds reviewed and patient questions answered today           Obesity (BMI 30-39  9)     Patient to increase exercise and partake of a diet with less calories to promote  weight loss           Obsessive compulsive disorder    Mixed hyperlipidemia     Patient  is stable with current medication and we discussed a low fat low cholesterol diet  Weight loss also discussed for this will help lower cholesterol also  Recheck lipids in 6 months  Relevant Orders    Comprehensive metabolic panel    Lipid Panel with Direct LDL reflex      Other Visit Diagnoses     Gastroesophageal reflux disease        Relevant Medications    omeprazole (PriLOSEC) 40 MG capsule    Chest wall mass        Relevant Orders    XR chest pa & lateral            Subjective:      Patient ID: Oni Leong is a 28 y o  male  A 27-year-old male here today for a multiple medical problems patient recurred allergic rhinitis system sleep apnea hypertension and hyperlipidemia with some depression  He is doing very well with his medications lab work reviewed and he is doing fine liver functions were mildly elevated will keep a left eyelid that  Patient does not consume alcohol so well we will check that again in 6 months  Patient has refills on his Claritin and his PPI  The patient also with a sebaceous type cyst mass on his left breast   This could be a growth on his rib cage or subcutaneous will see about getting a chest x-ray  S this is not feel like breast cancer        The following portions of the patient's history were reviewed and updated as appropriate:   Past Medical History:  He has a past medical history of Acne (09/01/2010), Acquired ankle/foot deformity, Allergic, Anxiety, Change in hearing, Closed displaced avulsion fracture of tuberosity of left calcaneus, Depression, Epistaxis, Essential hypertension (06/19/2018), Foreign body in foot, Gastritis, GERD (gastroesophageal reflux disease), History of oral aphthous ulcers, Hypersomnia (02/18/2009), Hypertension (2016), Impacted cerumen, Insomnia, Irritable bowel syndrome, Knee joint pain (03/10/2008), Left foot pain, Mixed hyperlipidemia (11/02/2020), Obesity, OCD (obsessive compulsive disorder), Plantar fibromatosis, Seasonal allergies, Tarsal tunnel syndrome of left side, and Tinea corporis  ,  _______________________________________________________________________  Medical Problems:  does not have any pertinent problems on file ,  _______________________________________________________________________  Past Surgical History:   has a past surgical history that includes No past surgeries; Esophagogastroduodenoscopy (N/A, 10/31/2017); and pr length/short leg/ankl tendon,single (Left, 11/3/2017)  ,  _______________________________________________________________________  Family History:  family history includes Allergic rhinitis in his mother; Cancer in his family; Colonic polyp in his maternal grandmother; Crohn's disease in his maternal grandmother; Diabetes in his family; Gout in his father; Hypertension in his father; Kidney disease in his father and mother; No Known Problems in his sister; Thyroid disease in his mother ,  _______________________________________________________________________  Social History:   reports that he has never smoked  He has never used smokeless tobacco  He reports that he does not drink alcohol and does not use drugs  ,  _______________________________________________________________________  Allergies:  is allergic to penicillins and sulfa antibiotics     _______________________________________________________________________  Current Outpatient Medications   Medication Sig Dispense Refill    Adapalene 0 3 % gel SPREAD ONE PEA-SIZED AMOUNT OF MEDICATION OVER ENTIRE FACE ABOUT ONE HOUR BEFORE BEDTIME   45 g 2    Alpha-D-Galactosidase (BEANO PO) Take by mouth      Alpha-Lipoic Acid 200 MG CAPS TAKE 1 CAPSULE BY MOUTH EVERY DAY 30 capsule 5    Ascorbic Acid (vitamin C) 1000 MG tablet TAKE 1 TABLET (1,000 MG TOTAL) BY MOUTH DAILY 30 tablet 5    atorvastatin (LIPITOR) 10 mg tablet TAKE 1 TABLET BY MOUTH EVERY DAY 30 tablet 3    Bacillus Coagulans-Inulin (Probiotic) 1-250 BILLION-MG CAPS TAKE 1 CAPSULE BY MOUTH EVERY DAY 30 capsule 5  Cholecalciferol (KP Vitamin D3) 50 MCG (2000 UT) CAPS Take 1 capsule (2,000 Units total) by mouth 2 (two) times a day 180 capsule 1    cholecalciferol (VITAMIN D3) 1,000 units tablet Take 1 tablet (1,000 Units total) by mouth daily 90 tablet 3    clindamycin-benzoyl peroxide (BENZACLIN) gel Apply topically 2 (two) times a day 50 g 3    Dapsone 7 5 % GEL Apply topically once daily 90 g 3    Echinacea-Alatorre Seal 350-100 MG CAPS TAKE 1 CAPSULE BY MOUTH EVERY DAY 30 capsule 5    Echinacea-Alatorre Seal Complex CAPS Take 1 capsule by mouth daily 30 capsule 0    fexofenadine (ALLEGRA) 180 MG tablet Take 1 tablet (180 mg total) by mouth daily 90 tablet 1    Fluvoxamine Maleate 100 MG CP24 TAKE 1 CAPSULE BY MOUTH EVERY DAY 30 capsule 5    Fluvoxamine Maleate 150 MG CP24 TAKE 1 CAPSULE BY MOUTH EVERY DAY 30 capsule 5    lisinopril (ZESTRIL) 10 mg tablet TAKE 1 TABLET BY MOUTH EVERY DAY 30 tablet 5    loratadine (CLARITIN) 10 mg tablet Take 1 tablet (10 mg total) by mouth daily 90 tablet 1    Melatonin 10 MG TABS        minocycline (MINOCIN) 100 mg capsule TAKE 1 CAPSULE BY MOUTH EVERY 12 HOURS 60 capsule 0    montelukast (SINGULAIR) 10 mg tablet TAKE 1 TABLET BY MOUTH EVERY DAY 30 tablet 4    Multiple Vitamin (multivitamin) capsule Take 1 capsule by mouth daily 100 capsule 3    Omega-3 Fatty Acids (fish oil) 1,000 mg TAKE 3 CAPSULES (3,000 MG TOTAL) BY MOUTH DAILY 90 capsule 5    omeprazole (PriLOSEC) 40 MG capsule Take 1 capsule (40 mg total) by mouth daily 90 capsule 1    oxymetazoline (AFRIN) 0 05 % nasal spray 2 sprays by Each Nare route 2 (two) times a day      triamcinolone (KENALOG) 0 1 % ointment Apply topically twice a day for 14 days starlight 30 g 0    Vitamin D-Vitamin K (K2 Plus D3) 100-1000 MCG-UNIT TABS TAKE 1 TABLET BY MOUTH TWICE A DAY 60 tablet 1    zinc gluconate 50 mg tablet TAKE 1 TABLET BY MOUTH EVERY DAY 30 tablet 5    buPROPion (WELLBUTRIN XL) 150 mg 24 hr tablet Take 1 tablet (150 mg total) by mouth every morning (Patient not taking: Reported on 7/8/2022) 30 tablet 2    methylPREDNISolone 4 MG tablet therapy pack Use as directed on package (Patient not taking: Reported on 7/8/2022) 1 each 0     No current facility-administered medications for this visit      _______________________________________________________________________  Review of Systems   Constitutional: Negative for activity change, appetite change, chills, fatigue, fever and unexpected weight change  HENT: Negative for congestion, ear pain, hearing loss, mouth sores, postnasal drip, sinus pressure, sinus pain, sneezing and sore throat  Respiratory: Negative for apnea, cough, shortness of breath and wheezing  Cardiovascular: Negative for chest pain, palpitations and leg swelling  Gastrointestinal: Negative for abdominal pain, constipation, diarrhea, nausea and vomiting  Endocrine: Negative for cold intolerance and heat intolerance  Genitourinary: Negative for dysuria, frequency and hematuria  Musculoskeletal: Negative for arthralgias, back pain, gait problem, joint swelling and neck pain  Skin: Negative for rash  Neurological: Negative for dizziness, weakness and numbness  Hematological: Does not bruise/bleed easily  Psychiatric/Behavioral: Negative for agitation, behavioral problems, confusion, hallucinations and sleep disturbance  The patient is not nervous/anxious  Objective:  Vitals:    07/08/22 0839   BP: 138/72   BP Location: Left arm   Patient Position: Sitting   Cuff Size: Large   Pulse: 69   Resp: 16   Temp: (!) 97 2 °F (36 2 °C)   SpO2: 97%   Weight: 131 kg (288 lb)   Height: 5' 7" (1 702 m)     Body mass index is 45 11 kg/m²  Physical Exam  Vitals and nursing note reviewed  Constitutional:       Appearance: He is well-developed  HENT:      Head: Normocephalic and atraumatic        Nose: Nose normal       Mouth/Throat:      Mouth: Mucous membranes are moist    Eyes: General: No scleral icterus  Conjunctiva/sclera: Conjunctivae normal       Pupils: Pupils are equal, round, and reactive to light  Neck:      Thyroid: No thyromegaly  Cardiovascular:      Rate and Rhythm: Normal rate and regular rhythm  Heart sounds: Normal heart sounds  Pulmonary:      Effort: Pulmonary effort is normal  No respiratory distress  Breath sounds: Normal breath sounds  No wheezing  Abdominal:      General: Bowel sounds are normal       Palpations: Abdomen is soft  Tenderness: There is no abdominal tenderness  There is no guarding or rebound  Musculoskeletal:         General: Normal range of motion  Cervical back: Normal range of motion and neck supple  Skin:     General: Skin is warm and dry  Findings: No rash  Neurological:      Mental Status: He is alert and oriented to person, place, and time  Psychiatric:         Mood and Affect: Mood normal          Behavior: Behavior normal          Thought Content:  Thought content normal          Judgment: Judgment normal

## 2022-07-12 ENCOUNTER — OFFICE VISIT (OUTPATIENT)
Dept: PHYSICAL THERAPY | Facility: REHABILITATION | Age: 32
End: 2022-07-12
Payer: COMMERCIAL

## 2022-07-12 DIAGNOSIS — G89.29 CHRONIC HEEL PAIN, LEFT: ICD-10-CM

## 2022-07-12 DIAGNOSIS — M72.2 PLANTAR FASCIITIS, LEFT: Primary | ICD-10-CM

## 2022-07-12 DIAGNOSIS — M79.672 CHRONIC HEEL PAIN, LEFT: ICD-10-CM

## 2022-07-12 DIAGNOSIS — M72.2 PLANTAR FASCIITIS OF LEFT FOOT: ICD-10-CM

## 2022-07-12 PROCEDURE — 97112 NEUROMUSCULAR REEDUCATION: CPT | Performed by: PHYSICAL THERAPIST

## 2022-07-12 PROCEDURE — 97140 MANUAL THERAPY 1/> REGIONS: CPT | Performed by: PHYSICAL THERAPIST

## 2022-07-12 PROCEDURE — 97110 THERAPEUTIC EXERCISES: CPT | Performed by: PHYSICAL THERAPIST

## 2022-07-12 PROCEDURE — 97530 THERAPEUTIC ACTIVITIES: CPT | Performed by: PHYSICAL THERAPIST

## 2022-07-12 NOTE — PROGRESS NOTES
Daily Note     Today's date: 2022  Patient name: Rosaura Edwards  : 1990  MRN: [id number]  Referring provider: MANDI Babcock*  Dx:   Encounter Diagnosis     ICD-10-CM    1  Plantar fasciitis, left  M72 2    2  Chronic heel pain, left  M79 672     G89 29    3  Plantar fasciitis of left foot  M72 2                   Subjective: Shane Alvarez reports that his foot actually felt fine yesterday while cooking dinner last night and little to no pain today  Objective: See treatment diary below      Assessment: Tolerated treatment well  Patient demonstrated fatigue post treatment, exhibited good technique with therapeutic exercises and would benefit from continued PT      Plan: Continue per plan of care               Precautions: Prior L achilles lengthening surgery    Daily Treatment Diary    Manual 2022   IASTM L gastroc done Done done done done done done done done   L TPR L medial calcaneous  done done done done done done done done   Lateral calcaneal glide in SL    done done                   TPR L gastroc done 12' total 8' total         Therapeutic-ex             Bike nv 5' lvl 1 5' lvl 1 np        Ankle TB eversion* MTB 3x15                       Bridges*  3x12 3x15 3x15 3x15 3x12 12# 3x12 12# 3x12 12# 3x15 12#   SLR abd*                        Flexibility            HS stretch            Gastroc stretch standing* 3x30" 3x30" 3x30" 3x30" 3x30" 3x30" 3x30" 3x30" 3x30"                           Therapeutic act                        Standing eccentric heel raises* 3x12 2x12 3x12 3x12 3x12  3x12 3x12 3x15 3x15   Heel raises w ball for post tib recruitment* nv 3x20 3x30 dc                                Step ups (involved LE up, uninvolved down) L only 8" nv 8" 2x12 nv 8" 2x10 8" 3x10 8" 3x12 8" 3x12 8" 3x12 8" 3x12               Step overs            Mini squats w ue support            STS from chair        nv 3x10 bw               Neuromuscular RE-ed Sneaky lunge holds nv nv 10x5" hold 10x5"  2x10x5" 10x5" 10x5" 10x5" 10x5"   Sneaky lunge step throughs nv nv nv 2x10 2x10 2x10 3x10 3x10 3x10   SL balance nv 5x10"  5x10"x2 5x10" 5x10"x2 2x5x10" 2x5x10" 2x5x10"   SL skaters nv nv 2x8 np        SL ball toss            SL on foam            SL skaters on foam                        * = on hep

## 2022-07-14 ENCOUNTER — OFFICE VISIT (OUTPATIENT)
Dept: PHYSICAL THERAPY | Facility: REHABILITATION | Age: 32
End: 2022-07-14
Payer: COMMERCIAL

## 2022-07-14 DIAGNOSIS — M79.672 CHRONIC HEEL PAIN, LEFT: ICD-10-CM

## 2022-07-14 DIAGNOSIS — M72.2 PLANTAR FASCIITIS OF LEFT FOOT: Primary | ICD-10-CM

## 2022-07-14 DIAGNOSIS — G89.29 CHRONIC HEEL PAIN, LEFT: ICD-10-CM

## 2022-07-14 DIAGNOSIS — M72.2 PLANTAR FASCIITIS, LEFT: ICD-10-CM

## 2022-07-14 PROCEDURE — 97140 MANUAL THERAPY 1/> REGIONS: CPT | Performed by: PHYSICAL THERAPIST

## 2022-07-14 PROCEDURE — 97530 THERAPEUTIC ACTIVITIES: CPT | Performed by: PHYSICAL THERAPIST

## 2022-07-14 PROCEDURE — 97110 THERAPEUTIC EXERCISES: CPT | Performed by: PHYSICAL THERAPIST

## 2022-07-14 PROCEDURE — 97112 NEUROMUSCULAR REEDUCATION: CPT | Performed by: PHYSICAL THERAPIST

## 2022-07-14 NOTE — PROGRESS NOTES
Daily Note     Today's date: 2022  Patient name: Alfredo Alpers  : 1990  MRN: [id number]  Referring provider: JORI Iqbal  Dx:   Encounter Diagnosis     ICD-10-CM    1  Plantar fasciitis of left foot  M72 2    2  Plantar fasciitis, left  M72 2    3  Chronic heel pain, left  M79 672     G89 29        Start Time: 1100  Stop Time: 1150  Total time in clinic (min): 50 minutes    Subjective: Raymond Lange reports that his ankle is slightly sore today specifically in his achilles as well as the plantar side of his calcaneous  Objective: See treatment diary below      Assessment: Tolerated treatment well  Patient demonstrated fatigue post treatment, exhibited good technique with therapeutic exercises and would benefit from continued PT  Progressed repetitions in some of the stability based exercises with good tolerance  Plan: Continue per plan of care               Precautions: Prior L achilles lengthening surgery    Daily Treatment Diary    Manual      IASTM L gastroc done done done done Done and achilles     L TPR L medial calcaneous done done done done done     Lateral calcaneal glide in SL          TPR L gastroc          Therapeutic-ex           Bike          Ankle TB eversion*                    Bridges* 3x12 12# 3x12 12# 3x12 12# 3x15 12# 3x15 12#     SLR abd*          Flexibility          HS stretch          Gastroc stretch standing* 3x30" 3x30" 3x30" 3x30" 3x30"                         Therapeutic act                    Standing eccentric heel raises* 3x12 3x12 3x15 3x15 3x15     Heel raises w ball for post tib recruitment*                    Step ups (involved LE up, uninvolved down) L only 8" 3x12 8" 3x12 8" 3x12 8" 3x12 8" 3x12               Step overs          Mini squats w ue support          STS from chair   nv 3x10 bw 3x12 bw               Neuromuscular RE-ed          Gabriel mcdonald holds 10x5" 10x5" 10x5" 10x5" 2x10 x5"     Gabriel mcdonald step throughs 2x10 3x10 3x10 3x10 2x10     SL balance 5x10"x2 2x5x10" 2x5x10" 2x5x10" 2x5x10"     SL skaters          SL ball toss          SL on foam          SL skaters on foam                    * = on hep

## 2022-07-17 NOTE — MISCELLANEOUS
Message  GI Reminder Recall [facility]:   Date: 09/12/2017   Dear Martir Guajardo:     Review of our records shows you are due for the following: office visit   Please call the following office to schedule your appointment:   [address] [facility], [address], [place], [address] [place] [address]  [phone]  We look forward to hearing from you!      Sincerely,     [facility]      Signatures   Electronically signed by : Sean Rausch, ; Sep 12 2017 11:01AM EST                       (Author) c/o abd pain

## 2022-07-19 ENCOUNTER — APPOINTMENT (OUTPATIENT)
Dept: PHYSICAL THERAPY | Facility: REHABILITATION | Age: 32
End: 2022-07-19
Payer: COMMERCIAL

## 2022-07-20 ENCOUNTER — OFFICE VISIT (OUTPATIENT)
Dept: PODIATRY | Facility: CLINIC | Age: 32
End: 2022-07-20
Payer: COMMERCIAL

## 2022-07-20 VITALS
DIASTOLIC BLOOD PRESSURE: 72 MMHG | SYSTOLIC BLOOD PRESSURE: 138 MMHG | WEIGHT: 288 LBS | HEIGHT: 67 IN | BODY MASS INDEX: 45.2 KG/M2 | HEART RATE: 69 BPM

## 2022-07-20 DIAGNOSIS — M72.2 PLANTAR FASCIITIS, LEFT: Primary | ICD-10-CM

## 2022-07-20 PROCEDURE — 99212 OFFICE O/P EST SF 10 MIN: CPT | Performed by: PODIATRIST

## 2022-07-20 NOTE — PROGRESS NOTES
Assessment/Plan:      Diagnoses and all orders for this visit:    Plantar fasciitis, left      Patient is improving with physical therapy  Reappoint as needed  Subjective:     Patient ID: Marybel Leach is a 28 y o  male  Patient following up to left heel plantar fascitis  He did 1 shot, oral steroid and formal PT  HE is seeing improvement  Most of the time walking without pain but still some tender days      Review of Systems      Objective:     Physical Exam  Vitals reviewed  Constitutional:       Appearance: He is obese  He is not ill-appearing or diaphoretic  Musculoskeletal:         General: Tenderness (mild tenderness medial plantar fascia insertion  ) present  Neurological:      Mental Status: He is alert

## 2022-07-21 ENCOUNTER — TELEMEDICINE (OUTPATIENT)
Dept: DERMATOLOGY | Age: 32
End: 2022-07-21
Payer: COMMERCIAL

## 2022-07-21 DIAGNOSIS — L70.0 ACNE VULGARIS: Primary | ICD-10-CM

## 2022-07-21 PROCEDURE — 99213 OFFICE O/P EST LOW 20 MIN: CPT | Performed by: DERMATOLOGY

## 2022-07-21 NOTE — PROGRESS NOTES
[facility] Follow Up Note    Patient Name: Maria Elena Ellis  Encounter Date: 7/21/22    Today's Chief Concerns:  Yury Kaminskividhi Concern #1:   Acne follow up      Current Medications:    Current Outpatient Medications:     Adapalene 0 3 % gel, SPREAD ONE PEA-SIZED AMOUNT OF MEDICATION OVER ENTIRE FACE ABOUT ONE HOUR BEFORE BEDTIME , Disp: 45 g, Rfl: 2    Alpha-D-Galactosidase (BEANO PO), Take by mouth, Disp: , Rfl:     Alpha-Lipoic Acid 200 MG CAPS, TAKE 1 CAPSULE BY MOUTH EVERY DAY, Disp: 30 capsule, Rfl: 5    Ascorbic Acid (vitamin C) 1000 MG tablet, TAKE 1 TABLET (1,000 MG TOTAL) BY MOUTH DAILY, Disp: 30 tablet, Rfl: 5    atorvastatin (LIPITOR) 10 mg tablet, TAKE 1 TABLET BY MOUTH EVERY DAY, Disp: 30 tablet, Rfl: 3    Bacillus Coagulans-Inulin (Probiotic) 1-250 BILLION-MG CAPS, TAKE 1 CAPSULE BY MOUTH EVERY DAY, Disp: 30 capsule, Rfl: 5    Cholecalciferol (KP Vitamin D3) 50 MCG (2000 UT) CAPS, Take 1 capsule (2,000 Units total) by mouth 2 (two) times a day, Disp: 180 capsule, Rfl: 1    clindamycin-benzoyl peroxide (BENZACLIN) gel, Apply topically 2 (two) times a day, Disp: 50 g, Rfl: 3    Dapsone 7 5 % GEL, Apply topically once daily, Disp: 90 g, Rfl: 3    Echinacea-Alatorre Seal 350-100 MG CAPS, TAKE 1 CAPSULE BY MOUTH EVERY DAY, Disp: 30 capsule, Rfl: 5    Echinacea-Alatorre Seal Complex CAPS, Take 1 capsule by mouth daily, Disp: 30 capsule, Rfl: 0    fexofenadine (ALLEGRA) 180 MG tablet, Take 1 tablet (180 mg total) by mouth daily, Disp: 90 tablet, Rfl: 1    Fluvoxamine Maleate 100 MG CP24, TAKE 1 CAPSULE BY MOUTH EVERY DAY, Disp: 30 capsule, Rfl: 5    Fluvoxamine Maleate 150 MG CP24, TAKE 1 CAPSULE BY MOUTH EVERY DAY, Disp: 30 capsule, Rfl: 5    lisinopril (ZESTRIL) 10 mg tablet, TAKE 1 TABLET BY MOUTH EVERY DAY, Disp: 30 tablet, Rfl: 5    loratadine (CLARITIN) 10 mg tablet, Take 1 tablet (10 mg total) by mouth daily, Disp: 90 tablet, Rfl: 1    Melatonin 10 MG TABS,  , Disp: , Rfl:    methylPREDNISolone 4 MG tablet therapy pack, Use as directed on package, Disp: 1 each, Rfl: 0    minocycline (MINOCIN) 100 mg capsule, TAKE 1 CAPSULE BY MOUTH EVERY 12 HOURS, Disp: 60 capsule, Rfl: 0    montelukast (SINGULAIR) 10 mg tablet, TAKE 1 TABLET BY MOUTH EVERY DAY, Disp: 30 tablet, Rfl: 4    Multiple Vitamin (multivitamin) capsule, Take 1 capsule by mouth daily, Disp: 100 capsule, Rfl: 3    Omega-3 Fatty Acids (fish oil) 1,000 mg, TAKE 3 CAPSULES (3,000 MG TOTAL) BY MOUTH DAILY, Disp: 90 capsule, Rfl: 5    omeprazole (PriLOSEC) 40 MG capsule, Take 1 capsule (40 mg total) by mouth daily, Disp: 90 capsule, Rfl: 1    oxymetazoline (AFRIN) 0 05 % nasal spray, 2 sprays by Each Nare route 2 (two) times a day, Disp: , Rfl:     triamcinolone (KENALOG) 0 1 % ointment, Apply topically twice a day for 14 days starlight, Disp: 30 g, Rfl: 0    Vitamin D-Vitamin K (K2 Plus D3) 100-1000 MCG-UNIT TABS, TAKE 1 TABLET BY MOUTH TWICE A DAY, Disp: 60 tablet, Rfl: 1    zinc gluconate 50 mg tablet, TAKE 1 TABLET BY MOUTH EVERY DAY, Disp: 30 tablet, Rfl: 5    buPROPion (WELLBUTRIN XL) 150 mg 24 hr tablet, Take 1 tablet (150 mg total) by mouth every morning (Patient not taking: No sig reported), Disp: 30 tablet, Rfl: 2    cholecalciferol (VITAMIN D3) 1,000 units tablet, Take 1 tablet (1,000 Units total) by mouth daily, Disp: 90 tablet, Rfl: 3    CONSTITUTIONAL:   Vitals:         Specific Alerts:    Have you been seen by a [facility] in the last 3 years? YES        Allergies   Allergen Reactions    Penicillins Hives    Sulfa Antibiotics GI Intolerance       May we call your Preferred Phone number to discuss your specific medical information? YES    May we leave a detailed message that includes your specific medical information? YES    Have you traveled outside of the [facility] in the past 3 months? No    Do you currently have a pacemaker or defibrillator?  No    Do you have any artificial heart valves, joints, plates, screws, rods, stents, pins, etc? No   - If Yes, were any placed within the last 2 years? Do you require any medications prior to a surgical procedure? No   - If Yes, for which procedure? - If Yes, what medications to you require? Are you taking any medications that cause you to bleed more easily ("blood thinners") No    Have you ever experienced a rapid heartbeat with epinephrine? No    Have you ever been treated with "gold" (gold sodium thiomalate) therapy? [facility] can help with wrinkles, "laugh lines," facial volume loss, "double chin," "love handles," age spots, and more  Are you interested in learning today about some of the skin enhancement procedures that we offer? (If Yes, please provide more detail)     Review of Systems:  Have you recently had or currently have any of the following?     · Fever or chills: No  · Night Sweats: No  · Headaches: No  · Weight Gain: No  · Weight Loss: No  · Blurry Vision: No  · Nausea: No  · Vomiting: No  · Diarrhea: No  · Blood in Stool: No  · Abdominal Pain: No  · Itchy Skin: No  · Painful Joints: No  · Swollen Joints: No  · Muscle Pain: No  · Irregular Mole: No  · Sun Burn: No  · Dry Skin: No  · Skin Color Changes: No  · Scar or Keloid: No  · Cold Sores/Fever Blisters: No  · Bacterial Infections/MRSA: No  · Anxiety: No  · Depression: No  · Suicidal or Homicidal Thoughts: No    PSYCH: Normal mood and affect  EYES: Normal conjunctiva  ENT: Normal lips and oral mucosa  CARDIOVASCULAR: No edema  RESPIRATORY: Normal respirations  HEME/LYMPH/IMMUNO:  No regional lymphadenopathy except as noted below in ASSESSMENT AND PLAN BY DIAGNOSIS    FULL ORGAN SYSTEM SKIN EXAM (SKIN)   Hair, Scalp, Ears, Face Normal except as noted below in Assessment   Neck, Cervical Chain Nodes Normal except as noted below in Assessment   Right Arm/Hand/Fingers Normal except as noted below in Assessment   Left Arm/Hand/Fingers Normal except as noted below in Assessment   Chest/Breasts/Axillae Viewed areas Normal except as noted below in Assessment   Abdomen, Umbilicus Normal except as noted below in Assessment   Back/Spine Normal except as noted below in Assessment   Groin/Genitalia/Buttocks Viewed areas Normal except as noted below in Assessment   Right Leg, Foot, Toes Normal except as noted below in Assessment   Left Leg, Foot, Toes Normal except as noted below in Assessment         ACNE VULGARIS ("COMMON ACNE"): FOLLOW UP    Physical Exam:   Psychiatric/Mood:   Anatomic Location Affected:  face   Morphological Description:  o Open/Closed Comedones:  - Few ("Mild")  o Inflammatory Papules/Pustules:  - Few ("Mild")  o Nodules:  - Few ("Mild")  o Scarring:  - Rare ("Almost Clear")  o Excoriations:  - Rare ("Almost Clear")  o Local Skin Redness/Erythema:  - Few ("Mild")  o Local Skin Dryness/Scaling:  - Rare ("Almost Clear")  o Local Skin Dyspigmentation:  - Rare ("Almost Clear")   Pertinent Positives:   Pertinent Negatives: Additional History of Present Condition:     Previous Treatment Plan: adapalene 0 3% gel, benzaclin, dapsone, minocycline   How compliant are you with the prescribed plan?:  100%   Did you experience any side effects of treatment: none   Are you happy with the improvement: patient stated that he;s is so far doing well with the medications  Assessment and Plan:   We reviewed the causes of acne, the kinds of acne, and the expected clinical course   We discussed treatment options ranging from over-the-counter products, topical retinoids, antibiotics, BP, hormonal therapies (OCPs/spironolactone), and isotretinoin (Accutane)   We reviewed specific over-the-counter interventions and medications  Recommended typical hygiene measures including water-based facial products, washing regularly with mild cleanser, and refraining from picking and popping any pimples   Recommended non-comedogenic sunscreen use daily   Expectations of therapy discussed  Side effects, risks and benefits of medications discussed   A comprehensive handout on Acne was provided   The phone number to call in case of questions or concerns (and instructions to stop medications in such a scenario) was provided   After lengthy discussion of etiology and treatment options, we decided to implement the following personalized treatment plan:    Based on a thorough discussion of this condition and the management approach to it (including a comprehensive discussion of the known risks, side effects and potential benefits of treatment), the patient (family) agrees to implement the following specific plan:    Continue adapalene 0 3% gel  Continue with Minocycline 100 mg as directed for another month  Follow up in 3 months  Recommend the over the counter Differin  Cerave acne foaming cleanser around $13 00        --------------------------------------------------------------------------------------  YOUR PERSONALIZED ACNE ACTION PLAN    MORNING ROUTINE    1) SKIN HYGEINE:  In the shower, wash your face, chest and back gently with Cetaphil moisturizing cleanser or Dove Fragrance-free bar  Do not use a luffa or washcloth as these tend to be too irritating to acne-prone skin  2) ANTIBIOTICS:       Oral Minocycline 100 mg after breakfast with a full glass of water    EVENING ROUTINE    1) SKIN HYGEINE:  In the shower, wash your face, chest and back gently with Cetaphil moisturizing cleanser or Dove Fragrance-free bar  Do not use a lufa or washcloth as these tend to be too irritating to acne-prone skin      2) ANTIBIOTICS:       Oral Minocycline 100 mg after dinner with a full glass of water      3) TOPICAL RETINOID:  At 1 hour before bedtime (after washing your face and allowing the skin to completely dry), spread only a single pea-sized amount of this medication evenly over your entire face (avoiding your eyes or mouth):     Adapalene 0 3% 1 hour before bedtime      Scribe Attestation    I,:  Kailey Azul am acting as a scribe while in the presence of the attending physician :       I,:  Angela Levine MD personally performed the services described in this documentation    as scribed in my presence :           Virtual Regular Visit    Verification of patient location:    Patient is located in the following state in which I hold an active license [place]      Assessment/Plan:    Problem List Items Addressed This Visit    None              Reason for visit is   Chief Complaint   Patient presents with    Virtual Regular Visit        Encounter provider Karyn Callahan MD    Provider located at [place] [id number] [phone]      Recent Visits  No visits were found meeting these conditions  Showing recent visits within past 7 days and meeting all other requirements  Today's Visits  Date Type Provider Dept   07/21/22 Telemedicine Angela Levine MD  [facility]   Showing today's visits and meeting all other requirements  Future Appointments  No visits were found meeting these conditions  Showing future appointments within next 150 days and meeting all other requirements       The patient was identified by name and date of birth  Damion Winston was informed that this is a telemedicine visit and that the visit is being conducted through [address] [place] [address] and patient was informed that this is a secure, HIPAA-compliant platform  He agrees to proceed     My office door was closed  The patient was notified the following individuals were present in the room donell bentley  He acknowledged consent and understanding of privacy and security of the video platform  The patient has agreed to participate and understands they can discontinue the visit at any time  Patient is aware this is a billable service       Subjective  Damion Winston is a 28 y o  male see above Gerhardt Sep HPI     Past Medical History:   Diagnosis Date    Acne 09/01/2010    Last Assessed 01 Sep 2010   Acquired ankle/foot deformity     Last assessed 23 Feb 2017  Resolved 14 Aug 2017   Allergic     Anxiety     Change in hearing     Resolved 16 Aug 2017    Closed displaced avulsion fracture of tuberosity of left calcaneus     Last Assessed 25 May 2017  Resolved 14 Aug 2017   Depression     Epistaxis     Last Assessed 18 Feb 2014  Resolved 08 Jun 2016   Essential hypertension 06/19/2018    Foreign body in foot     Last Assessed 30 Jul 2013  Resolved 12 Oct 2013   Gastritis     Last Assessed 22 Oct 2012    GERD (gastroesophageal reflux disease)     History of oral aphthous ulcers     Last Assessed 08 June 2016  Resolved 14 Aug 2017   Hypersomnia 02/18/2009    Last Assessed 18 Feb 2009  Resolved 16 Aug 2017   Hypertension 2016    Impacted cerumen     Last Assessed 08 Jun 2016  Resolved 08 Jul 2016   Insomnia     Irritable bowel syndrome     Knee joint pain 03/10/2008    Left foot pain     Last Assessed 19 May 2017  Resolved 14 Aug 2017   Mixed hyperlipidemia 11/02/2020    Obesity     OCD (obsessive compulsive disorder)     Plantar fibromatosis     Last Assessed 19 Oct 2016  Resolved 14 Aug 2017   Seasonal allergies     Tarsal tunnel syndrome of left side     Last Assessed 21 Aug 2017  Resolved 21 Aug 2017   Tinea corporis     Last Assessed 05 Dec 2011       Past Surgical History:   Procedure Laterality Date    ESOPHAGOGASTRODUODENOSCOPY N/A 10/31/2017    Procedure: ESOPHAGOGASTRODUODENOSCOPY (EGD); Surgeon: Kinjal Armando MD;  Location: [facility];   Service: Gastroenterology    NO PAST SURGERIES      NE LENGTH/SHORT LEG/ANKL TENDON,SINGLE Left 11/3/2017    Procedure: CALCANEAL OSTECTOMY, ACHILLEST TENDON [address];  Surgeon: Justina Michelle DPM;  Location: [facility];  Service: Podiatry       Current Outpatient Medications   Medication Sig Dispense Refill    Adapalene 0 3 % gel SPREAD ONE PEA-SIZED AMOUNT OF MEDICATION OVER ENTIRE FACE ABOUT ONE HOUR BEFORE BEDTIME   45 g 2    Alpha-D-Galactosidase (BEANO PO) Take by mouth      Alpha-Lipoic Acid 200 MG CAPS TAKE 1 CAPSULE BY MOUTH EVERY DAY 30 capsule 5    Ascorbic Acid (vitamin C) 1000 MG tablet TAKE 1 TABLET (1,000 MG TOTAL) BY MOUTH DAILY 30 tablet 5    atorvastatin (LIPITOR) 10 mg tablet TAKE 1 TABLET BY MOUTH EVERY DAY 30 tablet 3    Bacillus Coagulans-Inulin (Probiotic) 1-250 BILLION-MG CAPS TAKE 1 CAPSULE BY MOUTH EVERY DAY 30 capsule 5    Cholecalciferol ( Vitamin D3) 50 MCG (2000 UT) CAPS Take 1 capsule (2,000 Units total) by mouth 2 (two) times a day 180 capsule 1    clindamycin-benzoyl peroxide (BENZACLIN) gel Apply topically 2 (two) times a day 50 g 3    Dapsone 7 5 % GEL Apply topically once daily 90 g 3    Echinacea-Alatorre Seal 350-100 MG CAPS TAKE 1 CAPSULE BY MOUTH EVERY DAY 30 capsule 5    Echinacea-Alatorre Seal Complex CAPS Take 1 capsule by mouth daily 30 capsule 0    fexofenadine (ALLEGRA) 180 MG tablet Take 1 tablet (180 mg total) by mouth daily 90 tablet 1    Fluvoxamine Maleate 100 MG CP24 TAKE 1 CAPSULE BY MOUTH EVERY DAY 30 capsule 5    Fluvoxamine Maleate 150 MG CP24 TAKE 1 CAPSULE BY MOUTH EVERY DAY 30 capsule 5    lisinopril (ZESTRIL) 10 mg tablet TAKE 1 TABLET BY MOUTH EVERY DAY 30 tablet 5    loratadine (CLARITIN) 10 mg tablet Take 1 tablet (10 mg total) by mouth daily 90 tablet 1    Melatonin 10 MG TABS        methylPREDNISolone 4 MG tablet therapy pack Use as directed on package 1 each 0    minocycline (MINOCIN) 100 mg capsule TAKE 1 CAPSULE BY MOUTH EVERY 12 HOURS 60 capsule 0    montelukast (SINGULAIR) 10 mg tablet TAKE 1 TABLET BY MOUTH EVERY DAY 30 tablet 4    Multiple Vitamin (multivitamin) capsule Take 1 capsule by mouth daily 100 capsule 3    Omega-3 Fatty Acids (fish oil) 1,000 mg TAKE 3 CAPSULES (3,000 MG TOTAL) BY MOUTH DAILY 90 capsule 5    omeprazole (PriLOSEC) 40 MG capsule Take 1 capsule (40 mg total) by mouth daily 90 capsule 1    oxymetazoline (AFRIN) 0 05 % nasal spray 2 sprays by Each Nare route 2 (two) times a day      triamcinolone (KENALOG) 0 1 % ointment Apply topically twice a day for 14 days starlight 30 g 0    Vitamin D-Vitamin K (K2 Plus D3) 100-1000 MCG-UNIT TABS TAKE 1 TABLET BY MOUTH TWICE A DAY 60 tablet 1    zinc gluconate 50 mg tablet TAKE 1 TABLET BY MOUTH EVERY DAY 30 tablet 5    buPROPion (WELLBUTRIN XL) 150 mg 24 hr tablet Take 1 tablet (150 mg total) by mouth every morning (Patient not taking: No sig reported) 30 tablet 2    cholecalciferol (VITAMIN D3) 1,000 units tablet Take 1 tablet (1,000 Units total) by mouth daily 90 tablet 3     No current facility-administered medications for this visit  Allergies   Allergen Reactions    Penicillins Hives    Sulfa Antibiotics GI Intolerance       Review of Systems    Video Exam    Vitals:       Physical Exam     I spent 10 minutes directly with the patient during this visit    VIRTUAL VISIT [address] Anguiano verbally agrees to participate in [employer]  Pt is aware that [employer] could be limited without vital signs or the ability to perform a full hands-on physical Meredith Nichole understands he or the provider may request at any time to terminate the video visit and request the patient to seek care or treatment in person

## 2022-07-22 ENCOUNTER — APPOINTMENT (OUTPATIENT)
Dept: PHYSICAL THERAPY | Facility: REHABILITATION | Age: 32
End: 2022-07-22
Payer: COMMERCIAL

## 2022-07-26 ENCOUNTER — OFFICE VISIT (OUTPATIENT)
Dept: PHYSICAL THERAPY | Facility: REHABILITATION | Age: 32
End: 2022-07-26
Payer: COMMERCIAL

## 2022-07-26 DIAGNOSIS — M72.2 PLANTAR FASCIITIS, LEFT: Primary | ICD-10-CM

## 2022-07-26 DIAGNOSIS — M72.2 PLANTAR FASCIITIS OF LEFT FOOT: ICD-10-CM

## 2022-07-26 DIAGNOSIS — M79.672 CHRONIC HEEL PAIN, LEFT: ICD-10-CM

## 2022-07-26 DIAGNOSIS — G89.29 CHRONIC HEEL PAIN, LEFT: ICD-10-CM

## 2022-07-26 PROCEDURE — 97530 THERAPEUTIC ACTIVITIES: CPT | Performed by: PHYSICAL THERAPIST

## 2022-07-26 PROCEDURE — 97112 NEUROMUSCULAR REEDUCATION: CPT | Performed by: PHYSICAL THERAPIST

## 2022-07-26 PROCEDURE — 97140 MANUAL THERAPY 1/> REGIONS: CPT | Performed by: PHYSICAL THERAPIST

## 2022-07-26 PROCEDURE — 97110 THERAPEUTIC EXERCISES: CPT | Performed by: PHYSICAL THERAPIST

## 2022-07-26 NOTE — PROGRESS NOTES
Daily Note     Today's date: 2022  Patient name: Haleigh Coy  : 1990  MRN: [id number]  Referring provider: MANDI Brownlee*  Dx:   Encounter Diagnosis     ICD-10-CM    1  Plantar fasciitis, left  M72 2    2  Plantar fasciitis of left foot  M72 2    3  Chronic heel pain, left  M79 672     G89 29        Start Time: 1015  Stop Time: 1100  Total time in clinic (min): 45 minutes    Subjective: Magno Moore reports that his ankle and foot continue to feel much improved  Objective: See treatment diary below      Assessment: Tolerated treatment well  Patient demonstrated fatigue post treatment, exhibited good technique with therapeutic exercises and would benefit from continued PT      Plan: Continue per plan of care               Precautions: Prior L achilles lengthening surgery    Daily Treatment Diary    Manual     IASTM L gastroc done done done done Done and achilles Done and achiles    L TPR L medial calcaneous done done done done done done    Lateral calcaneal glide in SL          TPR L gastroc          Therapeutic-ex           Bike      5' lvl 1    Ankle TB eversion*                    Bridges* 3x12 12# 3x12 12# 3x12 12# 3x15 12# 3x15 12# 3x15 15#    SLR abd*          Flexibility          HS stretch          Gastroc stretch standing* 3x30" 3x30" 3x30" 3x30" 3x30" 3x30"                        Therapeutic act                    Standing eccentric heel raises* 3x12 3x12 3x15 3x15 3x15 3x15    Heel raises w ball for post tib recruitment*                    Step ups (involved LE up, uninvolved down) L only 8" 3x12 8" 3x12 8" 3x12 8" 3x12 8" 3x12 8" 3x12              Step overs          Mini squats w ue support          STS from chair   nv 3x10 bw 3x12 bw 3x12 bw              Neuromuscular RE-ed          Gabriel mcdonald holds 10x5" 10x5" 10x5" 10x5" 2x10 x5" 2x10x5"    eachuck mcdonald step throughs 2x10 3x10 3x10 3x10 2x10 2x10    SL balance 5x10"x2 2x5x10" 2x5x10" 2x5x10" 2x5x10" 2x5x10"    SL skaters          SL ball toss          SL on foam          SL skaters on foam                    * = on hep

## 2022-07-29 ENCOUNTER — OFFICE VISIT (OUTPATIENT)
Dept: MULTI SPECIALTY CLINIC | Facility: CLINIC | Age: 32
End: 2022-07-29

## 2022-07-29 ENCOUNTER — OFFICE VISIT (OUTPATIENT)
Dept: PHYSICAL THERAPY | Facility: REHABILITATION | Age: 32
End: 2022-07-29
Payer: COMMERCIAL

## 2022-07-29 VITALS
TEMPERATURE: 97.3 F | SYSTOLIC BLOOD PRESSURE: 135 MMHG | HEART RATE: 80 BPM | WEIGHT: 292 LBS | DIASTOLIC BLOOD PRESSURE: 84 MMHG | BODY MASS INDEX: 45.73 KG/M2 | OXYGEN SATURATION: 98 %

## 2022-07-29 DIAGNOSIS — J35.1 TONSILLAR HYPERTROPHY: Primary | ICD-10-CM

## 2022-07-29 DIAGNOSIS — M72.2 PLANTAR FASCIITIS OF LEFT FOOT: ICD-10-CM

## 2022-07-29 DIAGNOSIS — R09.89 THROAT FULLNESS: ICD-10-CM

## 2022-07-29 DIAGNOSIS — K21.9 GASTROESOPHAGEAL REFLUX DISEASE, UNSPECIFIED WHETHER ESOPHAGITIS PRESENT: ICD-10-CM

## 2022-07-29 DIAGNOSIS — M72.2 PLANTAR FASCIITIS, LEFT: Primary | ICD-10-CM

## 2022-07-29 DIAGNOSIS — G89.29 CHRONIC HEEL PAIN, LEFT: ICD-10-CM

## 2022-07-29 DIAGNOSIS — M79.672 CHRONIC HEEL PAIN, LEFT: ICD-10-CM

## 2022-07-29 PROCEDURE — 97110 THERAPEUTIC EXERCISES: CPT

## 2022-07-29 PROCEDURE — 97530 THERAPEUTIC ACTIVITIES: CPT

## 2022-07-29 PROCEDURE — 99214 OFFICE O/P EST MOD 30 MIN: CPT | Performed by: OTOLARYNGOLOGY

## 2022-07-29 PROCEDURE — 97112 NEUROMUSCULAR REEDUCATION: CPT

## 2022-07-29 PROCEDURE — 3079F DIAST BP 80-89 MM HG: CPT | Performed by: OTOLARYNGOLOGY

## 2022-07-29 PROCEDURE — 3075F SYST BP GE 130 - 139MM HG: CPT | Performed by: OTOLARYNGOLOGY

## 2022-07-29 PROCEDURE — 97140 MANUAL THERAPY 1/> REGIONS: CPT

## 2022-07-29 RX ORDER — FAMOTIDINE 40 MG/1
TABLET, FILM COATED ORAL
Qty: 30 TABLET | Refills: 2 | Status: SHIPPED | OUTPATIENT
Start: 2022-07-29

## 2022-07-29 NOTE — PROGRESS NOTES
Daily Note     Today's date: 2022  Patient name: Vinay Sanchez  : 1990  MRN: [id number]  Referring provider: JORI Cancino  Dx:   Encounter Diagnosis     ICD-10-CM    1  Plantar fasciitis, left  M72 2    2  Plantar fasciitis of left foot  M72 2    3  Chronic heel pain, left  M79 672     G89 29        Start Time: 1015          Subjective: Patient reports ankle and foot as "a little sore" at start of session  Objective: See treatment diary below       Assessment: Tolerated treatment well  Patient demonstrated fatigue post treatment, exhibited good technique with therapeutic exercises and would benefit from continued PTNo pain reported with any TE perform, fatigues quickly with all TE  Plan: Continue per plan of care  Progress treatment as tolerated             Precautions: Prior L achilles lengthening surgery    Daily Treatment Diary    Manual    IASTM L gastroc done done done done Done and achilles Done and achiles Done and achilles   L TPR L medial calcaneous done done done done done done done   Lateral calcaneal glide in SL          TPR L gastroc          Therapeutic-ex           Bike      5' lvl 1 5' lvl 1   Ankle TB eversion*                    Bridges* 3x12 12# 3x12 12# 3x12 12# 3x15 12# 3x15 12# 3x15 15# 3x15 15#   SLR abd*          Flexibility          HS stretch          Gastroc stretch standing* 3x30" 3x30" 3x30" 3x30" 3x30" 3x30" 3x30''                       Therapeutic act                    Standing eccentric heel raises* 3x12 3x12 3x15 3x15 3x15 3x15 3x15   Heel raises w ball for post tib recruitment*                    Step ups (involved LE up, uninvolved down) L only 8" 3x12 8" 3x12 8" 3x12 8" 3x12 8" 3x12 8" 3x12 8'' 3x12             Step overs          Mini squats w ue support          STS from chair   nv 3x10 bw 3x12 bw 3x12 bw 3x15 bw             Neuromuscular RE-ed          Gabriel mcdonald holds 10x5" 10x5" 10x5" 10x5" 2x10 x5" 2x10x5" 2x10x5''   Sneaky lunge step throughs 2x10 3x10 3x10 3x10 2x10 2x10 2x10   SL balance 5x10"x2 2x5x10" 2x5x10" 2x5x10" 2x5x10" 2x5x10" 2x5x10''   SL skaters          SL ball toss          SL on foam          SL skaters on foam                    * = on hep

## 2022-07-29 NOTE — PROGRESS NOTES
[facility]  [facility]          Augustina Dominique is a 28 y o  who presents with a chief complaint of tonsillar hypertrophy, thickness in throat x 1 year or so  HPI:  Tyree Ibarra is new to provider, not to practice, with c/o 1 yr or more with enlargement of tonsils and feels constricted in throat  Was seen in the past by Dr Natalie Johnston and was not a candidate for [facility]  He is currently using CPAP, seems to be helping  Slight dysphagia but no regurgitation or significant dysphagia with eating  No sore throat or weight loss  If anything weight gain over 20 lbs in the last year  No tonsillitis/strep  GP noticed enlarged tonsils  +Humidified CPAP  Mouth open when wakes up at times  Full face mask  No other mask trial  Nonsmoker  +frequent throat clearing  Allergies   Allergen Reactions    Penicillins Hives    Sulfa Antibiotics GI Intolerance     Past Medical History:   Diagnosis Date    Acne 09/01/2010    Last Assessed 01 Sep 2010   Acquired ankle/foot deformity     Last assessed 23 Feb 2017  Resolved 14 Aug 2017   Allergic     Anxiety     Change in hearing     Resolved 16 Aug 2017    Closed displaced avulsion fracture of tuberosity of left calcaneus     Last Assessed 25 May 2017  Resolved 14 Aug 2017   Depression     Epistaxis     Last Assessed 18 Feb 2014  Resolved 08 Jun 2016   Essential hypertension 06/19/2018    Foreign body in foot     Last Assessed 30 Jul 2013  Resolved 12 Oct 2013   Gastritis     Last Assessed 22 Oct 2012    GERD (gastroesophageal reflux disease)     History of oral aphthous ulcers     Last Assessed 08 June 2016  Resolved 14 Aug 2017   Hypersomnia 02/18/2009    Last Assessed 18 Feb 2009  Resolved 16 Aug 2017   Hypertension 2016    Impacted cerumen     Last Assessed 08 Jun 2016  Resolved 08 Jul 2016      Insomnia     Irritable bowel syndrome     Knee joint pain 03/10/2008    Left foot pain     Last Assessed 19 May 2017  Resolved 14 Aug 2017   Mixed hyperlipidemia 11/02/2020    Obesity     OCD (obsessive compulsive disorder)     Plantar fibromatosis     Last Assessed 19 Oct 2016  Resolved 14 Aug 2017   Seasonal allergies     Tarsal tunnel syndrome of left side     Last Assessed 21 Aug 2017  Resolved 21 Aug 2017   Tinea corporis     Last Assessed 05 Dec 2011     Past Surgical History:   Procedure Laterality Date    ESOPHAGOGASTRODUODENOSCOPY N/A 10/31/2017    Procedure: ESOPHAGOGASTRODUODENOSCOPY (EGD); Surgeon: Ashley Goins MD;  Location: [facility]; Service: Gastroenterology    NO PAST SURGERIES      PA LENGTH/SHORT LEG/ANKL TENDON,SINGLE Left 11/3/2017    Procedure: CALCANEAL OSTECTOMY, ACHILLEST TENDON [address];  Surgeon: Katt Lagunas DPM;  Location: [facility] OR;  Service: Podiatry     Family History   Problem Relation Age of Onset    Kidney disease Mother     Thyroid disease Mother     Allergic rhinitis Mother     Hypertension Father     Kidney disease Father     Gout Father     No Known Problems Sister     Colonic polyp Maternal Grandmother     Crohn's disease Maternal Grandmother     Cancer Family     Diabetes Family      Current Outpatient Medications on File Prior to Visit   Medication Sig Dispense Refill    Adapalene 0 3 % gel SPREAD ONE PEA-SIZED AMOUNT OF MEDICATION OVER ENTIRE FACE ABOUT ONE HOUR BEFORE BEDTIME   45 g 2    Alpha-D-Galactosidase (BEANO PO) Take by mouth      Alpha-Lipoic Acid 200 MG CAPS TAKE 1 CAPSULE BY MOUTH EVERY DAY 30 capsule 5    Ascorbic Acid (vitamin C) 1000 MG tablet TAKE 1 TABLET (1,000 MG TOTAL) BY MOUTH DAILY 30 tablet 5    atorvastatin (LIPITOR) 10 mg tablet TAKE 1 TABLET BY MOUTH EVERY DAY 30 tablet 3    Bacillus Coagulans-Inulin (Probiotic) 1-250 BILLION-MG CAPS TAKE 1 CAPSULE BY MOUTH EVERY DAY 30 capsule 5    buPROPion (WELLBUTRIN XL) 150 mg 24 hr tablet Take 1 tablet (150 mg total) by mouth every morning (Patient not taking: No sig reported) 30 tablet 2    Cholecalciferol (KP Vitamin D3) 50 MCG (2000 UT) CAPS Take 1 capsule (2,000 Units total) by mouth 2 (two) times a day 180 capsule 1    cholecalciferol (VITAMIN D3) 1,000 units tablet Take 1 tablet (1,000 Units total) by mouth daily 90 tablet 3    clindamycin-benzoyl peroxide (BENZACLIN) gel Apply topically 2 (two) times a day 50 g 3    Dapsone 7 5 % GEL Apply topically once daily 90 g 3    Echinacea-Alatorre Seal 350-100 MG CAPS TAKE 1 CAPSULE BY MOUTH EVERY DAY 30 capsule 5    Echinacea-Alatorre Seal Complex CAPS Take 1 capsule by mouth daily 30 capsule 0    fexofenadine (ALLEGRA) 180 MG tablet Take 1 tablet (180 mg total) by mouth daily 90 tablet 1    Fluvoxamine Maleate 100 MG CP24 TAKE 1 CAPSULE BY MOUTH EVERY DAY 30 capsule 5    Fluvoxamine Maleate 150 MG CP24 TAKE 1 CAPSULE BY MOUTH EVERY DAY 30 capsule 5    lisinopril (ZESTRIL) 10 mg tablet TAKE 1 TABLET BY MOUTH EVERY DAY 30 tablet 5    loratadine (CLARITIN) 10 mg tablet Take 1 tablet (10 mg total) by mouth daily 90 tablet 1    Melatonin 10 MG TABS        methylPREDNISolone 4 MG tablet therapy pack Use as directed on package 1 each 0    montelukast (SINGULAIR) 10 mg tablet TAKE 1 TABLET BY MOUTH EVERY DAY 30 tablet 4    Multiple Vitamin (multivitamin) capsule Take 1 capsule by mouth daily 100 capsule 3    Omega-3 Fatty Acids (fish oil) 1,000 mg TAKE 3 CAPSULES (3,000 MG TOTAL) BY MOUTH DAILY 90 capsule 5    omeprazole (PriLOSEC) 40 MG capsule Take 1 capsule (40 mg total) by mouth daily 90 capsule 1    oxymetazoline (AFRIN) 0 05 % nasal spray 2 sprays by Each Nare route 2 (two) times a day      triamcinolone (KENALOG) 0 1 % ointment Apply topically twice a day for 14 days starlight 30 g 0    Vitamin D-Vitamin K (K2 Plus D3) 100-1000 MCG-UNIT TABS TAKE 1 TABLET BY MOUTH TWICE A DAY 60 tablet 1    zinc gluconate 50 mg tablet TAKE 1 TABLET BY MOUTH EVERY DAY 30 tablet 5     No current facility-administered medications on file prior to visit  Social History     Tobacco Use    Smoking status: Never Smoker    Smokeless tobacco: Never Used   Vaping Use    Vaping Use: Never used   Substance Use Topics    Alcohol use: No    Drug use: No       Review of systems ENT ROS: tonsil enlargement    Results reviewed; images from any scan have been personally reviewed:    Previous ENT notes  Physical exam:     /84 (BP Location: Left arm, Patient Position: Sitting, Cuff Size: Large)   Pulse 80   Temp (!) 97 3 °F (36 3 °C) (Temporal)   Wt 132 kg (292 lb)   SpO2 98%   BMI 45 73 kg/m²     Constitutional:  Well developed, well nourished and groomed, in no acute distress  Obese  Eyes:  Extra-ocular movements intact, pupils equally round and reactive to light and accommodation, the lids and conjunctivae are normal in appearance  Head: Atraumatic, normocephalic, no visible scalp lesions, bony palpation unremarkable without stepoffs, parotid and submandibular salivary glands non-tender to palpation and without masses bilaterally  Ears:  Auricles normal in appearance bilaterally, mastoid prominence non-tender, external auditory canals clear bilaterally, tympanic membranes intact bilaterally without evidence of middle ear effusion or masses, normal appearing ossicles  Nose/Sinuses:  External appearance unremarkable, no maxillary or frontal sinus tenderness to palpation bilaterally  Anterior rhinoscopy reveals: normal mucosa b/l  Oral Cavity:  Moist mucus membranes, gums and dentition unremarkable, no oral mucosal masses or lesions, floor of mouth soft, tongue mobile without masses or lesions  Oropharynx:  Base of tongue soft and without masses, enlarged tongue, mallampati 4, tonsils bilaterally 2 5+ cryptic, soft palate mucosa unremarkable       Neck:  No visible or palpable cervical lesions or lymphadenopathy, thyroid gland is normal in size and symmetry and without masses, normal laryngeal elevation with swallowing  Cardiovascular:  Not examined  Respiratory:  Normal respiratory effort without evidence of retractions or use of accessory muscles  Integument:  Normal appearing without observed masses or lesions  Neurologic:  Cranial nerves II-XII intact bilaterally  Psychiatric:  Alert and oriented to time, place and person, normal affect  Procedures      Assessment:   1  Tonsillar hypertrophy  Ambulatory Referral to Otolaryngology    famotidine (PEPCID) 40 MG tablet   2  Gastroesophageal reflux disease, unspecified whether esophagitis present  famotidine (PEPCID) 40 MG tablet   3  Throat fullness         Orders  No orders of the defined types were placed in this encounter  Discussion/Plan:  Wilmer Glover has bilateral tonsillar enlargement approx 2 5+ cryptic over the last year this was noticed  He has YOSELIN already on CPAP, and has thickness in throat  Options include observation, vs treatment for underlying LPR (already has GERD and is on omeprazole 40mg in AM) vs tonsillectomy vs weight loss (Since had 20 lbs weight gain over the last year)  He would like to trial maximum medical management of reflux with addition of QHS famotidine 40mg and f/u in 6 weeks  Also encouraged weight loss since likely exacerbating symptoms  If still symptomatic next visit in 6 weeks, likely will need scope to ensure that his larynx is ok  Dictation software was used to dictate this note  It may contain errors with dictating incorrect words/spelling  Please contact provider directly for any questions  Thank you for allowing me to participate in the care of your patient

## 2022-08-01 ENCOUNTER — OFFICE VISIT (OUTPATIENT)
Dept: PHYSICAL THERAPY | Facility: REHABILITATION | Age: 32
End: 2022-08-01
Payer: COMMERCIAL

## 2022-08-01 DIAGNOSIS — G89.29 CHRONIC HEEL PAIN, LEFT: ICD-10-CM

## 2022-08-01 DIAGNOSIS — M72.2 PLANTAR FASCIITIS, LEFT: ICD-10-CM

## 2022-08-01 DIAGNOSIS — M72.2 PLANTAR FASCIITIS OF LEFT FOOT: Primary | ICD-10-CM

## 2022-08-01 DIAGNOSIS — M79.672 CHRONIC HEEL PAIN, LEFT: ICD-10-CM

## 2022-08-01 PROCEDURE — 97112 NEUROMUSCULAR REEDUCATION: CPT | Performed by: PHYSICAL THERAPIST

## 2022-08-01 PROCEDURE — 97110 THERAPEUTIC EXERCISES: CPT | Performed by: PHYSICAL THERAPIST

## 2022-08-01 PROCEDURE — 97140 MANUAL THERAPY 1/> REGIONS: CPT | Performed by: PHYSICAL THERAPIST

## 2022-08-01 PROCEDURE — 97530 THERAPEUTIC ACTIVITIES: CPT | Performed by: PHYSICAL THERAPIST

## 2022-08-01 NOTE — PROGRESS NOTES
Daily Note     Today's date: 2022  Patient name: Vidya Canela  : 1990  MRN: [id number]  Referring provider: JORI Evangelista  Dx:   Encounter Diagnosis     ICD-10-CM    1  Plantar fasciitis of left foot  M72 2    2  Chronic heel pain, left  M79 672     G89 29    3  Plantar fasciitis, left  M72 2        Start Time: 1359  Stop Time: 1445  Total time in clinic (min): 46 minutes    Subjective: Ko Murdock reports that his foot was sore yesterday but today notes "feels pretty good "       Objective: See treatment diary below      Assessment: Tolerated treatment well  Patient demonstrated fatigue post treatment  Will continue to benefit from progression as tolerated  Plan: Continue per plan of care               Precautions: Prior L achilles lengthening surgery    Daily Treatment Diary    Manual     IASTM L gastroc done done done done Done and achilles Done and achiles Done and achilles done    L TPR L medial calcaneous done done done done done done done done    Lateral calcaneal glide in SL            TPR L gastroc        done    Therapeutic-ex             Bike      5' lvl 1 5' lvl 1 5' lvl     Ankle TB eversion*                        Bridges* 3x12 12# 3x12 12# 3x12 12# 3x15 12# 3x15 12# 3x15 15# 3x15 15# 3x12 10#    SLR abd*            Flexibility            HS stretch            Gastroc stretch standing* 3x30" 3x30" 3x30" 3x30" 3x30" 3x30" 3x30'' 3x30"                            Therapeutic act                        Standing eccentric heel raises* 3x12 3x12 3x15 3x15 3x15 3x15 3x15 3x20    Heel raises w ball for post tib recruitment*                        Step ups (involved LE up, uninvolved down) L only 8" 3x12 8" 3x12 8" 3x12 8" 3x12 8" 3x12 8" 3x12 8'' 3x12 8" 3x12    Mini squats w ue support            STS from chair   nv 3x10 bw 3x12 bw 3x12 bw 3x15 bw 3x12 10#                Neuromuscular RE-ed            Gabriel mcdonald holds 10x5" 10x5" 10x5" 10x5" 2x10 x5" 2x10x5" 2x10x5'' 2x10x5"    Sneaky lunge step throughs 2x10 3x10 3x10 3x10 2x10 2x10 2x10 3x10    SL balance 5x10"x2 2x5x10" 2x5x10" 2x5x10" 2x5x10" 2x5x10" 2x5x10'' 2x5x10"    SL skaters            SL ball toss            SL on foam            SL skaters on foam                        * = on hep

## 2022-08-04 ENCOUNTER — OFFICE VISIT (OUTPATIENT)
Dept: PHYSICAL THERAPY | Facility: REHABILITATION | Age: 32
End: 2022-08-04
Payer: COMMERCIAL

## 2022-08-04 DIAGNOSIS — M72.2 PLANTAR FASCIITIS, LEFT: ICD-10-CM

## 2022-08-04 DIAGNOSIS — M72.2 PLANTAR FASCIITIS OF LEFT FOOT: Primary | ICD-10-CM

## 2022-08-04 DIAGNOSIS — M79.672 CHRONIC HEEL PAIN, LEFT: ICD-10-CM

## 2022-08-04 DIAGNOSIS — G89.29 CHRONIC HEEL PAIN, LEFT: ICD-10-CM

## 2022-08-04 PROCEDURE — 97112 NEUROMUSCULAR REEDUCATION: CPT | Performed by: PHYSICAL THERAPIST

## 2022-08-04 PROCEDURE — 97140 MANUAL THERAPY 1/> REGIONS: CPT | Performed by: PHYSICAL THERAPIST

## 2022-08-04 PROCEDURE — 97530 THERAPEUTIC ACTIVITIES: CPT | Performed by: PHYSICAL THERAPIST

## 2022-08-04 PROCEDURE — 97110 THERAPEUTIC EXERCISES: CPT | Performed by: PHYSICAL THERAPIST

## 2022-08-04 NOTE — PROGRESS NOTES
Daily Note     Today's date: 2022  Patient name: Venu Borges  : 1990  MRN: [id number]  Referring provider: JORI Prince  Dx:   Encounter Diagnosis     ICD-10-CM    1  Plantar fasciitis of left foot  M72 2    2  Chronic heel pain, left  M79 672     G89 29    3  Plantar fasciitis, left  M72 2        Start Time: 09  Stop Time: 1018  Total time in clinic (min): 48 minutes    Subjective: Ama Sommer reports tightness in the L calf at start of session but states that is common in the morning  Denies any heel pain at start of session  Objective: See treatment diary below      Assessment: Tolerated treatment well  Noted increased soft tissue tension through medial>lateral gastroc with improvement follow manual therapy  Introduced [employer] with evident shakiness with increased reps likely due to fatigue  Patient demonstrated appropriate level of challenge and muscular fatigue throughout session and noted good status at end of visit  Patient demonstrated fatigue post treatment, exhibited good technique with therapeutic exercises and would benefit from continued PT  Plan: Continue per plan of care  Progress treatment as tolerated             Precautions: Prior L achilles lengthening surgery    Daily Treatment Diary    Manual    IASTM L gastroc done done done done Done and achilles Done and achiles Done and achilles done done   L TPR L medial calcaneous done done done done done done done done    Lateral calcaneal glide in SL            TPR L gastroc        done done   Therapeutic-ex             Bike      5' lvl 1 5' lvl 1 5' lvl  5' lvl 1   Ankle TB eversion*                        Bridges* 3x12 12# 3x12 12# 3x12 12# 3x15 12# 3x15 12# 3x15 15# 3x15 15# 3x12 10# 3x12 20#   SLR abd*            Flexibility            HS stretch            Gastroc stretch standing* 3x30" 3x30" 3x30" 3x30" 3x30" 3x30" 3x30'' 3x30" 3x30"                           Therapeutic act Standing eccentric heel raises* 3x12 3x12 3x15 3x15 3x15 3x15 3x15 3x20 3x20   Heel raises w ball for post tib recruitment*                        Step ups (involved LE up, uninvolved down) L only 8" 3x12 8" 3x12 8" 3x12 8" 3x12 8" 3x12 8" 3x12 8'' 3x12 8" 3x12 8" 1x12, 2x15   Mini squats w ue support            STS from chair   nv 3x10 bw 3x12 bw 3x12 bw 3x15 bw 3x12 10# 3x12 10#               Neuromuscular RE-ed            Sneaky lunge holds 10x5" 10x5" 10x5" 10x5" 2x10 x5" 2x10x5" 2x10x5'' 2x10x5" 2x12x5"   Sneaky lunge step throughs 2x10 3x10 3x10 3x10 2x10 2x10 2x10 3x10 3x10   SL balance 5x10"x2 2x5x10" 2x5x10" 2x5x10" 2x5x10" 2x5x10" 2x5x10'' 2x5x10" 2x5x10"   SL skaters         10x   SL ball toss            SL on foam            SL skaters on foam                        * = on hep

## 2022-08-05 ENCOUNTER — APPOINTMENT (OUTPATIENT)
Dept: PHYSICAL THERAPY | Facility: REHABILITATION | Age: 32
End: 2022-08-05
Payer: COMMERCIAL

## 2022-08-09 ENCOUNTER — APPOINTMENT (OUTPATIENT)
Dept: PHYSICAL THERAPY | Facility: REHABILITATION | Age: 32
End: 2022-08-09
Payer: COMMERCIAL

## 2022-08-11 DIAGNOSIS — J30.9 ALLERGIC RHINITIS, UNSPECIFIED SEASONALITY, UNSPECIFIED TRIGGER: ICD-10-CM

## 2022-08-11 DIAGNOSIS — I10 ESSENTIAL HYPERTENSION: ICD-10-CM

## 2022-08-11 RX ORDER — LISINOPRIL 10 MG/1
TABLET ORAL
Qty: 30 TABLET | Refills: 5 | Status: SHIPPED | OUTPATIENT
Start: 2022-08-11

## 2022-08-11 RX ORDER — MONTELUKAST SODIUM 10 MG/1
TABLET ORAL
Qty: 30 TABLET | Refills: 4 | Status: SHIPPED | OUTPATIENT
Start: 2022-08-11

## 2022-08-12 ENCOUNTER — OFFICE VISIT (OUTPATIENT)
Dept: PHYSICAL THERAPY | Facility: REHABILITATION | Age: 32
End: 2022-08-12
Payer: COMMERCIAL

## 2022-08-12 DIAGNOSIS — G89.29 CHRONIC HEEL PAIN, LEFT: ICD-10-CM

## 2022-08-12 DIAGNOSIS — M72.2 PLANTAR FASCIITIS OF LEFT FOOT: ICD-10-CM

## 2022-08-12 DIAGNOSIS — M79.672 CHRONIC HEEL PAIN, LEFT: ICD-10-CM

## 2022-08-12 DIAGNOSIS — M72.2 PLANTAR FASCIITIS, LEFT: Primary | ICD-10-CM

## 2022-08-12 PROCEDURE — 97530 THERAPEUTIC ACTIVITIES: CPT | Performed by: PHYSICAL THERAPIST

## 2022-08-12 PROCEDURE — 97140 MANUAL THERAPY 1/> REGIONS: CPT | Performed by: PHYSICAL THERAPIST

## 2022-08-12 PROCEDURE — 97110 THERAPEUTIC EXERCISES: CPT | Performed by: PHYSICAL THERAPIST

## 2022-08-12 PROCEDURE — 97112 NEUROMUSCULAR REEDUCATION: CPT | Performed by: PHYSICAL THERAPIST

## 2022-08-12 NOTE — PROGRESS NOTES
Daily Note     Today's date: 2022  Patient name: Marybel Leach  : 1990  MRN: [id number]  Referring provider: JORI Otero  Dx:   Encounter Diagnosis     ICD-10-CM    1  Plantar fasciitis, left  M72 2    2  Chronic heel pain, left  M79 672     G89 29    3  Plantar fasciitis of left foot  M72 2                   Subjective: Pavithra Edward reports that his ankle feels about the same, "some days its fine some days it hurts more " notes the pain continues to be consistent in the morning  Objective: See treatment diary below      Assessment: Tolerated treatment well  Patient demonstrated fatigue post treatment, exhibited good technique with therapeutic exercises and would benefit from continued PT      Plan: Continue per plan of care               Precautions: Prior L achilles lengthening surgery    Daily Treatment Diary    Manual     IASTM L gastroc Done and achilles Done and achiles Done and achilles done done done    L TPR L medial calcaneous done done done done      Lateral calcaneal glide in SL          TPR L gastroc    done done done    Therapeutic-ex           Bike  5' lvl 1 5' lvl 1 5' lvl  5' lvl 1 5' lvl 1    Ankle TB eversion*                    Bridges* 3x15 12# 3x15 15# 3x15 15# 3x12 10# 3x12 20# 3x15 20#    SLR abd*          Flexibility          HS stretch          Gastroc stretch standing* 3x30" 3x30" 3x30'' 3x30" 3x30" 3x30"                        Therapeutic act                    Standing eccentric heel raises* 3x15 3x15 3x15 3x20 3x20 3x20    Heel raises w ball for post tib recruitment*                    Step ups (involved LE up, uninvolved down) L only 8" 3x12 8" 3x12 8'' 3x12 8" 3x12 8" 1x12, 2x15 8" 3x15    Mini squats w ue support          STS from chair 3x12 bw 3x12 bw 3x15 bw 3x12 10# 3x12 10# 3x12 10#              Neuromuscular RE-ed          Gabriel mcdonald holds 2x10 x5" 2x10x5" 2x10x5'' 2x10x5" 2x12x5" 2x12x5"    eachuck mcdonald step throughs 2x10 2x10 2x10 3x10 3x10 3x12    SL balance 2x5x10" 2x5x10" 2x5x10'' 2x5x10" 2x5x10" 2x5x10"    SL skaters     10x 10x              * = on hep

## 2022-08-16 ENCOUNTER — OFFICE VISIT (OUTPATIENT)
Dept: PHYSICAL THERAPY | Facility: REHABILITATION | Age: 32
End: 2022-08-16
Payer: COMMERCIAL

## 2022-08-16 DIAGNOSIS — M72.2 PLANTAR FASCIITIS, LEFT: ICD-10-CM

## 2022-08-16 DIAGNOSIS — G89.29 CHRONIC HEEL PAIN, LEFT: Primary | ICD-10-CM

## 2022-08-16 DIAGNOSIS — M79.672 CHRONIC HEEL PAIN, LEFT: Primary | ICD-10-CM

## 2022-08-16 DIAGNOSIS — M72.2 PLANTAR FASCIITIS OF LEFT FOOT: ICD-10-CM

## 2022-08-16 PROCEDURE — 97140 MANUAL THERAPY 1/> REGIONS: CPT | Performed by: PHYSICAL THERAPIST

## 2022-08-16 PROCEDURE — 97530 THERAPEUTIC ACTIVITIES: CPT | Performed by: PHYSICAL THERAPIST

## 2022-08-16 PROCEDURE — 97110 THERAPEUTIC EXERCISES: CPT | Performed by: PHYSICAL THERAPIST

## 2022-08-16 PROCEDURE — 97112 NEUROMUSCULAR REEDUCATION: CPT | Performed by: PHYSICAL THERAPIST

## 2022-08-16 NOTE — PROGRESS NOTES
Daily Note     Today's date: 2022  Patient name: Bora Irwin  : 1990  MRN: [id number]  Referring provider: JORI Mccarty  Dx:   Encounter Diagnosis     ICD-10-CM    1  Chronic heel pain, left  M79 672     G89 29    2  Plantar fasciitis of left foot  M72 2    3  Plantar fasciitis, left  M72 2                   Subjective: Marshall Snyder reports that his calf feels very tight today  Objective: See treatment diary below      Assessment: Tolerated treatment well  Patient demonstrated fatigue post treatment, exhibited good technique with therapeutic exercises and would benefit from continued PT  Reduced tension noted in calf after hands on  Plan: Continue per plan of care               Precautions: Prior L achilles lengthening surgery    Daily Treatment Diary    Manual    IASTM L gastroc Done and achilles Done and achiles Done and achilles done done done done   L TPR L medial calcaneous done done done done      Lateral calcaneal glide in SL          TPR L gastroc    done done done done   Therapeutic-ex           Bike  5' lvl 1 5' lvl 1 5' lvl  5' lvl 1 5' lvl 1 5' lvl 1   Ankle TB eversion*                    Bridges* 3x15 12# 3x15 15# 3x15 15# 3x12 10# 3x12 20# 3x15 20# 3x15 20#   SLR abd*          Flexibility          HS stretch          Gastroc stretch standing* 3x30" 3x30" 3x30'' 3x30" 3x30" 3x30" 3x30"                       Therapeutic act                    Standing eccentric heel raises* 3x15 3x15 3x15 3x20 3x20 3x20 3x20   Heel raises w ball for post tib recruitment*                    Step ups (involved LE up, uninvolved down) L only 8" 3x12 8" 3x12 8'' 3x12 8" 3x12 8" 1x12, 2x15 8" 3x15 8" 3x15   Mini squats w ue support          STS from chair 3x12 bw 3x12 bw 3x15 bw 3x12 10# 3x12 10# 3x12 10# 3x12 10#             Neuromuscular RE-ed          Gabriel mcdonald holds 2x10 x5" 2x10x5" 2x10x5'' 2x10x5" 2x12x5" 2x12x5" 2x12x5"   Gabriel mcdonald step throughs 2x10 2x10 2x10 3x10 3x10 3x12 3x12   SL balance 2x5x10" 2x5x10" 2x5x10'' 2x5x10" 2x5x10" 2x5x10" 3x5x10"   SL skaters     10x 10x 2x10             * = on hep

## 2022-08-19 ENCOUNTER — OFFICE VISIT (OUTPATIENT)
Dept: PHYSICAL THERAPY | Facility: REHABILITATION | Age: 32
End: 2022-08-19
Payer: COMMERCIAL

## 2022-08-19 DIAGNOSIS — L70.8 OTHER ACNE: Primary | ICD-10-CM

## 2022-08-19 DIAGNOSIS — M72.2 PLANTAR FASCIITIS OF LEFT FOOT: ICD-10-CM

## 2022-08-19 DIAGNOSIS — M79.672 CHRONIC HEEL PAIN, LEFT: Primary | ICD-10-CM

## 2022-08-19 DIAGNOSIS — G89.29 CHRONIC HEEL PAIN, LEFT: Primary | ICD-10-CM

## 2022-08-19 DIAGNOSIS — M72.2 PLANTAR FASCIITIS, LEFT: ICD-10-CM

## 2022-08-19 PROCEDURE — 97530 THERAPEUTIC ACTIVITIES: CPT

## 2022-08-19 PROCEDURE — 97112 NEUROMUSCULAR REEDUCATION: CPT

## 2022-08-19 PROCEDURE — 97140 MANUAL THERAPY 1/> REGIONS: CPT

## 2022-08-19 PROCEDURE — 97110 THERAPEUTIC EXERCISES: CPT

## 2022-08-19 RX ORDER — MINOCYCLINE HYDROCHLORIDE 100 MG/1
100 TABLET ORAL 2 TIMES DAILY
Qty: 60 TABLET | Refills: 0 | Status: SHIPPED | OUTPATIENT
Start: 2022-08-19 | End: 2022-08-22

## 2022-08-19 NOTE — PROGRESS NOTES
Daily Note     Today's date: 2022  Patient name: Carrie Paulino  : 1990  MRN: [id number]  Referring provider: JORI Jackson  Dx:   Encounter Diagnosis     ICD-10-CM    1  Chronic heel pain, left  M79 672     G89 29    2  Plantar fasciitis of left foot  M72 2    3  Plantar fasciitis, left  M72 2        Start Time: 1015  Stop Time: 1100  Total time in clinic (min): 45 minutes    Subjective: Pt reports today his L heel hurts a little bit more than usual but not as bad as it used to be, about 4/10  Objective: See treatment diary below      Assessment: Tolerated treatment well  Pt challenged with SLS today  Patient exhibited good technique with therapeutic exercises and would benefit from continued PT  Pt noted relief post MT with no pain noted  Plan: Continue per plan of care            Precautions: Prior L achilles lengthening surgery    Daily Treatment Diary    Manual    IASTM L gastroc done Done and achiles Done and achilles done done done done   L TPR L medial calcaneous  done done done      Lateral calcaneal glide in SL          TPR L gastroc done   done done done done   Therapeutic-ex           Bike 5' lvl 1 5' lvl 1 5' lvl 1 5' lvl  5' lvl 1 5' lvl 1 5' lvl 1   Ankle TB eversion*                    Bridges* 3x15 20# 3x15 15# 3x15 15# 3x12 10# 3x12 20# 3x15 20# 3x15 20#   SLR abd*          Flexibility          HS stretch          Gastroc stretch standing* 3x30"  3x30" 3x30'' 3x30" 3x30" 3x30" 3x30"                       Therapeutic act                    Standing eccentric heel raises* 3x20 3x15 3x15 3x20 3x20 3x20 3x20   Heel raises w ball for post tib recruitment*                    Step ups (involved LE up, uninvolved down) L only 8" 3x15 8" 3x12 8'' 3x12 8" 3x12 8" 1x12, 2x15 8" 3x15 8" 3x15   Mini squats w ue support          STS from chair 3x12 10# 3x12 bw 3x15 bw 3x12 10# 3x12 10# 3x12 10# 3x12 10#             Neuromuscular RE-ed          Phuong lunge holds 2x12x5" 2x10x5" 2x10x5'' 2x10x5" 2x12x5" 2x12x5" 2x12x5"   Sneaky lunge step throughs 3x12 2x10 2x10 3x10 3x10 3x12 3x12   SL balance 3x5x10" 2x5x10" 2x5x10'' 2x5x10" 2x5x10" 2x5x10" 3x5x10"   SL skaters 2x10    10x 10x 2x10             * = on hep

## 2022-08-20 DIAGNOSIS — L70.8 OTHER ACNE: ICD-10-CM

## 2022-08-22 RX ORDER — MINOCYCLINE HYDROCHLORIDE 100 MG/1
TABLET ORAL
Qty: 60 TABLET | Refills: 0 | Status: SHIPPED | OUTPATIENT
Start: 2022-08-22 | End: 2022-09-21

## 2022-08-24 ENCOUNTER — OFFICE VISIT (OUTPATIENT)
Dept: PHYSICAL THERAPY | Facility: REHABILITATION | Age: 32
End: 2022-08-24
Payer: COMMERCIAL

## 2022-08-24 DIAGNOSIS — M72.2 PLANTAR FASCIITIS, LEFT: ICD-10-CM

## 2022-08-24 DIAGNOSIS — M79.672 CHRONIC HEEL PAIN, LEFT: ICD-10-CM

## 2022-08-24 DIAGNOSIS — M72.2 PLANTAR FASCIITIS OF LEFT FOOT: Primary | ICD-10-CM

## 2022-08-24 DIAGNOSIS — G89.29 CHRONIC HEEL PAIN, LEFT: ICD-10-CM

## 2022-08-24 PROCEDURE — 97112 NEUROMUSCULAR REEDUCATION: CPT | Performed by: PHYSICAL THERAPIST

## 2022-08-24 PROCEDURE — 97530 THERAPEUTIC ACTIVITIES: CPT | Performed by: PHYSICAL THERAPIST

## 2022-08-24 PROCEDURE — 97140 MANUAL THERAPY 1/> REGIONS: CPT | Performed by: PHYSICAL THERAPIST

## 2022-08-24 PROCEDURE — 97110 THERAPEUTIC EXERCISES: CPT | Performed by: PHYSICAL THERAPIST

## 2022-08-24 NOTE — PROGRESS NOTES
Daily Note     Today's date: 2022  Patient name: Yoni Gallardo  : 1990  MRN: [id number]  Referring provider: JORI Crowley  Dx:   Encounter Diagnosis     ICD-10-CM    1  Plantar fasciitis of left foot  M72 2    2  Plantar fasciitis, left  M72 2    3  Chronic heel pain, left  M79 672     G89 29                   Subjective: Lorene Arteaga reports that his heel is very sore today, notes it started last night  Objective: See treatment diary below      Assessment: Tolerated treatment well  Patient demonstrated fatigue post treatment, exhibited good technique with therapeutic exercises and would benefit from continued PT  Fatigue and reduced pain noted at end of session  Plan: Continue per plan of care  Potential discharge next visit              Precautions: Prior L achilles lengthening surgery    Daily Treatment Diary    Manual     IASTM L gastroc Done and achilles done done done done done    L TPR L medial calcaneous done done        Lateral calcaneal glide in SL          TPR L gastroc  done done done done done    Therapeutic-ex           Bike 5' lvl 1 5' lvl  5' lvl 1 5' lvl 1 5' lvl 1 5' lvl 1    Ankle TB eversion*                    Bridges* 3x15 15# 3x12 10# 3x12 20# 3x15 20# 3x15 20# 3x15 20#    SLR abd*          Flexibility          HS stretch          Gastroc stretch standing* 3x30'' 3x30" 3x30" 3x30" 3x30" 3x30"                        Therapeutic act                    Standing eccentric heel raises* 3x15 3x20 3x20 3x20 3x20 3x20    Heel raises w ball for post tib recruitment*                    Step ups (involved LE up, uninvolved down) L only 8'' 3x12 8" 3x12 8" 1x12, 2x15 8" 3x15 8" 3x15 8" 3x15    Mini squats w ue support          STS from chair 3x15 bw 3x12 10# 3x12 10# 3x12 10# 3x12 10# 3x12 10#              Neuromuscular RE-ed          Gabriel lunge holds 2x10x5'' 2x10x5" 2x12x5" 2x12x5" 2x12x5" 2x12x5"    Sneaky lunge step throughs 2x10 3x10 3x10 3x12 3x12 3x15    SL balance 2x5x10'' 2x5x10" 2x5x10" 2x5x10" 3x5x10" np    SL skaters   10x 10x 2x10 2x10              * = on hep

## 2022-08-31 ENCOUNTER — APPOINTMENT (OUTPATIENT)
Dept: PHYSICAL THERAPY | Facility: REHABILITATION | Age: 32
End: 2022-08-31
Payer: COMMERCIAL

## 2022-09-02 ENCOUNTER — APPOINTMENT (OUTPATIENT)
Dept: PHYSICAL THERAPY | Facility: REHABILITATION | Age: 32
End: 2022-09-02
Payer: COMMERCIAL

## 2022-09-08 ENCOUNTER — OFFICE VISIT (OUTPATIENT)
Dept: PHYSICAL THERAPY | Facility: REHABILITATION | Age: 32
End: 2022-09-08
Payer: COMMERCIAL

## 2022-09-08 DIAGNOSIS — G89.29 CHRONIC HEEL PAIN, LEFT: ICD-10-CM

## 2022-09-08 DIAGNOSIS — M79.672 CHRONIC HEEL PAIN, LEFT: ICD-10-CM

## 2022-09-08 DIAGNOSIS — M72.2 PLANTAR FASCIITIS OF LEFT FOOT: Primary | ICD-10-CM

## 2022-09-08 DIAGNOSIS — M72.2 PLANTAR FASCIITIS, LEFT: ICD-10-CM

## 2022-09-08 PROCEDURE — 97140 MANUAL THERAPY 1/> REGIONS: CPT | Performed by: PHYSICAL THERAPIST

## 2022-09-08 PROCEDURE — 97112 NEUROMUSCULAR REEDUCATION: CPT | Performed by: PHYSICAL THERAPIST

## 2022-09-08 PROCEDURE — 97110 THERAPEUTIC EXERCISES: CPT | Performed by: PHYSICAL THERAPIST

## 2022-09-08 PROCEDURE — 97530 THERAPEUTIC ACTIVITIES: CPT | Performed by: PHYSICAL THERAPIST

## 2022-09-08 NOTE — PROGRESS NOTES
Daily Note     Today's date: 2022  Patient name: Adama Ojeda  : 1990  MRN: [id number]  Referring provider: JORI Blum  Dx:   Encounter Diagnosis     ICD-10-CM    1  Plantar fasciitis of left foot  M72 2    2  Plantar fasciitis, left  M72 2    3  Chronic heel pain, left  M79 672     G89 29        Start Time: 1145  Stop Time: 1230  Total time in clinic (min): 45 minutes    Subjective: Patient reports that he has had some "great days" but also days where the pain is severe and he is unable to find relief  He admits that this often occurs when he is not compliant with his home exercises  Objective: See treatment diary below      Assessment: Patient tolerated treatment well with no aggravation of sx reported post treatment  Progress, as able  Plan: Continue per plan of care  Possible D/C following NV            Precautions: Prior L achilles lengthening surgery    Daily Treatment Diary    Manual    IASTM L gastroc Done and achilles done done done done done JACM   L TPR L medial calcaneous done done        Lateral calcaneal glide in SL          TPR L gastroc  done done done done done ZINA   Therapeutic-ex           Bike 5' lvl 1 5' lvl  5' lvl 1 5' lvl 1 5' lvl 1 5' lvl 1 5' lvl 1   Ankle TB eversion*                    Bridges* 3x15 15# 3x12 10# 3x12 20# 3x15 20# 3x15 20# 3x15 20# 3x15 20#   SLR abd*          Flexibility          HS stretch          Gastroc stretch standing* 3x30'' 3x30" 3x30" 3x30" 3x30" 3x30"                        Therapeutic act                    Standing eccentric heel raises* 3x15 3x20 3x20 3x20 3x20 3x20    Heel raises w ball for post tib recruitment*                    Step ups (involved LE up, uninvolved down) L only 8'' 3x12 8" 3x12 8" 1x12, 2x15 8" 3x15 8" 3x15 8" 3x15    Mini squats w ue support          STS from chair 3x15 bw 3x12 10# 3x12 10# 3x12 10# 3x12 10# 3x12 10# 3x12 10#             Neuromuscular RE-ed          Phuong lunge holds 2x10x5'' 2x10x5" 2x12x5" 2x12x5" 2x12x5" 2x12x5" 2x12x5"   Sneaky lunge step throughs 2x10 3x10 3x10 3x12 3x12 3x15 3x15   SL balance 2x5x10'' 2x5x10" 2x5x10" 2x5x10" 3x5x10" np    SL skaters   10x 10x 2x10 2x10 2x10             * = on hep

## 2022-09-10 DIAGNOSIS — E78.2 MIXED HYPERLIPIDEMIA: ICD-10-CM

## 2022-09-12 RX ORDER — ATORVASTATIN CALCIUM 10 MG/1
TABLET, FILM COATED ORAL
Qty: 30 TABLET | Refills: 3 | Status: SHIPPED | OUTPATIENT
Start: 2022-09-12

## 2022-09-14 ENCOUNTER — TELEPHONE (OUTPATIENT)
Dept: DERMATOLOGY | Facility: CLINIC | Age: 32
End: 2022-09-14

## 2022-09-14 NOTE — TELEPHONE ENCOUNTER
(9/14 for pt to cb as his insurnace does not allow him to see Dr Justa Haque even virtually, left cb # to cb and reschedule appt)   jack

## 2022-09-15 ENCOUNTER — OFFICE VISIT (OUTPATIENT)
Dept: MULTI SPECIALTY CLINIC | Facility: CLINIC | Age: 32
End: 2022-09-15

## 2022-09-15 VITALS
HEART RATE: 82 BPM | HEIGHT: 67 IN | WEIGHT: 290 LBS | SYSTOLIC BLOOD PRESSURE: 103 MMHG | BODY MASS INDEX: 45.52 KG/M2 | DIASTOLIC BLOOD PRESSURE: 60 MMHG | TEMPERATURE: 98.1 F

## 2022-09-15 DIAGNOSIS — J35.1 TONSILLAR HYPERTROPHY: ICD-10-CM

## 2022-09-15 DIAGNOSIS — R09.89 THROAT FULLNESS: ICD-10-CM

## 2022-09-15 DIAGNOSIS — S00.411A ABRASION OF RIGHT EAR CANAL, INITIAL ENCOUNTER: ICD-10-CM

## 2022-09-15 DIAGNOSIS — K21.9 GASTROESOPHAGEAL REFLUX DISEASE, UNSPECIFIED WHETHER ESOPHAGITIS PRESENT: Primary | ICD-10-CM

## 2022-09-15 PROCEDURE — 3078F DIAST BP <80 MM HG: CPT | Performed by: OTOLARYNGOLOGY

## 2022-09-15 PROCEDURE — 99213 OFFICE O/P EST LOW 20 MIN: CPT | Performed by: OTOLARYNGOLOGY

## 2022-09-15 PROCEDURE — 3074F SYST BP LT 130 MM HG: CPT | Performed by: OTOLARYNGOLOGY

## 2022-09-15 NOTE — PROGRESS NOTES
[facility]  OTOLARYNGOLOGY - [address]  [id number]  1990    PROGRESS NOTE    Chief Complaint   Patient presents with    Follow-up     Pimple in right ear          History of Present Illness:    55-year-old man who returns for follow-up  We have been seeing him for many issues in the past   At his last visit, he was concerned about his throat  He was thought to have issues with extra esophageal reflux  He was on 40 mg of Pepcid in the morning and was added 40 mg of famotidine at night  Since that time, he is feels a little bit better  He is not always feeling full in his throat, and not noticing it as much  In terms of his ears, he does wear hearing aids  He feels like there is a "pimple" in the right ear  The hearing aid touches it and is is somewhat irritated  However, it has been improving  Review Of Systems:  Patient denies fevers or chills  All other systems reviewed and found to be negative unless otherwise noted in the HPI or MA note  Vitals:    09/15/22 1434   BP: 103/60   BP Location: Right arm   Patient Position: Sitting   Cuff Size: Large   Pulse: 82   Temp: 98 1 °F (36 7 °C)   TempSrc: Temporal   Weight: 132 kg (290 lb)   Height: 5' 7" (1 702 m)         General Appearance: No apparent distress    Head: Normocephalic, atraumatic  Face: Symmetric without obvious lesions  Eyes: Conjunctiva clear, extraocular movements are intact  Ears: Pinna normal shape and position  Canals are clear; right with excoriation/abrasion more distally  TMs intact with no middle ear effusion  Nose: External pyramid midline  Mucosa appears healthy  Turbinates are congested  Oral cavity/Oropharynx: No mucosal lesions, masses, or pharyngeal asymmetry  Tonsils 3+ bilaterally  Neck: No cervical lymphadenopathy or masses appreciated      Assessment:  1  Gastroesophageal reflux disease, unspecified whether esophagitis present     2   Throat fullness     3  Tonsillar hypertrophy     4  Abrasion of right ear canal, initial encounter  neomycin-polymyxin-hydrocortisone (CORTISPORIN) otic solution          Plan:   I counseled the patient that symptoms multifactorial   I do believe that he has evidence of extra esophageal reflux among other issues  I did  him on dietary and lifestyle modifications as well as using alginates  I will have him start alginates after meals and before bedtime  I did recommend Reflux Gourmet  He should  Continue using Prilosec and famotidine  In terms of his ear, I do think that this will get better on its own  However, he can use Cortisporin drops for about 3-5 days on the right side  Keep the hearing aid out at this time  Follow-up in 3 months or sooner as needed  James Casanova MD  Otolaryngology - Head & Neck Surgery  [facility]        ** Please Note: Dictation voice to text software may have been used in the creation of this document   **

## 2022-09-19 ENCOUNTER — OFFICE VISIT (OUTPATIENT)
Dept: PHYSICAL THERAPY | Facility: REHABILITATION | Age: 32
End: 2022-09-19
Payer: COMMERCIAL

## 2022-09-19 DIAGNOSIS — M79.672 CHRONIC HEEL PAIN, LEFT: ICD-10-CM

## 2022-09-19 DIAGNOSIS — M72.2 PLANTAR FASCIITIS, LEFT: Primary | ICD-10-CM

## 2022-09-19 DIAGNOSIS — M72.2 PLANTAR FASCIITIS OF LEFT FOOT: ICD-10-CM

## 2022-09-19 DIAGNOSIS — G89.29 CHRONIC HEEL PAIN, LEFT: ICD-10-CM

## 2022-09-19 PROCEDURE — 97530 THERAPEUTIC ACTIVITIES: CPT | Performed by: PHYSICAL THERAPIST

## 2022-09-19 PROCEDURE — 97110 THERAPEUTIC EXERCISES: CPT | Performed by: PHYSICAL THERAPIST

## 2022-09-19 PROCEDURE — 97140 MANUAL THERAPY 1/> REGIONS: CPT | Performed by: PHYSICAL THERAPIST

## 2022-09-19 PROCEDURE — 97112 NEUROMUSCULAR REEDUCATION: CPT | Performed by: PHYSICAL THERAPIST

## 2022-09-19 NOTE — PROGRESS NOTES
Daily Note     Today's date: 2022  Patient name: Adama Ojeda  : 1990  MRN: [id number]  Referring provider: JORI Blum  Dx:   Encounter Diagnosis     ICD-10-CM    1  Plantar fasciitis, left  M72 2    2  Plantar fasciitis of left foot  M72 2    3  Chronic heel pain, left  M79 672     G89 29                   Subjective: Roberto Holm reports that his foot is a little more sore today, denies compliance with HEP  Objective: See treatment diary below      Assessment: Tolerated treatment well  Patient demonstrated fatigue post treatment and exhibited good technique with therapeutic exercises      Plan:  Will place Roberto Holm on hold at this time and trial independent HEP            Precautions: Prior L achilles lengthening surgery    Daily Treatment Diary    Manual    IASTM L gastroc done done done done JACM done   L TPR L medial calcaneous         Lateral calcaneal glide in SL         TPR L gastroc done done done done JACM done   Therapeutic-ex          Bike 5' lvl 1 5' lvl 1 5' lvl 1 5' lvl 1 5' lvl 1 5' lvl 1   Ankle TB eversion*                  Bridges* 3x12 20# 3x15 20# 3x15 20# 3x15 20# 3x15 20# 3x15 20#   SLR abd*         Flexibility         HS stretch         Gastroc stretch standing* 3x30" 3x30" 3x30" 3x30"  3x30"                     Therapeutic act                  Standing eccentric heel raises* 3x20 3x20 3x20 3x20     Heel raises w ball for post tib recruitment*                  Step ups (involved LE up, uninvolved down) L only 8" 1x12, 2x15 8" 3x15 8" 3x15 8" 3x15  8" 3x15   Mini squats w ue support         STS from chair 3x12 10# 3x12 10# 3x12 10# 3x12 10# 3x12 10# np            Neuromuscular RE-ed         Sneaky lunge holds 2x12x5" 2x12x5" 2x12x5" 2x12x5" 2x12x5" 2x15x5"   Sneaky lunge step throughs 3x10 3x12 3x12 3x15 3x15 3x15   SL balance 2x5x10" 2x5x10" 3x5x10" np     SL skaters 10x 10x 2x10 2x10 2x10 2x10            * = on hep

## 2022-10-04 ENCOUNTER — TELEPHONE (OUTPATIENT)
Dept: FAMILY MEDICINE CLINIC | Facility: CLINIC | Age: 32
End: 2022-10-04

## 2022-10-04 DIAGNOSIS — G47.33 OBSTRUCTIVE SLEEP APNEA (ADULT) (PEDIATRIC): Primary | ICD-10-CM

## 2022-10-04 NOTE — TELEPHONE ENCOUNTER
Need a physician to physician order for Sleep medicine with DX codes- G47 33  G47 09  G47 61  G47 19  F45 8

## 2022-10-20 NOTE — PROGRESS NOTES
PT Discharge    Today's date: 10/20/2022  Patient name: Maria Elena Ellis  : 1990  MRN: [id number]  Referring provider: JORI Bynum  Dx:   Encounter Diagnosis     ICD-10-CM    1  Plantar fasciitis, left  M72 2    2  Plantar fasciitis of left foot  M72 2    3  Chronic heel pain, left  M79 672     G89 29        Start Time: 1145  Stop Time: 1230  Total time in clinic (min): 45 minutes    Assessment/Plan  Maria Elena Ellis has not returned since last appointment, unable to perform objective measures since then  Per dept policy of not having inactive charts beyond 30 days, at this time, Maria Elena Ellis will be discharged from physical therapy services      Subjective    Objective

## 2022-10-30 DIAGNOSIS — J35.1 TONSILLAR HYPERTROPHY: ICD-10-CM

## 2022-10-30 DIAGNOSIS — K21.9 GASTROESOPHAGEAL REFLUX DISEASE, UNSPECIFIED WHETHER ESOPHAGITIS PRESENT: ICD-10-CM

## 2022-10-31 RX ORDER — FAMOTIDINE 40 MG/1
TABLET, FILM COATED ORAL
Qty: 30 TABLET | Refills: 0 | Status: SHIPPED | OUTPATIENT
Start: 2022-10-31

## 2022-11-03 ENCOUNTER — TELEPHONE (OUTPATIENT)
Dept: FAMILY MEDICINE CLINIC | Facility: CLINIC | Age: 32
End: 2022-11-03

## 2022-11-03 DIAGNOSIS — F32.5 MAJOR DEPRESSIVE DISORDER WITH SINGLE EPISODE, IN REMISSION (HCC): Primary | ICD-10-CM

## 2022-11-03 RX ORDER — BUPROPION HYDROCHLORIDE 150 MG/1
150 TABLET ORAL EVERY MORNING
Qty: 30 TABLET | Refills: 1 | Status: SHIPPED | OUTPATIENT
Start: 2022-11-03

## 2022-11-03 NOTE — TELEPHONE ENCOUNTER
Pt is currently taking Bupropion 150 mg HCL XL  Pt no longer is under the care of Connie Reynoso (pyschiatrist) and needs a refill sent to [facility] [address] [place] [address]  Pt only has one dose left  Please review and advise      Ok to leave detailed message on vm

## 2022-11-27 DIAGNOSIS — F42.2 MIXED OBSESSIONAL THOUGHTS AND ACTS: ICD-10-CM

## 2022-11-28 RX ORDER — FLUVOXAMINE MALEATE 100 MG/1
CAPSULE, EXTENDED RELEASE ORAL
Qty: 30 CAPSULE | Refills: 5 | Status: SHIPPED | OUTPATIENT
Start: 2022-11-28

## 2022-11-28 RX ORDER — FLUVOXAMINE MALEATE 150 MG/1
CAPSULE, EXTENDED RELEASE ORAL
Qty: 30 CAPSULE | Refills: 5 | Status: SHIPPED | OUTPATIENT
Start: 2022-11-28

## 2022-12-01 ENCOUNTER — OFFICE VISIT (OUTPATIENT)
Dept: URGENT CARE | Facility: CLINIC | Age: 32
End: 2022-12-01

## 2022-12-01 VITALS — OXYGEN SATURATION: 98 % | RESPIRATION RATE: 20 BRPM | TEMPERATURE: 98 F | HEART RATE: 92 BPM

## 2022-12-01 DIAGNOSIS — H10.33 ACUTE BACTERIAL CONJUNCTIVITIS OF BOTH EYES: Primary | ICD-10-CM

## 2022-12-01 RX ORDER — POLYMYXIN B SULFATE AND TRIMETHOPRIM 1; 10000 MG/ML; [USP'U]/ML
1 SOLUTION OPHTHALMIC EVERY 4 HOURS
Qty: 10 ML | Refills: 0 | Status: SHIPPED | OUTPATIENT
Start: 2022-12-01

## 2022-12-01 NOTE — PROGRESS NOTES
[facility]        NAME: Lawanda Mullins is a 33 y o  male  : 1990    MRN: [id number]  DATE: 2022  TIME: 3:32 PM    Assessment and Plan   Acute bacterial conjunctivitis of both eyes [H10 33]  1  Acute bacterial conjunctivitis of both eyes  polymyxin b-trimethoprim (POLYTRIM) ophthalmic solution            Patient Instructions     Ophthalmic eye abx given today  Explained contagious nature of pink eye  Avoid rubbing eye and wash hands frequently  Follow-up with PCP in the next 3-5 days if no improvement  Go to the ED if symptoms severely worsen  Chief Complaint     Chief Complaint   Patient presents with   • Eye Problem     Itching tearing discharge, redness  Some sx started a few months ago  Discharge started a few weeks ago  History of Present Illness     Lawanda Mullins is a 33 y o  male presenting to the office today for eye itching  Symptoms have been present for 1 month, and include discharge and redness  Does not wear contacts  Review of Systems     Review of Systems   Constitutional: Negative for chills and fever  HENT: Negative for congestion, facial swelling, postnasal drip, sinus pressure, sinus pain and sore throat  Eyes: Positive for discharge and redness  Negative for itching  Respiratory: Negative for cough and shortness of breath  Skin: Negative for rash and wound  Allergic/Immunologic: Negative for environmental allergies  Neurological: Negative for dizziness, facial asymmetry, light-headedness and headaches  Psychiatric/Behavioral: Negative for agitation  The patient is not nervous/anxious          Current Medications       Current Outpatient Medications:   •  Adapalene 0 3 % gel, SPREAD ONE PEA-SIZED AMOUNT OF MEDICATION OVER ENTIRE FACE ABOUT ONE HOUR BEFORE BEDTIME , Disp: 45 g, Rfl: 2  •  Alpha-D-Galactosidase (BEANO PO), Take by mouth, Disp: , Rfl:   •  Alpha-Lipoic Acid 200 MG CAPS, TAKE 1 CAPSULE BY MOUTH EVERY DAY, Disp: 30 capsule, Rfl: 5  •  Ascorbic Acid (vitamin C) 1000 MG tablet, TAKE 1 TABLET (1,000 MG TOTAL) BY MOUTH DAILY, Disp: 30 tablet, Rfl: 5  •  atorvastatin (LIPITOR) 10 mg tablet, TAKE 1 TABLET BY MOUTH EVERY DAY, Disp: 30 tablet, Rfl: 3  •  Bacillus Coagulans-Inulin (Probiotic) 1-250 BILLION-MG CAPS, TAKE 1 CAPSULE BY MOUTH EVERY DAY, Disp: 30 capsule, Rfl: 5  •  buPROPion (Wellbutrin XL) 150 mg 24 hr tablet, Take 1 tablet (150 mg total) by mouth every morning, Disp: 30 tablet, Rfl: 1  •  Cholecalciferol (KP Vitamin D3) 50 MCG (2000 UT) CAPS, Take 1 capsule (2,000 Units total) by mouth 2 (two) times a day, Disp: 180 capsule, Rfl: 1  •  cholecalciferol (VITAMIN D3) 1,000 units tablet, Take 1 tablet (1,000 Units total) by mouth daily, Disp: 90 tablet, Rfl: 3  •  clindamycin-benzoyl peroxide (BENZACLIN) gel, Apply topically 2 (two) times a day, Disp: 50 g, Rfl: 3  •  Dapsone 7 5 % GEL, Apply topically once daily, Disp: 90 g, Rfl: 3  •  Echinacea-Alatorre Seal 350-100 MG CAPS, TAKE 1 CAPSULE BY MOUTH EVERY DAY, Disp: 30 capsule, Rfl: 5  •  Echinacea-Alatorre Seal Complex CAPS, Take 1 capsule by mouth daily, Disp: 30 capsule, Rfl: 0  •  famotidine (PEPCID) 40 MG tablet, Take 1 tab PO QHS, Disp: 30 tablet, Rfl: 0  •  fexofenadine (ALLEGRA) 180 MG tablet, Take 1 tablet (180 mg total) by mouth daily, Disp: 90 tablet, Rfl: 1  •  Fluvoxamine Maleate 100 MG CP24, TAKE 1 CAPSULE BY MOUTH EVERY DAY, Disp: 30 capsule, Rfl: 5  •  Fluvoxamine Maleate 150 MG CP24, TAKE 1 CAPSULE BY MOUTH EVERY DAY, Disp: 30 capsule, Rfl: 5  •  lisinopril (ZESTRIL) 10 mg tablet, TAKE 1 TABLET BY MOUTH EVERY DAY, Disp: 30 tablet, Rfl: 5  •  loratadine (CLARITIN) 10 mg tablet, Take 1 tablet (10 mg total) by mouth daily, Disp: 90 tablet, Rfl: 1  •  Melatonin 10 MG TABS,  , Disp: , Rfl:   •  methylPREDNISolone 4 MG tablet therapy pack, Use as directed on package, Disp: 1 each, Rfl: 0  •  montelukast (SINGULAIR) 10 mg tablet, TAKE 1 TABLET BY MOUTH EVERY DAY, Disp: 30 tablet, Rfl: 4  •  Multiple Vitamin (multivitamin) capsule, Take 1 capsule by mouth daily, Disp: 100 capsule, Rfl: 3  •  Omega-3 Fatty Acids (fish oil) 1,000 mg, TAKE 3 CAPSULES (3,000 MG TOTAL) BY MOUTH DAILY, Disp: 90 capsule, Rfl: 5  •  omeprazole (PriLOSEC) 40 MG capsule, Take 1 capsule (40 mg total) by mouth daily, Disp: 90 capsule, Rfl: 1  •  oxymetazoline (AFRIN) 0 05 % nasal spray, 2 sprays by Each Nare route 2 (two) times a day, Disp: , Rfl:   •  polymyxin b-trimethoprim (POLYTRIM) ophthalmic solution, Apply 1 drop to eye every 4 (four) hours, Disp: 10 mL, Rfl: 0  •  triamcinolone (KENALOG) 0 1 % ointment, Apply topically twice a day for 14 days starlight, Disp: 30 g, Rfl: 0  •  Vitamin D-Vitamin K (K2 Plus D3) 100-1000 MCG-UNIT TABS, TAKE 1 TABLET BY MOUTH TWICE A DAY, Disp: 60 tablet, Rfl: 1  •  zinc gluconate 50 mg tablet, TAKE 1 TABLET BY MOUTH EVERY DAY, Disp: 30 tablet, Rfl: 5  •  neomycin-polymyxin-hydrocortisone (CORTISPORIN) otic solution, Administer 4 drops to the right ear 2 (two) times a day for 5 days, Disp: 10 mL, Rfl: 0    Current Allergies     Allergies as of 12/01/2022 - Reviewed 12/01/2022   Allergen Reaction Noted   • Penicillins Hives 10/30/2017   • Sulfa antibiotics GI Intolerance 10/30/2017            The following portions of the patient's history were reviewed and updated as appropriate: allergies, current medications, past family history, past medical history, past social history, past surgical history and problem list      Past Medical History:   Diagnosis Date   • Acne 09/01/2010    Last Assessed 01 Sep 2010  • Acquired ankle/foot deformity     Last assessed 23 Feb 2017  Resolved 14 Aug 2017  • Allergic    • Anxiety    • Change in hearing     Resolved 16 Aug 2017   • Closed displaced avulsion fracture of tuberosity of left calcaneus     Last Assessed 25 May 2017  Resolved 14 Aug 2017  • Depression    • Epistaxis     Last Assessed 18 Feb 2014  Resolved 08 Jun 2016     • Essential hypertension 06/19/2018   • Foreign body in foot     Last Assessed 30 Jul 2013  Resolved 12 Oct 2013  • Gastritis     Last Assessed 22 Oct 2012   • GERD (gastroesophageal reflux disease)    • History of oral aphthous ulcers     Last Assessed 08 June 2016  Resolved 14 Aug 2017  • Hypersomnia 02/18/2009    Last Assessed 18 Feb 2009  Resolved 16 Aug 2017  • Hypertension 2016   • Impacted cerumen     Last Assessed 08 Jun 2016  Resolved 08 Jul 2016  • Insomnia    • Irritable bowel syndrome    • Knee joint pain 03/10/2008   • Left foot pain     Last Assessed 19 May 2017  Resolved 14 Aug 2017  • Mixed hyperlipidemia 11/02/2020   • Obesity    • OCD (obsessive compulsive disorder)    • Plantar fibromatosis     Last Assessed 19 Oct 2016  Resolved 14 Aug 2017  • Seasonal allergies    • Tarsal tunnel syndrome of left side     Last Assessed 21 Aug 2017  Resolved 21 Aug 2017  • Tinea corporis     Last Assessed 05 Dec 2011       Past Surgical History:   Procedure Laterality Date   • ESOPHAGOGASTRODUODENOSCOPY N/A 10/31/2017    Procedure: ESOPHAGOGASTRODUODENOSCOPY (EGD); Surgeon: Alessandro Petty MD;  Location: [facility] GI [facility]; Service: Gastroenterology   • NO PAST SURGERIES     • WY LENGTH/SHORT LEG/ANKL TENDON,SINGLE Left 11/3/2017    Procedure: CALCANEAL OSTECTOMY, ACHILLEST TENDON LENGTHING;  Surgeon: Hugo Andres DPM;  Location: [facility];  Service: Podiatry       Family History   Problem Relation Age of Onset   • Kidney disease Mother    • Thyroid disease Mother    • Allergic rhinitis Mother    • Hypertension Father    • Kidney disease Father    • Gout Father    • No Known Problems Sister    • Colonic polyp Maternal Grandmother    • Crohn's disease Maternal Grandmother    • Cancer Family    • Diabetes Family        Medications have been verified  Objective     Pulse 92   Temp 98 °F (36 7 °C) (Temporal)   Resp 20   SpO2 98%   No LMP for male patient       Physical Exam     Physical Exam  Vitals reviewed  Constitutional:       General: He is not in acute distress  Appearance: Normal appearance  He is not ill-appearing  HENT:      Head: Normocephalic and atraumatic  Eyes:      General: Lids are normal  Vision grossly intact  Right eye: No discharge or hordeolum  Left eye: No discharge or hordeolum  Extraocular Movements: Extraocular movements intact  Conjunctiva/sclera:      Right eye: Right conjunctiva is injected  Exudate present  Left eye: Left conjunctiva is injected  Exudate present  Pulmonary:      Effort: Pulmonary effort is normal  No respiratory distress  Musculoskeletal:      Cervical back: Normal range of motion and neck supple  No tenderness  Skin:     General: Skin is warm  Neurological:      General: No focal deficit present  Mental Status: He is alert     Psychiatric:         Mood and Affect: Mood normal          Behavior: Behavior normal

## 2022-12-30 DIAGNOSIS — F32.5 MAJOR DEPRESSIVE DISORDER WITH SINGLE EPISODE, IN REMISSION (HCC): ICD-10-CM

## 2022-12-30 RX ORDER — BUPROPION HYDROCHLORIDE 150 MG/1
TABLET ORAL
Qty: 30 TABLET | Refills: 1 | Status: SHIPPED | OUTPATIENT
Start: 2022-12-30

## 2023-01-10 DIAGNOSIS — J30.9 ALLERGIC RHINITIS, UNSPECIFIED SEASONALITY, UNSPECIFIED TRIGGER: ICD-10-CM

## 2023-01-10 DIAGNOSIS — K21.9 GASTROESOPHAGEAL REFLUX DISEASE: ICD-10-CM

## 2023-01-10 DIAGNOSIS — E78.2 MIXED HYPERLIPIDEMIA: ICD-10-CM

## 2023-01-10 RX ORDER — OMEPRAZOLE 40 MG/1
40 CAPSULE, DELAYED RELEASE ORAL DAILY
Qty: 30 CAPSULE | Refills: 5 | Status: SHIPPED | OUTPATIENT
Start: 2023-01-10

## 2023-01-10 RX ORDER — ATORVASTATIN CALCIUM 10 MG/1
TABLET, FILM COATED ORAL
Qty: 30 TABLET | Refills: 3 | Status: SHIPPED | OUTPATIENT
Start: 2023-01-10

## 2023-01-10 RX ORDER — MONTELUKAST SODIUM 10 MG/1
TABLET ORAL
Qty: 30 TABLET | Refills: 4 | Status: SHIPPED | OUTPATIENT
Start: 2023-01-10

## 2023-01-10 RX ORDER — LORATADINE 10 MG/1
TABLET ORAL
Qty: 30 TABLET | Refills: 5 | Status: SHIPPED | OUTPATIENT
Start: 2023-01-10

## 2023-01-16 ENCOUNTER — APPOINTMENT (OUTPATIENT)
Dept: LAB | Facility: CLINIC | Age: 33
End: 2023-01-16

## 2023-01-16 DIAGNOSIS — E78.2 MIXED HYPERLIPIDEMIA: ICD-10-CM

## 2023-01-16 LAB
ALBUMIN SERPL BCP-MCNC: 4.1 G/DL (ref 3.5–5)
ALP SERPL-CCNC: 112 U/L (ref 46–116)
ALT SERPL W P-5'-P-CCNC: 64 U/L (ref 12–78)
ANION GAP SERPL CALCULATED.3IONS-SCNC: 5 MMOL/L (ref 4–13)
AST SERPL W P-5'-P-CCNC: 31 U/L (ref 5–45)
BILIRUB SERPL-MCNC: 0.54 MG/DL (ref 0.2–1)
BUN SERPL-MCNC: 9 MG/DL (ref 5–25)
CALCIUM SERPL-MCNC: 9.8 MG/DL (ref 8.3–10.1)
CHLORIDE SERPL-SCNC: 106 MMOL/L (ref 96–108)
CHOLEST SERPL-MCNC: 155 MG/DL
CO2 SERPL-SCNC: 28 MMOL/L (ref 21–32)
CREAT SERPL-MCNC: 0.98 MG/DL (ref 0.6–1.3)
GFR SERPL CREATININE-BSD FRML MDRD: 102 ML/MIN/1.73SQ M
GLUCOSE P FAST SERPL-MCNC: 87 MG/DL (ref 65–99)
HDLC SERPL-MCNC: 38 MG/DL
LDLC SERPL CALC-MCNC: 84 MG/DL (ref 0–100)
POTASSIUM SERPL-SCNC: 4.2 MMOL/L (ref 3.5–5.3)
PROT SERPL-MCNC: 8 G/DL (ref 6.4–8.4)
SODIUM SERPL-SCNC: 139 MMOL/L (ref 135–147)
TRIGL SERPL-MCNC: 163 MG/DL

## 2023-01-18 ENCOUNTER — OFFICE VISIT (OUTPATIENT)
Dept: FAMILY MEDICINE CLINIC | Facility: CLINIC | Age: 33
End: 2023-01-18

## 2023-01-18 VITALS
DIASTOLIC BLOOD PRESSURE: 74 MMHG | HEART RATE: 78 BPM | RESPIRATION RATE: 16 BRPM | SYSTOLIC BLOOD PRESSURE: 130 MMHG | BODY MASS INDEX: 43.79 KG/M2 | WEIGHT: 279 LBS | HEIGHT: 67 IN | TEMPERATURE: 98.1 F | OXYGEN SATURATION: 98 %

## 2023-01-18 DIAGNOSIS — J30.9 ALLERGIC RHINITIS, UNSPECIFIED SEASONALITY, UNSPECIFIED TRIGGER: Primary | ICD-10-CM

## 2023-01-18 DIAGNOSIS — F98.8 ADD (ATTENTION DEFICIT DISORDER) WITHOUT HYPERACTIVITY: ICD-10-CM

## 2023-01-18 DIAGNOSIS — E78.2 MIXED HYPERLIPIDEMIA: ICD-10-CM

## 2023-01-18 DIAGNOSIS — K21.9 GASTROESOPHAGEAL REFLUX DISEASE WITHOUT ESOPHAGITIS: ICD-10-CM

## 2023-01-18 DIAGNOSIS — E66.9 OBESITY (BMI 30-39.9): ICD-10-CM

## 2023-01-18 DIAGNOSIS — I10 ESSENTIAL HYPERTENSION: ICD-10-CM

## 2023-01-18 NOTE — PROGRESS NOTES
Name: Keven Heading      : 1990      MRN: [id number]  Encounter Provider: Phil Narayan MD  Encounter Date: 2023   Encounter department: [address] [place] [address] Place     1  Allergic rhinitis, unspecified seasonality, unspecified trigger  Assessment & Plan:  Pt is to avoid those substances that precipitate allergic reaction and to use OY+TC antihistamines and/or nasal steroid spray prn       2  Gastroesophageal reflux disease without esophagitis  Assessment & Plan:  Patient to continue with present therapy and decrease caffeine, avoid ETOH and smoking to decrease acid production  Pt should also cease eating prior to bedtime and avoid excessive fluid intake prior to sleep  May use antacids as needed for breakthrough GERD  All pateint questions answered today about this condition  3  Essential hypertension  Assessment & Plan:  Patient is stable with current anti-hypertensive medicine and continue to follow a low sodium diet and take current medication  All questions about this condition were answered today  Orders:  -     TSH + Free T4; Future  -     Comprehensive metabolic panel; Future  -     CBC and differential; Future  -     Magnesium; Future  -     Uric acid; Future  -     Urinalysis with microscopic    4  ADD (attention deficit disorder) without hyperactivity  Assessment & Plan:  Patient is stable  and will continue present plan of care and reassess at next routine visit  All questions about this problem from patient were answered today  5  Mixed hyperlipidemia  Assessment & Plan:  Patient  is stable with current medication and we discussed a low fat low cholesterol diet  Weight loss also discussed for this will help lower cholesterol also  Recheck lipids in 6 months  Orders:  -     Lipid Panel with Direct LDL reflex; Future    6  Obesity (BMI 30-39  9)  Assessment & Plan:  Patient to increase exercise and partake of a diet with less calories to promote  weight loss        BMI Counseling: There is no height or weight on file to calculate BMI  The BMI is above normal  Nutrition recommendations include decreasing portion sizes, encouraging healthy choices of fruits and vegetables, decreasing fast food intake, consuming healthier snacks, limiting drinks that contain sugar, moderation in carbohydrate intake, increasing intake of lean protein, reducing intake of saturated and trans fat and reducing intake of cholesterol  Exercise recommendations include exercising 3-5 times per week  No pharmacotherapy was ordered  Patient referred to PCP  Rationale for BMI follow-up plan is due to patient being overweight or obese  Depression Screening and Follow-up Plan: Patient assessed for underlying major depression  Brief counseling provided and recommend additional follow-up/re-evaluation next office visit  Patient advised to follow-up with PCP for further management  Subjective     56-year-old male here today for his yearly physical exam   Patient history of some acne hypertension hyperlipidemia and a BMI of 43  Discussed with patient importance of weight loss although he has lost 11 pound since we saw him last   Discussed with patient's his current medications and that he stopped a lot of his supplements we have taken him off his list but we left his prescriptions on the he still taking does not need any refills for recently refilled and he will call when he needs them  We also reviewed his cholesterol lab work he is doing great with his cholesterol is 155 and we will do his yearly lab work in 6 months  Review of Systems   Constitutional: Negative for activity change, appetite change, chills, fatigue, fever and unexpected weight change  HENT: Negative for congestion, ear pain, hearing loss, mouth sores, postnasal drip, sinus pressure, sinus pain, sneezing and sore throat  Respiratory: Negative for apnea, cough, shortness of breath and wheezing  Cardiovascular: Negative for chest pain, palpitations and leg swelling  Gastrointestinal: Negative for abdominal pain, constipation, diarrhea, nausea and vomiting  Endocrine: Negative for cold intolerance and heat intolerance  Genitourinary: Negative for dysuria, frequency and hematuria  Musculoskeletal: Negative for arthralgias, back pain, gait problem, joint swelling and neck pain  Skin: Negative for rash  Neurological: Negative for dizziness, weakness and numbness  Hematological: Does not bruise/bleed easily  Psychiatric/Behavioral: Negative for agitation, behavioral problems, confusion, hallucinations and sleep disturbance  The patient is not nervous/anxious  Past Medical History:   Diagnosis Date   • Acne 09/01/2010    Last Assessed 01 Sep 2010  • Acquired ankle/foot deformity     Last assessed 23 Feb 2017  Resolved 14 Aug 2017  • Allergic    • Anxiety    • Change in hearing     Resolved 16 Aug 2017   • Closed displaced avulsion fracture of tuberosity of left calcaneus     Last Assessed 25 May 2017  Resolved 14 Aug 2017  • Depression    • Epistaxis     Last Assessed 18 Feb 2014  Resolved 08 Jun 2016  • Essential hypertension 06/19/2018   • Foreign body in foot     Last Assessed 30 Jul 2013  Resolved 12 Oct 2013  • Gastritis     Last Assessed 22 Oct 2012   • GERD (gastroesophageal reflux disease)    • History of oral aphthous ulcers     Last Assessed 08 June 2016  Resolved 14 Aug 2017  • Hypersomnia 02/18/2009    Last Assessed 18 Feb 2009  Resolved 16 Aug 2017  • Hypertension 2016   • Impacted cerumen     Last Assessed 08 Jun 2016  Resolved 08 Jul 2016  • Insomnia    • Irritable bowel syndrome    • Knee joint pain 03/10/2008   • Left foot pain     Last Assessed 19 May 2017  Resolved 14 Aug 2017  • Mixed hyperlipidemia 11/02/2020   • Obesity    • OCD (obsessive compulsive disorder)    • Plantar fibromatosis     Last Assessed 19 Oct 2016  Resolved 14 Aug 2017     • Seasonal allergies    • Tarsal tunnel syndrome of left side     Last Assessed 21 Aug 2017  Resolved 21 Aug 2017  • Tinea corporis     Last Assessed 05 Dec 2011     Past Surgical History:   Procedure Laterality Date   • ESOPHAGOGASTRODUODENOSCOPY N/A 10/31/2017    Procedure: ESOPHAGOGASTRODUODENOSCOPY (EGD); Surgeon: Jose Evans MD;  Location: [facility] GI LAB;   Service: Gastroenterology   • NO PAST SURGERIES     • [place] LNGTH/SHRT TENDON LEG/ANKLE 1 TENDON SPX Left 11/3/2017    Procedure: CALCANEAL OSTECTOMY, ACHILLEST TENDON LENGTHING;  Surgeon: Jodi Nunez DPM;  Location: [facility];  Service: Podiatry     Family History   Problem Relation Age of Onset   • Kidney disease Mother    • Thyroid disease Mother    • Allergic rhinitis Mother    • Hypertension Father    • Kidney disease Father    • Gout Father    • No Known Problems Sister    • Colonic polyp Maternal Grandmother    • Crohn's disease Maternal Grandmother    • Cancer Family    • Diabetes Family      Social History     Socioeconomic History   • Marital status: Single     Spouse name: None   • Number of children: None   • Years of education: None   • Highest education level: None   Occupational History   • None   Tobacco Use   • Smoking status: Never   • Smokeless tobacco: Never   Vaping Use   • Vaping Use: Never used   Substance and Sexual Activity   • Alcohol use: No   • Drug use: No   • Sexual activity: Never   Other Topics Concern   • None   Social History Narrative    Feels safe at home     Social Determinants of Health     Financial Resource Strain: Not on file   Food Insecurity: Not on file   Transportation Needs: Not on file   Physical Activity: Not on file   Stress: Not on file   Social Connections: Not on file   Intimate Partner Violence: Not on file   Housing Stability: Not on file     Current Outpatient Medications on File Prior to Visit   Medication Sig   • atorvastatin (LIPITOR) 10 mg tablet TAKE 1 TABLET BY MOUTH EVERY DAY   • buPROPion (WELLBUTRIN XL) 150 mg 24 hr tablet TAKE 1 TABLET BY MOUTH EVERY DAY IN THE MORNING   • Fluvoxamine Maleate 100 MG CP24 TAKE 1 CAPSULE BY MOUTH EVERY DAY   • Fluvoxamine Maleate 150 MG CP24 TAKE 1 CAPSULE BY MOUTH EVERY DAY   • lisinopril (ZESTRIL) 10 mg tablet TAKE 1 TABLET BY MOUTH EVERY DAY   • loratadine (CLARITIN) 10 mg tablet TAKE 1 TABLET BY MOUTH EVERY DAY   • montelukast (SINGULAIR) 10 mg tablet TAKE 1 TABLET BY MOUTH EVERY DAY   • omeprazole (PriLOSEC) 40 MG capsule TAKE 1 CAPSULE (40 MG TOTAL) BY MOUTH DAILY     • Adapalene 0 3 % gel SPREAD ONE PEA-SIZED AMOUNT OF MEDICATION OVER ENTIRE FACE ABOUT ONE HOUR BEFORE BEDTIME  (Patient not taking: Reported on 1/18/2023)   • clindamycin-benzoyl peroxide (BENZACLIN) gel Apply topically 2 (two) times a day (Patient not taking: Reported on 1/18/2023)   • Dapsone 7 5 % GEL Apply topically once daily (Patient not taking: Reported on 1/18/2023)   • Multiple Vitamin (multivitamin) capsule Take 1 capsule by mouth daily (Patient not taking: Reported on 1/18/2023)   • neomycin-polymyxin-hydrocortisone (CORTISPORIN) otic solution Administer 4 drops to the right ear 2 (two) times a day for 5 days   • triamcinolone (KENALOG) 0 1 % ointment Apply topically twice a day for 14 days starlight (Patient not taking: Reported on 1/18/2023)   • [DISCONTINUED] Alpha-D-Galactosidase (BEANO PO) Take by mouth (Patient not taking: Reported on 1/18/2023)   • [DISCONTINUED] Alpha-Lipoic Acid 200 MG CAPS TAKE 1 CAPSULE BY MOUTH EVERY DAY (Patient not taking: Reported on 1/18/2023)   • [DISCONTINUED] Ascorbic Acid (vitamin C) 1000 MG tablet TAKE 1 TABLET (1,000 MG TOTAL) BY MOUTH DAILY (Patient not taking: Reported on 1/18/2023)   • [DISCONTINUED] Bacillus Coagulans-Inulin (Probiotic) 1-250 BILLION-MG CAPS TAKE 1 CAPSULE BY MOUTH EVERY DAY (Patient not taking: Reported on 1/18/2023)   • [DISCONTINUED] Cholecalciferol (KP Vitamin D3) 50 MCG (2000 UT) CAPS Take 1 capsule (2,000 Units total) by mouth 2 (two) times a day (Patient not taking: Reported on 1/18/2023)   • [DISCONTINUED] cholecalciferol (VITAMIN D3) 1,000 units tablet Take 1 tablet (1,000 Units total) by mouth daily (Patient not taking: Reported on 1/18/2023)   • [DISCONTINUED] Echinacea-Alatorre Seal 350-100 MG CAPS TAKE 1 CAPSULE BY MOUTH EVERY DAY (Patient not taking: Reported on 1/18/2023)   • [DISCONTINUED] Echinacea-Alatorre Seal Complex CAPS Take 1 capsule by mouth daily (Patient not taking: Reported on 1/18/2023)   • [DISCONTINUED] famotidine (PEPCID) 40 MG tablet Take 1 tab PO QHS (Patient not taking: Reported on 1/18/2023)   • [DISCONTINUED] fexofenadine (ALLEGRA) 180 MG tablet Take 1 tablet (180 mg total) by mouth daily (Patient not taking: Reported on 1/18/2023)   • [DISCONTINUED] Melatonin 10 MG TABS   (Patient not taking: Reported on 1/18/2023)   • [DISCONTINUED] methylPREDNISolone 4 MG tablet therapy pack Use as directed on package (Patient not taking: Reported on 1/18/2023)   • [DISCONTINUED] Omega-3 Fatty Acids (fish oil) 1,000 mg TAKE 3 CAPSULES (3,000 MG TOTAL) BY MOUTH DAILY (Patient not taking: Reported on 1/18/2023)   • [DISCONTINUED] oxymetazoline (AFRIN) 0 05 % nasal spray 2 sprays by Each Nare route 2 (two) times a day (Patient not taking: Reported on 1/18/2023)   • [DISCONTINUED] polymyxin b-trimethoprim (POLYTRIM) ophthalmic solution Apply 1 drop to eye every 4 (four) hours (Patient not taking: Reported on 1/18/2023)   • [DISCONTINUED] Vitamin D-Vitamin K (K2 Plus D3) 100-1000 MCG-UNIT TABS TAKE 1 TABLET BY MOUTH TWICE A DAY (Patient not taking: Reported on 1/18/2023)   • [DISCONTINUED] zinc gluconate 50 mg tablet TAKE 1 TABLET BY MOUTH EVERY DAY (Patient not taking: Reported on 1/18/2023)     Allergies   Allergen Reactions   • Penicillins Hives   • Sulfa Antibiotics GI Intolerance     Immunization History   Administered Date(s) Administered   • DTaP 5 [birth year], 03/11/1992, 03/25/1993, 08/23/1995   • HPV9 09/14/2017   • Hep B, adult 04/20/2001, 05/22/2001, 12/22/2001   • Hib (HbOC) [birth year], 03/11/1992, 12/02/1992   • INFLUENZA 09/13/2018   • IPV [birth year], 02/25/1993, 08/23/1995   • Influenza Injectable, MDCK, Preservative Free, Quadrivalent, 0 5 mL 09/23/2019   • Influenza Quadrivalent, 6-35 Months IM 09/14/2017   • Influenza, injectable, quadrivalent, preservative free 0 5 mL 11/02/2020   • Influenza, recombinant, quadrivalent,injectable, preservative free 10/06/2021   • Influenza, seasonal, injectable 10/05/2015   • MMR 12/02/1992, 07/22/1996   • Td (adult), adsorbed 03/27/2009   • Varicella 08/26/2010       Objective     /74 (BP Location: Left arm, Patient Position: Sitting, Cuff Size: Large)   Pulse 78   Temp 98 1 °F (36 7 °C) (Temporal)   Resp 16   Ht 5' 7" (1 702 m)   Wt 127 kg (279 lb)   SpO2 98%   BMI 43 70 kg/m²     Physical Exam  Vitals and nursing note reviewed  Constitutional:       Appearance: He is well-developed  HENT:      Head: Normocephalic and atraumatic  Nose: Nose normal    Eyes:      General: No scleral icterus  Conjunctiva/sclera: Conjunctivae normal       Pupils: Pupils are equal, round, and reactive to light  Neck:      Thyroid: No thyromegaly  Cardiovascular:      Rate and Rhythm: Normal rate and regular rhythm  Heart sounds: Normal heart sounds  Pulmonary:      Effort: Pulmonary effort is normal  No respiratory distress  Breath sounds: Normal breath sounds  No wheezing  Abdominal:      General: Bowel sounds are normal       Palpations: Abdomen is soft  Tenderness: There is no abdominal tenderness  There is no guarding or rebound  Genitourinary:     Penis: Normal        Testes: Normal    Musculoskeletal:         General: Normal range of motion  Cervical back: Normal range of motion and neck supple  Skin:     General: Skin is warm and dry  Findings: No rash     Neurological:      Mental Status: He is alert and oriented to person, place, and time  Psychiatric:         Mood and Affect: Mood normal          Behavior: Behavior normal          Thought Content:  Thought content normal          Judgment: Judgment normal        Laura Vargas MD

## 2023-02-05 ENCOUNTER — OFFICE VISIT (OUTPATIENT)
Dept: URGENT CARE | Facility: CLINIC | Age: 33
End: 2023-02-05

## 2023-02-05 VITALS
TEMPERATURE: 97.9 F | DIASTOLIC BLOOD PRESSURE: 70 MMHG | HEART RATE: 97 BPM | RESPIRATION RATE: 16 BRPM | OXYGEN SATURATION: 96 % | SYSTOLIC BLOOD PRESSURE: 159 MMHG

## 2023-02-05 DIAGNOSIS — M10.9 ACUTE GOUT INVOLVING TOE OF RIGHT FOOT, UNSPECIFIED CAUSE: Primary | ICD-10-CM

## 2023-02-05 RX ORDER — INDOMETHACIN 75 MG/1
75 CAPSULE, EXTENDED RELEASE ORAL 2 TIMES DAILY WITH MEALS
Qty: 14 CAPSULE | Refills: 0 | Status: SHIPPED | OUTPATIENT
Start: 2023-02-05 | End: 2023-02-14 | Stop reason: SDUPTHER

## 2023-02-05 NOTE — PATIENT INSTRUCTIONS
Take Indomethacin as prescribed  Tylenol and ice as needed for pain  (Recommend 1,000 mg Tylenol every 6 hours)  Follow-up with PCP in 2-3 days if symptoms are not improving  If symptoms worsen, report to the emergency department

## 2023-02-05 NOTE — PROGRESS NOTES
[facility]        NAME: Conrado Byrd is a 33 y o  male  : 1990    MRN: [id number]  DATE: 2023  TIME: 1:02 PM    Assessment and Plan   Acute gout involving toe of right foot, unspecified cause [M10 9]  1  Acute gout involving toe of right foot, unspecified cause  indomethacin (INDOCIN SR) 75 mg CR capsule      Pt presents with symptoms and examination consistent with gout of the right great toe  He is started on indomethacin to treat and instructed to follow-up with his PCP in 3-5 days  Patient Instructions   Patient Instructions   Take Indomethacin as prescribed  Tylenol and ice as needed for pain  (Recommend 1,000 mg Tylenol every 6 hours)  Follow-up with PCP in 2-3 days if symptoms are not improving  If symptoms worsen, report to the emergency department  Follow up with PCP in 3-5 days  Proceed to  ER if symptoms worsen  Chief Complaint     Chief Complaint   Patient presents with   • Toe Pain     Rt great toe pain, pt reports family history of gout  History of Present Illness       33year old male presents with complaint of exquisite acute onset right great toe pain  Pt reports pain rated 8-9/10  It is worse with ambulation and when anything bumps it  No inciting injury  Pt has a family history of gout  Most recent uric acid level in 2022 was 9 2  Review of Systems   Review of Systems   Constitutional: Negative for chills and fever  Musculoskeletal: Positive for arthralgias and joint swelling           Current Medications       Current Outpatient Medications:   •  indomethacin (INDOCIN SR) 75 mg CR capsule, Take 1 capsule (75 mg total) by mouth 2 (two) times a day with meals for 7 days, Disp: 14 capsule, Rfl: 0  •  Adapalene 0 3 % gel, SPREAD ONE PEA-SIZED AMOUNT OF MEDICATION OVER ENTIRE FACE ABOUT ONE HOUR BEFORE BEDTIME  (Patient not taking: Reported on 2023), Disp: 45 g, Rfl: 2  •  atorvastatin (LIPITOR) 10 mg tablet, TAKE 1 TABLET BY MOUTH EVERY DAY, Disp: 30 tablet, Rfl: 3  •  buPROPion (WELLBUTRIN XL) 150 mg 24 hr tablet, TAKE 1 TABLET BY MOUTH EVERY DAY IN THE MORNING, Disp: 30 tablet, Rfl: 1  •  clindamycin-benzoyl peroxide (BENZACLIN) gel, Apply topically 2 (two) times a day (Patient not taking: Reported on 1/18/2023), Disp: 50 g, Rfl: 3  •  Dapsone 7 5 % GEL, Apply topically once daily (Patient not taking: Reported on 1/18/2023), Disp: 90 g, Rfl: 3  •  Fluvoxamine Maleate 100 MG CP24, TAKE 1 CAPSULE BY MOUTH EVERY DAY, Disp: 30 capsule, Rfl: 5  •  Fluvoxamine Maleate 150 MG CP24, TAKE 1 CAPSULE BY MOUTH EVERY DAY, Disp: 30 capsule, Rfl: 5  •  lisinopril (ZESTRIL) 10 mg tablet, TAKE 1 TABLET BY MOUTH EVERY DAY, Disp: 30 tablet, Rfl: 5  •  loratadine (CLARITIN) 10 mg tablet, TAKE 1 TABLET BY MOUTH EVERY DAY, Disp: 30 tablet, Rfl: 5  •  montelukast (SINGULAIR) 10 mg tablet, TAKE 1 TABLET BY MOUTH EVERY DAY, Disp: 30 tablet, Rfl: 4  •  Multiple Vitamin (multivitamin) capsule, Take 1 capsule by mouth daily (Patient not taking: Reported on 1/18/2023), Disp: 100 capsule, Rfl: 3  •  neomycin-polymyxin-hydrocortisone (CORTISPORIN) otic solution, Administer 4 drops to the right ear 2 (two) times a day for 5 days, Disp: 10 mL, Rfl: 0  •  omeprazole (PriLOSEC) 40 MG capsule, TAKE 1 CAPSULE (40 MG TOTAL) BY MOUTH DAILY  , Disp: 30 capsule, Rfl: 5  •  triamcinolone (KENALOG) 0 1 % ointment, Apply topically twice a day for 14 days starlight (Patient not taking: Reported on 1/18/2023), Disp: 30 g, Rfl: 0    Current Allergies     Allergies as of 02/05/2023 - Reviewed 02/05/2023   Allergen Reaction Noted   • Penicillins Hives 10/30/2017   • Sulfa antibiotics GI Intolerance 10/30/2017            The following portions of the patient's history were reviewed and updated as appropriate: allergies, current medications, past family history, past medical history, past social history, past surgical history and problem list      Past Medical History:   Diagnosis Date • Acne 09/01/2010    Last Assessed 01 Sep 2010  • Acquired ankle/foot deformity     Last assessed 23 Feb 2017  Resolved 14 Aug 2017  • Allergic    • Anxiety    • Change in hearing     Resolved 16 Aug 2017   • Closed displaced avulsion fracture of tuberosity of left calcaneus     Last Assessed 25 May 2017  Resolved 14 Aug 2017  • Depression    • Epistaxis     Last Assessed 18 Feb 2014  Resolved 08 Jun 2016  • Essential hypertension 06/19/2018   • Foreign body in foot     Last Assessed 30 Jul 2013  Resolved 12 Oct 2013  • Gastritis     Last Assessed 22 Oct 2012   • GERD (gastroesophageal reflux disease)    • History of oral aphthous ulcers     Last Assessed 08 June 2016  Resolved 14 Aug 2017  • Hypersomnia 02/18/2009    Last Assessed 18 Feb 2009  Resolved 16 Aug 2017  • Hypertension 2016   • Impacted cerumen     Last Assessed 08 Jun 2016  Resolved 08 Jul 2016  • Insomnia    • Irritable bowel syndrome    • Knee joint pain 03/10/2008   • Left foot pain     Last Assessed 19 May 2017  Resolved 14 Aug 2017  • Mixed hyperlipidemia 11/02/2020   • Obesity    • OCD (obsessive compulsive disorder)    • Plantar fibromatosis     Last Assessed 19 Oct 2016  Resolved 14 Aug 2017  • Seasonal allergies    • Tarsal tunnel syndrome of left side     Last Assessed 21 Aug 2017  Resolved 21 Aug 2017  • Tinea corporis     Last Assessed 05 Dec 2011       Past Surgical History:   Procedure Laterality Date   • ESOPHAGOGASTRODUODENOSCOPY N/A 10/31/2017    Procedure: ESOPHAGOGASTRODUODENOSCOPY (EGD); Surgeon: Cristina Pierce MD;  Location: [facility] GI LAB;   Service: Gastroenterology   • NO PAST SURGERIES     • [place] LNGTH/SHRT TENDON LEG/ANKLE 1 TENDON SPX Left 11/3/2017    Procedure: CALCANEAL OSTECTOMY, ACHILLEST TENDON LENGTHING;  Surgeon: Dylon Ramos DPM;  Location: [facility] OR;  Service: Podiatry       Family History   Problem Relation Age of Onset   • Kidney disease Mother    • Thyroid disease Mother    • Allergic rhinitis Mother    • Hypertension Father    • Kidney disease Father    • Gout Father    • No Known Problems Sister    • Colonic polyp Maternal Grandmother    • Crohn's disease Maternal Grandmother    • Cancer Family    • Diabetes Family          Medications have been verified  Objective   /70   Pulse 97   Temp 97 9 °F (36 6 °C)   Resp 16   SpO2 96%   No LMP for male patient  Physical Exam     Physical Exam  Constitutional:       General: He is awake  He is not in acute distress  Appearance: Normal appearance  He is well-developed and well-groomed  He is not ill-appearing, toxic-appearing or diaphoretic  HENT:      Head: Normocephalic and atraumatic  Right Ear: Hearing and external ear normal       Left Ear: Hearing and external ear normal    Eyes:      General: Lids are normal  Vision grossly intact  Gaze aligned appropriately  Cardiovascular:      Rate and Rhythm: Normal rate  Pulmonary:      Effort: Pulmonary effort is normal       Comments: Patient is speaking in full sentences with no increased respiratory effort  No audible wheezing or stridor  Musculoskeletal:      Cervical back: Normal range of motion  Feet:      Comments: Pt has exquisite tenderness to palpation with effusion and warmth present at the IP joint of the right hallux  There is no erythema present at this time  Skin:     General: Skin is warm and dry  Neurological:      Mental Status: He is alert and oriented to person, place, and time  Coordination: Coordination is intact  Gait: Gait is intact  Psychiatric:         Attention and Perception: Attention and perception normal          Mood and Affect: Mood and affect normal          Speech: Speech normal          Behavior: Behavior normal  Behavior is cooperative  Note: Portions of this record may have been created with voice recognition software   Occasional wrong word or "sound a like" substitutions may have occurred due to the inherent limitations of voice recognition software  Please read the chart carefully and recognize, using context, where substitutions have occurred  *

## 2023-02-06 ENCOUNTER — OFFICE VISIT (OUTPATIENT)
Dept: SLEEP CENTER | Facility: CLINIC | Age: 33
End: 2023-02-06

## 2023-02-06 DIAGNOSIS — F98.8 ADD (ATTENTION DEFICIT DISORDER) WITHOUT HYPERACTIVITY: ICD-10-CM

## 2023-02-06 DIAGNOSIS — F42.9 OBSESSIVE-COMPULSIVE DISORDER, UNSPECIFIED TYPE: ICD-10-CM

## 2023-02-06 DIAGNOSIS — J34.89 DRY NOSE: ICD-10-CM

## 2023-02-06 DIAGNOSIS — R68.2 DRY MOUTH: ICD-10-CM

## 2023-02-06 DIAGNOSIS — G47.33 OBSTRUCTIVE SLEEP APNEA (ADULT) (PEDIATRIC): Primary | ICD-10-CM

## 2023-02-06 DIAGNOSIS — J30.9 ALLERGIC RHINITIS, UNSPECIFIED SEASONALITY, UNSPECIFIED TRIGGER: ICD-10-CM

## 2023-02-06 DIAGNOSIS — I10 ESSENTIAL HYPERTENSION: ICD-10-CM

## 2023-02-06 DIAGNOSIS — G47.61 PLMD (PERIODIC LIMB MOVEMENT DISORDER): ICD-10-CM

## 2023-02-06 DIAGNOSIS — F45.8 BRUXISM: ICD-10-CM

## 2023-02-06 DIAGNOSIS — F32.A DEPRESSION, UNSPECIFIED DEPRESSION TYPE: ICD-10-CM

## 2023-02-06 DIAGNOSIS — M10.9 GOUT, UNSPECIFIED CAUSE, UNSPECIFIED CHRONICITY, UNSPECIFIED SITE: Primary | ICD-10-CM

## 2023-02-06 DIAGNOSIS — G47.19 EXCESSIVE DAYTIME SLEEPINESS: ICD-10-CM

## 2023-02-06 DIAGNOSIS — E66.01 MORBID OBESITY (HCC): ICD-10-CM

## 2023-02-06 NOTE — PROGRESS NOTES
Follow-Up Note - [address]  33 y o  male  UVN:0[birth year]  GLY:[id number]  DOS:2/6/2023    CC: I saw this patient for follow-up in clinic today for Sleep disordered breathing, Coexisting Sleep and Medical Problems  Results of prior studies: the diagnostic study in September of 2020 demonstrated severe obstructive sleep apnea:    AHI 29 8 /hour , higher while supine at 37 per hour and considerably higher during REM at 52 per hour  Very loud intensity  snoring  was noted  Minimum oxygen saturation 70 %  and 20 minutes of total sleep time was spent with saturations less than 90%  Sleep efficiency was slightly prolonged at 47 minutes  Sleep efficiency was 84 6%  There was severe periodic limb movements of sleep for an index of 51 per hour  The subsequent therapeutic study sleep disordered breathing was adequately remediated with PAP at 11 cm H2O  Sleep latency was 38 minutes  Sleep architecture was normal   There were mild periodic limb movements of sleep but severe sleep fragmentation  Treatment was initiated using a ResMed machine     PFSH, Problem List, Medications & Allergies were reviewed in EMR  Interval changes: None reported  He  has a past medical history of Acne (09/01/2010), Acquired ankle/foot deformity, Allergic, Anxiety, Change in hearing, Closed displaced avulsion fracture of tuberosity of left calcaneus, Depression, Epistaxis, Essential hypertension (06/19/2018), Foreign body in foot, Gastritis, GERD (gastroesophageal reflux disease), History of oral aphthous ulcers, Hypersomnia (02/18/2009), Hypertension (2016), Impacted cerumen, Insomnia, Irritable bowel syndrome, Knee joint pain (03/10/2008), Left foot pain, Mixed hyperlipidemia (11/02/2020), Obesity, OCD (obsessive compulsive disorder), Plantar fibromatosis, Seasonal allergies, Tarsal tunnel syndrome of left side, and Tinea corporis      He has a current medication list which includes the following prescription(s): atorvastatin, bupropion, fluvoxamine maleate, fluvoxamine maleate, indomethacin, lisinopril, loratadine, montelukast, omeprazole, adapalene, clindamycin-benzoyl peroxide, dapsone, multivitamin, neomycin-polymyxin-hydrocortisone, and triamcinolone  PHYSIOLOGICAL DATA REVIEW AND INTERPRETATION: Using PAP > 4 hours/night 100%  Estimated CESAR 1 6/hour with pressure of 13cm [email or web address] percentile; Patient has not been using ozone based devices to sanitize the machine  SUBJECTIVE: With respect to use of PAP, Thee Toney reports benefiting from use  He is experiencing some adverse effects: dry mouth/throat and dry nose; at times he has some restless leg symptoms but not disturbing sleep  He is not aware of periodic limb movements of sleep or teeth grinding during sleep  Sleep Routine: Thee Toney reports getting 8-9 hrs sleep; he has no difficulty initiating or maintaining sleep   He arises spontaneously and usually feels refreshed  Thee Toney denies excessive daytime sleepiness,  He rated [himself] at Total score: 4 /24 on the [place] Sleepiness Scale  Habits:[ reports that he has never smoked  He has never used smokeless tobacco ], [ reports no history of alcohol use ], [ reports no history of drug use ], Caffeine use:very little; Exercise routine: none  ROS: reviewed & significant for weight has been fluctuating  He uses Claritin and Singulair for his allergies  His psychiatric conditions are controlled on current medications  EXAM: BP (P) 132/78   Ht (P) 5' 7" (1 702 m)   Wt (P) 129 kg (283 lb 9 6 oz)   BMI (P) 44 42 kg/m²     Wt Readings from Last 3 Encounters:   02/06/23 (P) 129 kg (283 lb 9 6 oz)   01/18/23 127 kg (279 lb)   09/15/22 132 kg (290 lb)      Patient is well groomed; well appearing  CNS: Alert, orientated, clear & coherent speech  Psych: cooperative and in no distress  Mental state: Appears normal   H&N: EOMI; NC/AT: No facial pressure marks, no rashes      Skin/Extrem: col & hydration normal; no edema  Resp: Respiratory effort is normal  Physical findings otherwise essentially unchanged from previous  IMPRESSION: Problem List Items & Comorbidities Addressed this Visit    1  Obstructive sleep apnea (adult) (pediatric)  Ambulatory Referral to Sleep Medicine    PAP DME Resupply/Reorder      2  PLMD (periodic limb movement disorder)        3  Bruxism        4  Excessive daytime sleepiness        5  Allergic rhinitis, unspecified seasonality, unspecified trigger        6  Dry mouth        7  Dry nose        8  Essential hypertension        9  Depression, unspecified depression type        10  Obsessive-compulsive disorder, unspecified type        11  ADD (attention deficit disorder) without hyperactivity        12  Morbid obesity ([address] )            PLAN:  1  I reviewed results of prior studies and physiologic data with the patient  2  I discussed treatment options with risks and benefits  3  Treatment with  PAP is medically necessary and Itzel Barr is agreable to continue use  4  Care of equipment, methods to improve comfort using PAP and importance of compliance with therapy were discussed  5  Pressure setting: continue 11-13 cmH2O     6  Rx provided to replace supplies and Care coordinated with DME provider  7  Nasal symptoms may improve with regular nasal saline rinse up to twice a day, followed by topical nasal steroid (e g  OTC Flonase, Nasacort) once a day if necessary  With the strategies he may be able to reduce use of Claritin that may be contributing to dryness  8  Discussed strategies for weight reduction  9  Follow-up is advised in 1 year or sooner if needed to monitor progress, compliance and to adjust therapy  Thank you for allowing me to participate in the care of this patient  Sincerely,     Authenticated electronically on 92/73/21   Board Certified Specialist     Portions of the record may have been created with voice recognition software   Occasional wrong word or "sound a like" substitutions may have occurred due to the inherent limitations of voice recognition software  There may also be notations and random deletions of words or characters from malfunctioning software  Read the chart carefully and recognize, using context, where substitutions/deletions have occurred

## 2023-02-06 NOTE — PATIENT INSTRUCTIONS

## 2023-02-07 ENCOUNTER — TELEPHONE (OUTPATIENT)
Dept: SLEEP CENTER | Facility: CLINIC | Age: 33
End: 2023-02-07

## 2023-02-08 LAB

## 2023-02-09 ENCOUNTER — APPOINTMENT (OUTPATIENT)
Dept: LAB | Facility: CLINIC | Age: 33
End: 2023-02-09

## 2023-02-09 DIAGNOSIS — M10.9 GOUT, UNSPECIFIED CAUSE, UNSPECIFIED CHRONICITY, UNSPECIFIED SITE: ICD-10-CM

## 2023-02-09 LAB — URATE SERPL-MCNC: 9.6 MG/DL (ref 3.5–8.5)

## 2023-02-10 DIAGNOSIS — I10 ESSENTIAL HYPERTENSION: ICD-10-CM

## 2023-02-10 RX ORDER — LISINOPRIL 10 MG/1
TABLET ORAL
Qty: 30 TABLET | Refills: 5 | Status: SHIPPED | OUTPATIENT
Start: 2023-02-10

## 2023-02-14 ENCOUNTER — TELEPHONE (OUTPATIENT)
Dept: FAMILY MEDICINE CLINIC | Facility: CLINIC | Age: 33
End: 2023-02-14

## 2023-02-14 ENCOUNTER — OFFICE VISIT (OUTPATIENT)
Dept: URGENT CARE | Facility: CLINIC | Age: 33
End: 2023-02-14

## 2023-02-14 VITALS
TEMPERATURE: 97.8 F | HEART RATE: 97 BPM | DIASTOLIC BLOOD PRESSURE: 72 MMHG | SYSTOLIC BLOOD PRESSURE: 132 MMHG | RESPIRATION RATE: 16 BRPM

## 2023-02-14 DIAGNOSIS — M10.9 ACUTE GOUT INVOLVING TOE OF RIGHT FOOT, UNSPECIFIED CAUSE: Primary | ICD-10-CM

## 2023-02-14 DIAGNOSIS — M10.9 ACUTE GOUT INVOLVING TOE OF RIGHT FOOT, UNSPECIFIED CAUSE: ICD-10-CM

## 2023-02-14 RX ORDER — INDOMETHACIN 75 MG/1
75 CAPSULE, EXTENDED RELEASE ORAL 2 TIMES DAILY WITH MEALS
Qty: 14 CAPSULE | Refills: 0 | Status: SHIPPED | OUTPATIENT
Start: 2023-02-14 | End: 2023-02-21

## 2023-02-14 RX ORDER — PREDNISONE 20 MG/1
TABLET ORAL
Qty: 15 TABLET | Refills: 0 | Status: SHIPPED | OUTPATIENT
Start: 2023-02-14

## 2023-02-14 NOTE — PROGRESS NOTES
[facility]        NAME: Fouzia Wren is a 33 y o  male  : 1990    MRN: [id number]  DATE: 2023  TIME: 4:56 PM    Assessment and Plan   Acute gout involving toe of right foot, unspecified cause [M10 9]  1  Acute gout involving toe of right foot, unspecified cause  predniSONE 20 mg tablet            Patient Instructions     1  Over-the-counter acetaminophen 975 mg every 6-8 hours as needed for pain  2   Apply ice and elevation to the right foot area 3-4 times daily for 30 minutes until the symptoms improve  3   Go to the ER for any significantly worsening symptoms  4   Follow-up with your primary care doctor in 5 to 7 days for additional evaluation and treatment  Chief Complaint     Chief Complaint   Patient presents with   • Toe Pain     R great toe pain which started 2 days ago, started indocin         History of Present Illness       52-year-old male patient with a 2-day history of right great toe pain consistent with previous flares of gout  Patient states that he had a flare of gout earlier in the week and took indomethacin and it seemed to be getting better  However, the symptoms have now recurred  Patient denies any injury to the area whatsoever  Review of Systems   Review of Systems   Constitutional: Negative for chills and fever  HENT: Negative for ear pain and sore throat  Eyes: Negative for pain and visual disturbance  Respiratory: Negative for cough and shortness of breath  Cardiovascular: Negative for chest pain and palpitations  Gastrointestinal: Negative for abdominal pain and vomiting  Genitourinary: Negative for dysuria and hematuria  Musculoskeletal: Positive for arthralgias and joint swelling  Negative for back pain  Skin: Negative for color change and rash  Neurological: Negative for seizures and syncope  All other systems reviewed and are negative          Current Medications       Current Outpatient Medications:   •  atorvastatin (LIPITOR) 10 mg tablet, TAKE 1 TABLET BY MOUTH EVERY DAY, Disp: 30 tablet, Rfl: 3  •  buPROPion (WELLBUTRIN XL) 150 mg 24 hr tablet, TAKE 1 TABLET BY MOUTH EVERY DAY IN THE MORNING, Disp: 30 tablet, Rfl: 1  •  Fluvoxamine Maleate 100 MG CP24, TAKE 1 CAPSULE BY MOUTH EVERY DAY, Disp: 30 capsule, Rfl: 5  •  Fluvoxamine Maleate 150 MG CP24, TAKE 1 CAPSULE BY MOUTH EVERY DAY, Disp: 30 capsule, Rfl: 5  •  lisinopril (ZESTRIL) 10 mg tablet, TAKE 1 TABLET BY MOUTH EVERY DAY, Disp: 30 tablet, Rfl: 5  •  loratadine (CLARITIN) 10 mg tablet, TAKE 1 TABLET BY MOUTH EVERY DAY, Disp: 30 tablet, Rfl: 5  •  montelukast (SINGULAIR) 10 mg tablet, TAKE 1 TABLET BY MOUTH EVERY DAY, Disp: 30 tablet, Rfl: 4  •  omeprazole (PriLOSEC) 40 MG capsule, TAKE 1 CAPSULE (40 MG TOTAL) BY MOUTH DAILY  , Disp: 30 capsule, Rfl: 5  •  predniSONE 20 mg tablet, Take 3 tabs all at once daily for 3 days then 2 tabs for 2 days then 1 tab for 2 days  , Disp: 15 tablet, Rfl: 0  •  Adapalene 0 3 % gel, SPREAD ONE PEA-SIZED AMOUNT OF MEDICATION OVER ENTIRE FACE ABOUT ONE HOUR BEFORE BEDTIME  (Patient not taking: Reported on 1/18/2023), Disp: 45 g, Rfl: 2  •  clindamycin-benzoyl peroxide (BENZACLIN) gel, Apply topically 2 (two) times a day (Patient not taking: Reported on 1/18/2023), Disp: 50 g, Rfl: 3  •  Dapsone 7 5 % GEL, Apply topically once daily (Patient not taking: Reported on 1/18/2023), Disp: 90 g, Rfl: 3  •  indomethacin (INDOCIN SR) 75 mg CR capsule, Take 1 capsule (75 mg total) by mouth 2 (two) times a day with meals for 7 days, Disp: 14 capsule, Rfl: 0  •  Multiple Vitamin (multivitamin) capsule, Take 1 capsule by mouth daily (Patient not taking: Reported on 1/18/2023), Disp: 100 capsule, Rfl: 3  •  neomycin-polymyxin-hydrocortisone (CORTISPORIN) otic solution, Administer 4 drops to the right ear 2 (two) times a day for 5 days (Patient not taking: Reported on 2/6/2023), Disp: 10 mL, Rfl: 0  •  triamcinolone (KENALOG) 0 1 % ointment, Apply topically twice a day for 14 days starlight (Patient not taking: Reported on 1/18/2023), Disp: 30 g, Rfl: 0    Current Allergies     Allergies as of 02/14/2023 - Reviewed 02/14/2023   Allergen Reaction Noted   • Penicillins Hives 10/30/2017   • Sulfa antibiotics GI Intolerance 10/30/2017            The following portions of the patient's history were reviewed and updated as appropriate: allergies, current medications, past family history, past medical history, past social history, past surgical history and problem list      Past Medical History:   Diagnosis Date   • Acne 09/01/2010    Last Assessed 01 Sep 2010  • Acquired ankle/foot deformity     Last assessed 23 Feb 2017  Resolved 14 Aug 2017  • Allergic    • Anxiety    • Change in hearing     Resolved 16 Aug 2017   • Closed displaced avulsion fracture of tuberosity of left calcaneus     Last Assessed 25 May 2017  Resolved 14 Aug 2017  • Depression    • Epistaxis     Last Assessed 18 Feb 2014  Resolved 08 Jun 2016  • Essential hypertension 06/19/2018   • Foreign body in foot     Last Assessed 30 Jul 2013  Resolved 12 Oct 2013  • Gastritis     Last Assessed 22 Oct 2012   • GERD (gastroesophageal reflux disease)    • History of oral aphthous ulcers     Last Assessed 08 June 2016  Resolved 14 Aug 2017  • Hypersomnia 02/18/2009    Last Assessed 18 Feb 2009  Resolved 16 Aug 2017  • Hypertension 2016   • Impacted cerumen     Last Assessed 08 Jun 2016  Resolved 08 Jul 2016  • Insomnia    • Irritable bowel syndrome    • Knee joint pain 03/10/2008   • Left foot pain     Last Assessed 19 May 2017  Resolved 14 Aug 2017  • Mixed hyperlipidemia 11/02/2020   • Obesity    • OCD (obsessive compulsive disorder)    • Plantar fibromatosis     Last Assessed 19 Oct 2016  Resolved 14 Aug 2017  • Seasonal allergies    • Tarsal tunnel syndrome of left side     Last Assessed 21 Aug 2017  Resolved 21 Aug 2017     • Tinea corporis     Last Assessed 05 Dec 2011       Past Surgical History:   Procedure Laterality Date   • ESOPHAGOGASTRODUODENOSCOPY N/A 10/31/2017    Procedure: ESOPHAGOGASTRODUODENOSCOPY (EGD); Surgeon: Pardeep Toscano MD;  Location: [facility]; Service: Gastroenterology   • NO PAST SURGERIES     • GA LNGTH/SHRT TENDON LEG/ANKLE 1 TENDON SPX Left 11/3/2017    Procedure: CALCANEAL OSTECTOMY, ACHILLEST TENDON LENGTHING;  Surgeon: Pasha Real DPM;  Location: [facility];  Service: Podiatry       Family History   Problem Relation Age of Onset   • Kidney disease Mother    • Thyroid disease Mother    • Allergic rhinitis Mother    • Hypertension Father    • Kidney disease Father    • Gout Father    • No Known Problems Sister    • Colonic polyp Maternal Grandmother    • Crohn's disease Maternal Grandmother    • Cancer Family    • Diabetes Family          Medications have been verified  Objective   /72   Pulse 97   Temp 97 8 °F (36 6 °C) (Temporal)   Resp 16        Physical Exam     Physical Exam  Vitals and nursing note reviewed  Constitutional:       General: He is not in acute distress  Appearance: Normal appearance  He is not ill-appearing  HENT:      Head: Normocephalic  Nose: Nose normal       Mouth/Throat:      Mouth: Mucous membranes are moist       Pharynx: Oropharynx is clear  Eyes:      Conjunctiva/sclera: Conjunctivae normal       Pupils: Pupils are equal, round, and reactive to light  Cardiovascular:      Rate and Rhythm: Normal rate and regular rhythm  Pulses: Normal pulses  Pulmonary:      Effort: Pulmonary effort is normal       Breath sounds: Normal breath sounds  Abdominal:      Tenderness: There is no abdominal tenderness  Musculoskeletal:      Cervical back: Normal range of motion and neck supple  Comments: Right great toe is subtly swollen over the IP joint and exquisitely tender  Decreased range of motion of the same  No redness or warmth to the area  Skin:     General: Skin is warm and dry        Capillary Refill: Capillary refill takes less than 2 seconds  Neurological:      Mental Status: He is alert and oriented to person, place, and time     Psychiatric:         Mood and Affect: Mood normal          Behavior: Behavior normal

## 2023-02-14 NOTE — PATIENT INSTRUCTIONS
1   Over-the-counter acetaminophen 975 mg every 6-8 hours as needed for pain  2   Apply ice and elevation to the right foot area 3-4 times daily for 30 minutes until the symptoms improve  3   Go to the ER for any significantly worsening symptoms  4   Follow-up with your primary care doctor in 5 to 7 days for additional evaluation and treatment

## 2023-02-14 NOTE — TELEPHONE ENCOUNTER
Giselle Liz called in today and scheduled an appt for Friday the 17th to discuss uric acid levels  Pt is asking for something for pain until his appt  Please review and advise    Thanks

## 2023-02-17 ENCOUNTER — TELEMEDICINE (OUTPATIENT)
Dept: FAMILY MEDICINE CLINIC | Facility: CLINIC | Age: 33
End: 2023-02-17

## 2023-02-17 DIAGNOSIS — F98.8 ADD (ATTENTION DEFICIT DISORDER) WITHOUT HYPERACTIVITY: ICD-10-CM

## 2023-02-17 DIAGNOSIS — M10.9 ACUTE GOUT INVOLVING TOE, UNSPECIFIED CAUSE, UNSPECIFIED LATERALITY: Primary | ICD-10-CM

## 2023-02-17 DIAGNOSIS — E66.9 OBESITY (BMI 30-39.9): ICD-10-CM

## 2023-02-17 DIAGNOSIS — F32.5 MAJOR DEPRESSIVE DISORDER WITH SINGLE EPISODE, IN REMISSION (HCC): ICD-10-CM

## 2023-02-17 RX ORDER — COLCHICINE 0.6 MG/1
0.6 TABLET ORAL DAILY
Qty: 20 TABLET | Refills: 1 | Status: SHIPPED | OUTPATIENT
Start: 2023-02-17

## 2023-02-17 RX ORDER — ALLOPURINOL 300 MG/1
300 TABLET ORAL DAILY
Qty: 90 TABLET | Refills: 1 | Status: SHIPPED | OUTPATIENT
Start: 2023-02-17

## 2023-02-17 NOTE — PROGRESS NOTES
Virtual Regular Visit    Verification of patient location:    Patient is located in the following state in which I hold an active license [place]      Assessment/Plan:    Problem List Items Addressed This Visit        Other    ADD (attention deficit disorder) without hyperactivity     Patient is stable  and will continue present plan of care and reassess at next routine visit  All questions about this problem from patient were answered today  Depression     Patient to continue utilizing medical therapy as well and counseling sources as applicable for condition  If  suicidal thought or fear of suicide to contact 911 and seek help immediately  Meds reviewed and patient questions answered today         Obesity (BMI 30-39  9)     Patient to increase exercise and partake of a diet with less calories to promote  weight loss        Other Visit Diagnoses     Acute gout involving toe, unspecified cause, unspecified laterality    -  Primary    Relevant Medications    allopurinol (ZYLOPRIM) 300 mg tablet    colchicine (COLCRYS) 0 6 mg tablet    Other Relevant Orders    Uric acid            Depression Screening and Follow-up Plan: Patient assessed for underlying major depression  Brief counseling provided and recommend additional follow-up/re-evaluation next office visit  Patient advised to follow-up with PCP for further management           Reason for visit is   Chief Complaint   Patient presents with   • Virtual Regular Visit        Encounter provider Qiana Roland MD    Provider located at [address] [place] [address] [id number] [phone]      Recent Visits  Date Type Provider Dept   02/14/23 Telephone Via [facility]   Showing recent visits within past 7 days and meeting all other requirements  Today's Visits  Date Type Provider Dept   02/17/23 Telemedicine Qiana Roland [place] [id number] [facility] today's visits and meeting all other requirements  Future Appointments  No visits were found meeting these conditions  Showing future appointments within next 150 days and meeting all other requirements       The patient was identified by name and date of birth  Jen Gillis was informed that this is a telemedicine visit and that the visit is being conducted through the [employer]  He agrees to proceed     My office door was closed  No one else was in the room  He acknowledged consent and understanding of privacy and security of the video platform  The patient has agreed to participate and understands they can discontinue the visit at any time  Patient is aware this is a billable service  Subjective  Jen Gillis is a 33 y o  male see below   52-year-old male seen via video chat today with acute gout  Patient multiple medical problems include ADD some obsessive-compulsive disorder and some depression  Patient recently was had a problem with his left big toe was diagnosed as gout at in the walk-in center  Patient was started on some steroids and had another flareup  I told patient about continue steroids to is finished and I will add some colchicine for his acute problem and will add allopurinol 300 mg once a day  Patient had a uric acid level since he was at the [facility] [place] which is elevated at 9 2  Discussed with patient about his diet this is good for losing weight as well as for keeping his uric acid levels down we talked about the foods that will cause problems with gout that he should avoid there is to be seafood shellfish alcohol meats that are made of organs  Patient will get a uric acid level and we will see him back in about 6 weeks    I have given her prescription for colchicine the though he says he does not know if he is going to need it but I will have it in case he has to have an acute flareup between now and then he also has some Indocin that he was told not to take with his prednisone but he may take that if he gets an acute flareup with the colchicine  Past Medical History:   Diagnosis Date   • Acne 09/01/2010    Last Assessed 01 Sep 2010  • Acquired ankle/foot deformity     Last assessed 23 Feb 2017  Resolved 14 Aug 2017  • Allergic    • Anxiety    • Change in hearing     Resolved 16 Aug 2017   • Closed displaced avulsion fracture of tuberosity of left calcaneus     Last Assessed 25 May 2017  Resolved 14 Aug 2017  • Depression    • Epistaxis     Last Assessed 18 Feb 2014  Resolved 08 Jun 2016  • Essential hypertension 06/19/2018   • Foreign body in foot     Last Assessed 30 Jul 2013  Resolved 12 Oct 2013  • Gastritis     Last Assessed 22 Oct 2012   • GERD (gastroesophageal reflux disease)    • History of oral aphthous ulcers     Last Assessed 08 June 2016  Resolved 14 Aug 2017  • Hypersomnia 02/18/2009    Last Assessed 18 Feb 2009  Resolved 16 Aug 2017  • Hypertension 2016   • Impacted cerumen     Last Assessed 08 Jun 2016  Resolved 08 Jul 2016  • Insomnia    • Irritable bowel syndrome    • Knee joint pain 03/10/2008   • Left foot pain     Last Assessed 19 May 2017  Resolved 14 Aug 2017  • Mixed hyperlipidemia 11/02/2020   • Obesity    • OCD (obsessive compulsive disorder)    • Plantar fibromatosis     Last Assessed 19 Oct 2016  Resolved 14 Aug 2017  • Seasonal allergies    • Tarsal tunnel syndrome of left side     Last Assessed 21 Aug 2017  Resolved 21 Aug 2017  • Tinea corporis     Last Assessed 05 Dec 2011       Past Surgical History:   Procedure Laterality Date   • ESOPHAGOGASTRODUODENOSCOPY N/A 10/31/2017    Procedure: ESOPHAGOGASTRODUODENOSCOPY (EGD); Surgeon: Bar Zuñiga MD;  Location: [facility] GI LAB;   Service: Gastroenterology   • NO PAST SURGERIES     • IN LNGTH/SHRT TENDON LEG/ANKLE 1 TENDON SPX Left 11/3/2017    Procedure: CALCANEAL OSTECTOMY, ACHILLEST TENDON LENGTHING;  Surgeon: Echo Turner DPM;  Location: [facility] OR;  Service: Podiatry       Current Outpatient Medications   Medication Sig Dispense Refill   • allopurinol (ZYLOPRIM) 300 mg tablet Take 1 tablet (300 mg total) by mouth daily 90 tablet 1   • colchicine (COLCRYS) 0 6 mg tablet Take 1 tablet (0 6 mg total) by mouth daily 20 tablet 1   • Adapalene 0 3 % gel SPREAD ONE PEA-SIZED AMOUNT OF MEDICATION OVER ENTIRE FACE ABOUT ONE HOUR BEFORE BEDTIME  (Patient not taking: Reported on 1/18/2023) 45 g 2   • atorvastatin (LIPITOR) 10 mg tablet TAKE 1 TABLET BY MOUTH EVERY DAY 30 tablet 3   • buPROPion (WELLBUTRIN XL) 150 mg 24 hr tablet TAKE 1 TABLET BY MOUTH EVERY DAY IN THE MORNING 30 tablet 1   • clindamycin-benzoyl peroxide (BENZACLIN) gel Apply topically 2 (two) times a day (Patient not taking: Reported on 1/18/2023) 50 g 3   • Dapsone 7 5 % GEL Apply topically once daily (Patient not taking: Reported on 1/18/2023) 90 g 3   • Fluvoxamine Maleate 100 MG CP24 TAKE 1 CAPSULE BY MOUTH EVERY DAY 30 capsule 5   • Fluvoxamine Maleate 150 MG CP24 TAKE 1 CAPSULE BY MOUTH EVERY DAY 30 capsule 5   • indomethacin (INDOCIN SR) 75 mg CR capsule Take 1 capsule (75 mg total) by mouth 2 (two) times a day with meals for 7 days 14 capsule 0   • lisinopril (ZESTRIL) 10 mg tablet TAKE 1 TABLET BY MOUTH EVERY DAY 30 tablet 5   • loratadine (CLARITIN) 10 mg tablet TAKE 1 TABLET BY MOUTH EVERY DAY 30 tablet 5   • montelukast (SINGULAIR) 10 mg tablet TAKE 1 TABLET BY MOUTH EVERY DAY 30 tablet 4   • Multiple Vitamin (multivitamin) capsule Take 1 capsule by mouth daily (Patient not taking: Reported on 1/18/2023) 100 capsule 3   • neomycin-polymyxin-hydrocortisone (CORTISPORIN) otic solution Administer 4 drops to the right ear 2 (two) times a day for 5 days (Patient not taking: Reported on 2/6/2023) 10 mL 0   • omeprazole (PriLOSEC) 40 MG capsule TAKE 1 CAPSULE (40 MG TOTAL) BY MOUTH DAILY  30 capsule 5   • predniSONE 20 mg tablet Take 3 tabs all at once daily for 3 days then 2 tabs for 2 days then 1 tab for 2 days   15 tablet 0   • triamcinolone (KENALOG) 0 1 % ointment Apply topically twice a day for 14 days starlight (Patient not taking: Reported on 1/18/2023) 30 g 0     No current facility-administered medications for this visit  Allergies   Allergen Reactions   • Penicillins Hives   • Sulfa Antibiotics GI Intolerance       Review of Systems   Constitutional: Negative for activity change, appetite change, chills, fatigue, fever and unexpected weight change  HENT: Negative for congestion, ear pain, hearing loss, mouth sores, postnasal drip, sinus pressure, sinus pain, sneezing and sore throat  Respiratory: Negative for apnea, cough, shortness of breath and wheezing  Cardiovascular: Negative for chest pain, palpitations and leg swelling  Gastrointestinal: Negative for abdominal pain, constipation, diarrhea, nausea and vomiting  Endocrine: Negative for cold intolerance and heat intolerance  Genitourinary: Negative for dysuria, frequency and hematuria  Musculoskeletal: Positive for arthralgias  Negative for back pain, gait problem, joint swelling and neck pain  Skin: Negative for rash  Neurological: Negative for dizziness, weakness and numbness  Hematological: Does not bruise/bleed easily  Psychiatric/Behavioral: Negative for agitation, behavioral problems, confusion, hallucinations and sleep disturbance  The patient is not nervous/anxious  Video Exam    There were no vitals filed for this visit  Physical Exam  Skin:     General: Skin is dry  Neurological:      Mental Status: He is alert and oriented to person, place, and time  Psychiatric:         Mood and Affect: Mood normal          Thought Content:  Thought content normal          Judgment: Judgment normal           I spent 15 minutes directly with the patient during this visit

## 2023-02-20 ENCOUNTER — OFFICE VISIT (OUTPATIENT)
Dept: DENTISTRY | Facility: CLINIC | Age: 33
End: 2023-02-20

## 2023-02-20 VITALS — HEART RATE: 112 BPM | SYSTOLIC BLOOD PRESSURE: 118 MMHG | DIASTOLIC BLOOD PRESSURE: 81 MMHG

## 2023-02-20 DIAGNOSIS — Z01.20 ENCOUNTER FOR DENTAL EXAM AND CLEANING W/O ABNORMAL FINDINGS: Primary | ICD-10-CM

## 2023-02-20 NOTE — PROGRESS NOTES
COMP EXAM, FMX, PROBE EXAM   Last dental visit was   REVIEWED MED HX: meds, allergies, health changes reviewed in EPIC  CHIEF CONCERN:  none   PAIN SCALE:  0  ASA CLASS:  I  PLAQUE:  mild moderate heavy accumulation *  CALCULUS:  no calculus noted, light calculus /mod calculus/ heavy accumulation *  BLEEDING:   none light moderate severe *  STAIN :   none light moderate severe*  ORAL HYGIENE:  good, fair, poor , *   PERIO:     Visual and Tactile Intraoral/ Extraoral evaluation: Oral and Oropharyngeal cancer evaluation  No findings     Dr Chris Moreland exam=   Reviewed with patient clinical and radiographic findings and patient verbalized understanding  All questions and concerns addressed       REFERRALS: no referrals needed    CARIES FINDINGS:        NEXT VISIT:   1)    Last FMX : 2/20/23

## 2023-02-20 NOTE — DENTAL PROCEDURE DETAILS
COMP EXAM, FMX, PROBE EXAM   REVIEWED MED HX: meds, allergies, health changes reviewed in EPIC  CHIEF CONCERN:   -last dental visit 2020  - pt "here for a checkup"  - occasional discomfort reported on LL  PAIN SCALE:  0  ASA CLASS:  II  PLAQUE: moderate   CALCULUS: heavy visible on lower anterior teeth  BLEEDING:   Light bleeding upon probing  STAIN :   light  ORAL HYGIENE: poor   PERIO: 2-5 mm present   Full probing completed today  Pt reports he doesn't brush everyday  Visual and Tactile Intraoral/ Extraoral evaluation: Oral and Oropharyngeal cancer evaluation  No findings     Dr Devan Costa exam=   Reviewed with patient clinical and radiographic findings and patient verbalized understanding  All questions and concerns addressed       REFERRALS: no referrals needed    CARIES FINDINGS:  #2-O, #31-O       NEXT VISIT:   1) #2-O, #31-O  2) Adult prophy-re evaluate gums in 6 mths

## 2023-02-27 DIAGNOSIS — F32.5 MAJOR DEPRESSIVE DISORDER WITH SINGLE EPISODE, IN REMISSION (HCC): ICD-10-CM

## 2023-02-27 RX ORDER — BUPROPION HYDROCHLORIDE 150 MG/1
TABLET ORAL
Qty: 30 TABLET | Refills: 1 | Status: SHIPPED | OUTPATIENT
Start: 2023-02-27

## 2023-04-26 DIAGNOSIS — F32.5 MAJOR DEPRESSIVE DISORDER WITH SINGLE EPISODE, IN REMISSION (HCC): ICD-10-CM

## 2023-04-26 RX ORDER — BUPROPION HYDROCHLORIDE 150 MG/1
TABLET ORAL
Qty: 30 TABLET | Refills: 1 | Status: SHIPPED | OUTPATIENT
Start: 2023-04-26

## 2023-05-15 ENCOUNTER — OFFICE VISIT (OUTPATIENT)
Dept: DENTISTRY | Facility: CLINIC | Age: 33
End: 2023-05-15

## 2023-05-15 VITALS — SYSTOLIC BLOOD PRESSURE: 147 MMHG | DIASTOLIC BLOOD PRESSURE: 70 MMHG

## 2023-05-15 DIAGNOSIS — Z01.20 ENCOUNTER FOR DENTAL EXAMINATION AND CLEANING WITHOUT ABNORMAL FINDINGS: Primary | ICD-10-CM

## 2023-05-15 NOTE — DENTAL PROCEDURE DETAILS
Pt presented to [facility] for prophy only  Reviewed Medical History  ASA:II  Chief Complaint:none    Adult Prophy, [facility]  Intraoral exam:no findings  Oral Hygiene:lt  local plaque, heavy calculus anteriors,lt-moder  Local   bleeding  Hand & ultrasonic scaled, Flossed, polished  Patient tolerated well  Pt preferred mouth prop due to jaw pain staying open    NV:(2) rests-reminded pt today  6 mos per ex pro   FMX 2/20/23      Yane Simpson [facility] , PHDHP

## 2023-06-08 DIAGNOSIS — E78.2 MIXED HYPERLIPIDEMIA: ICD-10-CM

## 2023-06-08 DIAGNOSIS — F42.2 MIXED OBSESSIONAL THOUGHTS AND ACTS: ICD-10-CM

## 2023-06-08 RX ORDER — ATORVASTATIN CALCIUM 10 MG/1
10 TABLET, FILM COATED ORAL DAILY
Qty: 30 TABLET | Refills: 0 | Status: SHIPPED | OUTPATIENT
Start: 2023-06-08

## 2023-06-08 RX ORDER — FLUVOXAMINE MALEATE 150 MG/1
1 CAPSULE, EXTENDED RELEASE ORAL DAILY
Qty: 30 CAPSULE | Refills: 0 | Status: SHIPPED | OUTPATIENT
Start: 2023-06-08

## 2023-06-08 RX ORDER — FLUVOXAMINE MALEATE 100 MG/1
1 CAPSULE, EXTENDED RELEASE ORAL DAILY
Qty: 30 CAPSULE | Refills: 0 | Status: SHIPPED | OUTPATIENT
Start: 2023-06-08

## 2023-06-23 DIAGNOSIS — F32.5 MAJOR DEPRESSIVE DISORDER WITH SINGLE EPISODE, IN REMISSION (HCC): ICD-10-CM

## 2023-06-23 RX ORDER — BUPROPION HYDROCHLORIDE 150 MG/1
TABLET ORAL
Qty: 90 TABLET | Refills: 1 | Status: SHIPPED | OUTPATIENT
Start: 2023-06-23

## 2023-06-30 DIAGNOSIS — F42.2 MIXED OBSESSIONAL THOUGHTS AND ACTS: ICD-10-CM

## 2023-06-30 DIAGNOSIS — E78.2 MIXED HYPERLIPIDEMIA: ICD-10-CM

## 2023-06-30 RX ORDER — FLUVOXAMINE MALEATE 150 MG/1
CAPSULE, EXTENDED RELEASE ORAL
Qty: 90 CAPSULE | Refills: 1 | Status: SHIPPED | OUTPATIENT
Start: 2023-06-30

## 2023-06-30 RX ORDER — ATORVASTATIN CALCIUM 10 MG/1
TABLET, FILM COATED ORAL
Qty: 90 TABLET | Refills: 1 | Status: SHIPPED | OUTPATIENT
Start: 2023-06-30

## 2023-07-04 DIAGNOSIS — F42.2 MIXED OBSESSIONAL THOUGHTS AND ACTS: ICD-10-CM

## 2023-07-04 RX ORDER — FLUVOXAMINE MALEATE 100 MG/1
CAPSULE, EXTENDED RELEASE ORAL
Qty: 90 CAPSULE | Refills: 1 | Status: SHIPPED | OUTPATIENT
Start: 2023-07-04

## 2023-07-05 DIAGNOSIS — J30.9 ALLERGIC RHINITIS, UNSPECIFIED SEASONALITY, UNSPECIFIED TRIGGER: ICD-10-CM

## 2023-07-05 RX ORDER — MONTELUKAST SODIUM 10 MG/1
TABLET ORAL
Qty: 30 TABLET | Refills: 4 | Status: SHIPPED | OUTPATIENT
Start: 2023-07-05

## 2023-07-10 ENCOUNTER — APPOINTMENT (OUTPATIENT)
Dept: LAB | Facility: CLINIC | Age: 33
End: 2023-07-10
Payer: COMMERCIAL

## 2023-07-10 DIAGNOSIS — I10 ESSENTIAL HYPERTENSION: ICD-10-CM

## 2023-07-10 DIAGNOSIS — M10.9 ACUTE GOUT INVOLVING TOE, UNSPECIFIED CAUSE, UNSPECIFIED LATERALITY: ICD-10-CM

## 2023-07-10 DIAGNOSIS — E78.2 MIXED HYPERLIPIDEMIA: ICD-10-CM

## 2023-07-10 LAB
ALBUMIN SERPL BCP-MCNC: 3.9 G/DL (ref 3.5–5)
ALP SERPL-CCNC: 108 U/L (ref 46–116)
ALT SERPL W P-5'-P-CCNC: 53 U/L (ref 12–78)
ANION GAP SERPL CALCULATED.3IONS-SCNC: 4 MMOL/L
AST SERPL W P-5'-P-CCNC: 25 U/L (ref 5–45)
BASOPHILS # BLD AUTO: 0.06 THOUSANDS/ÂΜL (ref 0–0.1)
BASOPHILS NFR BLD AUTO: 1 % (ref 0–1)
BILIRUB SERPL-MCNC: 0.43 MG/DL (ref 0.2–1)
BUN SERPL-MCNC: 18 MG/DL (ref 5–25)
CALCIUM SERPL-MCNC: 9.6 MG/DL (ref 8.3–10.1)
CHLORIDE SERPL-SCNC: 112 MMOL/L (ref 96–108)
CHOLEST SERPL-MCNC: 131 MG/DL
CO2 SERPL-SCNC: 26 MMOL/L (ref 21–32)
CREAT SERPL-MCNC: 1.08 MG/DL (ref 0.6–1.3)
EOSINOPHIL # BLD AUTO: 0.06 THOUSAND/ÂΜL (ref 0–0.61)
EOSINOPHIL NFR BLD AUTO: 1 % (ref 0–6)
ERYTHROCYTE [DISTWIDTH] IN BLOOD BY AUTOMATED COUNT: 13.4 % (ref 11.6–15.1)
GFR SERPL CREATININE-BSD FRML MDRD: 90 ML/MIN/1.73SQ M
GLUCOSE P FAST SERPL-MCNC: 99 MG/DL (ref 65–99)
HCT VFR BLD AUTO: 44.8 % (ref 36.5–49.3)
HDLC SERPL-MCNC: 31 MG/DL
HGB BLD-MCNC: 14.4 G/DL (ref 12–17)
IMM GRANULOCYTES # BLD AUTO: 0.03 THOUSAND/UL (ref 0–0.2)
IMM GRANULOCYTES NFR BLD AUTO: 0 % (ref 0–2)
LDLC SERPL CALC-MCNC: 74 MG/DL (ref 0–100)
LYMPHOCYTES # BLD AUTO: 2.47 THOUSANDS/ÂΜL (ref 0.6–4.47)
LYMPHOCYTES NFR BLD AUTO: 32 % (ref 14–44)
MAGNESIUM SERPL-MCNC: 2.1 MG/DL (ref 1.6–2.6)
MCH RBC QN AUTO: 29.6 PG (ref 26.8–34.3)
MCHC RBC AUTO-ENTMCNC: 32.1 G/DL (ref 31.4–37.4)
MCV RBC AUTO: 92 FL (ref 82–98)
MONOCYTES # BLD AUTO: 0.55 THOUSAND/ÂΜL (ref 0.17–1.22)
MONOCYTES NFR BLD AUTO: 7 % (ref 4–12)
NEUTROPHILS # BLD AUTO: 4.59 THOUSANDS/ÂΜL (ref 1.85–7.62)
NEUTS SEG NFR BLD AUTO: 59 % (ref 43–75)
NRBC BLD AUTO-RTO: 0 /100 WBCS
PLATELET # BLD AUTO: 307 THOUSANDS/UL (ref 149–390)
PMV BLD AUTO: 10.1 FL (ref 8.9–12.7)
POTASSIUM SERPL-SCNC: 4.6 MMOL/L (ref 3.5–5.3)
PROT SERPL-MCNC: 7.7 G/DL (ref 6.4–8.4)
RBC # BLD AUTO: 4.87 MILLION/UL (ref 3.88–5.62)
SODIUM SERPL-SCNC: 142 MMOL/L (ref 135–147)
TRIGL SERPL-MCNC: 131 MG/DL
URATE SERPL-MCNC: 5 MG/DL (ref 3.5–8.5)
WBC # BLD AUTO: 7.76 THOUSAND/UL (ref 4.31–10.16)

## 2023-07-10 PROCEDURE — 80061 LIPID PANEL: CPT

## 2023-07-10 PROCEDURE — 83735 ASSAY OF MAGNESIUM: CPT

## 2023-07-10 PROCEDURE — 80053 COMPREHEN METABOLIC PANEL: CPT

## 2023-07-10 PROCEDURE — 85025 COMPLETE CBC W/AUTO DIFF WBC: CPT

## 2023-07-10 PROCEDURE — 84550 ASSAY OF BLOOD/URIC ACID: CPT

## 2023-07-10 PROCEDURE — 36415 COLL VENOUS BLD VENIPUNCTURE: CPT

## 2023-07-11 LAB
BACTERIA UR QL AUTO: ABNORMAL /HPF
BILIRUB UR QL STRIP: NEGATIVE
CLARITY UR: ABNORMAL
COLOR UR: ABNORMAL
GLUCOSE UR STRIP-MCNC: NEGATIVE MG/DL
HGB UR QL STRIP.AUTO: NEGATIVE
KETONES UR STRIP-MCNC: NEGATIVE MG/DL
LEUKOCYTE ESTERASE UR QL STRIP: NEGATIVE
MUCOUS THREADS UR QL AUTO: ABNORMAL
NITRITE UR QL STRIP: NEGATIVE
NON-SQ EPI CELLS URNS QL MICRO: ABNORMAL /HPF
PH UR STRIP.AUTO: 6.5 [PH]
PROT UR STRIP-MCNC: ABNORMAL MG/DL
RBC #/AREA URNS AUTO: ABNORMAL /HPF
SP GR UR STRIP.AUTO: 1.03 (ref 1–1.03)
UROBILINOGEN UR STRIP-ACNC: <2 MG/DL
WBC #/AREA URNS AUTO: ABNORMAL /HPF

## 2023-07-14 ENCOUNTER — OFFICE VISIT (OUTPATIENT)
Dept: URGENT CARE | Facility: CLINIC | Age: 33
End: 2023-07-14
Payer: COMMERCIAL

## 2023-07-14 VITALS
OXYGEN SATURATION: 95 % | DIASTOLIC BLOOD PRESSURE: 58 MMHG | SYSTOLIC BLOOD PRESSURE: 117 MMHG | TEMPERATURE: 98.4 F | RESPIRATION RATE: 20 BRPM | HEART RATE: 98 BPM

## 2023-07-14 DIAGNOSIS — J06.9 VIRAL URI WITH COUGH: Primary | ICD-10-CM

## 2023-07-14 PROCEDURE — 99213 OFFICE O/P EST LOW 20 MIN: CPT | Performed by: FAMILY MEDICINE

## 2023-07-14 NOTE — PROGRESS NOTES
[facility]        NAME: Geoff Simpson is a 33 y.o. male  : 1990    MRN: [id number]  DATE: 2023  TIME: 5:04 PM    Assessment and Plan   Viral URI with cough [J06.9]  1. Viral URI with cough              Patient Instructions     Decongestants recommended OTC for congestion. No signs of bacterial infection today. Follow up with PCP in 3-5 days if no improvement. Proceed to ER if symptoms worsen. Chief Complaint     Chief Complaint   Patient presents with   • Cough     Started with cough today. No other sx         History of Present Illness     Geoff Simpson is a 33 y.o. male presenting to the office today for upper respiratory complaints. Symptoms have been present for 1 days, and include cough. He has tried nothing for his symptoms, to date. Sick contacts include: father with URI      Review of Systems     Review of Systems   Constitutional: Negative for chills, fatigue and fever. HENT: Negative for congestion, ear discharge, ear pain, postnasal drip, sinus pressure, sinus pain and sore throat. Eyes: Negative for pain and discharge. Respiratory: Positive for cough. Negative for shortness of breath. Cardiovascular: Negative for chest pain and palpitations. Gastrointestinal: Negative for abdominal pain, diarrhea, nausea and vomiting. Genitourinary: Negative for difficulty urinating and dysuria. Musculoskeletal: Negative for arthralgias and myalgias. Skin: Negative for rash. Neurological: Negative for dizziness, syncope, light-headedness, numbness and headaches. Psychiatric/Behavioral: Negative for agitation. All other systems reviewed and are negative.       Current Medications       Current Outpatient Medications:   •  allopurinol (ZYLOPRIM) 300 mg tablet, Take 1 tablet (300 mg total) by mouth daily, Disp: 90 tablet, Rfl: 1  •  atorvastatin (LIPITOR) 10 mg tablet, TAKE 1 TABLET BY MOUTH EVERY DAY, Disp: 90 tablet, Rfl: 1  •  buPROPion (WELLBUTRIN XL) 150 mg 24 hr tablet, TAKE 1 TABLET BY MOUTH EVERY DAY IN THE MORNING, Disp: 90 tablet, Rfl: 1  •  colchicine (COLCRYS) 0.6 mg tablet, Take 1 tablet (0.6 mg total) by mouth daily, Disp: 20 tablet, Rfl: 1  •  Fluvoxamine Maleate 100 MG CP24, TAKE 1 CAPSULE BY MOUTH EVERY DAY, Disp: 90 capsule, Rfl: 1  •  Fluvoxamine Maleate 150 MG CP24, TAKE 1 CAPSULE BY MOUTH EVERY DAY, Disp: 90 capsule, Rfl: 1  •  lisinopril (ZESTRIL) 10 mg tablet, TAKE 1 TABLET BY MOUTH EVERY DAY, Disp: 30 tablet, Rfl: 5  •  loratadine (CLARITIN) 10 mg tablet, TAKE 1 TABLET BY MOUTH EVERY DAY, Disp: 30 tablet, Rfl: 5  •  montelukast (SINGULAIR) 10 mg tablet, TAKE 1 TABLET BY MOUTH EVERY DAY, Disp: 30 tablet, Rfl: 4  •  omeprazole (PriLOSEC) 40 MG capsule, TAKE 1 CAPSULE (40 MG TOTAL) BY MOUTH DAILY. , Disp: 30 capsule, Rfl: 5  •  Adapalene 0.3 % gel, SPREAD ONE PEA-SIZED AMOUNT OF MEDICATION OVER ENTIRE FACE ABOUT ONE HOUR BEFORE BEDTIME. (Patient not taking: Reported on 1/18/2023), Disp: 45 g, Rfl: 2  •  clindamycin-benzoyl peroxide (BENZACLIN) gel, Apply topically 2 (two) times a day (Patient not taking: Reported on 1/18/2023), Disp: 50 g, Rfl: 3  •  Dapsone 7.5 % GEL, Apply topically once daily (Patient not taking: Reported on 1/18/2023), Disp: 90 g, Rfl: 3  •  indomethacin (INDOCIN SR) 75 mg CR capsule, Take 1 capsule (75 mg total) by mouth 2 (two) times a day with meals for 7 days, Disp: 14 capsule, Rfl: 0  •  Multiple Vitamin (multivitamin) capsule, Take 1 capsule by mouth daily (Patient not taking: Reported on 1/18/2023), Disp: 100 capsule, Rfl: 3  •  neomycin-polymyxin-hydrocortisone (CORTISPORIN) otic solution, Administer 4 drops to the right ear 2 (two) times a day for 5 days (Patient not taking: Reported on 2/6/2023), Disp: 10 mL, Rfl: 0  •  predniSONE 20 mg tablet, Take 3 tabs all at once daily for 3 days then 2 tabs for 2 days then 1 tab for 2 days.  (Patient not taking: Reported on 5/15/2023), Disp: 15 tablet, Rfl: 0  •  triamcinolone (KENALOG) 0.1 % ointment, Apply topically twice a day for 14 days starlight (Patient not taking: Reported on 1/18/2023), Disp: 30 g, Rfl: 0    Current Allergies     Allergies as of 07/14/2023 - Reviewed 07/14/2023   Allergen Reaction Noted   • Penicillins Hives 10/30/2017   • Sulfa antibiotics GI Intolerance 10/30/2017            The following portions of the patient's history were reviewed and updated as appropriate: allergies, current medications, past family history, past medical history, past social history, past surgical history and problem list.     Past Medical History:   Diagnosis Date   • Acne 09/01/2010    Last Assessed 01 Sep 2010. • Acquired ankle/foot deformity     Last assessed 23 Feb 2017. Resolved 14 Aug 2017. • Allergic    • Anxiety    • Change in hearing     Resolved 16 Aug 2017   • Closed displaced avulsion fracture of tuberosity of left calcaneus     Last Assessed 25 May 2017. Resolved 14 Aug 2017. • Depression    • Epistaxis     Last Assessed 18 Feb 2014. Resolved 08 Jun 2016. • Essential hypertension 06/19/2018   • Foreign body in foot     Last Assessed 30 Jul 2013. Resolved 12 Oct 2013. • Gastritis     Last Assessed 22 Oct 2012   • GERD (gastroesophageal reflux disease)    • History of oral aphthous ulcers     Last Assessed 08 June 2016. Resolved 14 Aug 2017. • Hypersomnia 02/18/2009    Last Assessed 18 Feb 2009. Resolved 16 Aug 2017. • Hypertension 2016   • Impacted cerumen     Last Assessed 08 Jun 2016. Resolved 08 Jul 2016. • Insomnia    • Irritable bowel syndrome    • Knee joint pain 03/10/2008   • Left foot pain     Last Assessed 19 May 2017. Resolved 14 Aug 2017. • Mixed hyperlipidemia 11/02/2020   • Obesity    • OCD (obsessive compulsive disorder)    • Plantar fibromatosis     Last Assessed 19 Oct 2016. Resolved 14 Aug 2017. • Seasonal allergies    • Tarsal tunnel syndrome of left side     Last Assessed 21 Aug 2017. Resolved 21 Aug 2017.    • Tinea corporis     Last Assessed 05 Dec 2011       Past Surgical History:   Procedure Laterality Date   • ESOPHAGOGASTRODUODENOSCOPY N/A 10/31/2017    Procedure: ESOPHAGOGASTRODUODENOSCOPY (EGD); Surgeon: Cali Ricks MD;  Location: [facility] GI LAB; Service: Gastroenterology   • NO PAST SURGERIES     • RI LNGTH/SHRT TENDON LEG/ANKLE 1 TENDON SPX Left 11/3/2017    Procedure: CALCANEAL OSTECTOMY, ACHILLEST TENDON LENGTHING;  Surgeon: Flash Hernandez DPM;  Location: [facility];  Service: Podiatry       Family History   Problem Relation Age of Onset   • Kidney disease Mother    • Thyroid disease Mother    • Allergic rhinitis Mother    • Hypertension Father    • Kidney disease Father    • Gout Father    • No Known Problems Sister    • Colonic polyp Maternal Grandmother    • Crohn's disease Maternal Grandmother    • Cancer Family    • Diabetes Family        Medications have been verified. Objective     /58   Pulse 98   Temp 98.4 °F (36.9 °C) (Temporal)   Resp 20   SpO2 95%   No LMP for male patient. Physical Exam     Physical Exam  Vitals reviewed. Constitutional:       General: He is not in acute distress. Appearance: Normal appearance. He is not ill-appearing. HENT:      Head: Normocephalic and atraumatic. Right Ear: Tympanic membrane and ear canal normal. No middle ear effusion. Left Ear: Tympanic membrane and ear canal normal.  No middle ear effusion. Mouth/Throat:      Mouth: Mucous membranes are moist.      Pharynx: Posterior oropharyngeal erythema present. No oropharyngeal exudate. Tonsils: No tonsillar exudate. Eyes:      Extraocular Movements: Extraocular movements intact. Conjunctiva/sclera: Conjunctivae normal.      Pupils: Pupils are equal, round, and reactive to light. Cardiovascular:      Rate and Rhythm: Normal rate and regular rhythm. Pulses: Normal pulses. Heart sounds: Normal heart sounds. No murmur heard. Pulmonary:      Effort: Pulmonary effort is normal. No respiratory distress. Breath sounds: Normal breath sounds. No wheezing. Abdominal:      General: There is no distension. Musculoskeletal:      Cervical back: Normal range of motion and neck supple. No tenderness. Skin:     General: Skin is warm. Neurological:      General: No focal deficit present. Mental Status: He is alert.    Psychiatric:         Mood and Affect: Mood normal.         Behavior: Behavior normal.         Judgment: Judgment normal.

## 2023-07-14 NOTE — LETTER
To whom it may concern,      Jose Alejandro Anival was seen in my office on 07/14/23. He may return to work tomorrow. Thank you!       Sincerely,    Morenita Evans, DO

## 2023-07-18 NOTE — PROGRESS NOTES
Name: Anamaria Kennedy      : 1990      MRN: [id number]  Encounter Provider: Mariusz Ag MD  Encounter Date: 2023   Encounter department: [address] [place] [address]     1. Essential hypertension  Assessment & Plan:  Patient is stable with current anti-hypertensive medicine and continue to follow a low sodium diet and take current medication. All questions about this condition were answered today. 2. Mixed hyperlipidemia  Assessment & Plan:  Patient  is stable with current medication and we discussed a low fat low cholesterol diet. Weight loss also discussed for this will help lower cholesterol also. Recheck lipids in 6 months. Orders:  -     Comprehensive metabolic panel; Future  -     Lipid Panel with Direct LDL reflex; Future    3. Gastroesophageal reflux disease without esophagitis  Assessment & Plan:  Patient to continue with present therapy and decrease caffeine, avoid ETOH and smoking to decrease acid production. Pt should also cease eating prior to bedtime and avoid excessive fluid intake prior to sleep. May use antacids as needed for breakthrough GERD. All pateint questions answered today about this condition. 4. ADD (attention deficit disorder) without hyperactivity  Assessment & Plan:  Pt is  stable with ADHD and current medicine prescribed. Weight, appetite and BP was reviewed and these are stable. Pt had all questions answered today and will return for weight and BP check at next routine OV      5. Obesity (BMI 30-39. 9)  Assessment & Plan:  Patient to increase exercise and partake of a diet with less calories to promote  weight loss      6. Encounter for immunization    7. Obsessive-compulsive disorder, unspecified type  Assessment & Plan:  Patient is stable  and will continue present plan of care and reassess at next routine visit. All questions about this problem from patient were answered today.       8. Gout, unspecified cause, unspecified chronicity, unspecified site  -     Uric acid; Future        Depression Screening and Follow-up Plan: Patient assessed for underlying major depression. Brief counseling provided and recommend additional follow-up/re-evaluation next office visit. Continue regular follow-up with their mental health provider who is managing their mental health condition(s). Patient advised to follow-up with PCP for further management. Subjective     52-year-old male here today for checkup on multimedical problems patient with history of hypertension as well as hyperlipidemia OCD and gout. Patient has had an episode of pseudogout since his last visit. Patient is taking his allopurinol and his uric acid level was down to 5. This is the lowest that he has been. Patient well with his medications and will call when needs a refill. Lab work was reviewed other than little dehydration lab work is pretty unremarkable. Patient will come back to see us in 6 months we will check his cholesterol and uric acid at that point as well as his blood pressure. Review of Systems   Constitutional: Negative for activity change, appetite change, chills, fatigue, fever and unexpected weight change. HENT: Negative for congestion, ear pain, hearing loss, mouth sores, postnasal drip, sinus pressure, sinus pain, sneezing and sore throat. Respiratory: Negative for apnea, cough, shortness of breath and wheezing. Cardiovascular: Negative for chest pain, palpitations and leg swelling. Gastrointestinal: Negative for abdominal pain, constipation, diarrhea, nausea and vomiting. Endocrine: Negative for cold intolerance and heat intolerance. Genitourinary: Negative for dysuria, frequency and hematuria. Musculoskeletal: Negative for arthralgias, back pain, gait problem, joint swelling and neck pain. Skin: Negative for rash. Neurological: Negative for dizziness, weakness and numbness. Hematological: Does not bruise/bleed easily. Psychiatric/Behavioral: Negative for agitation, behavioral problems, confusion, hallucinations and sleep disturbance. The patient is not nervous/anxious. Past Medical History:   Diagnosis Date   • Acne 09/01/2010    Last Assessed 01 Sep 2010. • Acquired ankle/foot deformity     Last assessed 23 Feb 2017. Resolved 14 Aug 2017. • Allergic    • Anxiety    • Change in hearing     Resolved 16 Aug 2017   • Closed displaced avulsion fracture of tuberosity of left calcaneus     Last Assessed 25 May 2017. Resolved 14 Aug 2017. • Depression    • Epistaxis     Last Assessed 18 Feb 2014. Resolved 08 Jun 2016. • Essential hypertension 06/19/2018   • Foreign body in foot     Last Assessed 30 Jul 2013. Resolved 12 Oct 2013. • Gastritis     Last Assessed 22 Oct 2012   • GERD (gastroesophageal reflux disease)    • History of oral aphthous ulcers     Last Assessed 08 June 2016. Resolved 14 Aug 2017. • Hypersomnia 02/18/2009    Last Assessed 18 Feb 2009. Resolved 16 Aug 2017. • Hypertension 2016   • Impacted cerumen     Last Assessed 08 Jun 2016. Resolved 08 Jul 2016. • Insomnia    • Irritable bowel syndrome    • Knee joint pain 03/10/2008   • Left foot pain     Last Assessed 19 May 2017. Resolved 14 Aug 2017. • Mixed hyperlipidemia 11/02/2020   • Obesity    • OCD (obsessive compulsive disorder)    • Plantar fibromatosis     Last Assessed 19 Oct 2016. Resolved 14 Aug 2017. • Seasonal allergies    • Tarsal tunnel syndrome of left side     Last Assessed 21 Aug 2017. Resolved 21 Aug 2017. • Tinea corporis     Last Assessed 05 Dec 2011     Past Surgical History:   Procedure Laterality Date   • ESOPHAGOGASTRODUODENOSCOPY N/A 10/31/2017    Procedure: ESOPHAGOGASTRODUODENOSCOPY (EGD); Surgeon: Chilo Tovar MD;  Location: [facility] GI LAB;   Service: Gastroenterology   • NO PAST SURGERIES     • [place] LNGTH/SHRT TENDON LEG/ANKLE 1 TENDON SPX Left 11/3/2017    Procedure: CALCANEAL OSTECTOMY, ACHILLEST TENDON LENGTHING; Surgeon: Ольга Serrato DPM;  Location: [facility];  Service: Podiatry     Family History   Problem Relation Age of Onset   • Kidney disease Mother    • Thyroid disease Mother    • Allergic rhinitis Mother    • Hypertension Father    • Kidney disease Father    • Gout Father    • No Known Problems Sister    • Colonic polyp Maternal Grandmother    • Crohn's disease Maternal Grandmother    • Cancer Family    • Diabetes Family      Social History     Socioeconomic History   • Marital status: Single     Spouse name: None   • Number of children: None   • Years of education: None   • Highest education level: None   Occupational History   • None   Tobacco Use   • Smoking status: Never   • Smokeless tobacco: Never   Vaping Use   • Vaping Use: Never used   Substance and Sexual Activity   • Alcohol use: No   • Drug use: No   • Sexual activity: Never   Other Topics Concern   • None   Social History Narrative    Feels safe at home     Social Determinants of Health     Financial Resource Strain: Low Risk  (2/17/2023)    Overall Financial Resource Strain (CARDIA)    • Difficulty of Paying Living Expenses: Not hard at all   Food Insecurity: No Food Insecurity (2/17/2023)    Hunger Vital Sign    • Worried About Running Out of Food in the Last Year: Never true    • Ran Out of Food in the Last Year: Never true   Transportation Needs: No Transportation Needs (2/17/2023)    PRAPARE - Transportation    • Lack of Transportation (Medical): No    • Lack of Transportation (Non-Medical):  No   Physical Activity: Sufficiently Active (5/5/2021)    Exercise Vital Sign    • Days of Exercise per Week: 4 days    • Minutes of Exercise per Session: 60 min   Stress: No Stress Concern Present (5/5/2021)    [facility]    • Feeling of Stress : Not at all   Social Connections: Unknown (11/2/2020)    Social Connection and Isolation Panel [NHANES]    • Frequency of Communication with Friends and Family: More than three times a week    • Frequency of Social Gatherings with Friends and Family: Twice a week    • Attends Oriental orthodox Services: Never    • Active Member of Clubs or Organizations: Not on file    • Attends Club or Organization Meetings: Not on file    • Marital Status: Not on file   Intimate Partner Violence: Not At Risk (11/2/2020)    Humiliation, Afraid, Rape, and Kick questionnaire    • Fear of Current or Ex-Partner: No    • Emotionally Abused: No    • Physically Abused: No    • Sexually Abused: No   Housing Stability: Not on file     Current Outpatient Medications on File Prior to Visit   Medication Sig   • allopurinol (ZYLOPRIM) 300 mg tablet Take 1 tablet (300 mg total) by mouth daily   • atorvastatin (LIPITOR) 10 mg tablet TAKE 1 TABLET BY MOUTH EVERY DAY   • buPROPion (WELLBUTRIN XL) 150 mg 24 hr tablet TAKE 1 TABLET BY MOUTH EVERY DAY IN THE MORNING   • colchicine (COLCRYS) 0.6 mg tablet Take 1 tablet (0.6 mg total) by mouth daily   • Fluvoxamine Maleate 100 MG CP24 TAKE 1 CAPSULE BY MOUTH EVERY DAY   • Fluvoxamine Maleate 150 MG CP24 TAKE 1 CAPSULE BY MOUTH EVERY DAY   • lisinopril (ZESTRIL) 10 mg tablet TAKE 1 TABLET BY MOUTH EVERY DAY   • loratadine (CLARITIN) 10 mg tablet TAKE 1 TABLET BY MOUTH EVERY DAY   • montelukast (SINGULAIR) 10 mg tablet TAKE 1 TABLET BY MOUTH EVERY DAY   • omeprazole (PriLOSEC) 40 MG capsule TAKE 1 CAPSULE (40 MG TOTAL) BY MOUTH DAILY.    • Adapalene 0.3 % gel SPREAD ONE PEA-SIZED AMOUNT OF MEDICATION OVER ENTIRE FACE ABOUT ONE HOUR BEFORE BEDTIME. (Patient not taking: Reported on 1/18/2023)   • clindamycin-benzoyl peroxide (BENZACLIN) gel Apply topically 2 (two) times a day (Patient not taking: Reported on 1/18/2023)   • Dapsone 7.5 % GEL Apply topically once daily (Patient not taking: Reported on 1/18/2023)   • indomethacin (INDOCIN SR) 75 mg CR capsule Take 1 capsule (75 mg total) by mouth 2 (two) times a day with meals for 7 days   • Multiple Vitamin (multivitamin) capsule Take 1 capsule by mouth daily (Patient not taking: Reported on 1/18/2023)   • neomycin-polymyxin-hydrocortisone (CORTISPORIN) otic solution Administer 4 drops to the right ear 2 (two) times a day for 5 days (Patient not taking: Reported on 2/6/2023)   • predniSONE 20 mg tablet Take 3 tabs all at once daily for 3 days then 2 tabs for 2 days then 1 tab for 2 days. (Patient not taking: Reported on 5/15/2023)   • triamcinolone (KENALOG) 0.1 % ointment Apply topically twice a day for 14 days starlight (Patient not taking: Reported on 1/18/2023)     Allergies   Allergen Reactions   • Penicillins Hives   • Sulfa Antibiotics GI Intolerance     Immunization History   Administered Date(s) Administered   • DTaP 5 [birth year], 03/11/1992, 03/25/1993, 08/23/1995   • HPV9 09/14/2017   • Hep B, adult 04/20/2001, 05/22/2001, 12/22/2001   • Hib (HbOC) [birth year], 03/11/1992, 12/02/1992   • INFLUENZA 09/13/2018   • IPV [birth year], 02/25/1993, 08/23/1995   • Influenza Injectable, MDCK, Preservative Free, Quadrivalent, 0.5 mL 09/23/2019   • Influenza Quadrivalent, 6-35 Months IM 09/14/2017   • Influenza, injectable, quadrivalent, preservative free 0.5 mL 11/02/2020   • Influenza, recombinant, quadrivalent,injectable, preservative free 10/06/2021   • Influenza, seasonal, injectable 10/05/2015   • MMR 12/02/1992, 07/22/1996   • Td (adult), adsorbed 03/27/2009   • Varicella 08/26/2010       Objective     /76 (BP Location: Left arm, Patient Position: Sitting, Cuff Size: Large)   Pulse 91   Temp 97.6 °F (36.4 °C)   Resp 18   Ht 5' 7" (1.702 m)   Wt 126 kg (277 lb 3.2 oz)   SpO2 98%   BMI 43.42 kg/m²     Physical Exam  Vitals and nursing note reviewed. Constitutional:       Appearance: He is well-developed. He is obese. HENT:      Head: Normocephalic and atraumatic.       Nose: Nose normal.      Mouth/Throat:      Mouth: Mucous membranes are moist.   Eyes: General: No scleral icterus. Conjunctiva/sclera: Conjunctivae normal.      Pupils: Pupils are equal, round, and reactive to light. Neck:      Thyroid: No thyromegaly. Cardiovascular:      Rate and Rhythm: Normal rate and regular rhythm. Heart sounds: Normal heart sounds. Pulmonary:      Effort: Pulmonary effort is normal. No respiratory distress. Breath sounds: Normal breath sounds. No wheezing. Abdominal:      General: Bowel sounds are normal.      Palpations: Abdomen is soft. Tenderness: There is no abdominal tenderness. There is no guarding or rebound. Musculoskeletal:         General: Normal range of motion. Cervical back: Normal range of motion and neck supple. Skin:     General: Skin is warm and dry. Findings: No rash. Neurological:      Mental Status: He is alert and oriented to person, place, and time. Psychiatric:         Mood and Affect: Mood normal.         Behavior: Behavior normal.         Thought Content:  Thought content normal.         Judgment: Judgment normal.       Gregg Linda MD

## 2023-07-18 NOTE — ASSESSMENT & PLAN NOTE
Pt is  stable with ADHD and current medicine prescribed. Weight, appetite and BP was reviewed and these are stable.  Pt had all questions answered today and will return for weight and BP check at next routine OV

## 2023-07-19 ENCOUNTER — OFFICE VISIT (OUTPATIENT)
Dept: FAMILY MEDICINE CLINIC | Facility: CLINIC | Age: 33
End: 2023-07-19
Payer: COMMERCIAL

## 2023-07-19 VITALS
SYSTOLIC BLOOD PRESSURE: 130 MMHG | OXYGEN SATURATION: 98 % | TEMPERATURE: 97.6 F | HEIGHT: 67 IN | DIASTOLIC BLOOD PRESSURE: 76 MMHG | WEIGHT: 277.2 LBS | BODY MASS INDEX: 43.51 KG/M2 | RESPIRATION RATE: 18 BRPM | HEART RATE: 91 BPM

## 2023-07-19 DIAGNOSIS — M10.9 GOUT, UNSPECIFIED CAUSE, UNSPECIFIED CHRONICITY, UNSPECIFIED SITE: ICD-10-CM

## 2023-07-19 DIAGNOSIS — F98.8 ADD (ATTENTION DEFICIT DISORDER) WITHOUT HYPERACTIVITY: ICD-10-CM

## 2023-07-19 DIAGNOSIS — E78.2 MIXED HYPERLIPIDEMIA: ICD-10-CM

## 2023-07-19 DIAGNOSIS — K21.9 GASTROESOPHAGEAL REFLUX DISEASE WITHOUT ESOPHAGITIS: ICD-10-CM

## 2023-07-19 DIAGNOSIS — I10 ESSENTIAL HYPERTENSION: Primary | ICD-10-CM

## 2023-07-19 DIAGNOSIS — Z23 ENCOUNTER FOR IMMUNIZATION: ICD-10-CM

## 2023-07-19 DIAGNOSIS — E66.9 OBESITY (BMI 30-39.9): ICD-10-CM

## 2023-07-19 DIAGNOSIS — F42.9 OBSESSIVE-COMPULSIVE DISORDER, UNSPECIFIED TYPE: ICD-10-CM

## 2023-07-19 PROCEDURE — 99214 OFFICE O/P EST MOD 30 MIN: CPT | Performed by: FAMILY MEDICINE

## 2023-08-01 DIAGNOSIS — E78.2 MIXED HYPERLIPIDEMIA: ICD-10-CM

## 2023-08-01 DIAGNOSIS — J30.9 ALLERGIC RHINITIS, UNSPECIFIED SEASONALITY, UNSPECIFIED TRIGGER: ICD-10-CM

## 2023-08-01 DIAGNOSIS — F42.2 MIXED OBSESSIONAL THOUGHTS AND ACTS: ICD-10-CM

## 2023-08-01 RX ORDER — FLUVOXAMINE MALEATE 150 MG/1
CAPSULE, EXTENDED RELEASE ORAL
Qty: 90 CAPSULE | Refills: 2 | Status: SHIPPED | OUTPATIENT
Start: 2023-08-01

## 2023-08-01 RX ORDER — ATORVASTATIN CALCIUM 10 MG/1
TABLET, FILM COATED ORAL
Qty: 90 TABLET | Refills: 2 | Status: SHIPPED | OUTPATIENT
Start: 2023-08-01

## 2023-08-01 RX ORDER — MONTELUKAST SODIUM 10 MG/1
TABLET ORAL
Qty: 90 TABLET | Refills: 2 | Status: SHIPPED | OUTPATIENT
Start: 2023-08-01

## 2023-08-05 DIAGNOSIS — K21.9 GASTROESOPHAGEAL REFLUX DISEASE: ICD-10-CM

## 2023-08-05 RX ORDER — OMEPRAZOLE 40 MG/1
40 CAPSULE, DELAYED RELEASE ORAL DAILY
Qty: 30 CAPSULE | Refills: 1 | Status: SHIPPED | OUTPATIENT
Start: 2023-08-05

## 2023-08-08 DIAGNOSIS — M10.9 ACUTE GOUT INVOLVING TOE, UNSPECIFIED CAUSE, UNSPECIFIED LATERALITY: ICD-10-CM

## 2023-08-08 RX ORDER — ALLOPURINOL 300 MG/1
300 TABLET ORAL DAILY
Qty: 90 TABLET | Refills: 1 | Status: SHIPPED | OUTPATIENT
Start: 2023-08-08

## 2023-08-12 DIAGNOSIS — K21.9 GASTROESOPHAGEAL REFLUX DISEASE: ICD-10-CM

## 2023-08-12 RX ORDER — OMEPRAZOLE 40 MG/1
40 CAPSULE, DELAYED RELEASE ORAL DAILY
Qty: 90 CAPSULE | Refills: 1 | OUTPATIENT
Start: 2023-08-12

## 2023-08-14 ENCOUNTER — OFFICE VISIT (OUTPATIENT)
Dept: URGENT CARE | Facility: CLINIC | Age: 33
End: 2023-08-14
Payer: COMMERCIAL

## 2023-08-14 VITALS
HEART RATE: 79 BPM | SYSTOLIC BLOOD PRESSURE: 129 MMHG | TEMPERATURE: 97.6 F | DIASTOLIC BLOOD PRESSURE: 71 MMHG | RESPIRATION RATE: 16 BRPM

## 2023-08-14 DIAGNOSIS — H61.23 BILATERAL IMPACTED CERUMEN: Primary | ICD-10-CM

## 2023-08-14 PROCEDURE — 69209 REMOVE IMPACTED EAR WAX UNI: CPT | Performed by: NURSE PRACTITIONER

## 2023-08-14 PROCEDURE — G0382 LEV 3 HOSP TYPE B ED VISIT: HCPCS | Performed by: NURSE PRACTITIONER

## 2023-08-14 NOTE — PROGRESS NOTES
[facility]        NAME: Mohinder Glez is a 29 y.o. male  : 1990    MRN: [id number]  DATE: 2023  TIME: 3:38 PM    Assessment and Plan   Bilateral impacted cerumen [H61.23]  1. Bilateral impacted cerumen              Patient Instructions       Follow up with PCP in 3-5 days. Proceed to  ER if symptoms worsen. Chief Complaint     Chief Complaint   Patient presents with   • Ear Fullness     Ear fullness started yesterday. No pain. History of Present Illness       Patient is a 76-year-old male presenting with bilateral cerumen impaction. He states that he wears hearing aids and feels that his canals are clogged. He has worsened decreased hearing. He did use a Debrox removal kit without relief. Review of Systems   Review of Systems   Constitutional: Negative for activity change, chills and fever. HENT: Positive for hearing loss. Negative for ear discharge and ear pain. Neurological: Negative for dizziness and headaches.          Current Medications       Current Outpatient Medications:   •  allopurinol (ZYLOPRIM) 300 mg tablet, TAKE 1 TABLET BY MOUTH EVERY DAY, Disp: 90 tablet, Rfl: 1  •  atorvastatin (LIPITOR) 10 mg tablet, TAKE 1 TABLET BY MOUTH EVERY DAY, Disp: 90 tablet, Rfl: 2  •  buPROPion (WELLBUTRIN XL) 150 mg 24 hr tablet, TAKE 1 TABLET BY MOUTH EVERY DAY IN THE MORNING, Disp: 90 tablet, Rfl: 1  •  colchicine (COLCRYS) 0.6 mg tablet, Take 1 tablet (0.6 mg total) by mouth daily, Disp: 20 tablet, Rfl: 1  •  Fluvoxamine Maleate 100 MG CP24, TAKE 1 CAPSULE BY MOUTH EVERY DAY, Disp: 90 capsule, Rfl: 1  •  Fluvoxamine Maleate 150 MG CP24, TAKE 1 CAPSULE BY MOUTH EVERY DAY, Disp: 90 capsule, Rfl: 2  •  lisinopril (ZESTRIL) 10 mg tablet, TAKE 1 TABLET BY MOUTH EVERY DAY, Disp: 30 tablet, Rfl: 5  •  loratadine (CLARITIN) 10 mg tablet, TAKE 1 TABLET BY MOUTH EVERY DAY, Disp: 30 tablet, Rfl: 5  •  montelukast (SINGULAIR) 10 mg tablet, TAKE 1 TABLET BY MOUTH EVERY DAY, Disp: 90 tablet, Rfl: 2  •  omeprazole (PriLOSEC) 40 MG capsule, TAKE 1 CAPSULE (40 MG TOTAL) BY MOUTH DAILY. , Disp: 30 capsule, Rfl: 1  •  Adapalene 0.3 % gel, SPREAD ONE PEA-SIZED AMOUNT OF MEDICATION OVER ENTIRE FACE ABOUT ONE HOUR BEFORE BEDTIME. (Patient not taking: Reported on 1/18/2023), Disp: 45 g, Rfl: 2  •  clindamycin-benzoyl peroxide (BENZACLIN) gel, Apply topically 2 (two) times a day (Patient not taking: Reported on 1/18/2023), Disp: 50 g, Rfl: 3  •  Dapsone 7.5 % GEL, Apply topically once daily (Patient not taking: Reported on 1/18/2023), Disp: 90 g, Rfl: 3  •  indomethacin (INDOCIN SR) 75 mg CR capsule, Take 1 capsule (75 mg total) by mouth 2 (two) times a day with meals for 7 days, Disp: 14 capsule, Rfl: 0  •  Multiple Vitamin (multivitamin) capsule, Take 1 capsule by mouth daily (Patient not taking: Reported on 1/18/2023), Disp: 100 capsule, Rfl: 3  •  neomycin-polymyxin-hydrocortisone (CORTISPORIN) otic solution, Administer 4 drops to the right ear 2 (two) times a day for 5 days (Patient not taking: Reported on 2/6/2023), Disp: 10 mL, Rfl: 0  •  predniSONE 20 mg tablet, Take 3 tabs all at once daily for 3 days then 2 tabs for 2 days then 1 tab for 2 days. (Patient not taking: Reported on 5/15/2023), Disp: 15 tablet, Rfl: 0  •  triamcinolone (KENALOG) 0.1 % ointment, Apply topically twice a day for 14 days starlight (Patient not taking: Reported on 1/18/2023), Disp: 30 g, Rfl: 0    Current Allergies     Allergies as of 08/14/2023 - Reviewed 08/14/2023   Allergen Reaction Noted   • Penicillins Hives 10/30/2017   • Sulfa antibiotics GI Intolerance 10/30/2017            The following portions of the patient's history were reviewed and updated as appropriate: allergies, current medications, past family history, past medical history, past social history, past surgical history and problem list.     Past Medical History:   Diagnosis Date   • Acne 09/01/2010    Last Assessed 01 Sep 2010.    • Acquired ankle/foot deformity     Last assessed 23 Feb 2017. Resolved 14 Aug 2017. • Allergic    • Anxiety    • Change in hearing     Resolved 16 Aug 2017   • Closed displaced avulsion fracture of tuberosity of left calcaneus     Last Assessed 25 May 2017. Resolved 14 Aug 2017. • Depression    • Epistaxis     Last Assessed 18 Feb 2014. Resolved 08 Jun 2016. • Essential hypertension 06/19/2018   • Foreign body in foot     Last Assessed 30 Jul 2013. Resolved 12 Oct 2013. • Gastritis     Last Assessed 22 Oct 2012   • GERD (gastroesophageal reflux disease)    • History of oral aphthous ulcers     Last Assessed 08 June 2016. Resolved 14 Aug 2017. • Hypersomnia 02/18/2009    Last Assessed 18 Feb 2009. Resolved 16 Aug 2017. • Hypertension 2016   • Impacted cerumen     Last Assessed 08 Jun 2016. Resolved 08 Jul 2016. • Insomnia    • Irritable bowel syndrome    • Knee joint pain 03/10/2008   • Left foot pain     Last Assessed 19 May 2017. Resolved 14 Aug 2017. • Mixed hyperlipidemia 11/02/2020   • Obesity    • OCD (obsessive compulsive disorder)    • Plantar fibromatosis     Last Assessed 19 Oct 2016. Resolved 14 Aug 2017. • Seasonal allergies    • Tarsal tunnel syndrome of left side     Last Assessed 21 Aug 2017. Resolved 21 Aug 2017. • Tinea corporis     Last Assessed 05 Dec 2011       Past Surgical History:   Procedure Laterality Date   • ESOPHAGOGASTRODUODENOSCOPY N/A 10/31/2017    Procedure: ESOPHAGOGASTRODUODENOSCOPY (EGD); Surgeon: Cali Ricks MD;  Location: [facility] GI LAB;   Service: Gastroenterology   • NO PAST SURGERIES     • [place] LNGTH/SHRT TENDON LEG/ANKLE 1 TENDON SPX Left 11/3/2017    Procedure: CALCANEAL OSTECTOMY, ACHILLEST TENDON LENGTHING;  Surgeon: Flash Hernandez DPM;  Location: [facility];  Service: Podiatry       Family History   Problem Relation Age of Onset   • Kidney disease Mother    • Thyroid disease Mother    • Allergic rhinitis Mother    • Hypertension Father    • Kidney disease Father    • Gout Father    • No Known Problems Sister    • Colonic polyp Maternal Grandmother    • Crohn's disease Maternal Grandmother    • Cancer Family    • Diabetes Family          Medications have been verified. Objective   /71   Pulse 79   Temp 97.6 °F (36.4 °C) (Temporal)   Resp 16          Physical Exam     Physical Exam  Vitals reviewed. Constitutional:       General: He is awake. He is not in acute distress. Appearance: Normal appearance. HENT:      Head: Normocephalic. Right Ear: Decreased hearing noted. Left Ear: Decreased hearing noted. Ears:      Comments: Bilateral cerumen impaction. Post removal.  Bilateral canals are patent with no trauma. Mild effusion on the right. Left TM intact no erythema. Nose: Nose normal.   Cardiovascular:      Rate and Rhythm: Normal rate. Pulmonary:      Effort: Pulmonary effort is normal.   Skin:     General: Skin is warm and moist.   Neurological:      General: No focal deficit present. Mental Status: He is alert and oriented to person, place, and time. Psychiatric:         Behavior: Behavior is cooperative. Ear cerumen removal    Date/Time: 8/14/2023 2:55 PM    Performed by: Jewell Goodell, CRNP  Authorized by: Jewell Goodell, CRNP  Universal Protocol:  Consent given by: patient  Patient understanding: patient states understanding of the procedure being performed  Patient identity confirmed: verbally with patient      Patient location:  Clinic  Procedure details:     Local anesthetic:  None    Location:  L ear and R ear    Procedure type: irrigation only      Approach:  Natural orifice  Post-procedure details:     Complication:  None    Hearing quality:  Improved    Patient tolerance of procedure:   Tolerated well, no immediate complications

## 2023-08-17 ENCOUNTER — OFFICE VISIT (OUTPATIENT)
Dept: URGENT CARE | Facility: CLINIC | Age: 33
End: 2023-08-17
Payer: COMMERCIAL

## 2023-08-17 VITALS
HEART RATE: 91 BPM | DIASTOLIC BLOOD PRESSURE: 60 MMHG | OXYGEN SATURATION: 100 % | HEIGHT: 66 IN | WEIGHT: 278 LBS | RESPIRATION RATE: 20 BRPM | TEMPERATURE: 97.8 F | BODY MASS INDEX: 44.68 KG/M2 | SYSTOLIC BLOOD PRESSURE: 128 MMHG

## 2023-08-17 DIAGNOSIS — H93.8X1 EAR CONGESTION, RIGHT: Primary | ICD-10-CM

## 2023-08-17 PROCEDURE — 99214 OFFICE O/P EST MOD 30 MIN: CPT | Performed by: FAMILY MEDICINE

## 2023-08-17 RX ORDER — PREDNISONE 20 MG/1
20 TABLET ORAL 2 TIMES DAILY WITH MEALS
Qty: 8 TABLET | Refills: 0 | Status: SHIPPED | OUTPATIENT
Start: 2023-08-17

## 2023-08-17 NOTE — PROGRESS NOTES
[facility]        NAME: Jeane Pate is a 29 y.o. male  : 1990    MRN: [id number]  DATE: 2023  TIME: 1:56 PM    Assessment and Plan   Ear congestion, right [H93.8X1]  1. Ear congestion, right  predniSONE 20 mg tablet            Patient Instructions     Right middle ear effusion diagnosed on exam today. Steroids sent to the pharmacy. Recommend flonase as well. Follow up with PCP in 3-5 days if no improvement. Proceed to ER if symptoms worsen. Chief Complaint     Chief Complaint   Patient presents with   • Ear Fullness     Pt seen on Monday for Rt ear wax build up. Rt ear fullness/fluid started 3 days ago. Taking Singulair. History of Present Illness     Jeane Pate is a 29 y.o. male presenting to the office today complaining of ear pain. Symptoms have been present for 4 days, and include ear fullness and decreased hearing. He has been using allergy medications with little improvement. Review of Systems     Review of Systems   Constitutional: Negative for chills and fever. HENT: Positive for congestion and hearing loss. Negative for ear discharge, ear pain, postnasal drip, sinus pressure, sinus pain and sore throat. Eyes: Negative for pain and discharge. Respiratory: Negative for cough and shortness of breath. Cardiovascular: Negative for chest pain and palpitations. Gastrointestinal: Negative for abdominal pain, diarrhea, nausea and vomiting. Genitourinary: Negative for difficulty urinating and dysuria. Musculoskeletal: Negative for arthralgias and myalgias. Skin: Negative for rash. Neurological: Negative for dizziness, syncope, light-headedness, numbness and headaches. Psychiatric/Behavioral: Negative for agitation. All other systems reviewed and are negative.       Current Medications       Current Outpatient Medications:   •  allopurinol (ZYLOPRIM) 300 mg tablet, TAKE 1 TABLET BY MOUTH EVERY DAY, Disp: 90 tablet, Rfl: 1  •  atorvastatin (LIPITOR) 10 mg tablet, TAKE 1 TABLET BY MOUTH EVERY DAY, Disp: 90 tablet, Rfl: 2  •  colchicine (COLCRYS) 0.6 mg tablet, Take 1 tablet (0.6 mg total) by mouth daily, Disp: 20 tablet, Rfl: 1  •  Fluvoxamine Maleate 100 MG CP24, TAKE 1 CAPSULE BY MOUTH EVERY DAY, Disp: 90 capsule, Rfl: 1  •  Fluvoxamine Maleate 150 MG CP24, TAKE 1 CAPSULE BY MOUTH EVERY DAY, Disp: 90 capsule, Rfl: 2  •  lisinopril (ZESTRIL) 10 mg tablet, TAKE 1 TABLET BY MOUTH EVERY DAY, Disp: 30 tablet, Rfl: 5  •  montelukast (SINGULAIR) 10 mg tablet, TAKE 1 TABLET BY MOUTH EVERY DAY, Disp: 90 tablet, Rfl: 2  •  omeprazole (PriLOSEC) 40 MG capsule, TAKE 1 CAPSULE (40 MG TOTAL) BY MOUTH DAILY. , Disp: 30 capsule, Rfl: 1  •  predniSONE 20 mg tablet, Take 1 tablet (20 mg total) by mouth 2 (two) times a day with meals, Disp: 8 tablet, Rfl: 0  •  Adapalene 0.3 % gel, SPREAD ONE PEA-SIZED AMOUNT OF MEDICATION OVER ENTIRE FACE ABOUT ONE HOUR BEFORE BEDTIME. (Patient not taking: Reported on 1/18/2023), Disp: 45 g, Rfl: 2  •  buPROPion (WELLBUTRIN XL) 150 mg 24 hr tablet, TAKE 1 TABLET BY MOUTH EVERY DAY IN THE MORNING (Patient not taking: Reported on 8/17/2023), Disp: 90 tablet, Rfl: 1  •  clindamycin-benzoyl peroxide (BENZACLIN) gel, Apply topically 2 (two) times a day (Patient not taking: Reported on 1/18/2023), Disp: 50 g, Rfl: 3  •  colchicine (COLCRYS) 0.6 mg tablet, Take 1 tablet (0.6 mg total) by mouth daily, Disp: 20 tablet, Rfl: 1  •  Dapsone 7.5 % GEL, Apply topically once daily (Patient not taking: Reported on 1/18/2023), Disp: 90 g, Rfl: 3  •  indomethacin (INDOCIN SR) 75 mg CR capsule, Take 1 capsule (75 mg total) by mouth 2 (two) times a day with meals for 7 days, Disp: 14 capsule, Rfl: 0  •  lisinopril (ZESTRIL) 10 mg tablet, TAKE 1 TABLET BY MOUTH EVERY DAY, Disp: 30 tablet, Rfl: 5  •  loratadine (CLARITIN) 10 mg tablet, TAKE 1 TABLET BY MOUTH EVERY DAY (Patient not taking: Reported on 8/17/2023), Disp: 30 tablet, Rfl: 5  •  Multiple Vitamin (multivitamin) capsule, Take 1 capsule by mouth daily (Patient not taking: Reported on 1/18/2023), Disp: 100 capsule, Rfl: 3  •  neomycin-polymyxin-hydrocortisone (CORTISPORIN) otic solution, Administer 4 drops to the right ear 2 (two) times a day for 5 days (Patient not taking: Reported on 2/6/2023), Disp: 10 mL, Rfl: 0  •  triamcinolone (KENALOG) 0.1 % ointment, Apply topically twice a day for 14 days starlight (Patient not taking: Reported on 1/18/2023), Disp: 30 g, Rfl: 0    Current Allergies     Allergies as of 08/17/2023 - Reviewed 08/17/2023   Allergen Reaction Noted   • Penicillins Hives 10/30/2017   • Sulfa antibiotics GI Intolerance 10/30/2017            The following portions of the patient's history were reviewed and updated as appropriate: allergies, current medications, past family history, past medical history, past social history, past surgical history and problem list.     Past Medical History:   Diagnosis Date   • Acne 09/01/2010    Last Assessed 01 Sep 2010. • Acquired ankle/foot deformity     Last assessed 23 Feb 2017. Resolved 14 Aug 2017. • Allergic    • Anxiety    • Change in hearing     Resolved 16 Aug 2017   • Closed displaced avulsion fracture of tuberosity of left calcaneus     Last Assessed 25 May 2017. Resolved 14 Aug 2017. • Depression    • Epistaxis     Last Assessed 18 Feb 2014. Resolved 08 Jun 2016. • Essential hypertension 06/19/2018   • Foreign body in foot     Last Assessed 30 Jul 2013. Resolved 12 Oct 2013. • Gastritis     Last Assessed 22 Oct 2012   • GERD (gastroesophageal reflux disease)    • History of oral aphthous ulcers     Last Assessed 08 June 2016. Resolved 14 Aug 2017. • Hypersomnia 02/18/2009    Last Assessed 18 Feb 2009. Resolved 16 Aug 2017. • Hypertension 2016   • Impacted cerumen     Last Assessed 08 Jun 2016. Resolved 08 Jul 2016.    • Insomnia    • Irritable bowel syndrome    • Knee joint pain 03/10/2008   • Left foot pain     Last Assessed 19 May 2017. Resolved 14 Aug 2017. • Mixed hyperlipidemia 11/02/2020   • Obesity    • OCD (obsessive compulsive disorder)    • Plantar fibromatosis     Last Assessed 19 Oct 2016. Resolved 14 Aug 2017. • Seasonal allergies    • Tarsal tunnel syndrome of left side     Last Assessed 21 Aug 2017. Resolved 21 Aug 2017. • Tinea corporis     Last Assessed 05 Dec 2011       Past Surgical History:   Procedure Laterality Date   • ESOPHAGOGASTRODUODENOSCOPY N/A 10/31/2017    Procedure: ESOPHAGOGASTRODUODENOSCOPY (EGD); Surgeon: Sophia Solitario MD;  Location: [facility] GI LAB; Service: Gastroenterology   • NO PAST SURGERIES     • AL LNGTH/SHRT TENDON LEG/ANKLE 1 TENDON SPX Left 11/3/2017    Procedure: CALCANEAL OSTECTOMY, ACHILLEST TENDON LENGTHING;  Surgeon: Alexa Bustos DPM;  Location: [facility] OR;  Service: Podiatry       Family History   Problem Relation Age of Onset   • Kidney disease Mother    • Thyroid disease Mother    • Allergic rhinitis Mother    • Hypertension Father    • Kidney disease Father    • Gout Father    • No Known Problems Sister    • Colonic polyp Maternal Grandmother    • Crohn's disease Maternal Grandmother    • Cancer Family    • Diabetes Family        Medications have been verified. Objective     /60   Pulse 91   Temp 97.8 °F (36.6 °C) (Temporal)   Resp 20   Ht 5' 6" (1.676 m)   Wt 126 kg (278 lb)   SpO2 100%   BMI 44.87 kg/m²   No LMP for male patient. Physical Exam     Physical Exam  Vitals reviewed. Constitutional:       General: He is not in acute distress. Appearance: Normal appearance. He is not ill-appearing. HENT:      Head: Normocephalic and atraumatic. Right Ear: Ear canal normal. A middle ear effusion is present. Tympanic membrane is bulging. Left Ear: Ear canal normal. A middle ear effusion is present. Mouth/Throat:      Mouth: Mucous membranes are moist.      Pharynx: Posterior oropharyngeal erythema present. No oropharyngeal exudate.       Tonsils: No tonsillar exudate. Eyes:      Extraocular Movements: Extraocular movements intact. Conjunctiva/sclera: Conjunctivae normal.      Pupils: Pupils are equal, round, and reactive to light. Cardiovascular:      Rate and Rhythm: Normal rate and regular rhythm. Pulses: Normal pulses. Heart sounds: Normal heart sounds. No murmur heard. Pulmonary:      Effort: Pulmonary effort is normal. No respiratory distress. Breath sounds: Normal breath sounds. No wheezing. Musculoskeletal:      Cervical back: Normal range of motion and neck supple. No tenderness. Skin:     General: Skin is warm. Neurological:      General: No focal deficit present. Mental Status: He is alert.    Psychiatric:         Mood and Affect: Mood normal.         Behavior: Behavior normal.         Judgment: Judgment normal.

## 2023-08-19 DIAGNOSIS — E78.2 MIXED HYPERLIPIDEMIA: ICD-10-CM

## 2023-08-19 DIAGNOSIS — K21.9 GASTROESOPHAGEAL REFLUX DISEASE: ICD-10-CM

## 2023-08-19 RX ORDER — OMEPRAZOLE 40 MG/1
40 CAPSULE, DELAYED RELEASE ORAL DAILY
Qty: 90 CAPSULE | Refills: 1 | Status: SHIPPED | OUTPATIENT
Start: 2023-08-19

## 2023-08-19 RX ORDER — ATORVASTATIN CALCIUM 10 MG/1
TABLET, FILM COATED ORAL
Qty: 90 TABLET | Refills: 3 | Status: SHIPPED | OUTPATIENT
Start: 2023-08-19

## 2023-08-21 ENCOUNTER — OFFICE VISIT (OUTPATIENT)
Dept: URGENT CARE | Facility: CLINIC | Age: 33
End: 2023-08-21
Payer: COMMERCIAL

## 2023-08-21 VITALS
RESPIRATION RATE: 20 BRPM | SYSTOLIC BLOOD PRESSURE: 132 MMHG | DIASTOLIC BLOOD PRESSURE: 61 MMHG | HEART RATE: 77 BPM | TEMPERATURE: 98.1 F

## 2023-08-21 DIAGNOSIS — H65.04 RECURRENT ACUTE SEROUS OTITIS MEDIA OF RIGHT EAR: Primary | ICD-10-CM

## 2023-08-21 DIAGNOSIS — J30.9 ALLERGIC RHINITIS, UNSPECIFIED SEASONALITY, UNSPECIFIED TRIGGER: ICD-10-CM

## 2023-08-21 PROCEDURE — 99213 OFFICE O/P EST LOW 20 MIN: CPT | Performed by: NURSE PRACTITIONER

## 2023-08-21 RX ORDER — AZITHROMYCIN 250 MG/1
TABLET, FILM COATED ORAL
Qty: 6 TABLET | Refills: 0 | Status: SHIPPED | OUTPATIENT
Start: 2023-08-21 | End: 2023-08-26

## 2023-08-21 RX ORDER — LORATADINE 10 MG/1
TABLET ORAL
Qty: 30 TABLET | Refills: 1 | Status: SHIPPED | OUTPATIENT
Start: 2023-08-21

## 2023-08-21 NOTE — PROGRESS NOTES
[facility]        NAME: Khalida Hand is a 29 y.o. male  : 1990    MRN: [id number]  DATE: 2023  TIME: 10:36 AM    Assessment and Plan   Recurrent acute serous otitis media of right ear [H65.04]  1. Recurrent acute serous otitis media of right ear  azithromycin (ZITHROMAX) 250 mg tablet    Ambulatory Referral to Otolaryngology            Patient Instructions     --Follow-up with ENT  --Continue OTC nasal steroid (2 sprays/nostril at bedtime). --Fill and start antibiotic if increased ear pain and/or fever in the interim    Chief Complaint     Chief Complaint   Patient presents with   • Ear Problem     Seen here twice recently with ear issues. Had wax removal the first time. Came back with ear fullness. Told to use Flonase which he has been doing. Still feeling fullness in R ear. A slight bit more discomfort now. History of Present Illness       Here with complaints of ongoing R>L ear discomfort, somewhat muffled hearing x 1 week. Seen here on , at which time he had both ears flushed. Seen back on  for complaints of ongoing right ear discomfort. OTC nasal steroid and Rx oral steroid prescribed which he has taken, with minimal improvement. No otorrhea. Notes some allergy symptoms over the last week or two, but states that this has since resolved. No current nasal congestion. No fever. No other OTC meds. States he had multiple ear infections when younger. Review of Systems   Review of Systems   Constitutional: Negative for fever. HENT: Positive for ear pain. Negative for ear discharge and rhinorrhea. Respiratory: Negative for cough. Neurological: Negative for headaches.          Current Medications       Current Outpatient Medications:   •  allopurinol (ZYLOPRIM) 300 mg tablet, TAKE 1 TABLET BY MOUTH EVERY DAY, Disp: 90 tablet, Rfl: 1  •  atorvastatin (LIPITOR) 10 mg tablet, TAKE 1 TABLET BY MOUTH EVERY DAY, Disp: 90 tablet, Rfl: 3  • azithromycin (ZITHROMAX) 250 mg tablet, Take 2 tablets today then 1 tablet daily x 4 days, Disp: 6 tablet, Rfl: 0  •  colchicine (COLCRYS) 0.6 mg tablet, Take 1 tablet (0.6 mg total) by mouth daily, Disp: 20 tablet, Rfl: 1  •  Fluvoxamine Maleate 100 MG CP24, TAKE 1 CAPSULE BY MOUTH EVERY DAY, Disp: 90 capsule, Rfl: 1  •  Fluvoxamine Maleate 150 MG CP24, TAKE 1 CAPSULE BY MOUTH EVERY DAY, Disp: 90 capsule, Rfl: 2  •  lisinopril (ZESTRIL) 10 mg tablet, TAKE 1 TABLET BY MOUTH EVERY DAY, Disp: 30 tablet, Rfl: 5  •  loratadine (CLARITIN) 10 mg tablet, TAKE 1 TABLET BY MOUTH EVERY DAY, Disp: 30 tablet, Rfl: 1  •  montelukast (SINGULAIR) 10 mg tablet, TAKE 1 TABLET BY MOUTH EVERY DAY, Disp: 90 tablet, Rfl: 2  •  omeprazole (PriLOSEC) 40 MG capsule, TAKE 1 CAPSULE (40 MG TOTAL) BY MOUTH DAILY. , Disp: 90 capsule, Rfl: 1  •  predniSONE 20 mg tablet, Take 1 tablet (20 mg total) by mouth 2 (two) times a day with meals, Disp: 8 tablet, Rfl: 0  •  Adapalene 0.3 % gel, SPREAD ONE PEA-SIZED AMOUNT OF MEDICATION OVER ENTIRE FACE ABOUT ONE HOUR BEFORE BEDTIME. (Patient not taking: Reported on 1/18/2023), Disp: 45 g, Rfl: 2  •  buPROPion (WELLBUTRIN XL) 150 mg 24 hr tablet, TAKE 1 TABLET BY MOUTH EVERY DAY IN THE MORNING (Patient not taking: Reported on 8/17/2023), Disp: 90 tablet, Rfl: 1  •  clindamycin-benzoyl peroxide (BENZACLIN) gel, Apply topically 2 (two) times a day (Patient not taking: Reported on 1/18/2023), Disp: 50 g, Rfl: 3  •  Dapsone 7.5 % GEL, Apply topically once daily (Patient not taking: Reported on 1/18/2023), Disp: 90 g, Rfl: 3  •  indomethacin (INDOCIN SR) 75 mg CR capsule, Take 1 capsule (75 mg total) by mouth 2 (two) times a day with meals for 7 days, Disp: 14 capsule, Rfl: 0  •  Multiple Vitamin (multivitamin) capsule, Take 1 capsule by mouth daily (Patient not taking: Reported on 1/18/2023), Disp: 100 capsule, Rfl: 3  •  neomycin-polymyxin-hydrocortisone (CORTISPORIN) otic solution, Administer 4 drops to the right ear 2 (two) times a day for 5 days (Patient not taking: Reported on 2/6/2023), Disp: 10 mL, Rfl: 0  •  triamcinolone (KENALOG) 0.1 % ointment, Apply topically twice a day for 14 days starlight (Patient not taking: Reported on 1/18/2023), Disp: 30 g, Rfl: 0    Current Allergies     Allergies as of 08/21/2023 - Reviewed 08/21/2023   Allergen Reaction Noted   • Penicillins Hives 10/30/2017   • Sulfa antibiotics GI Intolerance 10/30/2017            The following portions of the patient's history were reviewed and updated as appropriate: allergies, current medications, past family history, past medical history, past social history, past surgical history and problem list.     Past Medical History:   Diagnosis Date   • Acne 09/01/2010    Last Assessed 01 Sep 2010. • Acquired ankle/foot deformity     Last assessed 23 Feb 2017. Resolved 14 Aug 2017. • Allergic    • Anxiety    • Change in hearing     Resolved 16 Aug 2017   • Closed displaced avulsion fracture of tuberosity of left calcaneus     Last Assessed 25 May 2017. Resolved 14 Aug 2017. • Depression    • Epistaxis     Last Assessed 18 Feb 2014. Resolved 08 Jun 2016. • Essential hypertension 06/19/2018   • Foreign body in foot     Last Assessed 30 Jul 2013. Resolved 12 Oct 2013. • Gastritis     Last Assessed 22 Oct 2012   • GERD (gastroesophageal reflux disease)    • History of oral aphthous ulcers     Last Assessed 08 June 2016. Resolved 14 Aug 2017. • Hypersomnia 02/18/2009    Last Assessed 18 Feb 2009. Resolved 16 Aug 2017. • Hypertension 2016   • Impacted cerumen     Last Assessed 08 Jun 2016. Resolved 08 Jul 2016. • Insomnia    • Irritable bowel syndrome    • Knee joint pain 03/10/2008   • Left foot pain     Last Assessed 19 May 2017. Resolved 14 Aug 2017. • Mixed hyperlipidemia 11/02/2020   • Obesity    • OCD (obsessive compulsive disorder)    • Plantar fibromatosis     Last Assessed 19 Oct 2016. Resolved 14 Aug 2017.    • Seasonal allergies • Tarsal tunnel syndrome of left side     Last Assessed 21 Aug 2017. Resolved 21 Aug 2017. • Tinea corporis     Last Assessed 05 Dec 2011       Past Surgical History:   Procedure Laterality Date   • ESOPHAGOGASTRODUODENOSCOPY N/A 10/31/2017    Procedure: ESOPHAGOGASTRODUODENOSCOPY (EGD); Surgeon: Tahmina Moss MD;  Location: [facility] GI LAB; Service: Gastroenterology   • NO PAST SURGERIES     • [place] LNGTH/SHRT TENDON LEG/ANKLE 1 TENDON SPX Left 11/3/2017    Procedure: CALCANEAL OSTECTOMY, ACHILLEST TENDON LENGTHING;  Surgeon: Anh Robbins DPM;  Location: [facility];  Service: Podiatry       Family History   Problem Relation Age of Onset   • Kidney disease Mother    • Thyroid disease Mother    • Allergic rhinitis Mother    • Hypertension Father    • Kidney disease Father    • Gout Father    • No Known Problems Sister    • Colonic polyp Maternal Grandmother    • Crohn's disease Maternal Grandmother    • Cancer Family    • Diabetes Family          Medications have been verified. Objective   /61   Pulse 77   Temp 98.1 °F (36.7 °C) (Temporal)   Resp 20   No LMP for male patient. Physical Exam     Physical Exam  Constitutional:       General: He is not in acute distress. HENT:      Right Ear: Ear canal and external ear normal. There is no impacted cerumen. Left Ear: Ear canal and external ear normal. There is no impacted cerumen. Ears:      Comments: Right TM dull grey/pink, partially retracted with 3-4 serous fluid bubbles behind TM. No otorrhea. Left TM dull grey/pink with minimal bulge. Nose: No congestion or rhinorrhea. Pulmonary:      Effort: Pulmonary effort is normal.      Breath sounds: Normal breath sounds. Lymphadenopathy:      Cervical: No cervical adenopathy. Neurological:      Mental Status: He is alert.

## 2023-08-21 NOTE — PATIENT INSTRUCTIONS
--Follow-up with ENT  --Continue OTC nasal steroid (2 sprays/nostril at bedtime).    --Fill and start antibiotic if increased ear pain and/or fever in the interim

## 2023-08-25 ENCOUNTER — OFFICE VISIT (OUTPATIENT)
Dept: FAMILY MEDICINE CLINIC | Facility: CLINIC | Age: 33
End: 2023-08-25
Payer: COMMERCIAL

## 2023-08-25 VITALS
BODY MASS INDEX: 45.48 KG/M2 | HEIGHT: 66 IN | DIASTOLIC BLOOD PRESSURE: 80 MMHG | SYSTOLIC BLOOD PRESSURE: 136 MMHG | TEMPERATURE: 98.6 F | RESPIRATION RATE: 16 BRPM | WEIGHT: 283 LBS | HEART RATE: 85 BPM | OXYGEN SATURATION: 98 %

## 2023-08-25 DIAGNOSIS — H65.21 RIGHT CHRONIC SEROUS OTITIS MEDIA: Primary | ICD-10-CM

## 2023-08-25 PROCEDURE — 99213 OFFICE O/P EST LOW 20 MIN: CPT | Performed by: FAMILY MEDICINE

## 2023-08-25 NOTE — PROGRESS NOTES
Name: Rick Olivera      : 1990      MRN: [id number]  Encounter Provider: Brooke Nair MD  Encounter Date: 2023   Encounter department: [address] [place] [address] Avenue     1. Right chronic serous otitis media        Depression Screening and Follow-up Plan: Patient assessed for underlying major depression. Brief counseling provided and recommend additional follow-up/re-evaluation next office visit. Patient advised to follow-up with PCP for further management. Subjective      76-year-old male here today for checkup on the serous otitis. Patient with chronic serous otitis on the right side he has been seen by care now and was started on some antihistamines as well as some nasal steroids. Patient is using that not really effective. Patient has decreased hearing in his right ear. Patient is scheduled to see his ENT but not until November he has a lot of barriers put up by his insurance coverage to get coverage until then. Upon exam of the patient he does have serous otitis on the right side is pretty significant is looks as though he is going need a tympanostomy tube however he needs to see the ENT and the problem is getting him into see somebody. Patient also with some irritation to his eyes with some allergic symptoms he is not able to use any eyedrops because he is not able to use eyedrops I told him to continue with some lid scrubs as well as good hygiene for his eyes and to continue with the Claritin and the Flonase nasal spray. Review of Systems   Constitutional: Negative for activity change, appetite change, chills, fatigue, fever and unexpected weight change. HENT: Positive for hearing loss. Negative for congestion, ear pain, mouth sores, postnasal drip, sinus pressure, sinus pain, sneezing and sore throat. Respiratory: Negative for apnea, cough, shortness of breath and wheezing.     Cardiovascular: Negative for chest pain, palpitations and leg swelling. Gastrointestinal: Negative for abdominal pain, constipation, diarrhea, nausea and vomiting. Endocrine: Negative for cold intolerance and heat intolerance. Genitourinary: Negative for dysuria, frequency and hematuria. Musculoskeletal: Negative for arthralgias, back pain, gait problem, joint swelling and neck pain. Skin: Negative for rash. Neurological: Negative for dizziness, weakness and numbness. Hematological: Does not bruise/bleed easily. Psychiatric/Behavioral: Negative for agitation, behavioral problems, confusion, hallucinations and sleep disturbance. The patient is not nervous/anxious. Current Outpatient Medications on File Prior to Visit   Medication Sig   • allopurinol (ZYLOPRIM) 300 mg tablet TAKE 1 TABLET BY MOUTH EVERY DAY   • atorvastatin (LIPITOR) 10 mg tablet TAKE 1 TABLET BY MOUTH EVERY DAY   • colchicine (COLCRYS) 0.6 mg tablet Take 1 tablet (0.6 mg total) by mouth daily   • Fluvoxamine Maleate 100 MG CP24 TAKE 1 CAPSULE BY MOUTH EVERY DAY   • Fluvoxamine Maleate 150 MG CP24 TAKE 1 CAPSULE BY MOUTH EVERY DAY   • lisinopril (ZESTRIL) 10 mg tablet TAKE 1 TABLET BY MOUTH EVERY DAY   • montelukast (SINGULAIR) 10 mg tablet TAKE 1 TABLET BY MOUTH EVERY DAY   • omeprazole (PriLOSEC) 40 MG capsule TAKE 1 CAPSULE (40 MG TOTAL) BY MOUTH DAILY.    • Adapalene 0.3 % gel SPREAD ONE PEA-SIZED AMOUNT OF MEDICATION OVER ENTIRE FACE ABOUT ONE HOUR BEFORE BEDTIME. (Patient not taking: Reported on 1/18/2023)   • azithromycin (ZITHROMAX) 250 mg tablet Take 2 tablets today then 1 tablet daily x 4 days (Patient not taking: Reported on 8/25/2023)   • buPROPion (WELLBUTRIN XL) 150 mg 24 hr tablet TAKE 1 TABLET BY MOUTH EVERY DAY IN THE MORNING (Patient not taking: Reported on 8/17/2023)   • clindamycin-benzoyl peroxide (BENZACLIN) gel Apply topically 2 (two) times a day (Patient not taking: Reported on 1/18/2023)   • Dapsone 7.5 % GEL Apply topically once daily (Patient not taking: Reported on 1/18/2023)   • indomethacin (INDOCIN SR) 75 mg CR capsule Take 1 capsule (75 mg total) by mouth 2 (two) times a day with meals for 7 days   • loratadine (CLARITIN) 10 mg tablet TAKE 1 TABLET BY MOUTH EVERY DAY (Patient not taking: Reported on 8/25/2023)   • Multiple Vitamin (multivitamin) capsule Take 1 capsule by mouth daily (Patient not taking: Reported on 1/18/2023)   • neomycin-polymyxin-hydrocortisone (CORTISPORIN) otic solution Administer 4 drops to the right ear 2 (two) times a day for 5 days (Patient not taking: Reported on 2/6/2023)   • predniSONE 20 mg tablet Take 1 tablet (20 mg total) by mouth 2 (two) times a day with meals (Patient not taking: Reported on 8/25/2023)   • triamcinolone (KENALOG) 0.1 % ointment Apply topically twice a day for 14 days starlight (Patient not taking: Reported on 1/18/2023)       Objective     /80 (BP Location: Left arm, Patient Position: Sitting, Cuff Size: Large)   Pulse 85   Temp 98.6 °F (37 °C) (Temporal)   Resp 16   Ht 5' 6" (1.676 m)   Wt 128 kg (283 lb)   SpO2 98%   BMI 45.68 kg/m²     Physical Exam  Vitals and nursing note reviewed. Constitutional:       Appearance: He is well-developed. HENT:      Head: Normocephalic and atraumatic. Nose: Nose normal.   Eyes:      General: No scleral icterus. Conjunctiva/sclera: Conjunctivae normal.      Pupils: Pupils are equal, round, and reactive to light. Neck:      Thyroid: No thyromegaly. Cardiovascular:      Rate and Rhythm: Normal rate and regular rhythm. Heart sounds: Normal heart sounds. Pulmonary:      Effort: Pulmonary effort is normal. No respiratory distress. Breath sounds: Normal breath sounds. No wheezing. Abdominal:      General: Bowel sounds are normal.      Palpations: Abdomen is soft. Tenderness: There is no abdominal tenderness. There is no guarding or rebound. Musculoskeletal:         General: Normal range of motion.       Cervical back: Normal range of motion and neck supple. Skin:     General: Skin is warm and dry. Findings: No rash. Neurological:      Mental Status: He is alert and oriented to person, place, and time. Psychiatric:         Mood and Affect: Mood normal.         Behavior: Behavior normal.         Thought Content:  Thought content normal.         Judgment: Judgment normal.       Alma Barnes MD

## 2023-09-06 DIAGNOSIS — I10 ESSENTIAL HYPERTENSION: ICD-10-CM

## 2023-09-06 RX ORDER — LISINOPRIL 10 MG/1
TABLET ORAL
Qty: 30 TABLET | Refills: 2 | Status: SHIPPED | OUTPATIENT
Start: 2023-09-06 | End: 2023-09-14 | Stop reason: SDUPTHER

## 2023-09-10 DIAGNOSIS — F32.5 MAJOR DEPRESSIVE DISORDER WITH SINGLE EPISODE, IN REMISSION (HCC): ICD-10-CM

## 2023-09-10 RX ORDER — BUPROPION HYDROCHLORIDE 150 MG/1
TABLET ORAL
Qty: 90 TABLET | Refills: 2 | Status: SHIPPED | OUTPATIENT
Start: 2023-09-10

## 2023-09-14 DIAGNOSIS — I10 ESSENTIAL HYPERTENSION: ICD-10-CM

## 2023-09-14 RX ORDER — LISINOPRIL 10 MG/1
10 TABLET ORAL DAILY
Qty: 30 TABLET | Refills: 0 | Status: SHIPPED | OUTPATIENT
Start: 2023-09-14

## 2023-09-17 DIAGNOSIS — J30.9 ALLERGIC RHINITIS, UNSPECIFIED SEASONALITY, UNSPECIFIED TRIGGER: ICD-10-CM

## 2023-09-18 RX ORDER — LORATADINE 10 MG/1
TABLET ORAL
Qty: 90 TABLET | Refills: 1 | Status: SHIPPED | OUTPATIENT
Start: 2023-09-18

## 2023-09-28 DIAGNOSIS — E78.2 MIXED HYPERLIPIDEMIA: ICD-10-CM

## 2023-09-28 DIAGNOSIS — K21.9 GASTROESOPHAGEAL REFLUX DISEASE: ICD-10-CM

## 2023-09-28 DIAGNOSIS — M10.9 ACUTE GOUT INVOLVING TOE OF RIGHT FOOT, UNSPECIFIED CAUSE: ICD-10-CM

## 2023-09-28 DIAGNOSIS — F42.2 MIXED OBSESSIONAL THOUGHTS AND ACTS: ICD-10-CM

## 2023-09-28 DIAGNOSIS — F32.5 MAJOR DEPRESSIVE DISORDER WITH SINGLE EPISODE, IN REMISSION (HCC): ICD-10-CM

## 2023-09-28 DIAGNOSIS — J30.9 ALLERGIC RHINITIS, UNSPECIFIED SEASONALITY, UNSPECIFIED TRIGGER: ICD-10-CM

## 2023-09-28 DIAGNOSIS — I10 ESSENTIAL HYPERTENSION: ICD-10-CM

## 2023-09-28 DIAGNOSIS — M10.9 ACUTE GOUT INVOLVING TOE, UNSPECIFIED CAUSE, UNSPECIFIED LATERALITY: ICD-10-CM

## 2023-09-29 RX ORDER — FLUVOXAMINE MALEATE 150 MG/1
1 CAPSULE, EXTENDED RELEASE ORAL DAILY
Qty: 90 CAPSULE | Refills: 0 | Status: SHIPPED | OUTPATIENT
Start: 2023-09-29

## 2023-09-29 RX ORDER — MONTELUKAST SODIUM 10 MG/1
10 TABLET ORAL DAILY
Qty: 90 TABLET | Refills: 0 | Status: SHIPPED | OUTPATIENT
Start: 2023-09-29

## 2023-09-29 RX ORDER — ALLOPURINOL 300 MG/1
300 TABLET ORAL DAILY
Qty: 90 TABLET | Refills: 0 | Status: SHIPPED | OUTPATIENT
Start: 2023-09-29

## 2023-09-29 RX ORDER — COLCHICINE 0.6 MG/1
0.6 TABLET ORAL DAILY
Qty: 20 TABLET | Refills: 0 | Status: SHIPPED | OUTPATIENT
Start: 2023-09-29

## 2023-09-29 RX ORDER — FLUVOXAMINE MALEATE 100 MG/1
1 CAPSULE, EXTENDED RELEASE ORAL DAILY
Qty: 90 CAPSULE | Refills: 0 | Status: SHIPPED | OUTPATIENT
Start: 2023-09-29

## 2023-09-29 RX ORDER — BUPROPION HYDROCHLORIDE 150 MG/1
150 TABLET ORAL EVERY MORNING
Qty: 90 TABLET | Refills: 0 | Status: SHIPPED | OUTPATIENT
Start: 2023-09-29

## 2023-09-29 RX ORDER — ATORVASTATIN CALCIUM 10 MG/1
10 TABLET, FILM COATED ORAL DAILY
Qty: 90 TABLET | Refills: 0 | Status: SHIPPED | OUTPATIENT
Start: 2023-09-29

## 2023-09-29 RX ORDER — LISINOPRIL 10 MG/1
10 TABLET ORAL DAILY
Qty: 30 TABLET | Refills: 0 | Status: SHIPPED | OUTPATIENT
Start: 2023-09-29

## 2023-09-29 RX ORDER — INDOMETHACIN 75 MG/1
75 CAPSULE, EXTENDED RELEASE ORAL 2 TIMES DAILY WITH MEALS
Qty: 14 CAPSULE | Refills: 0 | Status: SHIPPED | OUTPATIENT
Start: 2023-09-29 | End: 2023-10-06

## 2023-09-29 RX ORDER — OMEPRAZOLE 40 MG/1
40 CAPSULE, DELAYED RELEASE ORAL DAILY
Qty: 90 CAPSULE | Refills: 0 | Status: SHIPPED | OUTPATIENT
Start: 2023-09-29

## 2023-11-11 DIAGNOSIS — I10 ESSENTIAL HYPERTENSION: ICD-10-CM

## 2023-11-13 RX ORDER — LISINOPRIL 10 MG/1
10 TABLET ORAL DAILY
Qty: 30 TABLET | Refills: 0 | Status: SHIPPED | OUTPATIENT
Start: 2023-11-13

## 2024-01-10 ENCOUNTER — TELEPHONE (OUTPATIENT)
Age: 34
End: 2024-01-10

## 2024-01-10 DIAGNOSIS — M10.9 ACUTE GOUT INVOLVING TOE, UNSPECIFIED CAUSE, UNSPECIFIED LATERALITY: ICD-10-CM

## 2024-01-10 DIAGNOSIS — E78.2 MIXED HYPERLIPIDEMIA: ICD-10-CM

## 2024-01-10 DIAGNOSIS — J30.9 ALLERGIC RHINITIS, UNSPECIFIED SEASONALITY, UNSPECIFIED TRIGGER: ICD-10-CM

## 2024-01-10 DIAGNOSIS — F32.5 MAJOR DEPRESSIVE DISORDER WITH SINGLE EPISODE, IN REMISSION (HCC): ICD-10-CM

## 2024-01-10 DIAGNOSIS — F42.2 MIXED OBSESSIONAL THOUGHTS AND ACTS: ICD-10-CM

## 2024-01-10 RX ORDER — MONTELUKAST SODIUM 10 MG/1
10 TABLET ORAL DAILY
Qty: 90 TABLET | Refills: 0 | Status: SHIPPED | OUTPATIENT
Start: 2024-01-10

## 2024-01-10 RX ORDER — ALLOPURINOL 300 MG/1
300 TABLET ORAL DAILY
Qty: 90 TABLET | Refills: 0 | Status: SHIPPED | OUTPATIENT
Start: 2024-01-10

## 2024-01-10 RX ORDER — FLUVOXAMINE MALEATE 100 MG/1
CAPSULE, EXTENDED RELEASE ORAL
Qty: 90 CAPSULE | Refills: 0 | Status: SHIPPED | OUTPATIENT
Start: 2024-01-10

## 2024-01-10 RX ORDER — BUPROPION HYDROCHLORIDE 150 MG/1
TABLET ORAL
Qty: 90 TABLET | Refills: 0 | Status: SHIPPED | OUTPATIENT
Start: 2024-01-10

## 2024-01-10 RX ORDER — ATORVASTATIN CALCIUM 10 MG/1
10 TABLET, FILM COATED ORAL DAILY
Qty: 90 TABLET | Refills: 0 | Status: SHIPPED | OUTPATIENT
Start: 2024-01-10

## 2024-01-10 NOTE — TELEPHONE ENCOUNTER
PA for Atorvastatin    Submitted via  []CMM-KEY    [x][employer]-Case ID # [id number]   []Faxed to plan   []Other website    []Phone call Case ID #      Office notes sent, clinical questions answered. Awaiting determination

## 2024-01-10 NOTE — TELEPHONE ENCOUNTER
PA for Fluvozamine Maleate    Submitted via  []CMM-KEY    [x]Last 2 Left-Case ID # [id number]   []Faxed to plan   []Other website    []Phone call Case ID #      Office notes sent, clinical questions answered. Awaiting determination

## 2024-01-15 NOTE — TELEPHONE ENCOUNTER
PA for Fluvoxamine Maleate 100 mg Not Required     Reason:   Prior Authorization not required for patient/medication  Note from payer: Available without authorization.        Message sent to provider pool no

## 2024-01-15 NOTE — TELEPHONE ENCOUNTER
-- DO NOT REPLY / DO NOT REPLY ALL --  -- Message is from Engagement Center Operations (ECO) --    ONLY TO BE USED WITHIN A REFILL MEDICATION ENCOUNTER    Please Note: Wife states that Pharmacy needs a NEW prescription because the patient now takes 2 tablets a day.  The pharmacy advised that the prescription they have says 1 tablet a day and they will not refill the medication because they say it is too soon for a refill.    Insurance company will pay for a 90day supply.    Med Refill  Is the patient currently having any symptoms?: No/Non-Emergent symptoms    Name of medication requested: See pended med    Has patient contacted the pharmacy? Yes    Is this the first request for the medication in the last 48 hours?: Yes      Patient is requesting a medication refill - medication is on active list      Full name of the provider who ordered the medication: Jhonny Samson MD    Clinic site name / Account # for provider: [address] [address]  [address]      Preferred Pharmacy: Pharmacy  [id number] - [place], [place] - [address] [facility] [address]    Patient confirmed the above pharmacy as correct?  Yes      Caller Information       Type Contact Phone/Fax    08/07/2023 09:56 AM CDT Phone (Incoming) LOGAN ALAS (Emergency Contact) [phone]          Alternative phone number: none    Can a detailed message be left?: Yes    Patient is completely out of medication: Verify if patient is currently experiencing symptoms. If patient is symptomatic, proceed with front end triage instead of medication refill. If patient is not symptomatic but is completely out of medication, maeve as High priority when routing. Inform patient: “Please call back with any questions or concerns and if your condition becomes life threatening, you should seek immediate medical assistance by calling 911 or going to the Emergency Department for evaluation.”    Inform all patients: \"If the clinical team needs to  PA for Atorvastatin (LIPITOR) 10 mg tablet  Not Required     Reason:   Prior Authorization not required for patient/medication  Note from payer: Available without authorization.        Message sent to provider pool no     contact you regarding this refill, please be aware the return phone call may come from an unidentified or out of state phone number and your refill request will be addressed as soon as the clinical team reviews your message.\"

## 2024-01-24 ENCOUNTER — TELEPHONE (OUTPATIENT)
Age: 34
End: 2024-01-24

## 2024-01-24 DIAGNOSIS — F42.2 MIXED OBSESSIONAL THOUGHTS AND ACTS: ICD-10-CM

## 2024-01-24 RX ORDER — FLUVOXAMINE MALEATE 150 MG/1
CAPSULE, EXTENDED RELEASE ORAL
Qty: 90 CAPSULE | Refills: 1 | Status: SHIPPED | OUTPATIENT
Start: 2024-01-24

## 2024-01-24 RX ORDER — FLUVOXAMINE MALEATE 100 MG/1
CAPSULE, EXTENDED RELEASE ORAL
Qty: 90 CAPSULE | Refills: 1 | Status: SHIPPED | OUTPATIENT
Start: 2024-01-24

## 2024-01-24 NOTE — TELEPHONE ENCOUNTER
[facility].  He sent all his medication list to be refilled.  He called to inquire if they were all done. I tried to explain that some were done on 1/11/24, and told him which ones.  However he still wanted all the meds that he sent on my Chart the 22nd of January to be filled.  Please contact the patient at [phone].

## 2024-01-25 NOTE — TELEPHONE ENCOUNTER
Maren from [facility] called in stating the pt is all out of this medication and Pharmacy Provider Services can make this process go faster and zuleyka it as urgent if they're called at [phone].

## 2024-01-25 NOTE — TELEPHONE ENCOUNTER
I called [facility] as requested and initiated an expedited p/a and left a message for patient and notified.

## 2024-01-25 NOTE — TELEPHONE ENCOUNTER
Patient called back requesting to speak with Jayde regarding the p/a.  Call was warm transferred to practice to assist.

## 2024-01-26 NOTE — TELEPHONE ENCOUNTER
Patient called to check on status of prior authorization. I explained it was currently in process and awaiting approval.

## 2024-02-21 PROBLEM — Z13.1 SCREENING FOR DIABETES MELLITUS (DM): Status: RESOLVED | Noted: 2018-06-19 | Resolved: 2024-02-21

## 2024-02-21 PROBLEM — Z13.83 SCREENING FOR CARDIOVASCULAR, RESPIRATORY, AND GENITOURINARY DISEASES: Status: RESOLVED | Noted: 2018-06-19 | Resolved: 2024-02-21

## 2024-02-21 PROBLEM — Z00.00 WELL ADULT HEALTH CHECK: Status: RESOLVED | Noted: 2018-07-17 | Resolved: 2024-02-21

## 2024-02-21 PROBLEM — Z13.6 SCREENING FOR CARDIOVASCULAR, RESPIRATORY, AND GENITOURINARY DISEASES: Status: RESOLVED | Noted: 2018-06-19 | Resolved: 2024-02-21

## 2024-02-21 PROBLEM — Z13.89 SCREENING FOR CARDIOVASCULAR, RESPIRATORY, AND GENITOURINARY DISEASES: Status: RESOLVED | Noted: 2018-06-19 | Resolved: 2024-02-21

## 2024-04-22 DIAGNOSIS — F32.5 MAJOR DEPRESSIVE DISORDER WITH SINGLE EPISODE, IN REMISSION (HCC): ICD-10-CM

## 2024-04-22 DIAGNOSIS — J30.9 ALLERGIC RHINITIS, UNSPECIFIED SEASONALITY, UNSPECIFIED TRIGGER: ICD-10-CM

## 2024-04-22 DIAGNOSIS — M10.9 ACUTE GOUT INVOLVING TOE, UNSPECIFIED CAUSE, UNSPECIFIED LATERALITY: ICD-10-CM

## 2024-04-22 RX ORDER — ALLOPURINOL 300 MG/1
300 TABLET ORAL DAILY
Qty: 90 TABLET | Refills: 1 | Status: SHIPPED | OUTPATIENT
Start: 2024-04-22

## 2024-04-22 RX ORDER — MONTELUKAST SODIUM 10 MG/1
10 TABLET ORAL DAILY
Qty: 90 TABLET | Refills: 1 | Status: SHIPPED | OUTPATIENT
Start: 2024-04-22

## 2024-04-22 RX ORDER — BUPROPION HYDROCHLORIDE 150 MG/1
TABLET ORAL
Qty: 90 TABLET | Refills: 1 | Status: SHIPPED | OUTPATIENT
Start: 2024-04-22

## 2024-04-25 DIAGNOSIS — K21.9 GASTROESOPHAGEAL REFLUX DISEASE: ICD-10-CM

## 2024-04-25 DIAGNOSIS — F42.2 MIXED OBSESSIONAL THOUGHTS AND ACTS: ICD-10-CM

## 2024-04-26 RX ORDER — FLUVOXAMINE MALEATE 100 MG/1
CAPSULE, EXTENDED RELEASE ORAL
Qty: 90 CAPSULE | Refills: 1 | Status: SHIPPED | OUTPATIENT
Start: 2024-04-26 | End: 2024-05-03 | Stop reason: SDUPTHER

## 2024-04-26 RX ORDER — OMEPRAZOLE 40 MG/1
40 CAPSULE, DELAYED RELEASE ORAL DAILY
Qty: 90 CAPSULE | Refills: 1 | Status: SHIPPED | OUTPATIENT
Start: 2024-04-26

## 2024-05-02 DIAGNOSIS — E78.2 MIXED HYPERLIPIDEMIA: ICD-10-CM

## 2024-05-02 RX ORDER — ATORVASTATIN CALCIUM 10 MG/1
10 TABLET, FILM COATED ORAL DAILY
Qty: 90 TABLET | Refills: 0 | Status: SHIPPED | OUTPATIENT
Start: 2024-05-02

## 2024-05-02 NOTE — TELEPHONE ENCOUNTER
Pt called, he said the Fluvoxamine is on  back order and he is wondering if he can get another medication in its place?

## 2024-05-03 ENCOUNTER — HOSPITAL ENCOUNTER (EMERGENCY)
Facility: HOSPITAL | Age: 34
Discharge: HOME | End: 2024-05-03
Attending: EMERGENCY MEDICINE | Admitting: EMERGENCY MEDICINE
Payer: COMMERCIAL

## 2024-05-03 VITALS
OXYGEN SATURATION: 96 % | SYSTOLIC BLOOD PRESSURE: 121 MMHG | TEMPERATURE: 99.2 F | HEART RATE: 86 BPM | DIASTOLIC BLOOD PRESSURE: 69 MMHG | RESPIRATION RATE: 17 BRPM

## 2024-05-03 DIAGNOSIS — F42.2 MIXED OBSESSIONAL THOUGHTS AND ACTS: ICD-10-CM

## 2024-05-03 DIAGNOSIS — Z76.0 MEDICATION REFILL: Primary | ICD-10-CM

## 2024-05-03 PROCEDURE — 99281 EMR DPT VST MAYX REQ PHY/QHP: CPT

## 2024-05-03 PROCEDURE — 99284 EMERGENCY DEPT VISIT MOD MDM: CPT | Performed by: EMERGENCY MEDICINE

## 2024-05-03 RX ORDER — FLUVOXAMINE MALEATE 100 MG/1
300 CAPSULE, EXTENDED RELEASE ORAL DAILY
Qty: 21 CAPSULE | Refills: 0 | Status: SHIPPED | OUTPATIENT
Start: 2024-05-03 | End: 2024-05-06 | Stop reason: SDUPTHER

## 2024-05-03 RX ORDER — FLUVOXAMINE MALEATE 100 MG/1
100 CAPSULE, EXTENDED RELEASE ORAL DAILY
Qty: 90 CAPSULE | Refills: 0 | Status: SHIPPED | OUTPATIENT
Start: 2024-05-03 | End: 2024-05-03

## 2024-05-03 NOTE — ED PROVIDER NOTES
History  Chief Complaint   Patient presents with    Medication Refill     Pt states he is unable to get anymore of his fluvoxamine ordered without first seeing his PCP. States he is unable to see PCP until Monday and is requesting a refill.       Medication Refill      Patient presents emergency department because he is unable to see his PCP until Monday and is requesting a refill of fluvoxamine extended release.  Patient states he been on this medication for quite some time, ran out about a week ago and was requesting refill but was unable to do so.  He denies additional medical symptoms.  He states his symptoms have become worse significant he was interested in a medication increase.  He states he called the pharmacy and all they have are 100 mg tablets.  His previous dose was 250 mg extended release daily.    Prior to Admission Medications   Prescriptions Last Dose Informant Patient Reported? Taking?   Adapalene 0.3 % gel  Self No No   Sig: SPREAD ONE PEA-SIZED AMOUNT OF MEDICATION OVER ENTIRE FACE ABOUT ONE HOUR BEFORE BEDTIME.   Patient not taking: Reported on 1/18/2023   Dapsone 7.5 % GEL  Self No No   Sig: Apply topically once daily   Patient not taking: Reported on 1/18/2023   Fluvoxamine Maleate 100 MG CP24   No No   Sig: Take 1 capsule (100 mg total) by mouth in the morning   Fluvoxamine Maleate 100 MG CP24   No Yes   Sig: Take 3 capsules (300 mg total) by mouth in the morning for 7 days   Fluvoxamine Maleate 150 MG CP24   No No   Sig: TAKE 1 CAPSULE(150 MG) BY MOUTH DAILY   Multiple Vitamin (multivitamin) capsule  Self No No   Sig: Take 1 capsule by mouth daily   Patient not taking: Reported on 1/18/2023   allopurinol (ZYLOPRIM) 300 mg tablet   No No   Sig: TAKE 1 TABLET(300 MG) BY MOUTH DAILY   atorvastatin (LIPITOR) 10 mg tablet   No No   Sig: Take 1 tablet (10 mg total) by mouth daily   buPROPion (WELLBUTRIN XL) 150 mg 24 hr tablet   No No   Sig: TAKE 1 TABLET(150 MG) BY MOUTH EVERY MORNING    clindamycin-benzoyl peroxide (BENZACLIN) gel  Self No No   Sig: Apply topically 2 (two) times a day   Patient not taking: Reported on 1/18/2023   colchicine (COLCRYS) 0.6 mg tablet   No No   Sig: Take 1 tablet (0.6 mg total) by mouth daily   indomethacin (INDOCIN SR) 75 mg CR capsule   No No   Sig: Take 1 capsule (75 mg total) by mouth 2 (two) times a day with meals for 7 days   lisinopril (ZESTRIL) 10 mg tablet   No No   Sig: Take 1 tablet (10 mg total) by mouth daily   loratadine (CLARITIN) 10 mg tablet   No No   Sig: TAKE 1 TABLET BY MOUTH EVERY DAY   montelukast (SINGULAIR) 10 mg tablet   No No   Sig: TAKE 1 TABLET(10 MG) BY MOUTH DAILY   neomycin-polymyxin-hydrocortisone (CORTISPORIN) otic solution   No No   Sig: Administer 4 drops to the right ear 2 (two) times a day for 5 days   Patient not taking: Reported on 2/6/2023   omeprazole (PriLOSEC) 40 MG capsule   No No   Sig: TAKE 1 CAPSULE(40 MG) BY MOUTH DAILY   predniSONE 20 mg tablet   No No   Sig: Take 1 tablet (20 mg total) by mouth 2 (two) times a day with meals   Patient not taking: Reported on 8/25/2023   triamcinolone (KENALOG) 0.1 % ointment  Self No No   Sig: Apply topically twice a day for 14 days starlight   Patient not taking: Reported on 1/18/2023      Facility-Administered Medications: None       Past Medical History:   Diagnosis Date    Acne 09/01/2010    Last Assessed 01 Sep 2010.    Acquired ankle/foot deformity     Last assessed 23 Feb 2017. Resolved 14 Aug 2017.    Allergic     Anxiety     Change in hearing     Resolved 16 Aug 2017    Closed displaced avulsion fracture of tuberosity of left calcaneus     Last Assessed 25 May 2017. Resolved 14 Aug 2017.    Depression     Epistaxis     Last Assessed 18 Feb 2014. Resolved 08 Jun 2016.    Essential hypertension 06/19/2018    Foreign body in foot     Last Assessed 30 Jul 2013. Resolved 12 Oct 2013.    Gastritis     Last Assessed 22 Oct 2012    GERD (gastroesophageal reflux disease)     History  of oral aphthous ulcers     Last Assessed 08 June 2016. Resolved 14 Aug 2017.    Hypersomnia 02/18/2009    Last Assessed 18 Feb 2009. Resolved 16 Aug 2017.    Hypertension 2016    Impacted cerumen     Last Assessed 08 Jun 2016. Resolved 08 Jul 2016.    Insomnia     Irritable bowel syndrome     Knee joint pain 03/10/2008    Left foot pain     Last Assessed 19 May 2017. Resolved 14 Aug 2017.    Mixed hyperlipidemia 11/02/2020    Obesity     OCD (obsessive compulsive disorder)     Plantar fibromatosis     Last Assessed 19 Oct 2016. Resolved 14 Aug 2017.    Seasonal allergies     Tarsal tunnel syndrome of left side     Last Assessed 21 Aug 2017. Resolved 21 Aug 2017.    Tinea corporis     Last Assessed 05 Dec 2011       Past Surgical History:   Procedure Laterality Date    ESOPHAGOGASTRODUODENOSCOPY N/A 10/31/2017    Procedure: ESOPHAGOGASTRODUODENOSCOPY (EGD);  Surgeon: Aydin Gibbons MD;  Location: [facility];  Service: Gastroenterology    NO PAST SURGERIES      MD LNGTH/SHRT TENDON LEG/ANKLE 1 TENDON SPX Left 11/3/2017    Procedure: CALCANEAL OSTECTOMY, ACHILLEST TENDON LENGTHING;  Surgeon: Kvng Gustafson DPM;  Location: [facility] OR;  Service: Podiatry       Family History   Problem Relation Age of Onset    Kidney disease Mother     Thyroid disease Mother     Allergic rhinitis Mother     Hypertension Father     Kidney disease Father     Gout Father     No Known Problems Sister     Colonic polyp Maternal Grandmother     Crohn's disease Maternal Grandmother     Cancer Family     Diabetes Family      I have reviewed and agree with the history as documented.    E-Cigarette/Vaping    E-Cigarette Use Never User      E-Cigarette/Vaping Substances    Nicotine No     THC No     CBD No     Other No     Unknown No      Social History     Tobacco Use    Smoking status: Never    Smokeless tobacco: Never   Vaping Use    Vaping status: Never Used   Substance Use Topics    Alcohol use: No    Drug use: No       Review of Systems   All  other systems reviewed and are negative.      Physical Exam  Physical Exam  Vitals and nursing note reviewed.   HENT:      Head: Normocephalic and atraumatic.   Pulmonary:      Effort: Pulmonary effort is normal.   Abdominal:      General: There is no distension.   Neurological:      Mental Status: He is alert.   Psychiatric:         Mood and Affect: Mood normal.         Behavior: Behavior normal.         Vital Signs  ED Triage Vitals [05/03/24 1757]   Temperature Pulse Respirations Blood Pressure SpO2   99.2 °F (37.3 °C) 86 17 121/69 96 %      Temp Source Heart Rate Source Patient Position - Orthostatic VS BP Location FiO2 (%)   Oral Monitor -- -- --      Pain Score       --           Vitals:    05/03/24 1757   BP: 121/69   Pulse: 86         Visual Acuity      ED Medications  Medications - No data to display    Diagnostic Studies  Results Reviewed       None                   No orders to display              Procedures  Procedures         ED Course                                             Medical Decision Making  Will fill fluvoxamine 300 mg ER daily for 7 days until patient is able to follow-up with his family physician.  No indication for hospitalization.  No additional labs or imaging indicated.  Attempted to give dose of medication in the emergency department however extended release formulation of fluvoxamine is not on our formulary.             Disposition  Final diagnoses:   Medication refill     Time reflects when diagnosis was documented in both MDM as applicable and the Disposition within this note       Time User Action Codes Description Comment    5/3/2024  6:04 PM Frank Weston [Z76.0] Medication refill     5/3/2024  6:04 PM Frank Weston [F42.2] Mixed obsessional thoughts and acts           ED Disposition       ED Disposition   Discharge    Condition   Stable    Date/Time   Fri May 3, 2024  6:04 PM    Comment   Harlan Dowell discharge to home/self care.                   Follow-up  Information       Follow up With Specialties Details Why Contact Info    Aldo Calixto MD Family Medicine Go on 5/6/2024 as scheduled [address]  [place] [address] [phone]              Discharge Medication List as of 5/3/2024  6:05 PM        CONTINUE these medications which have CHANGED    Details   !! Fluvoxamine Maleate 100 MG CP24 Take 3 capsules (300 mg total) by mouth in the morning for 7 days, Starting Fri 5/3/2024, Until Fri 5/10/2024, Print       !! - Potential duplicate medications found. Please discuss with provider.        CONTINUE these medications which have NOT CHANGED    Details   Adapalene 0.3 % gel SPREAD ONE PEA-SIZED AMOUNT OF MEDICATION OVER ENTIRE FACE ABOUT ONE HOUR BEFORE BEDTIME., Normal      allopurinol (ZYLOPRIM) 300 mg tablet TAKE 1 TABLET(300 MG) BY MOUTH DAILY, Starting Mon 4/22/2024, Normal      atorvastatin (LIPITOR) 10 mg tablet Take 1 tablet (10 mg total) by mouth daily, Starting Thu 5/2/2024, Normal      buPROPion (WELLBUTRIN XL) 150 mg 24 hr tablet TAKE 1 TABLET(150 MG) BY MOUTH EVERY MORNING, Normal      clindamycin-benzoyl peroxide (BENZACLIN) gel Apply topically 2 (two) times a day, Starting Mon 3/21/2022, Normal      colchicine (COLCRYS) 0.6 mg tablet Take 1 tablet (0.6 mg total) by mouth daily, Starting Fri 9/29/2023, Normal      Dapsone 7.5 % GEL Apply topically once daily, Normal      !! Fluvoxamine Maleate 150 MG CP24 TAKE 1 CAPSULE(150 MG) BY MOUTH DAILY, Normal      indomethacin (INDOCIN SR) 75 mg CR capsule Take 1 capsule (75 mg total) by mouth 2 (two) times a day with meals for 7 days, Starting Fri 9/29/2023, Until Fri 10/6/2023, Normal      lisinopril (ZESTRIL) 10 mg tablet Take 1 tablet (10 mg total) by mouth daily, Starting Mon 1/22/2024, Normal      loratadine (CLARITIN) 10 mg tablet TAKE 1 TABLET BY MOUTH EVERY DAY, Normal      montelukast (SINGULAIR) 10 mg tablet TAKE 1 TABLET(10 MG) BY MOUTH DAILY, Starting Mon 4/22/2024, Normal      Multiple Vitamin  (multivitamin) capsule Take 1 capsule by mouth daily, Starting Sun 3/20/2022, Normal      neomycin-polymyxin-hydrocortisone (CORTISPORIN) otic solution Administer 4 drops to the right ear 2 (two) times a day for 5 days, Starting Thu 9/15/2022, Until Tue 9/20/2022, Normal      omeprazole (PriLOSEC) 40 MG capsule TAKE 1 CAPSULE(40 MG) BY MOUTH DAILY, Starting Fri 4/26/2024, Normal      predniSONE 20 mg tablet Take 1 tablet (20 mg total) by mouth 2 (two) times a day with meals, Starting Thu 8/17/2023, Normal      triamcinolone (KENALOG) 0.1 % ointment Apply topically twice a day for 14 days starlight, Normal       !! - Potential duplicate medications found. Please discuss with provider.          No discharge procedures on file.    PDMP Review       None            ED Provider  Electronically Signed by             Frank Weston MD  05/03/24 6337

## 2024-05-04 PROBLEM — F31.9 BIPOLAR 1 DISORDER (HCC): Status: ACTIVE | Noted: 2024-05-04

## 2024-05-05 NOTE — PROGRESS NOTES
Name: Harlan Dowell      : 1990      MRN: [id number]  Encounter Provider: Aldo Calixto MD  Encounter Date: 2024   Encounter department: [facility]    Assessment & Plan     1. Acne vulgaris  Assessment & Plan:  Patient is stable  and will continue present plan of care and reassess at next routine visit. All questions about this problem from patient were answered today.       2. Well adult exam    3. ADD (attention deficit disorder) without hyperactivity  Assessment & Plan:  Pt is  stable with ADHD and current medicine prescribed. Weight, appetite and BP was reviewed and these are stable. Pt had all questions answered today and will return for weight and BP check at next routine OV       4. Essential hypertension  Assessment & Plan:  Patient is stable with current anti-hypertensive medicine and continue to follow a low sodium diet and take current medication. All questions about this condition were answered today.     Orders:  -     Comprehensive metabolic panel; Future  -     CBC and differential; Future  -     TSH, 3rd generation with Free T4 reflex; Future  -     Magnesium; Future  -     Uric acid; Future  -     UA/M w/rflx Culture, Comp    5. Gastroesophageal reflux disease without esophagitis  Assessment & Plan:  Patient to continue with present therapy and decrease caffeine, avoid ETOH and smoking to decrease acid production. Pt should also cease eating prior to bedtime and avoid excessive fluid intake prior to sleep. May use antacids as needed for breakthrough GERD. All pateint questions answered today about this condition.       6. Mixed hyperlipidemia  Assessment & Plan:  Patient  is stable with current medication and we discussed a low fat low cholesterol diet. Weight loss also discussed for this will help lower cholesterol also. Recheck lipids in 6 months.     Orders:  -     Lipid Panel with Direct LDL reflex; Future    7. Obesity (BMI 30-39.9)  Assessment &  Plan:  Patient to increase exercise and partake of a diet with less calories to promote  weight loss       8. Bipolar 1 disorder (HCC)  Assessment & Plan:  Patient is stable  and will continue present plan of care and reassess at next routine visit. All questions about this problem from patient were answered today.       9. Mixed obsessional thoughts and acts  -     Fluvoxamine Maleate 100 MG CP24; Take 3 capsules (300 mg total) by mouth in the morning for 7 days  -     Ambulatory referral to Psych Services; Future           Subjective     HPI  Review of Systems   Constitutional:  Negative for activity change, appetite change, chills, fatigue, fever and unexpected weight change.   HENT:  Negative for congestion, ear pain, hearing loss, mouth sores, postnasal drip, sinus pressure, sinus pain, sneezing and sore throat.    Respiratory:  Negative for apnea, cough, shortness of breath and wheezing.    Cardiovascular:  Negative for chest pain, palpitations and leg swelling.   Gastrointestinal:  Negative for abdominal pain, constipation, diarrhea, nausea and vomiting.   Endocrine: Negative for cold intolerance and heat intolerance.   Genitourinary:  Negative for dysuria, frequency and hematuria.   Musculoskeletal:  Negative for arthralgias, back pain, gait problem, joint swelling and neck pain.   Skin:  Negative for rash.   Neurological:  Negative for dizziness, weakness and numbness.   Hematological:  Does not bruise/bleed easily.   Psychiatric/Behavioral:  Negative for agitation, behavioral problems, confusion, hallucinations and sleep disturbance. The patient is not nervous/anxious.        Past Medical History:   Diagnosis Date   • Acne 09/01/2010    Last Assessed 01 Sep 2010.   • Acquired ankle/foot deformity     Last assessed 23 Feb 2017. Resolved 14 Aug 2017.   • Allergic    • Anxiety    • Change in hearing     Resolved 16 Aug 2017   • Closed displaced avulsion fracture of tuberosity of left calcaneus     Last  Assessed 25 May 2017. Resolved 14 Aug 2017.   • Depression    • Epistaxis     Last Assessed 18 Feb 2014. Resolved 08 Jun 2016.   • Essential hypertension 06/19/2018   • Foreign body in foot     Last Assessed 30 Jul 2013. Resolved 12 Oct 2013.   • Gastritis     Last Assessed 22 Oct 2012   • GERD (gastroesophageal reflux disease)    • History of oral aphthous ulcers     Last Assessed 08 June 2016. Resolved 14 Aug 2017.   • Hypersomnia 02/18/2009    Last Assessed 18 Feb 2009. Resolved 16 Aug 2017.   • Hypertension 2016   • Impacted cerumen     Last Assessed 08 Jun 2016. Resolved 08 Jul 2016.   • Insomnia    • Irritable bowel syndrome    • Knee joint pain 03/10/2008   • Left foot pain     Last Assessed 19 May 2017. Resolved 14 Aug 2017.   • Mixed hyperlipidemia 11/02/2020   • Obesity    • OCD (obsessive compulsive disorder)    • Plantar fibromatosis     Last Assessed 19 Oct 2016. Resolved 14 Aug 2017.   • Seasonal allergies    • Tarsal tunnel syndrome of left side     Last Assessed 21 Aug 2017. Resolved 21 Aug 2017.   • Tinea corporis     Last Assessed 05 Dec 2011     Past Surgical History:   Procedure Laterality Date   • ESOPHAGOGASTRODUODENOSCOPY N/A 10/31/2017    Procedure: ESOPHAGOGASTRODUODENOSCOPY (EGD);  Surgeon: Aydin Gibbons MD;  Location: [facility] GI LAB;  Service: Gastroenterology   • NO PAST SURGERIES     • MO LNGTH/SHRT TENDON LEG/ANKLE 1 TENDON SPX Left 11/3/2017    Procedure: CALCANEAL OSTECTOMY, ACHILLEST TENDON LENGTHING;  Surgeon: Kvng Gustafson DPM;  Location: [facility] OR;  Service: Podiatry     Family History   Problem Relation Age of Onset   • Kidney disease Mother    • Thyroid disease Mother    • Allergic rhinitis Mother    • Arthritis Mother    • Hypertension Father    • Kidney disease Father    • Gout Father    • Arthritis Father    • Depression Father    • No Known Problems Sister    • Colonic polyp Maternal Grandmother    • Crohn's disease Maternal Grandmother    • Arthritis Maternal Grandmother    •  Cancer Family    • Diabetes Family      Social History     Socioeconomic History   • Marital status: Single     Spouse name: None   • Number of children: None   • Years of education: None   • Highest education level: None   Occupational History   • None   Tobacco Use   • Smoking status: Never   • Smokeless tobacco: Never   Vaping Use   • Vaping status: Never Used   Substance and Sexual Activity   • Alcohol use: No   • Drug use: No   • Sexual activity: Never   Other Topics Concern   • None   Social History Narrative    Feels safe at home     Social Determinants of Health     Financial Resource Strain: Low Risk  (2/17/2023)    Overall Financial Resource Strain (CARDIA)    • Difficulty of Paying Living Expenses: Not hard at all   Food Insecurity: No Food Insecurity (2/17/2023)    Hunger Vital Sign    • Worried About Running Out of Food in the Last Year: Never true    • Ran Out of Food in the Last Year: Never true   Transportation Needs: No Transportation Needs (2/17/2023)    PRAPARE - Transportation    • Lack of Transportation (Medical): No    • Lack of Transportation (Non-Medical): No   Physical Activity: Sufficiently Active (5/5/2021)    Exercise Vital Sign    • Days of Exercise per Week: 4 days    • Minutes of Exercise per Session: 60 min   Stress: No Stress Concern Present (5/5/2021)    [employer] [place] [employer] - Occupational Stress Questionnaire    • Feeling of Stress : Not at all   Social Connections: Unknown (11/2/2020)    Social Connection and Isolation Panel [NHANES]    • Frequency of Communication with Friends and Family: More than three times a week    • Frequency of Social Gatherings with Friends and Family: Twice a week    • Attends Jainism Services: Never    • Active Member of Clubs or Organizations: Not on file    • Attends Club or Organization Meetings: Not on file    • Marital Status: Not on file   Intimate Partner Violence: Not At Risk (11/2/2020)    Humiliation, Afraid, Rape, and  Kick questionnaire    • Fear of Current or Ex-Partner: No    • Emotionally Abused: No    • Physically Abused: No    • Sexually Abused: No   Housing Stability: Not on file     Current Outpatient Medications on File Prior to Visit   Medication Sig   • allopurinol (ZYLOPRIM) 300 mg tablet TAKE 1 TABLET(300 MG) BY MOUTH DAILY   • atorvastatin (LIPITOR) 10 mg tablet Take 1 tablet (10 mg total) by mouth daily   • buPROPion (WELLBUTRIN XL) 150 mg 24 hr tablet TAKE 1 TABLET(150 MG) BY MOUTH EVERY MORNING   • colchicine (COLCRYS) 0.6 mg tablet Take 1 tablet (0.6 mg total) by mouth daily   • indomethacin (INDOCIN SR) 75 mg CR capsule Take 1 capsule (75 mg total) by mouth 2 (two) times a day with meals for 7 days   • lisinopril (ZESTRIL) 10 mg tablet Take 1 tablet (10 mg total) by mouth daily   • loratadine (CLARITIN) 10 mg tablet TAKE 1 TABLET BY MOUTH EVERY DAY   • montelukast (SINGULAIR) 10 mg tablet TAKE 1 TABLET(10 MG) BY MOUTH DAILY   • omeprazole (PriLOSEC) 40 MG capsule TAKE 1 CAPSULE(40 MG) BY MOUTH DAILY   • [DISCONTINUED] Fluvoxamine Maleate 100 MG CP24 Take 3 capsules (300 mg total) by mouth in the morning for 7 days   • [DISCONTINUED] Fluvoxamine Maleate 150 MG CP24 TAKE 1 CAPSULE(150 MG) BY MOUTH DAILY   • Adapalene 0.3 % gel SPREAD ONE PEA-SIZED AMOUNT OF MEDICATION OVER ENTIRE FACE ABOUT ONE HOUR BEFORE BEDTIME. (Patient not taking: Reported on 1/18/2023)   • clindamycin-benzoyl peroxide (BENZACLIN) gel Apply topically 2 (two) times a day (Patient not taking: Reported on 1/18/2023)   • Dapsone 7.5 % GEL Apply topically once daily (Patient not taking: Reported on 5/6/2024)   • Multiple Vitamin (multivitamin) capsule Take 1 capsule by mouth daily (Patient not taking: Reported on 1/18/2023)   • neomycin-polymyxin-hydrocortisone (CORTISPORIN) otic solution Administer 4 drops to the right ear 2 (two) times a day for 5 days (Patient not taking: Reported on 5/6/2024)   • predniSONE 20 mg tablet Take 1 tablet (20 mg  "total) by mouth 2 (two) times a day with meals (Patient not taking: Reported on 8/25/2023)   • triamcinolone (KENALOG) 0.1 % ointment Apply topically twice a day for 14 days starlight (Patient not taking: Reported on 1/18/2023)     Allergies   Allergen Reactions   • Penicillins Hives   • Sulfa Antibiotics GI Intolerance     Immunization History   Administered Date(s) Administered   • DTaP 5 [birth year], 03/11/1992, 03/25/1993, 08/23/1995   • HPV9 09/14/2017   • Hep B, adult 04/20/2001, 05/22/2001, 12/22/2001   • Hib (HbOC) [birth year], 03/11/1992, 12/02/1992   • INFLUENZA 09/13/2018   • IPV [birth year], 02/25/1993, 08/23/1995   • Influenza Injectable, MDCK, Preservative Free, Quadrivalent, 0.5 mL 09/23/2019   • Influenza Quadrivalent, 6-35 Months IM 09/14/2017   • Influenza, injectable, quadrivalent, preservative free 0.5 mL 11/02/2020   • Influenza, recombinant, quadrivalent,injectable, preservative free 10/06/2021   • Influenza, seasonal, injectable 10/05/2015   • MMR 12/02/1992, 07/22/1996   • Td (adult), adsorbed 03/27/2009   • Varicella 08/26/2010       Objective     /86 (BP Location: Left arm, Patient Position: Sitting, Cuff Size: Large)   Pulse 72   Temp 98.1 °F (36.7 °C)   Ht 5' 6\" (1.676 m)   Wt 116 kg (255 lb)   SpO2 98%   BMI 41.16 kg/m²     Physical Exam  Vitals and nursing note reviewed.   Constitutional:       Appearance: He is well-developed.   HENT:      Head: Normocephalic and atraumatic.      Nose: Nose normal.   Eyes:      General: No scleral icterus.     Conjunctiva/sclera: Conjunctivae normal.      Pupils: Pupils are equal, round, and reactive to light.   Neck:      Thyroid: No thyromegaly.   Cardiovascular:      Rate and Rhythm: Normal rate and regular rhythm.      Heart sounds: Normal heart sounds.   Pulmonary:      Effort: Pulmonary effort is normal. No respiratory distress.      Breath sounds: Normal breath sounds. No wheezing.   Abdominal:      " General: Bowel sounds are normal.      Palpations: Abdomen is soft.      Tenderness: There is no abdominal tenderness. There is no guarding or rebound.   Musculoskeletal:         General: Normal range of motion.      Cervical back: Normal range of motion and neck supple.   Skin:     General: Skin is warm and dry.      Findings: No rash.   Neurological:      Mental Status: He is alert and oriented to person, place, and time.   Psychiatric:         Mood and Affect: Mood normal.         Behavior: Behavior normal.         Thought Content: Thought content normal.         Judgment: Judgment normal.       Aldo Calixto MD

## 2024-05-06 ENCOUNTER — OFFICE VISIT (OUTPATIENT)
Dept: FAMILY MEDICINE CLINIC | Facility: CLINIC | Age: 34
End: 2024-05-06
Payer: COMMERCIAL

## 2024-05-06 VITALS
OXYGEN SATURATION: 98 % | BODY MASS INDEX: 40.98 KG/M2 | TEMPERATURE: 98.1 F | SYSTOLIC BLOOD PRESSURE: 132 MMHG | HEART RATE: 72 BPM | HEIGHT: 66 IN | WEIGHT: 255 LBS | DIASTOLIC BLOOD PRESSURE: 86 MMHG

## 2024-05-06 DIAGNOSIS — F42.2 MIXED OBSESSIONAL THOUGHTS AND ACTS: ICD-10-CM

## 2024-05-06 DIAGNOSIS — E66.9 OBESITY (BMI 30-39.9): ICD-10-CM

## 2024-05-06 DIAGNOSIS — I10 ESSENTIAL HYPERTENSION: ICD-10-CM

## 2024-05-06 DIAGNOSIS — Z00.00 WELL ADULT EXAM: ICD-10-CM

## 2024-05-06 DIAGNOSIS — F31.9 BIPOLAR 1 DISORDER (HCC): ICD-10-CM

## 2024-05-06 DIAGNOSIS — F98.8 ADD (ATTENTION DEFICIT DISORDER) WITHOUT HYPERACTIVITY: ICD-10-CM

## 2024-05-06 DIAGNOSIS — E78.2 MIXED HYPERLIPIDEMIA: ICD-10-CM

## 2024-05-06 DIAGNOSIS — L70.0 ACNE VULGARIS: Primary | ICD-10-CM

## 2024-05-06 DIAGNOSIS — K21.9 GASTROESOPHAGEAL REFLUX DISEASE WITHOUT ESOPHAGITIS: ICD-10-CM

## 2024-05-06 PROCEDURE — 99395 PREV VISIT EST AGE 18-39: CPT | Performed by: FAMILY MEDICINE

## 2024-05-06 RX ORDER — FLUVOXAMINE MALEATE 100 MG/1
300 CAPSULE, EXTENDED RELEASE ORAL DAILY
Qty: 90 CAPSULE | Refills: 2 | Status: SHIPPED | OUTPATIENT
Start: 2024-05-06 | End: 2024-05-13

## 2024-05-13 ENCOUNTER — TELEPHONE (OUTPATIENT)
Age: 34
End: 2024-05-13

## 2024-05-13 NOTE — TELEPHONE ENCOUNTER
----- Message from Harlan Dowell sent at 5/12/2024 10:00 PM EDT -----  Regarding: Prior Authorization  Contact: [phone]  My prescription for Fluvocamine ER 100MG (3X/day) needs prior authorization.  Can that be done ASAP?    Thanks,  Harlan Dowell

## 2024-05-15 NOTE — TELEPHONE ENCOUNTER
Patient calling and is not sure if because his dose has increased, but he said the pharmacy is waiting on a PA for this medicaiton, he was wondering if there was any way to expedite this, please review (Prior auth from January says not required)

## 2024-05-17 NOTE — TELEPHONE ENCOUNTER
Molly from [facility] for you is calling in requesting prior authorization be submitted for this medication. I advised that authorization was submitted and pending determination and she stated that they never received anything.   She states that there are 3 ways to submit to them. They are as follows:  Via [facility] provider Portal  2. Via Fax: [phone]  3. Over the phone - call: [phone]    Please advise if further submission is required. Thank you!

## 2024-05-17 NOTE — TELEPHONE ENCOUNTER
PA for Fluvoxamine Maleate 100 mg     Submitted again this time via     [x]CMM-KEY [id number]  []Surescripts-Case ID #   []Faxed to plan   []Other website   []Phone call Case ID #      Office notes sent, clinical questions answered. Awaiting determination     Turnaround time for your insurance to make a decision on your Prior Authorization can take 7-21 business days

## 2024-05-17 NOTE — TELEPHONE ENCOUNTER
PA for Fluvoxamine Maleate 100 mg    Submitted via    []CMM-KEY   [x]PI[employer]IS [employer]-Case ID # [id number]  []Faxed to plan   []Other website   []Phone call Case ID #     Office notes sent, clinical questions answered. Awaiting determination    Turnaround time for your insurance to make a decision on your Prior Authorization can take 7-21 business days.

## 2024-05-20 NOTE — TELEPHONE ENCOUNTER
PA for Fluvoxamine Maleate 100 mg CP24 Denied    Reason: Quantity requested amount of 90 capsules per 30 days not medically necessary. Plan covers 60 capsules per 30 days      Message sent to office clinical pool Yes    Denial letter scanned into Media Yes    Appeal started No    **Please follow up with your patient regarding denial and next steps**

## 2024-05-21 ENCOUNTER — TELEPHONE (OUTPATIENT)
Age: 34
End: 2024-05-21

## 2024-05-21 NOTE — TELEPHONE ENCOUNTER
[facility] insurance appeals department calling stating that they need a letter of appeal stating that the patient mental status will be in jeopardy without the medication Fluvoxamine malaete.  Sarah Rosenthal  Please fax letter to [phone]

## 2024-05-21 NOTE — TELEPHONE ENCOUNTER
Shauna from [facility] called with pt on the line, as well, to request to have pt's prior auth for fluvoxamine  mg tid resubmitted.      Dr Calixto:  Pt does not want to try another medication, nor does he wish to decrease his dosage.      Prior Auth Dept:  Shauna stated the prior auth dept should make sure to document that pt has been taking this medication  mg tid for years and that his symptoms had been getting worse.  She also stated it should be submitted as urgent.

## 2024-05-21 NOTE — TELEPHONE ENCOUNTER
Call pt. His plan will not cover 3 fluvoxamine a day only 2. Would he like to start another medicine for his OCD or decrease his dose to 2 tabs a day?

## 2024-05-21 NOTE — TELEPHONE ENCOUNTER
PA for Fluvoxamine Maleate 100 mg CP24  appealed via     []CMM  []SS  [x]Letter sent to insurance via fax [phone]  []Other site or means     All necessary records sent. Will await response from insurance company    Turnaround time for a decision to be made on an appeal could take up to 30 business days

## 2024-05-22 ENCOUNTER — TELEPHONE (OUTPATIENT)
Dept: PSYCHIATRY | Facility: CLINIC | Age: 34
End: 2024-05-22

## 2024-05-22 NOTE — TELEPHONE ENCOUNTER
I have printed and signed a letter of appeal for this patient. I have placed it in the infamous blue folder.

## 2024-05-22 NOTE — TELEPHONE ENCOUNTER
Contacted patient in regards to Routine Referral in attempts to verify patient's needs of services and add patient to proper wait list. LVM for patient to contact intake dept  in regards to routine referral and left callback number [phone].  Final call attempt, closed, Unable to contact pt

## 2024-06-03 ENCOUNTER — TELEPHONE (OUTPATIENT)
Dept: FAMILY MEDICINE CLINIC | Facility: CLINIC | Age: 34
End: 2024-06-03

## 2024-06-03 NOTE — TELEPHONE ENCOUNTER
Phone call with Judith at [facility] [place]. Judith stated that the appeal letter will need to updated to reflect that the Fluvoxamine 150 mg is on backorder and he will need to take the 100 mg dosage.     Fax updated letter to     [phone]

## 2024-07-09 ENCOUNTER — TELEPHONE (OUTPATIENT)
Age: 34
End: 2024-07-09

## 2024-07-09 DIAGNOSIS — F42.9 OBSESSIVE-COMPULSIVE DISORDER, UNSPECIFIED TYPE: Primary | ICD-10-CM

## 2024-07-09 RX ORDER — FLUVOXAMINE MALEATE 150 MG/1
300 CAPSULE, EXTENDED RELEASE ORAL 2 TIMES DAILY
Qty: 60 CAPSULE | Refills: 5 | Status: SHIPPED | OUTPATIENT
Start: 2024-07-09

## 2024-07-09 NOTE — TELEPHONE ENCOUNTER
Pt is asking for Fluvoxamine Maleate 150 mg, to be taken two a day. He said that the 150 mg are now available and the insurance does not want to pay for the 100 mg anymore. Pt uses [facility] on  [address] in [place]. Please, advise. Pt is out of the med.

## 2024-07-11 ENCOUNTER — TELEPHONE (OUTPATIENT)
Age: 34
End: 2024-07-11

## 2024-07-11 NOTE — TELEPHONE ENCOUNTER
Spoke with pharmacist and the sig was sent for Fluvoxamine Maleate 150 MG CP24 2 capsules twice a day and should have been 2 capsules once a day.  Verbal given for change.

## 2024-09-19 DIAGNOSIS — K21.9 GASTROESOPHAGEAL REFLUX DISEASE: ICD-10-CM

## 2024-09-19 RX ORDER — OMEPRAZOLE 40 MG/1
40 CAPSULE, DELAYED RELEASE ORAL DAILY
Qty: 90 CAPSULE | Refills: 1 | Status: SHIPPED | OUTPATIENT
Start: 2024-09-19

## 2024-10-15 DIAGNOSIS — F42.9 OBSESSIVE-COMPULSIVE DISORDER, UNSPECIFIED TYPE: ICD-10-CM

## 2024-10-15 RX ORDER — FLUVOXAMINE MALEATE 150 MG/1
300 CAPSULE, EXTENDED RELEASE ORAL 2 TIMES DAILY
Qty: 60 CAPSULE | Refills: 1 | Status: SHIPPED | OUTPATIENT
Start: 2024-10-15

## 2024-10-16 ENCOUNTER — TELEPHONE (OUTPATIENT)
Age: 34
End: 2024-10-16

## 2024-10-16 NOTE — TELEPHONE ENCOUNTER
PA Fluvoxamine Maleate 150 MG SUBMITTED     via    []CMM-KEY:    [x]Surescripts-Case ID # [id number]  []Availity-Auth ID #  NDC #    []Faxed to plan   []Other website    []Phone call Case ID #      Office notes sent, clinical questions answered. Awaiting determination    Turnaround time for your insurance to make a decision on your Prior Authorization can take 7-21 business days.

## 2024-10-22 DIAGNOSIS — M10.9 ACUTE GOUT INVOLVING TOE, UNSPECIFIED CAUSE, UNSPECIFIED LATERALITY: ICD-10-CM

## 2024-10-22 DIAGNOSIS — F32.5 MAJOR DEPRESSIVE DISORDER WITH SINGLE EPISODE, IN REMISSION (HCC): ICD-10-CM

## 2024-10-22 DIAGNOSIS — I10 ESSENTIAL HYPERTENSION: ICD-10-CM

## 2024-10-22 DIAGNOSIS — J30.9 ALLERGIC RHINITIS, UNSPECIFIED SEASONALITY, UNSPECIFIED TRIGGER: ICD-10-CM

## 2024-10-23 RX ORDER — LISINOPRIL 10 MG/1
10 TABLET ORAL DAILY
Qty: 30 TABLET | Refills: 0 | Status: SHIPPED | OUTPATIENT
Start: 2024-10-23

## 2024-10-23 RX ORDER — MONTELUKAST SODIUM 10 MG/1
10 TABLET ORAL DAILY
Qty: 90 TABLET | Refills: 0 | Status: SHIPPED | OUTPATIENT
Start: 2024-10-23

## 2024-10-23 RX ORDER — BUPROPION HYDROCHLORIDE 150 MG/1
TABLET ORAL
Qty: 90 TABLET | Refills: 0 | Status: SHIPPED | OUTPATIENT
Start: 2024-10-23

## 2024-10-23 RX ORDER — ALLOPURINOL 300 MG/1
300 TABLET ORAL DAILY
Qty: 30 TABLET | Refills: 0 | Status: SHIPPED | OUTPATIENT
Start: 2024-10-23

## 2024-11-01 ENCOUNTER — APPOINTMENT (OUTPATIENT)
Dept: LAB | Facility: CLINIC | Age: 34
End: 2024-11-01
Payer: COMMERCIAL

## 2024-11-01 DIAGNOSIS — E78.2 MIXED HYPERLIPIDEMIA: ICD-10-CM

## 2024-11-01 DIAGNOSIS — I10 ESSENTIAL HYPERTENSION: ICD-10-CM

## 2024-11-01 LAB
ALBUMIN SERPL BCG-MCNC: 4.4 G/DL (ref 3.5–5)
ALP SERPL-CCNC: 80 U/L (ref 34–104)
ALT SERPL W P-5'-P-CCNC: 44 U/L (ref 7–52)
ANION GAP SERPL CALCULATED.3IONS-SCNC: 9 MMOL/L (ref 4–13)
AST SERPL W P-5'-P-CCNC: 28 U/L (ref 13–39)
BASOPHILS # BLD AUTO: 0.06 THOUSANDS/ΜL (ref 0–0.1)
BASOPHILS NFR BLD AUTO: 1 % (ref 0–1)
BILIRUB SERPL-MCNC: 0.46 MG/DL (ref 0.2–1)
BILIRUB UR QL STRIP: NEGATIVE
BUN SERPL-MCNC: 13 MG/DL (ref 5–25)
CALCIUM SERPL-MCNC: 9.6 MG/DL (ref 8.4–10.2)
CHLORIDE SERPL-SCNC: 105 MMOL/L (ref 96–108)
CHOLEST SERPL-MCNC: 218 MG/DL
CLARITY UR: CLEAR
CO2 SERPL-SCNC: 26 MMOL/L (ref 21–32)
COLOR UR: COLORLESS
CREAT SERPL-MCNC: 0.83 MG/DL (ref 0.6–1.3)
EOSINOPHIL # BLD AUTO: 0.06 THOUSAND/ΜL (ref 0–0.61)
EOSINOPHIL NFR BLD AUTO: 1 % (ref 0–6)
ERYTHROCYTE [DISTWIDTH] IN BLOOD BY AUTOMATED COUNT: 13.3 % (ref 11.6–15.1)
GFR SERPL CREATININE-BSD FRML MDRD: 115 ML/MIN/1.73SQ M
GLUCOSE P FAST SERPL-MCNC: 90 MG/DL (ref 65–99)
GLUCOSE UR STRIP-MCNC: NEGATIVE MG/DL
HCT VFR BLD AUTO: 46 % (ref 36.5–49.3)
HDLC SERPL-MCNC: 38 MG/DL
HGB BLD-MCNC: 14.6 G/DL (ref 12–17)
HGB UR QL STRIP.AUTO: NEGATIVE
IMM GRANULOCYTES # BLD AUTO: 0.03 THOUSAND/UL (ref 0–0.2)
IMM GRANULOCYTES NFR BLD AUTO: 0 % (ref 0–2)
KETONES UR STRIP-MCNC: NEGATIVE MG/DL
LDLC SERPL CALC-MCNC: 144 MG/DL (ref 0–100)
LEUKOCYTE ESTERASE UR QL STRIP: NEGATIVE
LYMPHOCYTES # BLD AUTO: 2.99 THOUSANDS/ΜL (ref 0.6–4.47)
LYMPHOCYTES NFR BLD AUTO: 35 % (ref 14–44)
MAGNESIUM SERPL-MCNC: 1.9 MG/DL (ref 1.9–2.7)
MCH RBC QN AUTO: 30.6 PG (ref 26.8–34.3)
MCHC RBC AUTO-ENTMCNC: 31.7 G/DL (ref 31.4–37.4)
MCV RBC AUTO: 96 FL (ref 82–98)
MONOCYTES # BLD AUTO: 0.67 THOUSAND/ΜL (ref 0.17–1.22)
MONOCYTES NFR BLD AUTO: 8 % (ref 4–12)
NEUTROPHILS # BLD AUTO: 4.82 THOUSANDS/ΜL (ref 1.85–7.62)
NEUTS SEG NFR BLD AUTO: 55 % (ref 43–75)
NITRITE UR QL STRIP: NEGATIVE
NRBC BLD AUTO-RTO: 0 /100 WBCS
PH UR STRIP.AUTO: 7 [PH]
PLATELET # BLD AUTO: 275 THOUSANDS/UL (ref 149–390)
PMV BLD AUTO: 10.2 FL (ref 8.9–12.7)
POTASSIUM SERPL-SCNC: 4 MMOL/L (ref 3.5–5.3)
PROT SERPL-MCNC: 7.5 G/DL (ref 6.4–8.4)
PROT UR STRIP-MCNC: NEGATIVE MG/DL
RBC # BLD AUTO: 4.77 MILLION/UL (ref 3.88–5.62)
SODIUM SERPL-SCNC: 140 MMOL/L (ref 135–147)
SP GR UR STRIP.AUTO: 1.01 (ref 1–1.03)
TRIGL SERPL-MCNC: 182 MG/DL
TSH SERPL DL<=0.05 MIU/L-ACNC: 3.52 UIU/ML (ref 0.45–4.5)
URATE SERPL-MCNC: 5.2 MG/DL (ref 3.5–8.5)
UROBILINOGEN UR STRIP-ACNC: <2 MG/DL
WBC # BLD AUTO: 8.63 THOUSAND/UL (ref 4.31–10.16)

## 2024-11-01 PROCEDURE — 84443 ASSAY THYROID STIM HORMONE: CPT

## 2024-11-01 PROCEDURE — 36415 COLL VENOUS BLD VENIPUNCTURE: CPT

## 2024-11-01 PROCEDURE — 83735 ASSAY OF MAGNESIUM: CPT

## 2024-11-01 PROCEDURE — 81003 URINALYSIS AUTO W/O SCOPE: CPT

## 2024-11-01 PROCEDURE — 84550 ASSAY OF BLOOD/URIC ACID: CPT

## 2024-11-01 PROCEDURE — 80053 COMPREHEN METABOLIC PANEL: CPT

## 2024-11-01 PROCEDURE — 85025 COMPLETE CBC W/AUTO DIFF WBC: CPT

## 2024-11-01 PROCEDURE — 80061 LIPID PANEL: CPT

## 2024-11-03 NOTE — ASSESSMENT & PLAN NOTE
Patient is stable  and will continue present plan of care and reassess at next routine visit. All questions about this problem from patient were answered today.     Orders:    ARIPiprazole (ABILIFY) 5 mg tablet; Take 1 tablet (5 mg total) by mouth daily    Fluvoxamine Maleate 150 MG CP24; Take 2 capsules (300 mg total) by mouth in the morning

## 2024-11-03 NOTE — ASSESSMENT & PLAN NOTE
Patient instructed to continue using CPAP as directed to decrease episodes of apnea to minimize risk of cardiac complications. Pt instructed to kep  machine in clean working order and to call if any replacement parts are needed.

## 2024-11-03 NOTE — ASSESSMENT & PLAN NOTE
Patient  is stable with current medication and we discussed a low fat low cholesterol diet. Weight loss also discussed for this will help lower cholesterol also. Recheck lipids in 6 months.     Orders:    atorvastatin (LIPITOR) 10 mg tablet; Take 1 tablet (10 mg total) by mouth daily    Comprehensive metabolic panel; Future    Lipid Panel with Direct LDL reflex; Future

## 2024-11-03 NOTE — PROGRESS NOTES
Ambulatory Visit  Name: Harlan Dowell      : 1990      MRN: [id number]  Encounter Provider: Aldo Calixto MD  Encounter Date: 2024   Encounter department: [facility]    Assessment & Plan  Obsessive-compulsive disorder, unspecified type  Patient is stable  and will continue present plan of care and reassess at next routine visit. All questions about this problem from patient were answered today.     Orders:    ARIPiprazole (ABILIFY) 5 mg tablet; Take 1 tablet (5 mg total) by mouth daily    Fluvoxamine Maleate 150 MG CP24; Take 2 capsules (300 mg total) by mouth in the morning    Obesity (BMI 30-39.9)  Patient to increase exercise and partake of a diet with less calories to promote  weight loss          Moderate obstructive sleep apnea  Patient instructed to continue using CPAP as directed to decrease episodes of apnea to minimize risk of cardiac complications. Pt instructed to kep  machine in clean working order and to call if any replacement parts are needed.          Mixed hyperlipidemia  Patient  is stable with current medication and we discussed a low fat low cholesterol diet. Weight loss also discussed for this will help lower cholesterol also. Recheck lipids in 6 months.     Orders:    atorvastatin (LIPITOR) 10 mg tablet; Take 1 tablet (10 mg total) by mouth daily    Comprehensive metabolic panel; Future    Lipid Panel with Direct LDL reflex; Future    Gastroesophageal reflux disease without esophagitis  Patient to continue with present therapy and decrease caffeine, avoid ETOH and smoking to decrease acid production. Pt should also cease eating prior to bedtime and avoid excessive fluid intake prior to sleep. May use antacids as needed for breakthrough GERD. All pateint questions answered today about this condition.          Essential hypertension  Patient is stable with current anti-hypertensive medicine and continue to follow a low sodium diet and take current  medication. All questions about this condition were answered today.            Depression Screening and Follow-up Plan: Patient was screened for depression during today's encounter. They screened negative with a PHQ-9 score of 0.    History of Present Illness     34-year-old male here today for checkup on OCD as well as hyperlipidemia and hypertension.  Patient states that he has been having some more problems with his OCD and does not feel the Luvox is working as well as it has been in the past 300 mg at bedtime.  Will see about adding some Abilify 5 mg and I talked about seeing a psychiatrist because they are more qualified really to deal with this and he is going to see about calling them.  Also patient states he has been out of his Lipitor and does not know why it has not been refilled which I refilled for him today.  His blood pressure has been doing well and has been having some problems with some irritation in his throat.          Review of Systems   Constitutional:  Negative for activity change, appetite change, chills, fatigue, fever and unexpected weight change.   HENT:  Negative for congestion, ear pain, hearing loss, mouth sores, postnasal drip, sinus pressure, sinus pain, sneezing and sore throat.    Respiratory:  Negative for apnea, cough, shortness of breath and wheezing.    Cardiovascular:  Negative for chest pain, palpitations and leg swelling.   Gastrointestinal:  Negative for abdominal pain, constipation, diarrhea, nausea and vomiting.   Endocrine: Negative for cold intolerance and heat intolerance.   Genitourinary:  Negative for dysuria, frequency and hematuria.   Musculoskeletal:  Negative for arthralgias, back pain, gait problem, joint swelling and neck pain.   Skin:  Negative for rash.   Neurological:  Negative for dizziness, weakness and numbness.   Hematological:  Does not bruise/bleed easily.   Psychiatric/Behavioral:  Negative for agitation, behavioral problems, confusion, hallucinations  and sleep disturbance. The patient is not nervous/anxious.            Objective     There were no vitals taken for this visit.    Physical Exam  Constitutional:       Appearance: He is well-developed.   HENT:      Head: Normocephalic and atraumatic.      Right Ear: External ear normal.      Left Ear: External ear normal.      Nose: Nose normal.      Mouth/Throat:      Pharynx: Oropharynx is clear. No oropharyngeal exudate.   Eyes:      General: No scleral icterus.     Conjunctiva/sclera: Conjunctivae normal.      Pupils: Pupils are equal, round, and reactive to light.   Cardiovascular:      Rate and Rhythm: Normal rate and regular rhythm.      Heart sounds: Normal heart sounds.   Pulmonary:      Effort: Pulmonary effort is normal.      Breath sounds: Normal breath sounds. No wheezing or rales.   Abdominal:      General: Bowel sounds are normal.      Palpations: Abdomen is soft.      Tenderness: There is no abdominal tenderness. There is no guarding.   Musculoskeletal:         General: Normal range of motion.      Cervical back: Normal range of motion and neck supple.   Skin:     General: Skin is warm and dry.      Findings: No erythema or rash.   Neurological:      Mental Status: He is alert and oriented to person, place, and time. Mental status is at baseline.   Psychiatric:         Mood and Affect: Mood normal.         Behavior: Behavior normal.         Thought Content: Thought content normal.         Judgment: Judgment normal.

## 2024-11-06 ENCOUNTER — OFFICE VISIT (OUTPATIENT)
Dept: FAMILY MEDICINE CLINIC | Facility: CLINIC | Age: 34
End: 2024-11-06
Payer: COMMERCIAL

## 2024-11-06 VITALS
RESPIRATION RATE: 20 BRPM | HEIGHT: 66 IN | WEIGHT: 257 LBS | OXYGEN SATURATION: 97 % | SYSTOLIC BLOOD PRESSURE: 128 MMHG | DIASTOLIC BLOOD PRESSURE: 84 MMHG | TEMPERATURE: 98.2 F | BODY MASS INDEX: 41.3 KG/M2 | HEART RATE: 80 BPM

## 2024-11-06 DIAGNOSIS — F42.9 OBSESSIVE-COMPULSIVE DISORDER, UNSPECIFIED TYPE: Primary | ICD-10-CM

## 2024-11-06 DIAGNOSIS — I10 ESSENTIAL HYPERTENSION: ICD-10-CM

## 2024-11-06 DIAGNOSIS — E78.2 MIXED HYPERLIPIDEMIA: ICD-10-CM

## 2024-11-06 DIAGNOSIS — E66.9 OBESITY (BMI 30-39.9): ICD-10-CM

## 2024-11-06 DIAGNOSIS — G47.33 MODERATE OBSTRUCTIVE SLEEP APNEA: ICD-10-CM

## 2024-11-06 DIAGNOSIS — K21.9 GASTROESOPHAGEAL REFLUX DISEASE WITHOUT ESOPHAGITIS: ICD-10-CM

## 2024-11-06 PROCEDURE — 99214 OFFICE O/P EST MOD 30 MIN: CPT | Performed by: FAMILY MEDICINE

## 2024-11-06 RX ORDER — FLUVOXAMINE MALEATE 150 MG/1
300 CAPSULE, EXTENDED RELEASE ORAL DAILY
Qty: 60 CAPSULE | Refills: 1 | Status: SHIPPED | OUTPATIENT
Start: 2024-11-06

## 2024-11-06 RX ORDER — ATORVASTATIN CALCIUM 10 MG/1
10 TABLET, FILM COATED ORAL DAILY
Qty: 90 TABLET | Refills: 0 | Status: SHIPPED | OUTPATIENT
Start: 2024-11-06

## 2024-11-06 RX ORDER — ARIPIPRAZOLE 5 MG/1
5 TABLET ORAL DAILY
Qty: 30 TABLET | Refills: 1 | Status: SHIPPED | OUTPATIENT
Start: 2024-11-06

## 2024-11-23 DIAGNOSIS — I10 ESSENTIAL HYPERTENSION: ICD-10-CM

## 2024-11-23 DIAGNOSIS — M10.9 ACUTE GOUT INVOLVING TOE, UNSPECIFIED CAUSE, UNSPECIFIED LATERALITY: ICD-10-CM

## 2024-11-23 RX ORDER — ALLOPURINOL 300 MG/1
300 TABLET ORAL DAILY
Qty: 90 TABLET | Refills: 1 | Status: SHIPPED | OUTPATIENT
Start: 2024-11-23

## 2024-11-23 RX ORDER — LISINOPRIL 10 MG/1
10 TABLET ORAL DAILY
Qty: 90 TABLET | Refills: 1 | Status: SHIPPED | OUTPATIENT
Start: 2024-11-23

## 2024-12-18 ENCOUNTER — OFFICE VISIT (OUTPATIENT)
Dept: FAMILY MEDICINE CLINIC | Facility: CLINIC | Age: 34
End: 2024-12-18
Payer: COMMERCIAL

## 2024-12-18 VITALS
BODY MASS INDEX: 45.48 KG/M2 | HEART RATE: 77 BPM | TEMPERATURE: 98.1 F | SYSTOLIC BLOOD PRESSURE: 148 MMHG | DIASTOLIC BLOOD PRESSURE: 82 MMHG | HEIGHT: 66 IN | WEIGHT: 283 LBS | OXYGEN SATURATION: 96 %

## 2024-12-18 DIAGNOSIS — K21.9 GASTROESOPHAGEAL REFLUX DISEASE WITHOUT ESOPHAGITIS: ICD-10-CM

## 2024-12-18 DIAGNOSIS — E66.9 OBESITY (BMI 30-39.9): ICD-10-CM

## 2024-12-18 DIAGNOSIS — G47.33 MODERATE OBSTRUCTIVE SLEEP APNEA: ICD-10-CM

## 2024-12-18 DIAGNOSIS — F42.9 OBSESSIVE-COMPULSIVE DISORDER, UNSPECIFIED TYPE: Primary | ICD-10-CM

## 2024-12-18 DIAGNOSIS — F41.9 ANXIETY: ICD-10-CM

## 2024-12-18 DIAGNOSIS — I10 ESSENTIAL HYPERTENSION: ICD-10-CM

## 2024-12-18 PROCEDURE — 99213 OFFICE O/P EST LOW 20 MIN: CPT | Performed by: FAMILY MEDICINE

## 2024-12-18 RX ORDER — ALPRAZOLAM 0.5 MG
0.5 TABLET ORAL 3 TIMES DAILY PRN
Qty: 90 TABLET | Refills: 1 | Status: SHIPPED | OUTPATIENT
Start: 2024-12-18

## 2024-12-18 RX ORDER — ARIPIPRAZOLE 10 MG/1
10 TABLET ORAL DAILY
Qty: 30 TABLET | Refills: 1 | Status: SHIPPED | OUTPATIENT
Start: 2024-12-18

## 2024-12-18 NOTE — PROGRESS NOTES
Name: Harlan Dowell      : 1990      MRN: [id number]  Encounter Provider: Aldo Calixto MD  Encounter Date: 2024   Encounter department: [facility]  :  Assessment & Plan  Obsessive-compulsive disorder, unspecified type  Patient is stable  and will continue present plan of care and reassess at next routine visit. All questions about this problem from patient were answered today.   Orders:  •  ARIPiprazole (ABILIFY) 10 mg tablet; Take 1 tablet (10 mg total) by mouth daily    Obesity (BMI 30-39.9)  Patient to increase exercise and partake of a diet with less calories to promote  weight loss        Moderate obstructive sleep apnea  Patient instructed to continue using CPAP as directed to decrease episodes of apnea to minimize risk of cardiac complications. Pt instructed to kep  machine in clean working order and to call if any replacement parts are needed.        Gastroesophageal reflux disease without esophagitis  Patient to continue with present therapy and decrease caffeine, avoid ETOH and smoking to decrease acid production. Pt should also cease eating prior to bedtime and avoid excessive fluid intake prior to sleep. May use antacids as needed for breakthrough GERD. All pateint questions answered today about this condition.        Essential hypertension  Patient is stable with current anti-hypertensive medicine and continue to follow a low sodium diet and take current medication. All questions about this condition were answered today.        Anxiety           History of Present Illness     Patient here today for recheck on his OCD problem.  Patient was started Abilify last time 5 mg and states that he is having a little problems with some paranoia and was asking about something for anxiety will see about increasing his Abilify from 5 to 10 mg and we will see about adding a little bit of Xanax he can take 3 times a day as needed.  Patient will come back to recheck over the  "problems in about 6 weeks to see how he is doing with adjustment in his medication adjustments dose from today.  Patient is having some paranoia thoughts about making mistakes with putting information on applications wrong and traffic violation and fractions and things like that.  Patient denies any current hallucinations or visual hallucinations.      Review of Systems   Constitutional:  Negative for activity change, appetite change, chills, fatigue, fever and unexpected weight change.   HENT:  Negative for congestion, ear pain, hearing loss, mouth sores, postnasal drip, sinus pressure, sinus pain, sneezing and sore throat.    Respiratory:  Negative for apnea, cough, shortness of breath and wheezing.    Cardiovascular:  Negative for chest pain, palpitations and leg swelling.   Gastrointestinal:  Negative for abdominal pain, constipation, diarrhea, nausea and vomiting.   Endocrine: Negative for cold intolerance and heat intolerance.   Genitourinary:  Negative for dysuria, frequency and hematuria.   Musculoskeletal:  Negative for arthralgias, back pain, gait problem, joint swelling and neck pain.   Skin:  Negative for rash.   Neurological:  Negative for dizziness, weakness and numbness.   Hematological:  Does not bruise/bleed easily.   Psychiatric/Behavioral:  Negative for agitation, behavioral problems, confusion, hallucinations and sleep disturbance. The patient is not nervous/anxious.        Objective   /82 (BP Location: Left arm, Patient Position: Sitting, Cuff Size: Large)   Pulse 77   Temp 98.1 °F (36.7 °C) (Temporal)   Ht 5' 6\" (1.676 m)   Wt 128 kg (283 lb)   SpO2 96%   BMI 45.68 kg/m²      Physical Exam    "

## 2024-12-18 NOTE — ASSESSMENT & PLAN NOTE
Patient is stable  and will continue present plan of care and reassess at next routine visit. All questions about this problem from patient were answered today.   Orders:  •  ARIPiprazole (ABILIFY) 10 mg tablet; Take 1 tablet (10 mg total) by mouth daily

## 2024-12-27 DIAGNOSIS — F42.9 OBSESSIVE-COMPULSIVE DISORDER, UNSPECIFIED TYPE: ICD-10-CM

## 2024-12-27 RX ORDER — FLUVOXAMINE MALEATE 150 MG/1
CAPSULE, EXTENDED RELEASE ORAL
Qty: 60 CAPSULE | Refills: 1 | Status: SHIPPED | OUTPATIENT
Start: 2024-12-27

## 2025-01-24 DIAGNOSIS — K21.9 GASTROESOPHAGEAL REFLUX DISEASE: ICD-10-CM

## 2025-01-24 RX ORDER — OMEPRAZOLE 40 MG/1
40 CAPSULE, DELAYED RELEASE ORAL DAILY
Qty: 90 CAPSULE | Refills: 1 | Status: SHIPPED | OUTPATIENT
Start: 2025-01-24

## 2025-01-29 ENCOUNTER — OFFICE VISIT (OUTPATIENT)
Dept: FAMILY MEDICINE CLINIC | Facility: CLINIC | Age: 35
End: 2025-01-29
Payer: COMMERCIAL

## 2025-01-29 VITALS
SYSTOLIC BLOOD PRESSURE: 130 MMHG | OXYGEN SATURATION: 96 % | RESPIRATION RATE: 18 BRPM | TEMPERATURE: 98.8 F | WEIGHT: 285 LBS | HEART RATE: 75 BPM | BODY MASS INDEX: 45.8 KG/M2 | HEIGHT: 66 IN | DIASTOLIC BLOOD PRESSURE: 76 MMHG

## 2025-01-29 DIAGNOSIS — G47.33 MODERATE OBSTRUCTIVE SLEEP APNEA: ICD-10-CM

## 2025-01-29 DIAGNOSIS — K21.9 GASTROESOPHAGEAL REFLUX DISEASE WITHOUT ESOPHAGITIS: ICD-10-CM

## 2025-01-29 DIAGNOSIS — F42.9 OBSESSIVE-COMPULSIVE DISORDER, UNSPECIFIED TYPE: ICD-10-CM

## 2025-01-29 DIAGNOSIS — F32.5 MAJOR DEPRESSIVE DISORDER WITH SINGLE EPISODE, IN REMISSION (HCC): ICD-10-CM

## 2025-01-29 DIAGNOSIS — L70.0 ACNE VULGARIS: Primary | ICD-10-CM

## 2025-01-29 DIAGNOSIS — I10 ESSENTIAL HYPERTENSION: ICD-10-CM

## 2025-01-29 DIAGNOSIS — E66.9 OBESITY (BMI 30-39.9): ICD-10-CM

## 2025-01-29 DIAGNOSIS — F22 PARANOIA (HCC): ICD-10-CM

## 2025-01-29 DIAGNOSIS — E78.2 MIXED HYPERLIPIDEMIA: ICD-10-CM

## 2025-01-29 PROCEDURE — 99214 OFFICE O/P EST MOD 30 MIN: CPT | Performed by: FAMILY MEDICINE

## 2025-01-29 RX ORDER — OLANZAPINE 5 MG/1
5 TABLET ORAL
Qty: 30 TABLET | Refills: 1 | Status: SHIPPED | OUTPATIENT
Start: 2025-01-29

## 2025-01-29 NOTE — PROGRESS NOTES
Name: Harlan Dowell      : 1990      MRN: [id number]  Encounter Provider: Aldo Calixto MD  Encounter Date: 2025   Encounter department: [facility]  :  Assessment & Plan  Acne vulgaris  Patient is stable  and will continue present plan of care and reassess at next routine visit. All questions about this problem from patient were answered today.        Major depressive disorder with single episode, in remission (HCC)  Patient to continue utilizing medical therapy as well and counseling sources as applicable for condition. If  suicidal thought or fear of suicide to contact 911 and seek help immediately. Meds reviewed and patient questions answered today   Orders:  •  Ambulatory referral to Psych Services; Future    Obesity (BMI 30-39.9)  Patient to increase exercise and partake of a diet with less calories to promote  weight loss        Obsessive-compulsive disorder, unspecified type  Patient is stable  and will continue present plan of care and reassess at next routine visit. All questions about this problem from patient were answered today.   Orders:  •  OLANZapine (ZyPREXA) 5 mg tablet; Take 1 tablet (5 mg total) by mouth daily at bedtime    Moderate obstructive sleep apnea  Patient instructed to continue using CPAP as directed to decrease episodes of apnea to minimize risk of cardiac complications. Pt instructed to kep  machine in clean working order and to call if any replacement parts are needed.        Essential hypertension  Patient is stable with current anti-hypertensive medicine and continue to follow a low sodium diet and take current medication. All questions about this condition were answered today.        Mixed hyperlipidemia  Patient  is stable with current medication and we discussed a low fat low cholesterol diet. Weight loss also discussed for this will help lower cholesterol also. Recheck lipids in 6 months.        Gastroesophageal reflux disease without  esophagitis  Patient to continue with present therapy and decrease caffeine, avoid ETOH and smoking to decrease acid production. Pt should also cease eating prior to bedtime and avoid excessive fluid intake prior to sleep. May use antacids as needed for breakthrough GERD. All pateint questions answered today about this condition.        Paranoia (HCC)    Orders:  •  OLANZapine (ZyPREXA) 5 mg tablet; Take 1 tablet (5 mg total) by mouth daily at bedtime  •  Ambulatory referral to Psych Services; Future           History of Present Illness   34-year-old male here today for checkup on OCD.  Patient states that OCD is pretty much unchanged however he still has some paranoia he has been on Abilify with his Luvox and his Xanax the Xanax is helping with the anxiety but he still having some paranoia at this time decided working to see about having him see a psychiatrist however I think it is can be a while for his daily 6 weeks he is doing with this medication titrate [facility] dissipate is going to be somebody.      Review of Systems   Constitutional:  Negative for activity change, appetite change, chills, fatigue, fever and unexpected weight change.   HENT:  Negative for congestion, ear pain, hearing loss, mouth sores, postnasal drip, sinus pressure, sinus pain, sneezing and sore throat.    Respiratory:  Negative for apnea, cough, shortness of breath and wheezing.    Cardiovascular:  Negative for chest pain, palpitations and leg swelling.   Gastrointestinal:  Negative for abdominal pain, constipation, diarrhea, nausea and vomiting.   Endocrine: Negative for cold intolerance and heat intolerance.   Genitourinary:  Negative for dysuria, frequency and hematuria.   Musculoskeletal:  Negative for arthralgias, back pain, gait problem, joint swelling and neck pain.   Skin:  Negative for rash.   Neurological:  Negative for dizziness, weakness and numbness.   Hematological:  Does not bruise/bleed easily.   Psychiatric/Behavioral:   "Negative for agitation, behavioral problems, confusion, hallucinations and sleep disturbance. The patient is nervous/anxious.        Objective   /76 (BP Location: Left arm, Patient Position: Sitting, Cuff Size: Large)   Pulse 75   Temp 98.8 °F (37.1 °C)   Resp 18   Ht 5' 6\" (1.676 m)   Wt 129 kg (285 lb)   SpO2 96%   BMI 46.00 kg/m²      Physical Exam  Constitutional:       Appearance: He is well-developed. He is obese.   HENT:      Head: Normocephalic and atraumatic.      Right Ear: External ear normal.      Left Ear: External ear normal.      Nose: Nose normal.      Mouth/Throat:      Pharynx: No oropharyngeal exudate.   Eyes:      General: No scleral icterus.     Conjunctiva/sclera: Conjunctivae normal.   Pulmonary:      Effort: Pulmonary effort is normal.   Musculoskeletal:      Cervical back: Normal range of motion and neck supple.   Skin:     General: Skin is warm and dry.      Findings: No erythema or rash.   Neurological:      Mental Status: He is alert and oriented to person, place, and time. Mental status is at baseline.   Psychiatric:         Mood and Affect: Mood normal.         Behavior: Behavior normal.         Thought Content: Thought content normal.         Judgment: Judgment normal.         "

## 2025-01-29 NOTE — ASSESSMENT & PLAN NOTE
Patient to continue utilizing medical therapy as well and counseling sources as applicable for condition. If  suicidal thought or fear of suicide to contact 911 and seek help immediately. Meds reviewed and patient questions answered today   Orders:  •  Ambulatory referral to Psych Services; Future

## 2025-01-29 NOTE — ASSESSMENT & PLAN NOTE
Patient is stable  and will continue present plan of care and reassess at next routine visit. All questions about this problem from patient were answered today.   Orders:  •  OLANZapine (ZyPREXA) 5 mg tablet; Take 1 tablet (5 mg total) by mouth daily at bedtime

## 2025-01-30 ENCOUNTER — TELEPHONE (OUTPATIENT)
Age: 35
End: 2025-01-30

## 2025-01-30 NOTE — TELEPHONE ENCOUNTER
ASAP Referral.  Called Pt in an attempt to verify services needed/interested, and advise of current wait list.  No answer, lvm for patient to call back at [phone].    1st attempt.

## 2025-01-31 NOTE — TELEPHONE ENCOUNTER
ASAP Referral.  Called Pt in an attempt to verify services needed/interested, and advise of current wait list.  No answer, lvm for patient to call back at [phone].     2nd attempt.

## 2025-02-03 ENCOUNTER — TELEPHONE (OUTPATIENT)
Age: 35
End: 2025-02-03

## 2025-02-03 NOTE — TELEPHONE ENCOUNTER
Contacted PT. in regards to ASAP/STAT Referral, LVM to contact [phone] option 3, to discuss Services needed at this time in attempts to schedule NP appt.    Pt can be sent to Intake Line for Scheduling availability    [facility]  [phone]  Verified with [facility]

## 2025-02-25 DIAGNOSIS — F42.9 OBSESSIVE-COMPULSIVE DISORDER, UNSPECIFIED TYPE: ICD-10-CM

## 2025-02-26 RX ORDER — ARIPIPRAZOLE 10 MG/1
10 TABLET ORAL DAILY
Qty: 30 TABLET | Refills: 1 | Status: SHIPPED | OUTPATIENT
Start: 2025-02-26

## 2025-02-27 ENCOUNTER — TELEPHONE (OUTPATIENT)
Dept: FAMILY MEDICINE CLINIC | Facility: CLINIC | Age: 35
End: 2025-02-27

## 2025-02-27 NOTE — TELEPHONE ENCOUNTER
PA Aripiprazole (ABILIFY) 10 mg SUBMITTED     to [facility]     via    []CMM-KEY:    [x]Surescripts-Case ID # [id number]   []Availity-Auth ID #  NDC #    []Faxed to plan    []Other website    []Phone call Case ID #      []PA sent as URGENT    All office notes, labs and other pertaining documents and studies sent. Clinical questions answered. Awaiting determination from insurance company.     Turnaround time for your insurance to make a decision on your Prior Authorization can take 7-21 business days.

## 2025-02-28 NOTE — TELEPHONE ENCOUNTER
PA Aripiprazole (ABILIFY) 10 mg APPROVED         Patient advised by          []MyChart Message  []Phone call   [x]LMOM  []L/M to call office as no active Communication consent on file  []Unable to leave detailed message as VM not approved on Communication consent       Pharmacy advised by    [x]Fax  []Phone call    Specialty Pharmacy    []

## 2025-03-16 DIAGNOSIS — F42.9 OBSESSIVE-COMPULSIVE DISORDER, UNSPECIFIED TYPE: ICD-10-CM

## 2025-03-17 RX ORDER — FLUVOXAMINE MALEATE 150 MG/1
CAPSULE, EXTENDED RELEASE ORAL
Qty: 60 CAPSULE | Refills: 1 | Status: SHIPPED | OUTPATIENT
Start: 2025-03-17

## 2025-03-27 DIAGNOSIS — J30.9 ALLERGIC RHINITIS, UNSPECIFIED SEASONALITY, UNSPECIFIED TRIGGER: ICD-10-CM

## 2025-03-27 RX ORDER — MONTELUKAST SODIUM 10 MG/1
10 TABLET ORAL DAILY
Qty: 90 TABLET | Refills: 1 | Status: SHIPPED | OUTPATIENT
Start: 2025-03-27

## 2025-03-28 DIAGNOSIS — F22 PARANOIA (HCC): ICD-10-CM

## 2025-03-28 DIAGNOSIS — F42.9 OBSESSIVE-COMPULSIVE DISORDER, UNSPECIFIED TYPE: ICD-10-CM

## 2025-03-28 DIAGNOSIS — F32.5 MAJOR DEPRESSIVE DISORDER WITH SINGLE EPISODE, IN REMISSION (HCC): ICD-10-CM

## 2025-03-28 RX ORDER — OLANZAPINE 5 MG/1
5 TABLET ORAL
Qty: 30 TABLET | Refills: 5 | Status: SHIPPED | OUTPATIENT
Start: 2025-03-28

## 2025-03-28 RX ORDER — BUPROPION HYDROCHLORIDE 150 MG/1
TABLET ORAL
Qty: 90 TABLET | Refills: 1 | Status: SHIPPED | OUTPATIENT
Start: 2025-03-28

## 2025-03-31 DIAGNOSIS — E78.2 MIXED HYPERLIPIDEMIA: ICD-10-CM

## 2025-03-31 RX ORDER — ATORVASTATIN CALCIUM 10 MG/1
10 TABLET, FILM COATED ORAL DAILY
Qty: 90 TABLET | Refills: 1 | Status: SHIPPED | OUTPATIENT
Start: 2025-03-31

## 2025-05-16 DIAGNOSIS — F42.9 OBSESSIVE-COMPULSIVE DISORDER, UNSPECIFIED TYPE: ICD-10-CM

## 2025-05-18 RX ORDER — FLUVOXAMINE MALEATE 150 MG/1
CAPSULE, EXTENDED RELEASE ORAL
Qty: 60 CAPSULE | Refills: 1 | Status: SHIPPED | OUTPATIENT
Start: 2025-05-18

## 2025-05-27 DIAGNOSIS — I10 ESSENTIAL HYPERTENSION: ICD-10-CM

## 2025-05-27 DIAGNOSIS — M10.9 ACUTE GOUT INVOLVING TOE, UNSPECIFIED CAUSE, UNSPECIFIED LATERALITY: ICD-10-CM

## 2025-05-27 RX ORDER — LISINOPRIL 10 MG/1
10 TABLET ORAL DAILY
Qty: 90 TABLET | Refills: 1 | Status: SHIPPED | OUTPATIENT
Start: 2025-05-27

## 2025-05-27 RX ORDER — ALLOPURINOL 300 MG/1
300 TABLET ORAL DAILY
Qty: 90 TABLET | Refills: 1 | Status: SHIPPED | OUTPATIENT
Start: 2025-05-27

## 2025-05-29 ENCOUNTER — TELEPHONE (OUTPATIENT)
Dept: FAMILY MEDICINE CLINIC | Facility: CLINIC | Age: 35
End: 2025-05-29

## 2025-05-29 NOTE — TELEPHONE ENCOUNTER
Left message for patient to call the office back to reschedule 6/4/25 appointment with dr mendoza as provider will not be available

## 2025-05-30 DIAGNOSIS — K21.9 GASTROESOPHAGEAL REFLUX DISEASE: ICD-10-CM

## 2025-05-31 RX ORDER — OMEPRAZOLE 40 MG/1
40 CAPSULE, DELAYED RELEASE ORAL DAILY
Qty: 90 CAPSULE | Refills: 1 | Status: SHIPPED | OUTPATIENT
Start: 2025-05-31

## 2025-06-22 DIAGNOSIS — F41.9 ANXIETY: ICD-10-CM

## 2025-06-23 RX ORDER — ALPRAZOLAM 0.5 MG
TABLET ORAL
Qty: 90 TABLET | Refills: 1 | Status: SHIPPED | OUTPATIENT
Start: 2025-06-23

## 2025-07-17 DIAGNOSIS — F42.9 OBSESSIVE-COMPULSIVE DISORDER, UNSPECIFIED TYPE: ICD-10-CM

## 2025-07-18 RX ORDER — FLUVOXAMINE MALEATE 150 MG/1
CAPSULE, EXTENDED RELEASE ORAL
Qty: 60 CAPSULE | Refills: 1 | Status: SHIPPED | OUTPATIENT
Start: 2025-07-18

## (undated) DEVICE — MEDI-VAC YANKAUER SUCTION HANDLE: Brand: CARDINAL HEALTH

## (undated) DEVICE — SUT ETHILON 3-0 FSL 30 IN 1671H

## (undated) DEVICE — 3000CC GUARDIAN II: Brand: GUARDIAN

## (undated) DEVICE — PACK GENERAL LF

## (undated) DEVICE — TIBURON EXTREMITY SHEET: Brand: CONVERTORS

## (undated) DEVICE — GLOVE INDICATOR PI UNDERGLOVE SZ 8 BLUE

## (undated) DEVICE — 60 ML SYRINGE,REGULAR TIP: Brand: MONOJECT

## (undated) DEVICE — TUBING BUBBLE CLEAR 5MM X 100 FT NS

## (undated) DEVICE — SINGLE-USE BIOPSY FORCEPS: Brand: RADIAL JAW 4

## (undated) DEVICE — 3M™ STERI-STRIP™ REINFORCED ADHESIVE SKIN CLOSURES, R1547, 1/2 IN X 4 IN (12 MM X 100 MM), 6 STRIPS/ENVELOPE: Brand: 3M™ STERI-STRIP™

## (undated) DEVICE — CURITY NON-ADHERENT STRIPS: Brand: CURITY

## (undated) DEVICE — "MH-438 A/W VLVE F/140 EVIS-140": Brand: AIR/WATER VALVE

## (undated) DEVICE — TRAVELKIT CONTAINS FIRST STEP KIT (200ML EP-4 KIT) AND SOILED SCOPE BAG - 1 KIT: Brand: TRAVELKIT CONTAINS FIRST STEP KIT AND SOILED SCOPE BAG

## (undated) DEVICE — STRETCH BANDAGE: Brand: CURITY

## (undated) DEVICE — PRECISION (9.0 X 0.51 X 18.5MM)

## (undated) DEVICE — REM POLYHESIVE ADULT PATIENT RETURN ELECTRODE: Brand: VALLEYLAB

## (undated) DEVICE — POV-IOD SOLUTION 4OZ BT

## (undated) DEVICE — GLOVE SRG BIOGEL 7

## (undated) DEVICE — BITE BLOCK ENDO 60FR ADLT MAXI  DISP W/STRAP

## (undated) DEVICE — 1200CC GUARDIAN II: Brand: GUARDIAN

## (undated) DEVICE — AIRLIFE™  ADULT CUSHION NASAL CANNULA WITH 7 FOOT (2.1 M) CRUSH-RESISTANT OXYGEN TUBING, AND U/CONNECT-IT ADAPTER: Brand: AIRLIFE™

## (undated) DEVICE — SUT ETHILON 4-0 PS-2 18 IN 1667G

## (undated) DEVICE — "MB-142 MOUTHPIECE": Brand: MOUTHPIECE

## (undated) DEVICE — LUBRICANT SURGILUBE TUBE 4 OZ  FLIP TOP

## (undated) DEVICE — LABEL MEDICATION STERILE 2 YELLOW LIDOCAINE 2 BLUE MARCAINE 2 ORANGE HEPARIN

## (undated) DEVICE — SPLINT COMFORT 5 X 30

## (undated) DEVICE — LABEL STERILE (2- POVIDONE IODINE PAINT 2 -POVIDONE IODINE SCRUB 2- CHLOHEXIDINE MOUTHWASH 4 -BLANK MED/SOL

## (undated) DEVICE — SYRINGE 10ML LL CONTROL TOP

## (undated) DEVICE — BASIC DOUBLE BASIN 2-LF: Brand: MEDLINE INDUSTRIES, INC.

## (undated) DEVICE — STOCKINETTE,IMPERVIOUS,12X48,STERILE: Brand: MEDLINE

## (undated) DEVICE — "MAJ-901 WATER CONTAINER SET CV-160/140": Brand: WATER CONTAINER

## (undated) DEVICE — SUT CHROMIC 3-0 SH 27 IN G122H

## (undated) DEVICE — "MH-443 SUCTION VALVE F/EVIS140 EVIS160": Brand: SUCTION VALVE

## (undated) DEVICE — ACE WRAP 3 IN STERILE

## (undated) DEVICE — DISPOSABLE BIOPSY VALVE MAJ-1555: Brand: SINGLE USE BIOPSY VALVE (STERILE)

## (undated) DEVICE — NEEDLE 25GA X 1 IN SAFETY GLIDE

## (undated) DEVICE — BRUSH ENDO CLEANING DBL-HEADER

## (undated) DEVICE — ASTOUND STANDARD SURGICAL GOWN, XL: Brand: CONVERTORS

## (undated) DEVICE — CHLORAPREP HI-LITE 26ML ORANGE

## (undated) DEVICE — CUFF TOURNIQUET 24 X 4 IN QUICK CONNECT DISP 1BLA

## (undated) DEVICE — GLOVE INDICATOR PI UNDERGLOVE SZ 7 BLUE

## (undated) DEVICE — GLOVE SRG BIOGEL 7.5

## (undated) DEVICE — DRAPE SHEET THREE QUARTER

## (undated) DEVICE — GAUZE SPONGES,16 PLY: Brand: CURITY

## (undated) DEVICE — INTENDED FOR TISSUE SEPARATION, AND OTHER PROCEDURES THAT REQUIRE A SHARP SURGICAL BLADE TO PUNCTURE OR CUT.: Brand: BARD-PARKER ® CARBON RIB-BACK BLADES

## (undated) DEVICE — GLOVE EXAM NON-STRL NTRL PLUS LRG PF

## (undated) DEVICE — SOLIDIFIER FLUID WASTE CONTROL 1500ML